# Patient Record
Sex: FEMALE | Race: WHITE | NOT HISPANIC OR LATINO | Employment: PART TIME | URBAN - METROPOLITAN AREA
[De-identification: names, ages, dates, MRNs, and addresses within clinical notes are randomized per-mention and may not be internally consistent; named-entity substitution may affect disease eponyms.]

---

## 2023-03-14 ENCOUNTER — TELEPHONE (OUTPATIENT)
Dept: PRIMARY CARE | Facility: CLINIC | Age: 73
End: 2023-03-14

## 2023-03-14 NOTE — TELEPHONE ENCOUNTER
"Pt called stating she has radiation scheduled for next week ( oncology) and feels very anxious about it; she is hoping to get something called in \"to take the edge off\". Her oncologist told her to call her PCP. Please advise.   "

## 2023-03-15 DIAGNOSIS — F41.9 ANXIETY: Primary | ICD-10-CM

## 2023-03-16 RX ORDER — ALPRAZOLAM 0.25 MG/1
0.25 TABLET ORAL EVERY 8 HOURS PRN
Qty: 21 TABLET | Refills: 0 | Status: SHIPPED | OUTPATIENT
Start: 2023-03-16 | End: 2024-02-02 | Stop reason: ALTCHOICE

## 2023-07-25 LAB — THYROTROPIN (MIU/L) IN SER/PLAS BY DETECTION LIMIT <= 0.05 MIU/L: 3.06 MIU/L (ref 0.44–3.98)

## 2023-08-08 LAB
ERYTHROCYTE DISTRIBUTION WIDTH (RATIO) BY AUTOMATED COUNT: 14.3 % (ref 11.5–14.5)
ERYTHROCYTE MEAN CORPUSCULAR HEMOGLOBIN CONCENTRATION (G/DL) BY AUTOMATED: 30.6 G/DL (ref 32–36)
ERYTHROCYTE MEAN CORPUSCULAR VOLUME (FL) BY AUTOMATED COUNT: 96 FL (ref 80–100)
ERYTHROCYTES (10*6/UL) IN BLOOD BY AUTOMATED COUNT: 4.23 X10E12/L (ref 4–5.2)
HEMATOCRIT (%) IN BLOOD BY AUTOMATED COUNT: 40.8 % (ref 36–46)
HEMOGLOBIN (G/DL) IN BLOOD: 12.5 G/DL (ref 12–16)
LEUKOCYTES (10*3/UL) IN BLOOD BY AUTOMATED COUNT: 6.2 X10E9/L (ref 4.4–11.3)
PLATELETS (10*3/UL) IN BLOOD AUTOMATED COUNT: 312 X10E9/L (ref 150–450)

## 2023-08-09 LAB
ALANINE AMINOTRANSFERASE (SGPT) (U/L) IN SER/PLAS: 22 U/L (ref 7–45)
ALBUMIN (G/DL) IN SER/PLAS: 4.2 G/DL (ref 3.4–5)
ALKALINE PHOSPHATASE (U/L) IN SER/PLAS: 127 U/L (ref 33–136)
ANION GAP IN SER/PLAS: 12 MMOL/L (ref 10–20)
ASPARTATE AMINOTRANSFERASE (SGOT) (U/L) IN SER/PLAS: 26 U/L (ref 9–39)
BILIRUBIN TOTAL (MG/DL) IN SER/PLAS: 0.3 MG/DL (ref 0–1.2)
CALCIUM (MG/DL) IN SER/PLAS: 10 MG/DL (ref 8.6–10.3)
CARBON DIOXIDE, TOTAL (MMOL/L) IN SER/PLAS: 30 MMOL/L (ref 21–32)
CHLORIDE (MMOL/L) IN SER/PLAS: 100 MMOL/L (ref 98–107)
CREATININE (MG/DL) IN SER/PLAS: 0.71 MG/DL (ref 0.5–1.05)
GFR FEMALE: >90 ML/MIN/1.73M2
GLUCOSE (MG/DL) IN SER/PLAS: 83 MG/DL (ref 74–99)
POTASSIUM (MMOL/L) IN SER/PLAS: 4.2 MMOL/L (ref 3.5–5.3)
PROTEIN TOTAL: 6.9 G/DL (ref 6.4–8.2)
SODIUM (MMOL/L) IN SER/PLAS: 138 MMOL/L (ref 136–145)
UREA NITROGEN (MG/DL) IN SER/PLAS: 28 MG/DL (ref 6–23)

## 2023-08-21 ENCOUNTER — HOSPITAL ENCOUNTER (OUTPATIENT)
Dept: DATA CONVERSION | Facility: HOSPITAL | Age: 73
End: 2023-08-21
Attending: OTOLARYNGOLOGY | Admitting: OTOLARYNGOLOGY
Payer: COMMERCIAL

## 2023-08-21 DIAGNOSIS — M79.89 OTHER SPECIFIED SOFT TISSUE DISORDERS: ICD-10-CM

## 2023-08-21 DIAGNOSIS — K08.89 OTHER SPECIFIED DISORDERS OF TEETH AND SUPPORTING STRUCTURES: ICD-10-CM

## 2023-08-21 DIAGNOSIS — L90.5 SCAR CONDITIONS AND FIBROSIS OF SKIN: ICD-10-CM

## 2023-08-21 DIAGNOSIS — C06.9 MALIGNANT NEOPLASM OF MOUTH, UNSPECIFIED (MULTI): ICD-10-CM

## 2023-08-22 LAB
AFB CULTURE: NORMAL
AFB STAIN: NORMAL

## 2023-09-29 VITALS — HEIGHT: 66 IN | BODY MASS INDEX: 19.7 KG/M2 | WEIGHT: 122.58 LBS

## 2023-10-01 PROBLEM — J10.1 INFLUENZA B: Status: ACTIVE | Noted: 2023-10-01

## 2023-10-01 PROBLEM — M79.672 LEFT FOOT PAIN: Status: ACTIVE | Noted: 2023-10-01

## 2023-10-01 PROBLEM — K13.70 MOUTH LESION: Status: ACTIVE | Noted: 2023-10-01

## 2023-10-01 PROBLEM — R29.898 ANKLE WEAKNESS: Status: ACTIVE | Noted: 2023-10-01

## 2023-10-01 PROBLEM — M17.12 PRIMARY OSTEOARTHRITIS OF LEFT KNEE: Status: ACTIVE | Noted: 2023-10-01

## 2023-10-01 PROBLEM — L03.90 CELLULITIS: Status: ACTIVE | Noted: 2023-10-01

## 2023-10-01 PROBLEM — K12.1 OTHER FORMS OF STOMATITIS: Status: ACTIVE | Noted: 2023-10-01

## 2023-10-01 PROBLEM — E03.9 ACQUIRED HYPOTHYROIDISM: Status: ACTIVE | Noted: 2023-10-01

## 2023-10-01 PROBLEM — T66.XXXA ADVERSE EFFECT OF RADIATION: Status: ACTIVE | Noted: 2023-10-01

## 2023-10-01 PROBLEM — L08.9 WOUND INFECTION: Status: ACTIVE | Noted: 2023-10-01

## 2023-10-01 PROBLEM — E04.1 THYROID NODULE: Status: ACTIVE | Noted: 2023-10-01

## 2023-10-01 PROBLEM — R11.2 NAUSEA AND/OR VOMITING: Status: ACTIVE | Noted: 2023-05-05

## 2023-10-01 PROBLEM — R25.2 TRISMUS: Status: ACTIVE | Noted: 2023-10-01

## 2023-10-01 PROBLEM — C02.3 CANCER OF ANTERIOR TWO-THIRDS OF TONGUE (MULTI): Status: ACTIVE | Noted: 2023-10-01

## 2023-10-01 PROBLEM — K92.2 GI BLEED: Status: ACTIVE | Noted: 2023-10-01

## 2023-10-01 PROBLEM — F41.9 ANXIETY: Status: ACTIVE | Noted: 2023-10-01

## 2023-10-01 PROBLEM — R13.10 DYSPHAGIA: Status: ACTIVE | Noted: 2023-10-01

## 2023-10-01 PROBLEM — D64.9 ANEMIA: Status: ACTIVE | Noted: 2023-10-01

## 2023-10-01 PROBLEM — J98.8 AIRWAY OBSTRUCTION: Status: ACTIVE | Noted: 2023-10-01

## 2023-10-01 PROBLEM — K21.9 ESOPHAGEAL REFLUX: Status: ACTIVE | Noted: 2023-10-01

## 2023-10-01 PROBLEM — J06.9 VIRAL UPPER RESPIRATORY TRACT INFECTION: Status: ACTIVE | Noted: 2023-10-01

## 2023-10-01 PROBLEM — M27.2 OSTEORADIONECROSIS OF JAW: Status: ACTIVE | Noted: 2023-10-01

## 2023-10-01 PROBLEM — R06.02 SHORTNESS OF BREATH: Status: ACTIVE | Noted: 2023-10-01
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
## 2023-10-01 PROBLEM — K14.8 TONGUE LESION: Status: ACTIVE | Noted: 2023-10-01

## 2023-10-01 PROBLEM — K12.1 ULCERATION, ORAL MUCOSA: Status: ACTIVE | Noted: 2023-10-01

## 2023-10-01 PROBLEM — K06.8 GUM LESION: Status: ACTIVE | Noted: 2023-10-01

## 2023-10-01 PROBLEM — R09.81 SINUS CONGESTION: Status: ACTIVE | Noted: 2023-10-01

## 2023-10-01 PROBLEM — E78.5 HLD (HYPERLIPIDEMIA): Status: ACTIVE | Noted: 2023-10-01

## 2023-10-01 PROBLEM — M25.672 STIFFNESS OF LEFT ANKLE JOINT: Status: ACTIVE | Noted: 2023-10-01

## 2023-10-01 PROBLEM — T14.8XXA WOUND INFECTION: Status: ACTIVE | Noted: 2023-10-01

## 2023-10-01 PROBLEM — C06.9 MALIGNANT NEOPLASM OF ORAL CAVITY (MULTI): Status: ACTIVE | Noted: 2023-10-01

## 2023-10-01 PROBLEM — C03.1: Status: ACTIVE | Noted: 2023-10-01

## 2023-10-01 PROBLEM — R92.8 ABNORMAL MAMMOGRAM: Status: ACTIVE | Noted: 2023-10-01

## 2023-10-01 PROBLEM — G47.00 INSOMNIA: Status: ACTIVE | Noted: 2023-10-01

## 2023-10-01 PROBLEM — L02.01 ABSCESS OF FACE: Status: ACTIVE | Noted: 2023-10-01

## 2023-10-01 PROBLEM — R07.89 CHEST TIGHTNESS: Status: ACTIVE | Noted: 2023-02-17

## 2023-10-01 PROBLEM — C02.9 MALIGNANT NEOPLASM OF TONGUE (MULTI): Status: ACTIVE | Noted: 2023-10-01

## 2023-10-01 RX ORDER — ASPIRIN 325 MG
TABLET ORAL
COMMUNITY
End: 2023-10-24 | Stop reason: ALTCHOICE

## 2023-10-01 RX ORDER — SODIUM CHLORIDE 0.9 G/100ML
IRRIGANT IRRIGATION
COMMUNITY
Start: 2023-02-17 | End: 2023-10-24 | Stop reason: ALTCHOICE

## 2023-10-01 RX ORDER — CHLORHEXIDINE GLUCONATE 40 MG/ML
SOLUTION TOPICAL
COMMUNITY
Start: 2023-01-30 | End: 2023-10-24 | Stop reason: ALTCHOICE

## 2023-10-01 RX ORDER — HYDROXYZINE HYDROCHLORIDE 10 MG/1
TABLET, FILM COATED ORAL
COMMUNITY
Start: 2023-02-23 | End: 2023-10-24 | Stop reason: ALTCHOICE

## 2023-10-01 RX ORDER — ONDANSETRON 8 MG/1
TABLET, ORALLY DISINTEGRATING ORAL
COMMUNITY
Start: 2023-03-28 | End: 2023-10-24 | Stop reason: ALTCHOICE

## 2023-10-01 RX ORDER — DOXYCYCLINE 100 MG/1
CAPSULE ORAL
COMMUNITY
Start: 2023-03-16 | End: 2023-10-24 | Stop reason: ALTCHOICE

## 2023-10-01 RX ORDER — OXYCODONE HCL 5 MG/5 ML
SOLUTION, ORAL ORAL
COMMUNITY
Start: 2023-02-13 | End: 2023-10-24 | Stop reason: ALTCHOICE

## 2023-10-01 RX ORDER — HYDROCODONE BITARTRATE AND ACETAMINOPHEN 7.5; 325 MG/1; MG/1
TABLET ORAL
COMMUNITY
Start: 2023-02-21 | End: 2023-10-24 | Stop reason: ALTCHOICE

## 2023-10-01 RX ORDER — ONDANSETRON 4 MG/1
TABLET, FILM COATED ORAL
COMMUNITY
Start: 2023-02-14 | End: 2023-10-24 | Stop reason: ALTCHOICE

## 2023-10-01 RX ORDER — MAGNESIUM HYDROXIDE 2400 MG/10ML
SUSPENSION ORAL
COMMUNITY
End: 2023-10-24 | Stop reason: ALTCHOICE

## 2023-10-01 RX ORDER — PETROLATUM 1 G/G
OINTMENT TOPICAL
COMMUNITY
End: 2023-10-24 | Stop reason: ALTCHOICE

## 2023-10-01 RX ORDER — TRAZODONE HYDROCHLORIDE 100 MG/1
TABLET ORAL
COMMUNITY
Start: 2023-01-20 | End: 2023-10-24 | Stop reason: ALTCHOICE

## 2023-10-01 RX ORDER — ESOMEPRAZOLE MAGNESIUM 20 MG/1
GRANULE, DELAYED RELEASE ORAL
COMMUNITY
Start: 2023-02-27 | End: 2023-10-24 | Stop reason: ALTCHOICE

## 2023-10-01 RX ORDER — GENTAMICIN SULFATE 1 MG/G
CREAM TOPICAL
COMMUNITY
Start: 2023-03-16 | End: 2023-10-24 | Stop reason: ALTCHOICE

## 2023-10-01 RX ORDER — CIPROFLOXACIN 500 MG/1
TABLET ORAL
COMMUNITY
Start: 2023-03-30 | End: 2023-10-24 | Stop reason: ALTCHOICE

## 2023-10-01 RX ORDER — HYDROCODONE BITARTRATE AND ACETAMINOPHEN 5; 325 MG/1; MG/1
TABLET ORAL
COMMUNITY
Start: 2023-01-25 | End: 2023-10-24 | Stop reason: ALTCHOICE

## 2023-10-01 RX ORDER — CEPHALEXIN 250 MG/5ML
POWDER, FOR SUSPENSION ORAL
COMMUNITY
Start: 2023-03-08 | End: 2023-10-24 | Stop reason: ALTCHOICE

## 2023-10-01 RX ORDER — CHLORHEXIDINE GLUCONATE ORAL RINSE 1.2 MG/ML
SOLUTION DENTAL
COMMUNITY
Start: 2023-03-16 | End: 2023-10-24 | Stop reason: ALTCHOICE

## 2023-10-01 RX ORDER — OXYCODONE HYDROCHLORIDE 5 MG/1
TABLET ORAL
COMMUNITY
Start: 2023-04-24 | End: 2023-10-24 | Stop reason: ALTCHOICE

## 2023-10-01 RX ORDER — SODIUM CHLORIDE FOR INHALATION 0.9 %
VIAL, NEBULIZER (ML) INHALATION
COMMUNITY
Start: 2023-02-13 | End: 2023-10-24 | Stop reason: ALTCHOICE

## 2023-10-01 NOTE — OP NOTE
PROCEDURE DETAILS    Preoperative Diagnosis:  1. Tissue thinning and fibrosis around mandibular plate  2. Loose left mandibular incisor   Postoperative Diagnosis:  1. Tissue thinning and fibrosis around mandibular plate  2. Loose left mandibular incisor   Surgeon: Dr. Fermin MD  Resident/Fellow/Other Assistant: Kamran Merino DO    Procedure:  1. Mandibular debridement   2. Right lower chin wound revision with local tissue rearrangement   3. Dental extraction   Anesthesia: General  Estimated Blood Loss: 2 cc  Findings: See operative report        Operative Report:   Indications:   Ms. 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 In order to address  her devitalized tooth, impending plate exposure and scar, I recommend taking her to the OR for a scar revision  and tooth extraction. Discussed the inherent risks, benefits and alternatives with the patient including but not limited to risk of bleeding,  infection, scarring, damage to surrounding structures, wound breakdown and need for further procedure. She verbalized understanding and agrees to proceed.     Description of procedure: Patient was brought back to the operating room and placed supine on the table with SCDs on and functioning. A huddle was performed with Dr. Zee present. She was turned over to anesthesia for proper induction via general anesthesia  and orotracheal intubation via fiberoptic scope. Once induced. She was turned 90 degrees from anesthesia. The incision was marked out and infiltrated with approximately 2 cc of 1% lidocaine with 1:100,000 epinephrine. She was then prepped and draped in  the usual sterile fashion.     Using a #15 blade full thickness skin incision was made on the previously marked area down through skin and subcutaneous tissue. There was fat necrosis present and thinning of the dermis around the mandibular  plate. The skin and underlying fat necrosis  were excised completely. The using hook retractors and #15 blade we undermined circumferentially to allow laxity of the surrounding tissues for maximal tension free closure. Deep dermal layer was closed using 3-0 chromic suture. The skin was then closed  using 5-0 prolene suture in a simple interrupted fashion. This was then covered with bacitracin.     Attention was then turned to the mouth. Dental extraction forceps were used to grasp the devitalized left mandibular incisor and gentle retraction was provided in a figure of 8 fashion until the entire tooth root was removed from the alveolar socket.  Rongeur was used to remove any remaining bone within the socket which was then irrigated with normal saline.     Patient was then turned over to anesthesia for proper awakening and taken to PACU in good condition without complication    Dr. Christensen was present and scrubbed for the entirety of the case                         Attestation:   Note Completion:  Attending Attestation I was present for the entire procedure          Electronic Signatures:  John Merino (DO (Resident))  (Signed 21-Aug-2023 13:48)   Authored: Post-Operative Note, Chart Review  Brandy Christensen)  (Signed 31-Aug-2023 08:58)   Authored: Note Completion   Co-Signer: Post-Operative Note, Chart Review      Last Updated: 31-Aug-2023 08:58 by Brandy Christensen)

## 2023-10-04 ENCOUNTER — APPOINTMENT (OUTPATIENT)
Dept: PHYSICAL THERAPY | Facility: CLINIC | Age: 73
End: 2023-10-04
Payer: COMMERCIAL

## 2023-10-10 ENCOUNTER — TREATMENT (OUTPATIENT)
Dept: PHYSICAL THERAPY | Facility: CLINIC | Age: 73
End: 2023-10-10
Payer: COMMERCIAL

## 2023-10-10 DIAGNOSIS — R29.898 OTHER SYMPTOMS AND SIGNS INVOLVING THE MUSCULOSKELETAL SYSTEM: ICD-10-CM

## 2023-10-10 DIAGNOSIS — M25.672 STIFFNESS OF LEFT ANKLE, NOT ELSEWHERE CLASSIFIED: ICD-10-CM

## 2023-10-10 DIAGNOSIS — M25.672 STIFFNESS OF LEFT ANKLE JOINT: ICD-10-CM

## 2023-10-10 DIAGNOSIS — R29.898 ANKLE WEAKNESS: Primary | ICD-10-CM

## 2023-10-10 DIAGNOSIS — M79.672 LEFT FOOT PAIN: ICD-10-CM

## 2023-10-10 DIAGNOSIS — M79.672 PAIN IN LEFT FOOT: ICD-10-CM

## 2023-10-10 PROCEDURE — 97140 MANUAL THERAPY 1/> REGIONS: CPT | Mod: GP

## 2023-10-10 PROCEDURE — 97110 THERAPEUTIC EXERCISES: CPT | Mod: GP

## 2023-10-10 ASSESSMENT — ENCOUNTER SYMPTOMS
OCCASIONAL FEELINGS OF UNSTEADINESS: 0
LOSS OF SENSATION IN FEET: 0
DEPRESSION: 0

## 2023-10-10 NOTE — PROGRESS NOTES
Physical Therapy    Physical Therapy Treatment    Patient Name: Brigette Romo  MRN: [id number]  Today's Date: 10/11/2023  Time Calculation  Start Time: 1430  Stop Time: 1515  Time Calculation (min): 45 min  Visit number: 8 of 10   [employer] Medicare   Medicare Certification Period: Beginnin2023 Ending: 10/ 19/ 2023      Assessment:   Pt demod limited response to DN this date. Pt will benefit from continued AAROM/AROM at home this week to help the scar tissue continue to move  to end range and reduce restriction.     Plan:   Continue per plan of care as tolerated. Patient to continue with HEP each day independently.      Current Problem  1. Ankle weakness        2. Left foot pain        3. Stiffness of left ankle joint        4. Stiffness of left ankle, not elsewhere classified  Follow Up In Physical Therapy      5. Pain in left foot  Follow Up In Physical Therapy      6. Other symptoms and signs involving the musculoskeletal system  Follow Up In Physical Therapy          Subjective   Pt reports no new complaints. Pt has been walking/shopping recently.    Precautions  Precautions  Precautions Comment: no falls in last 6 mo  Pain  Pain Assessment: 0-10  Pain Score: 0 - No pain    Objective   AROM L Ankle  DF 9  PF 40    AROM L Forefoot  Inv 34 --> 35  Ev 18    Redness at needle site, no pain or unusual skin reaction    Treatments:  Therapeutic exercise (26111):   Heel /toe raises off foam x10 each  Foam stance decreased YARITZA EC 30s, tandem 30s ea   Airex SLS 30s    Not this visit:  towel scrunch x 20  AAROM BTB ev/inv  YTB eversion loop on foot x 20   Side stepping with YTB - HEP  Ankle CW/CCW AAROM on [place] board x 20 ea  prop board ant/post /balance x20 1 minute   SIde stepping on foam with/without hurdles 2 minutes each   Step down L LE 4 inch 2 x10   Mini squats x10   Leg Press B LE 50 # x10 / 65 1x10.     Manual Therapy (08312):   STM lateral L lower leg scar tissue   Hindfoot and midfoot inv/ev AROM   TC  posterior glides gr 2-3  MTP ext   metatarsal glides anterior/posterior glides  digit distraction NV.    (5) .25 x 30mm DN to anterior border of scar   Patient educated in dry needling precautions, indications, and contraindications. Patient is aware of the risks and benefits of procedure and wishes to have it performed. Verbal and written consent provided. No adverse reaction to the dry needling noted.

## 2023-10-11 ASSESSMENT — PAIN SCALES - GENERAL: PAINLEVEL_OUTOF10: 0 - NO PAIN

## 2023-10-11 ASSESSMENT — PAIN - FUNCTIONAL ASSESSMENT: PAIN_FUNCTIONAL_ASSESSMENT: 0-10

## 2023-10-17 ENCOUNTER — TREATMENT (OUTPATIENT)
Dept: PHYSICAL THERAPY | Facility: CLINIC | Age: 73
End: 2023-10-17
Payer: COMMERCIAL

## 2023-10-17 DIAGNOSIS — R29.898 OTHER SYMPTOMS AND SIGNS INVOLVING THE MUSCULOSKELETAL SYSTEM: ICD-10-CM

## 2023-10-17 DIAGNOSIS — M79.672 PAIN IN LEFT FOOT: ICD-10-CM

## 2023-10-17 DIAGNOSIS — M25.672 STIFFNESS OF LEFT ANKLE, NOT ELSEWHERE CLASSIFIED: ICD-10-CM

## 2023-10-17 PROCEDURE — 97110 THERAPEUTIC EXERCISES: CPT | Mod: GP,CQ

## 2023-10-17 PROCEDURE — 97140 MANUAL THERAPY 1/> REGIONS: CPT | Mod: GP,CQ

## 2023-10-17 ASSESSMENT — PAIN SCALES - GENERAL: PAINLEVEL_OUTOF10: 0 - NO PAIN

## 2023-10-17 ASSESSMENT — PAIN - FUNCTIONAL ASSESSMENT: PAIN_FUNCTIONAL_ASSESSMENT: 0-10

## 2023-10-17 NOTE — PROGRESS NOTES
Physical Therapy    Physical Therapy Treatment    Patient Name: Brigette Romo  MRN: [id number]  Today's Date: 10/17/2023  Time Calculation  Start Time: 020  Stop Time: 245  Time Calculation (min): 40 min    Visit number: 8 of 10   [employer] Medicare   Medicare Certification Period: Beginnin2023 Ending: 10/ 19/ 2023           Plan:  Cont with progression     Current Problem  1. Stiffness of left ankle, not elsewhere classified  Follow Up In Physical Therapy      2. Pain in left foot  Follow Up In Physical Therapy      3. Other symptoms and signs involving the musculoskeletal system  Follow Up In Physical Therapy          Subjective   States that she continues to have no pain in L ankle however would like to improve mobility and strength.    Precautions  Precautions  Precautions Comment: No falls recently      Pain  Pain Assessment: 0-10  Pain Score: 0 - No pain    Objective   Gait improved toni decreased antalgia       Treatments:    Therapeutic exercise (84850):  30 minutes 2 units   Heel /toe raises off foam x10 each  Foam stance decreased YARITZA EC 30s, tandem 30s ea   Airex SLS 30s    Ankle CW/CCW AAROM on [place] board x 20 ea  prop board ant/post /balance x20 1 minute   Step down L LE 4 inch 2 x10   Mini squats x10   Leg Press B LE 80 # 2x10  Leg press Gastroc B LE 80 X20     Manual Therapy (22181): 10 minutes 1 unit   STM lateral L lower leg scar tissue   Hindfoot and midfoot inv/ev AROM   TC posterior glides gr 2-3  MTP ext

## 2023-10-24 ENCOUNTER — OFFICE VISIT (OUTPATIENT)
Dept: OTOLARYNGOLOGY | Facility: CLINIC | Age: 73
End: 2023-10-24
Payer: COMMERCIAL

## 2023-10-24 ENCOUNTER — TREATMENT (OUTPATIENT)
Dept: PHYSICAL THERAPY | Facility: CLINIC | Age: 73
End: 2023-10-24
Payer: COMMERCIAL

## 2023-10-24 VITALS — HEIGHT: 66 IN | WEIGHT: 125 LBS | BODY MASS INDEX: 20.09 KG/M2

## 2023-10-24 DIAGNOSIS — C02.9 MALIGNANT NEOPLASM OF TONGUE (MULTI): Primary | ICD-10-CM

## 2023-10-24 DIAGNOSIS — C03.1 CANCER OF LOWER GUM (MULTI): ICD-10-CM

## 2023-10-24 DIAGNOSIS — M79.672 PAIN IN LEFT FOOT: ICD-10-CM

## 2023-10-24 DIAGNOSIS — M25.672 STIFFNESS OF LEFT ANKLE, NOT ELSEWHERE CLASSIFIED: ICD-10-CM

## 2023-10-24 DIAGNOSIS — R29.898 OTHER SYMPTOMS AND SIGNS INVOLVING THE MUSCULOSKELETAL SYSTEM: ICD-10-CM

## 2023-10-24 DIAGNOSIS — R13.12 OROPHARYNGEAL DYSPHAGIA: ICD-10-CM

## 2023-10-24 PROCEDURE — 99213 OFFICE O/P EST LOW 20 MIN: CPT | Performed by: OTOLARYNGOLOGY

## 2023-10-24 PROCEDURE — 31575 DIAGNOSTIC LARYNGOSCOPY: CPT | Performed by: OTOLARYNGOLOGY

## 2023-10-24 PROCEDURE — 3008F BODY MASS INDEX DOCD: CPT | Performed by: OTOLARYNGOLOGY

## 2023-10-24 PROCEDURE — 1159F MED LIST DOCD IN RCRD: CPT | Performed by: OTOLARYNGOLOGY

## 2023-10-24 PROCEDURE — 1160F RVW MEDS BY RX/DR IN RCRD: CPT | Performed by: OTOLARYNGOLOGY

## 2023-10-24 PROCEDURE — 1036F TOBACCO NON-USER: CPT | Performed by: OTOLARYNGOLOGY

## 2023-10-24 PROCEDURE — 97110 THERAPEUTIC EXERCISES: CPT | Mod: GP,CQ

## 2023-10-24 PROCEDURE — 1126F AMNT PAIN NOTED NONE PRSNT: CPT | Performed by: OTOLARYNGOLOGY

## 2023-10-24 ASSESSMENT — PAIN SCALES - GENERAL: PAINLEVEL_OUTOF10: 0 - NO PAIN

## 2023-10-24 ASSESSMENT — PAIN - FUNCTIONAL ASSESSMENT: PAIN_FUNCTIONAL_ASSESSMENT: 0-10

## 2023-10-24 NOTE — PROGRESS NOTES
Patient Name: Brigette Romo  MRN: [id number]  Today's Date: 10/24/2023  Time Calculation  Start Time: 0200  Stop Time: 0243  Time Calculation (min): 43 min    Subjective:  States that she may possible have an infection on scar on chin. Going to surgeon after today's session.    Objective:  Gait decreased YARITZA decreased heel strike    Assessment:   Pts tissue mobility is improving . Gait quality improved with cues on proper mechanics.    Plan:   Continue to increase ankle stability.    Treatment :     Therapeutic exercise (75093): 40 minutes 2 units   Heel /toe raises off foam x10 each  Foam stance decreased YARITZA EC 30s, tandem 30s ea   Airex SLS 30s  prop board ant/post /balance x20 1 minute   Step down L LE 4 inch 2 x10   Mini squats x10   Leg Press B LE 80 # 2x10  Leg press SL 30# 2 x10  Cybex hamstring curl 30# x20   Cable column walk out all directions 7.5# x3 each    MANUAL TECHNIQUES  52803 3 minute no charge   MFR across incision  Current Problem:  1. Stiffness of left ankle, not elsewhere classified  Follow Up In Physical Therapy      2. Pain in left foot  Follow Up In Physical Therapy      3. Other symptoms and signs involving the musculoskeletal system  Follow Up In Physical Therapy              Pain:  No pain        Precautions:   Precautions  Precautions Comment: No falls

## 2023-10-24 NOTE — PROGRESS NOTES
Provider Impressions     Status post treatment of multiple different oral cavity cancers.  I do not see any evidence of any tumor recurrence.    Right mandibular plate exposure with some redness of the overlying soft tissue and skin.  She is put on Augmentin liquid for 2 months.    Dysphagia which is unlikely to get better.  She remains PEG dependent.    Thyroid nodule that was picked up on the PET scan. She may need to get a thyroid ultrasound at some point.     I will see her in 4 months      Chief Complaint     Follow-up status post treatment of tongue cancer      History of Present Illness    This lady was treated back in December 2014 for a T1 N1 lesion of her right tongue. She had surgical excision and a neck dissection followed by radiation therapy. She was found to have tumor recurrence and on September 22, 2016 she underwent reexcision. The margins were felt to be clear. There was no evidence of pedro luis metastasis. She had a TSH level in July 2023 which was normal. She had a chest CT in April 2020 which was negative. She was having some issues with discomfort on the lateral tongue on the left. This led to a biopsy that showed cancer. On April 13, 2020 she underwent a partial glossectomy. The margins were clear. She was presented at our tumor board and it was not felt that anything further needed to be done. She admits that her swallowing is somewhat difficult. She had a chest CT in May 2023 that showed a possible small nodule. I saw her in January 2023 because she had some concern about a lesion in her mouth. 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 She had a CT of the neck done on January 20, 2023 that showed a lesion involving the mandible on the right side. On February 7, 2023 she underwent surgery. There was bone involvement. There was a margin that was described as being close. She completed radiation therapy in April 2023. She had a procedure to try to cover her  reconstruction plate which helped but does not seem to have solved the issue completely.  She has had some recent drainage from around the plate exposure site.  She also has some redness of her right chin.  She also had a tooth pulled and the area has not healed.  She had a PET scan done in August 2023 that really does not show anything worrisome.  She is also PEG dependent.    Physical Exam      Examination of the oral cavity and oropharynx shows all the changes from the prior surgeries. The site of the recent dental extraction shows some exposed bone. Examination of the neck and palpation of the parotid, neck, and thyroid field is negative except for this is small area in the right mental area where the plate is now eroding through.  She does have some small amount of purulence coming from there.  Her right shin is also reddish in appearance.  A flexible laryngoscopy was carried out. Under topical Xylocaine and Leonard-Synephrine the scope was introduced through the nostril. The nasopharynx, base of tongue, hypopharynx, and larynx are visualized. The vocal cords are normally mobile. There is no pooling of secretions in the piriform sinuses. There is no evidence of any mucosal lesions. The airway is adequate.

## 2023-10-25 ENCOUNTER — TELEPHONE (OUTPATIENT)
Dept: OTOLARYNGOLOGY | Facility: CLINIC | Age: 73
End: 2023-10-25
Payer: COMMERCIAL

## 2023-10-25 DIAGNOSIS — M27.2 OSTEORADIONECROSIS OF JAW: ICD-10-CM
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
## 2023-10-25 DIAGNOSIS — L02.01 ABSCESS OF FACE: ICD-10-CM

## 2023-10-25 RX ORDER — AMOXICILLIN AND CLAVULANATE POTASSIUM 400; 57 MG/5ML; MG/5ML
POWDER, FOR SUSPENSION ORAL
Qty: 900 ML | Refills: 0 | Status: SHIPPED | OUTPATIENT
Start: 2023-10-25 | End: 2024-02-02 | Stop reason: ALTCHOICE

## 2023-10-25 NOTE — TELEPHONE ENCOUNTER
Brigette was seen yesterday by Dr. Magana who wanted her started on Augmentin liquid twice a day for one month and reduce to once a day for one month.      Sharon called and left me a message as her pharmacy didn't receive anything.  Chart reviewed and it was noted that the prescription didn't get sent in.    Prescription entered and sent in.  I notified Brigette and apologized for the delay.

## 2023-10-31 ENCOUNTER — APPOINTMENT (OUTPATIENT)
Dept: PHYSICAL THERAPY | Facility: CLINIC | Age: 73
End: 2023-10-31
Payer: COMMERCIAL

## 2023-11-07 ENCOUNTER — TELEPHONE (OUTPATIENT)
Dept: RADIATION ONCOLOGY | Facility: HOSPITAL | Age: 73
End: 2023-11-07
Payer: COMMERCIAL

## 2023-11-13 ENCOUNTER — HOSPITAL ENCOUNTER (OUTPATIENT)
Dept: RADIATION ONCOLOGY | Facility: HOSPITAL | Age: 73
Setting detail: RADIATION/ONCOLOGY SERIES
Discharge: HOME | End: 2023-11-13
Payer: COMMERCIAL

## 2023-11-13 VITALS
SYSTOLIC BLOOD PRESSURE: 97 MMHG | HEIGHT: 66 IN | BODY MASS INDEX: 20.27 KG/M2 | TEMPERATURE: 97.2 F | OXYGEN SATURATION: 98 % | HEART RATE: 73 BPM | RESPIRATION RATE: 18 BRPM | WEIGHT: 126.1 LBS | DIASTOLIC BLOOD PRESSURE: 52 MMHG

## 2023-11-13 DIAGNOSIS — I79.8 OTHER DISORDERS OF ARTERIES, ARTERIOLES AND CAPILLARIES IN DISEASES CLASSIFIED ELSEWHERE (CMS-HCC): ICD-10-CM

## 2023-11-13 DIAGNOSIS — C06.9 MALIGNANT NEOPLASM OF ORAL CAVITY (MULTI): ICD-10-CM

## 2023-11-13 DIAGNOSIS — C02.9 MALIGNANT NEOPLASM OF TONGUE (MULTI): ICD-10-CM

## 2023-11-13 DIAGNOSIS — K14.8 TONGUE LESION: Primary | ICD-10-CM

## 2023-11-13 DIAGNOSIS — C03.1 CANCER OF LOWER GUM (MULTI): ICD-10-CM

## 2023-11-13 DIAGNOSIS — Z92.3 HISTORY OF RADIATION THERAPY: ICD-10-CM

## 2023-11-13 PROCEDURE — 99214 OFFICE O/P EST MOD 30 MIN: CPT

## 2023-11-13 ASSESSMENT — ENCOUNTER SYMPTOMS
VOMITING: 0
CHILLS: 0
HEMOPTYSIS: 0
ABDOMINAL DISTENTION: 0
APPETITE CHANGE: 0
PALPITATIONS: 0
COUGH: 0
LOSS OF SENSATION IN FEET: 0
ARTHRALGIAS: 0
HEMATOLOGIC/LYMPHATIC NEGATIVE: 1
SEIZURES: 0
FATIGUE: 0
BLOOD IN STOOL: 0
HEMATURIA: 0
DIZZINESS: 0
TROUBLE SWALLOWING: 1
DIFFICULTY URINATING: 0
LIGHT-HEADEDNESS: 0
SORE THROAT: 0
OCCASIONAL FEELINGS OF UNSTEADINESS: 0
FEVER: 0
SPEECH DIFFICULTY: 0
EYE PROBLEMS: 1
HEADACHES: 0
NAUSEA: 0
BACK PAIN: 0
DIAPHORESIS: 0
ABDOMINAL PAIN: 0
NERVOUS/ANXIOUS: 0
DEPRESSION: 0
CONFUSION: 0
WHEEZING: 0
CONSTIPATION: 0
DIARRHEA: 0
CHEST TIGHTNESS: 0
NUMBNESS: 0

## 2023-11-13 ASSESSMENT — COLUMBIA-SUICIDE SEVERITY RATING SCALE - C-SSRS
1. IN THE PAST MONTH, HAVE YOU WISHED YOU WERE DEAD OR WISHED YOU COULD GO TO SLEEP AND NOT WAKE UP?: NO
2. HAVE YOU ACTUALLY HAD ANY THOUGHTS OF KILLING YOURSELF?: NO
6. HAVE YOU EVER DONE ANYTHING, STARTED TO DO ANYTHING, OR PREPARED TO DO ANYTHING TO END YOUR LIFE?: NO

## 2023-11-13 ASSESSMENT — PAIN SCALES - GENERAL: PAINLEVEL: 0-NO PAIN

## 2023-11-13 ASSESSMENT — PATIENT HEALTH QUESTIONNAIRE - PHQ9
SUM OF ALL RESPONSES TO PHQ9 QUESTIONS 1 AND 2: 0
2. FEELING DOWN, DEPRESSED OR HOPELESS: NOT AT ALL
1. LITTLE INTEREST OR PLEASURE IN DOING THINGS: NOT AT ALL

## 2023-11-13 NOTE — PROGRESS NOTES
Radiation Oncology Follow-Up    Patient Name:  Brigette Romo  MRN:  [id number]  :  1950    Referring Provider: No ref. provider found  Primary Care Provider: Eri Read DO  Care Team: Patient Care Team:  Eri Read DO as PCP - General  Eri Read DO as PCP - [employer] PCP  Brandy Christensen MD as Surgeon (Otolaryngology)  Sierra Gagnon MD as Radiation Oncologist (Radiation Oncology)  Maximino Aguilera DMD as Dentist (Prosthodontics)    Date of Service: 2023     SUBJECTIVE  History of Present Illness:   Mrs. Romo is a pleasant 72 y/o who is in clinic today for her first Radiation Oncology survival visit s/p diagnosis with a new primary T4N0M0 SCC of the  alveolar ridge.  She underwent a direct laryngoscopy, bronchoscopy, and Esophagoscopy with segmental mandibulectomy/ composite resection, right mandibular reconstruction with plating and right sided neck exploration of vessels on 23.  She then underwent  photon radiation (60Gy in 30 fractions) to the oral cavity ending on 23.  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  Today the patient states that she is doing well and has good energy.   Continues to have a fistula on the right jaw which is scabbed over on the outside.  Pt denies liquids draining from the site.  Is currently on Augmentin, per Dr. Magana.  Patient continues to follow the recommendations from her 23 MBS.  She eats soft pureed foods and does thin liquid and medications through her PEG.  Denies coughing or choking when eating.  Denies mucositis or any new lumps/bumps in the mouth or around the neck.  She endorses being able to maintain her weight with her current diet.  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  She manages with extra hydration and Biotene spray  throughout the day. She endorses significant trismus which began after her most recent treatments.  She continues to follow with SLP.  She does have some hearing loss which has been stable.  She does have some visual changes which she attributes to b/l cataracts.  She is scheduled to have eye surgery in Jan 2024.  Denies chills, fatigue, fever, dyspnea or chest pain.  Denies seizures,  headaches, dizziness and focal/sensory neurological changes.  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  She has a prism in the left lens of her glasses which helps.  Denies abdominal pain, nausea, vomiting, diarrhea or constipation.      Treatment Rendered:   Radiation Treatments       No radiation treatments to show. (Treatments may have been administered in another system.)            Review of Systems:   Review of Systems   Constitutional:  Negative for appetite change, chills, diaphoresis, fatigue and fever.   HENT:   Positive for hearing loss and trouble swallowing. Negative for lump/mass, mouth sores, nosebleeds, sore throat and tinnitus.         Is following a soft/puree diet for swallowing issues.    Eyes:  Positive for eye problems.   Respiratory:  Negative for chest tightness, cough, hemoptysis and wheezing.    Cardiovascular:  Negative for chest pain and palpitations.   Gastrointestinal:  Negative for abdominal distention, abdominal pain, blood in stool, constipation, diarrhea, nausea and vomiting.   Genitourinary:  Negative for difficulty urinating and hematuria.    Musculoskeletal:  Negative for arthralgias, back pain and gait problem.   Neurological:  Negative for dizziness, gait problem, headaches, light-headedness, numbness, seizures and speech difficulty.   Hematological: Negative.    Psychiatric/Behavioral:  Negative for confusion. The patient is not nervous/anxious.      The patient's current pain level was assessed.  They report currently having a pain of 0 out of 10.  They feel  their pain is under control without the use of pain medications.    Performance Status:   The Karnofsky performance scale today is 100, Fully active, able to carry on all pre-disease performed without restriction (ECOG equivalent 0).        OBJECTIVE  Vital Signs:  There were no vitals taken for this visit.   Physical Exam:  Physical Exam  Vitals and nursing note reviewed.   Constitutional:       General: She is not in acute distress.     Appearance: Normal appearance. She is normal weight. She is not ill-appearing.   HENT:      Head: Normocephalic and atraumatic. No masses.      Jaw: Trismus present. No tenderness, swelling or pain on movement.      Salivary Glands: Right salivary gland is not diffusely enlarged. Left salivary gland is not diffusely enlarged.        Comments: Scabbed over fistula.      Nose: Nose normal. No congestion or rhinorrhea.      Mouth/Throat:      Mouth: Mucous membranes are dry. No injury.      Dentition: Abnormal dentition. Does not have dentures. No dental tenderness, gingival swelling, dental caries or gum lesions.      Tongue: No lesions. Tongue does not deviate from midline.      Palate: No mass and lesions.      Pharynx: Pharyngeal swelling present. No oropharyngeal exudate or posterior oropharyngeal erythema.      Tonsils: No tonsillar exudate or tonsillar abscesses.   Eyes:      General: No scleral icterus.     Extraocular Movements: Extraocular movements intact.      Pupils: Pupils are equal, round, and reactive to light.   Cardiovascular:      Rate and Rhythm: Normal rate and regular rhythm.      Pulses: Normal pulses.      Heart sounds: Normal heart sounds. No murmur heard.  Pulmonary:      Effort: Pulmonary effort is normal. No respiratory distress.      Breath sounds: Normal breath sounds. No stridor. No wheezing or rhonchi.   Chest:      Chest wall: No tenderness.   Abdominal:      General: Abdomen is flat. Bowel sounds are normal.      Palpations: Abdomen is soft. There is  no mass.      Tenderness: There is no abdominal tenderness. There is no guarding or rebound.   Musculoskeletal:         General: No swelling or tenderness. Normal range of motion.      Cervical back: Normal range of motion and neck supple. No tenderness.   Skin:     General: Skin is warm and dry.      Coloration: Skin is not jaundiced.      Findings: No erythema or lesion.   Neurological:      General: No focal deficit present.      Mental Status: She is alert and oriented to person, place, and time.      Sensory: No sensory deficit.      Motor: No weakness.      Gait: Gait normal.      Deep Tendon Reflexes: Reflexes normal.   Psychiatric:         Mood and Affect: Mood normal.         Behavior: Behavior normal.         Thought Content: Thought content normal.         Judgment: Judgment normal.             ASSESSMENT:  Mrs. Romo is a pleasant 74 y/o who is in clinic today for a routine Radiation Oncology survival visit s/p diagnosis with a new primary T4N0M0 SCC of the  alveolar ridge.  She underwent a direct laryngoscopy, bronchoscopy, and Esophagoscopy with segmental mandibulectomy/ composite resection, right mandibular reconstruction with plating and right sided neck exploration of vessels on 2/27/23.  She then underwent  photon radiation (60Gy in 30 fractions) to the oral cavity ending on 4/28/23.  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  She continues to do very well in recovery.     PLAN:      #1 Cancer Status:   --FU with Radiation Oncology in 3 months.  --Continue to follow with Geo Magana and Fermin for ENT management.   --CT neck in 4/2024.  Schedule at next visit.   --Carotid ultrasound in 4/4024.  Schedule at next visit.      #2 Nutrition :  --Continue to follow with RD for nutritional needs.    --VS stable today.  --Continue with Soft/puree diet per SLP recommendations.      #2 Pain  Management:   --None     #3 Distress:   --Patient is in no distress.   --Instructed PT to call me if she has any concerns.      #4 Alcohol/Smoking Cessation:   --Never smoker  --Doesn't currently drink.   --No illicit drugs.     #5 Thyroid Surveillance:  --TSH 3.06 on 7/26/23.  --Dr. Magana managing surveillance.     #6 Dental:   --Will self-schedule.   --Unable to start high concentration fluoride because oral paresthesia when eating abrasive food items.   --Xerostomia--Continue hydrating with water and Biotene spray PRN.      #7 Swallowing:   --Significant trismus  --Continue to follow with SLP.      #8 Neck edema or shoulder weakness:  --Patient reports some soreness of her right shoulder.   --Continue to monitor.  --Patient to manage with Tylenol.      #9 Skin Care:   --Patient does report some tightening of her skin on the left neck.   --Continue to apply lotion on a daily basis.       #10 PCP:   --Patient to self schedule with her PCP at least twice a year.   --Patient to give PCP a copy of the survival plan.      #11 Eye & hearing:   --Follow with eye doctor for cateracts.   --Cataract surgery in Jan 2024.      Please reach out to us with any questions or concerns.   NCCN Guidelines were applicable to guide this patients treatment plan.  Evaristo Winston, APRN-CNP

## 2023-11-14 ENCOUNTER — TREATMENT (OUTPATIENT)
Dept: PHYSICAL THERAPY | Facility: CLINIC | Age: 73
End: 2023-11-14
Payer: COMMERCIAL

## 2023-11-14 DIAGNOSIS — M25.672 STIFFNESS OF LEFT ANKLE, NOT ELSEWHERE CLASSIFIED: Primary | ICD-10-CM

## 2023-11-14 DIAGNOSIS — R29.898 ANKLE WEAKNESS: ICD-10-CM

## 2023-11-14 DIAGNOSIS — M79.672 PAIN IN LEFT FOOT: ICD-10-CM

## 2023-11-14 DIAGNOSIS — R29.898 OTHER SYMPTOMS AND SIGNS INVOLVING THE MUSCULOSKELETAL SYSTEM: ICD-10-CM

## 2023-11-14 DIAGNOSIS — M25.672 STIFFNESS OF LEFT ANKLE JOINT: ICD-10-CM

## 2023-11-14 DIAGNOSIS — M79.672 LEFT FOOT PAIN: ICD-10-CM

## 2023-11-14 PROCEDURE — 97110 THERAPEUTIC EXERCISES: CPT | Mod: GP,CQ

## 2023-11-14 PROCEDURE — 97140 MANUAL THERAPY 1/> REGIONS: CPT | Mod: GP,CQ

## 2023-11-14 ASSESSMENT — PAIN - FUNCTIONAL ASSESSMENT: PAIN_FUNCTIONAL_ASSESSMENT: 0-10

## 2023-11-14 ASSESSMENT — PAIN SCALES - GENERAL: PAINLEVEL_OUTOF10: 0 - NO PAIN

## 2023-11-14 NOTE — PROGRESS NOTES
Patient Name: Brigette Romo  MRN: [id number]  Today's Date: 2023  Time Calculation  Start Time: 1500  Stop Time: 1545  Time Calculation (min): 45 min  Visit number: 10 of 10   [facility] Medicare   Medicare Certification Period: Beginnin2023 Ending: 10/ 19/ 2023       Subjective:  States that was out of town and that is why she has not had any follow up PT sessions . However she was able to hike recently without increased L LE pain . States that she still feels that her balance is slightly off however doing well.   Objective:  No gait deviations     Assessment:   Pt is progressing well with increased ankle mobility and stabilization. Dynamic stability still challenging however and would benefit from further balance progression.     Plan:   REEVAL NV   Treatment :   THERAPEUTIC EXERCISE 64067 35 minutes 2 units   Heel /toe raises off foam x10 each  Foam stance decreased YARITZA EC 30s, tandem 30s ea   Airex SLS 30s  Airex with Loaded LLE /RLE leg swings x10   Foam lateral/fwd/retro stepping over hurdles 2 minutes   Foam heel /toe raises   prop board ant/post /balance x20 1 minute   Step down  Fwd/lat L LE 4 inch 2 x10   Mini squats x10 NV  Leg Press B LE 80 # 2x10 NV   Leg press SL 30# 2 x10 NV  Cybex hamstring curl 30# x20 NV    Cable column walk out all directions 7.5# x3 each NV   MANUAL TECHNIQUES  38540 10 minutes 1 unit   STM lateral L lower leg scar tissue   Hindfoot and midfoot inv/ev AROM   TC posterior glides gr 2-3  MTP ext      Current Problem:  1. Stiffness of left ankle, not elsewhere classified        2. Pain in left foot        3. Other symptoms and signs involving the musculoskeletal system        4. Ankle weakness        5. Left foot pain        6. Stiffness of left ankle joint                Pain:  No pain   Precautions:   No recent falls

## 2023-11-20 ENCOUNTER — TREATMENT (OUTPATIENT)
Dept: PHYSICAL THERAPY | Facility: CLINIC | Age: 73
End: 2023-11-20
Payer: COMMERCIAL

## 2023-11-20 DIAGNOSIS — R29.898 OTHER SYMPTOMS AND SIGNS INVOLVING THE MUSCULOSKELETAL SYSTEM: ICD-10-CM

## 2023-11-20 DIAGNOSIS — M25.672 STIFFNESS OF LEFT ANKLE, NOT ELSEWHERE CLASSIFIED: ICD-10-CM

## 2023-11-20 DIAGNOSIS — M79.672 PAIN IN LEFT FOOT: ICD-10-CM

## 2023-11-20 PROCEDURE — 97110 THERAPEUTIC EXERCISES: CPT | Mod: GP

## 2023-11-20 ASSESSMENT — PAIN - FUNCTIONAL ASSESSMENT: PAIN_FUNCTIONAL_ASSESSMENT: 0-10

## 2023-11-20 ASSESSMENT — PAIN SCALES - GENERAL: PAINLEVEL_OUTOF10: 0 - NO PAIN

## 2023-11-20 NOTE — PROGRESS NOTES
Physical Therapy    Physical Therapy Treatment    Patient Name: Brigette Romo  MRN: [id number]  Today's Date: 11/20/2023  Time Calculation  Start Time: 1400  Stop Time: 1440  Time Calculation (min): 40 min  Visit number: 11  [employer]     Assessment:   Pt has improved in ankle ROM, slightly improved OM scores, improved ankle strength for inversion, eversion and DF. Pt no longer requires skilled therapy to continue her program.     Plan:   Discharge to [facility]. Met most important goals.     Current Problem  1. Stiffness of left ankle, not elsewhere classified  Follow Up In Physical Therapy      2. Pain in left foot  Follow Up In Physical Therapy      3. Other symptoms and signs involving the musculoskeletal system  Follow Up In Physical Therapy            Subjective   Pt reports some ankle stiffness, no pain recently. Overall she reports doing well.     Precautions  Precautions  Precautions Comment: No falls  Pain  Pain Assessment: 0-10  Pain Score: 0 - No pain    Objective     Ankle AROM (degrees):   Dorsiflexion R/L: 10/10 PROM 19/15  Plantarflexion R/L: 45/45  Inversion R/L: 18/ 10 PROM 25/ 21  Eversion R/L: 20/10 PROM 20/ 12    Strength:  Dorsiflexion R/L: 5/5  Inversion R/L: 5/4+  Eversion R/L: 5/4    Hip flexion, IR, ER ROM and HS length all WNL    Observation:  Tenderness: lateral R foot at 5th metatarsal resolved  Skin condition:graft is closed without current signs of infection, limited vertical> lateral skin mobility  Joint play: good movement at TC jt, improved movement at forefoot and intermetatarsal joints.     Treatments:  Re-assessment  Access Code: [email or web address]  URL: [email or web address]  Date: 11/20/2023  Prepared by: Nicole Calderon    Exercises  - Single Leg Stance  - 1 x daily - 7 x weekly - 3 sets - 10 reps  - Side Stepping with Resistance at Feet  - 1 x daily - 7 x weekly - 3 sets - 10 reps  - Eccentric Heel Lowering on Step  - 1 x daily - 7 x weekly - 3 sets - 10 reps  -  Forward Step Down  - 1 x daily - 7 x weekly - 3 sets - 10 reps  - Seated Ankle Inversion Eversion PROM  - 1 x daily - 7 x weekly - 3 sets - 10 reps  - Standing Gastroc Stretch on Step  - 1 x daily - 7 x weekly - 2 sets - 30s hold  - Standing Soleus Stretch on Step  - 1 x daily - 7 x weekly - 2 sets - 30s hold    Sheet with exercise descriptions and images issued. Skilled intervention utilized in the appropriate selection & application of above exercises. Verbal and tactile cues provided for proper form and technique. Pt demonstrated appropriate form & verbalized understanding of optimal technique for above exercises.    Goals:    By discharge DWIGHT PÉREZ will achieve the following goals:     Patient will verbalize pain (0-10) 0 /10 at rest and 2/10 or less with activity. MET    L AROM (degrees) will improve to greater or equal to: Dorsiflexion 10, Plantarflexion 35 , Inversion 21, and Eversion 18 , to improve joint mechanics during ADL’s. PARTIALLY MET    Strength will improve to greater than or equal to: Dorsiflexion 5/5, Inversion 5/5, and Eversion 5/5, to improve joint stability during ADL’s. PARTIALLY MRT     LEFS score will be = 70 /80 or better to demonstrate overall improved functional abilities. NOT MET    Patient will improve flexibility of the calf to 10 degrees or better to improve gait quality and ADL’s. MET    Patient will correctly perform home exercise program to progress current functional status. MET    Pt will report using stairs at home reciprocally without instability or LOB without use of HR to improve her functional level with household tasks. MET    Pt will demo improved scar tissue mobility to improve inversion at ankle.  MET    Pt will walk recreationally > 1 hour to increase her overall activity level, systemic health, and improve QoL.  MET

## 2023-11-27 ENCOUNTER — OFFICE VISIT (OUTPATIENT)
Dept: OTOLARYNGOLOGY | Facility: HOSPITAL | Age: 73
End: 2023-11-27
Payer: COMMERCIAL

## 2023-11-27 VITALS — BODY MASS INDEX: 20.33 KG/M2 | HEIGHT: 66 IN | TEMPERATURE: 97.5 F | WEIGHT: 126.5 LBS

## 2023-11-27 DIAGNOSIS — M27.2 OSTEORADIONECROSIS OF JAW: ICD-10-CM
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
## 2023-11-27 PROCEDURE — 99213 OFFICE O/P EST LOW 20 MIN: CPT | Performed by: OTOLARYNGOLOGY

## 2023-11-27 PROCEDURE — 1160F RVW MEDS BY RX/DR IN RCRD: CPT | Performed by: OTOLARYNGOLOGY

## 2023-11-27 PROCEDURE — 1159F MED LIST DOCD IN RCRD: CPT | Performed by: OTOLARYNGOLOGY

## 2023-11-27 PROCEDURE — 1036F TOBACCO NON-USER: CPT | Performed by: OTOLARYNGOLOGY

## 2023-11-27 PROCEDURE — 1126F AMNT PAIN NOTED NONE PRSNT: CPT | Performed by: OTOLARYNGOLOGY

## 2023-11-27 PROCEDURE — 3008F BODY MASS INDEX DOCD: CPT | Performed by: OTOLARYNGOLOGY

## 2023-11-27 RX ORDER — AMPICILLIN 500 MG/1
CAPSULE ORAL
COMMUNITY
Start: 2023-09-01 | End: 2023-12-19 | Stop reason: ALTCHOICE

## 2023-11-27 ASSESSMENT — PATIENT HEALTH QUESTIONNAIRE - PHQ9
1. LITTLE INTEREST OR PLEASURE IN DOING THINGS: NOT AT ALL
2. FEELING DOWN, DEPRESSED OR HOPELESS: NOT AT ALL
SUM OF ALL RESPONSES TO PHQ9 QUESTIONS 1 & 2: 0

## 2023-11-27 NOTE — LETTER
November 29, 2023     Eri Read DO  [address]  [address]  [facility] [address]    Patient: Brigette Romo   YOB: 1950   Date of Visit: 11/27/2023       Dear Dr. Eri Read DO:    Thank you for referring Brigette Romo to me for evaluation. Below are my notes for this consultation.  If you have questions, please do not hesitate to call me. I look forward to following your patient along with you.       Sincerely,     Brandy Christensen MD      CC: MD Maximino Wilson, DMD  ______________________________________________________________________________________    Subjective  Patient ID: Brigette Romo is a 73 y.o. female who presents for Follow-up.  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  She has done well since her surgery but most recently developed exposed hardware in the right parasymphyseal region.  She was taken back to the operating room where a closure was performed.  Since that time her hardware has once again exposed in the right parasymphyseal region and she has a near exposure in the left parasymphyseal region.  Recent CT imaging was performed and there is no evidence of a recurrence.  However there is concern for bony nonunion at the anterior osteotomy site.  She has not had a Panorex to date.      Objective  Physical Exam  On physical examination, he is a healthy-appearing woman in no distress.  Overlying the right parasymphyseal incision line she has a 1.5 x 0.7 cm dehiscence with exposed plate.  There is no purulence noted at this site.  In addition she has a 3 x 5 mm punctate area overlying her hardware in the left parasymphyseal submental region which has not yet exposed but exposure is imminent.  The remainder of her head neck exam is free of any  mucosal abnormalities.  Assessment/Plan  Brigette would very much like to have this exposed hardware situation dealt with definitively.  She is asked that we perform a procedure following the new year and certainly this is a reasonable request as this is a nonurgent situation.  I will need to evaluate a Panorex x-ray to determine if she has a bony nonunion.  The plan after the new year is 2 remove her hardware and if necessary create a union at the anterior osteotomy site.  I will also perform a bone scan to ensure that all segments of her fibula are viable.  She will return to see me in 2 weeks after the studies have been performed.

## 2023-11-28 ENCOUNTER — APPOINTMENT (OUTPATIENT)
Dept: PHYSICAL THERAPY | Facility: CLINIC | Age: 73
End: 2023-11-28
Payer: COMMERCIAL

## 2023-11-28 ENCOUNTER — APPOINTMENT (OUTPATIENT)
Dept: OTOLARYNGOLOGY | Facility: CLINIC | Age: 73
End: 2023-11-28
Payer: COMMERCIAL

## 2023-11-29 NOTE — PROGRESS NOTES
Subjective   Patient ID: Brigette Romo is a 73 y.o. female who presents for Follow-up.  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  She has done well since her surgery but most recently developed exposed hardware in the right parasymphyseal region.  She was taken back to the operating room where a closure was performed.  Since that time her hardware has once again exposed in the right parasymphyseal region and she has a near exposure in the left parasymphyseal region.  Recent CT imaging was performed and there is no evidence of a recurrence.  However there is concern for bony nonunion at the anterior osteotomy site.  She has not had a Panorex to date.      Objective   Physical Exam  On physical examination, he is a healthy-appearing woman in no distress.  Overlying the right parasymphyseal incision line she has a 1.5 x 0.7 cm dehiscence with exposed plate.  There is no purulence noted at this site.  In addition she has a 3 x 5 mm punctate area overlying her hardware in the left parasymphyseal submental region which has not yet exposed but exposure is imminent.  The remainder of her head neck exam is free of any mucosal abnormalities.  Assessment/Plan   Brigette would very much like to have this exposed hardware situation dealt with definitively.  She is asked that we perform a procedure following the new year and certainly this is a reasonable request as this is a nonurgent situation.  I will need to evaluate a Panorex x-ray to determine if she has a bony nonunion.  The plan after the new year is 2 remove her hardware and if necessary create a union at the anterior osteotomy site.  I will also perform a bone scan to ensure that all segments of her fibula are viable.  She will return to see me in 2 weeks after the studies have  been performed.

## 2023-12-04 ENCOUNTER — APPOINTMENT (OUTPATIENT)
Dept: PHYSICAL THERAPY | Facility: CLINIC | Age: 73
End: 2023-12-04
Payer: COMMERCIAL

## 2023-12-11 ENCOUNTER — APPOINTMENT (OUTPATIENT)
Dept: PHYSICAL THERAPY | Facility: CLINIC | Age: 73
End: 2023-12-11
Payer: COMMERCIAL

## 2023-12-18 ENCOUNTER — APPOINTMENT (OUTPATIENT)
Dept: PHYSICAL THERAPY | Facility: CLINIC | Age: 73
End: 2023-12-18
Payer: COMMERCIAL

## 2023-12-19 ENCOUNTER — OFFICE VISIT (OUTPATIENT)
Dept: OTOLARYNGOLOGY | Facility: HOSPITAL | Age: 73
End: 2023-12-19
Payer: COMMERCIAL

## 2023-12-19 ENCOUNTER — HOSPITAL ENCOUNTER (OUTPATIENT)
Dept: RADIOLOGY | Facility: HOSPITAL | Age: 73
Discharge: HOME | End: 2023-12-19
Payer: COMMERCIAL

## 2023-12-19 VITALS — BODY MASS INDEX: 20.88 KG/M2 | TEMPERATURE: 96.8 F | WEIGHT: 129.9 LBS | HEIGHT: 66 IN

## 2023-12-19 DIAGNOSIS — M27.2 OSTEORADIONECROSIS OF JAW: ICD-10-CM

## 2023-12-19 DIAGNOSIS — R23.9 UNSPECIFIED SKIN CHANGES: ICD-10-CM
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
## 2023-12-19 DIAGNOSIS — T84.498A EXPOSED ORTHOPAEDIC HARDWARE (CMS-HCC): Primary | ICD-10-CM

## 2023-12-19 PROCEDURE — 1159F MED LIST DOCD IN RCRD: CPT | Performed by: OTOLARYNGOLOGY

## 2023-12-19 PROCEDURE — 99213 OFFICE O/P EST LOW 20 MIN: CPT | Performed by: OTOLARYNGOLOGY

## 2023-12-19 PROCEDURE — 1126F AMNT PAIN NOTED NONE PRSNT: CPT | Performed by: OTOLARYNGOLOGY

## 2023-12-19 PROCEDURE — 70355 PANORAMIC X-RAY OF JAWS: CPT

## 2023-12-19 PROCEDURE — 1036F TOBACCO NON-USER: CPT | Performed by: OTOLARYNGOLOGY

## 2023-12-19 PROCEDURE — 70355 PANORAMIC X-RAY OF JAWS: CPT | Performed by: RADIOLOGY

## 2023-12-19 PROCEDURE — 1160F RVW MEDS BY RX/DR IN RCRD: CPT | Performed by: OTOLARYNGOLOGY

## 2023-12-19 PROCEDURE — 3008F BODY MASS INDEX DOCD: CPT | Performed by: OTOLARYNGOLOGY

## 2023-12-19 ASSESSMENT — PATIENT HEALTH QUESTIONNAIRE - PHQ9
2. FEELING DOWN, DEPRESSED OR HOPELESS: NOT AT ALL
SUM OF ALL RESPONSES TO PHQ9 QUESTIONS 1 & 2: 0
1. LITTLE INTEREST OR PLEASURE IN DOING THINGS: NOT AT ALL

## 2023-12-19 NOTE — LETTER
December 19, 2023     Eri Read DO  [address] [facility]  [address]  [facility] [address]    Patient: Brigette Romo   YOB: 1950   Date of Visit: 12/19/2023       Dear Dr. Eri Read DO:    Thank you for referring Brigette Romo to me for evaluation. Below are my notes for this consultation.  If you have questions, please do not hesitate to call me. I look forward to following your patient along with you.       Sincerely,     Brandy Christensen MD      CC: No Recipients  ______________________________________________________________________________________    Ms. 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  She has undergone an extensive workup for this which reveals she has a viable fibular flap with what appears to be mandibular continuity both on CT scanning as well as Panorex.  The patient has been on chronic antibiotics to keep this region from draining and currently it is dry.    On physical examination she has an area of exposed bone which is deep to the mental incisional line used to access her tumor.  Inspection of her oral cavity reveals a well-healed flap with no evidence of exposed bone intraorally.  The remainder of her head neck exam is free of any mucosal abnormalities.    Overall Ms. Romo would like to proceed with hardware removal.  She has cataract surgery scheduled in January and would like to have that prior to proceeding with this hardware removal.  I plan to remove the exposed hardware, assess the mandible for union and potentially use tibial bone graft should a nonunion be noted.  The patient understands the risks and benefits and has agreed to proceed with this surgical intervention.

## 2023-12-19 NOTE — PROGRESS NOTES
Ms. 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  She has undergone an extensive workup for this which reveals she has a viable fibular flap with what appears to be mandibular continuity both on CT scanning as well as Panorex.  The patient has been on chronic antibiotics to keep this region from draining and currently it is dry.    On physical examination she has an area of exposed bone which is deep to the mental incisional line used to access her tumor.  Inspection of her oral cavity reveals a well-healed flap with no evidence of exposed bone intraorally.  The remainder of her head neck exam is free of any mucosal abnormalities.    Overall Ms. Romo would like to proceed with hardware removal.  She has cataract surgery scheduled in January and would like to have that prior to proceeding with this hardware removal.  I plan to remove the exposed hardware, assess the mandible for union and potentially use tibial bone graft should a nonunion be noted.  The patient understands the risks and benefits and has agreed to proceed with this surgical intervention.

## 2024-01-22 NOTE — PROGRESS NOTES
Provider Impressions     Status post treatment of multiple different oral cavity cancers.  I do not see any evidence of any tumor recurrence.    Right mandibular plate exposure with some redness of the overlying soft tissue and skin.  She will undergo surgery in the near future to have this addressed.    Dysphagia which is unlikely to get better.  She remains PEG dependent.    Thyroid nodule that was picked up on the PET scan.  An ultrasound is ordered.     I will see her in 3 months      Chief Complaint     Follow-up status post treatment of tongue cancer      History of Present Illness    This lady was treated back in December 2014 for a T1 N1 lesion of her right tongue. She had surgical excision and a neck dissection followed by radiation therapy. She was found to have tumor recurrence and on September 22, 2016 she underwent reexcision. The margins were felt to be clear. There was no evidence of pedro luis metastasis. She had a TSH level in July 2023 which was normal. She had a chest CT in April 2020 which was negative. She was having some issues with discomfort on the lateral tongue on the left. This led to a biopsy that showed cancer. On April 13, 2020 she underwent a partial glossectomy. The margins were clear. She was presented at our tumor board and it was not felt that anything further needed to be done. She admits that her swallowing is somewhat difficult. She had a chest CT in May 2023 that showed a possible small nodule. I saw her in January 2023 because she had some concern about a lesion in her mouth. 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 She had a CT of the neck done on January 20, 2023 that showed a lesion involving the mandible on the right side. On February 7, 2023 she underwent surgery. There was bone involvement. There was a margin that was described as being close. She completed radiation therapy in April 2023. She had a procedure to try to cover her  reconstruction plate which helped but does not seem to have solved the issue completely.   She also had a tooth pulled and the area has not healed.  She had a PET scan done in August 2023 that really does not show anything worrisome.  At the same time she has some uptake in the right thyroid.  She is also PEG dependent.    Physical Exam      Examination of the oral cavity and oropharynx shows all the changes from the prior surgeries. The site of the dental extraction shows some exposed bone. Examination of the neck and palpation of the parotid, neck, and thyroid field is negative except for this is small area in the right mental area where the plate is now eroding through.      A flexible laryngoscopy was carried out. Under topical Xylocaine and Leonard-Synephrine the scope was introduced through the nostril. The nasopharynx, base of tongue, hypopharynx, and larynx are visualized. The vocal cords are normally mobile. There is no pooling of secretions in the piriform sinuses. There is no evidence of any mucosal lesions. The airway is adequate.

## 2024-01-23 ENCOUNTER — OFFICE VISIT (OUTPATIENT)
Dept: OTOLARYNGOLOGY | Facility: CLINIC | Age: 74
End: 2024-01-23
Payer: MEDICARE

## 2024-01-23 VITALS — HEIGHT: 66 IN | WEIGHT: 132 LBS | BODY MASS INDEX: 21.21 KG/M2

## 2024-01-23 DIAGNOSIS — M27.2 OSTEORADIONECROSIS OF JAW: ICD-10-CM

## 2024-01-23 DIAGNOSIS — C03.1 CANCER OF LOWER GUM (MULTI): Primary | ICD-10-CM

## 2024-01-23 DIAGNOSIS — R13.12 OROPHARYNGEAL DYSPHAGIA: ICD-10-CM
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
## 2024-01-23 DIAGNOSIS — C02.9 MALIGNANT NEOPLASM OF TONGUE (MULTI): ICD-10-CM

## 2024-01-23 DIAGNOSIS — E04.1 THYROID NODULE: ICD-10-CM

## 2024-01-23 PROCEDURE — 31575 DIAGNOSTIC LARYNGOSCOPY: CPT | Performed by: OTOLARYNGOLOGY

## 2024-01-23 PROCEDURE — 1036F TOBACCO NON-USER: CPT | Performed by: OTOLARYNGOLOGY

## 2024-01-23 PROCEDURE — 3008F BODY MASS INDEX DOCD: CPT | Performed by: OTOLARYNGOLOGY

## 2024-01-23 PROCEDURE — 1159F MED LIST DOCD IN RCRD: CPT | Performed by: OTOLARYNGOLOGY

## 2024-01-23 PROCEDURE — 99213 OFFICE O/P EST LOW 20 MIN: CPT | Performed by: OTOLARYNGOLOGY

## 2024-01-23 PROCEDURE — 1160F RVW MEDS BY RX/DR IN RCRD: CPT | Performed by: OTOLARYNGOLOGY

## 2024-01-23 PROCEDURE — 1126F AMNT PAIN NOTED NONE PRSNT: CPT | Performed by: OTOLARYNGOLOGY

## 2024-02-02 ENCOUNTER — OFFICE VISIT (OUTPATIENT)
Dept: PRIMARY CARE | Facility: CLINIC | Age: 74
End: 2024-02-02
Payer: MEDICARE

## 2024-02-02 ENCOUNTER — LAB (OUTPATIENT)
Dept: LAB | Facility: LAB | Age: 74
End: 2024-02-02
Payer: MEDICARE

## 2024-02-02 VITALS
BODY MASS INDEX: 21.05 KG/M2 | HEIGHT: 66 IN | DIASTOLIC BLOOD PRESSURE: 62 MMHG | TEMPERATURE: 97.8 F | SYSTOLIC BLOOD PRESSURE: 108 MMHG | WEIGHT: 131 LBS | OXYGEN SATURATION: 97 % | HEART RATE: 83 BPM | RESPIRATION RATE: 20 BRPM

## 2024-02-02 DIAGNOSIS — Z00.00 HEALTHCARE MAINTENANCE: ICD-10-CM

## 2024-02-02 DIAGNOSIS — Z93.1 GASTROSTOMY STATUS (MULTI): ICD-10-CM

## 2024-02-02 DIAGNOSIS — Z00.00 ROUTINE GENERAL MEDICAL EXAMINATION AT HEALTH CARE FACILITY: Primary | ICD-10-CM

## 2024-02-02 DIAGNOSIS — E78.2 MIXED HYPERLIPIDEMIA: ICD-10-CM

## 2024-02-02 DIAGNOSIS — R23.9 UNSPECIFIED SKIN CHANGES: ICD-10-CM

## 2024-02-02 DIAGNOSIS — I79.8 OTHER DISORDERS OF ARTERIES, ARTERIOLES AND CAPILLARIES IN DISEASES CLASSIFIED ELSEWHERE (CMS-HCC): ICD-10-CM

## 2024-02-02 DIAGNOSIS — Z12.31 ENCOUNTER FOR SCREENING MAMMOGRAM FOR BREAST CANCER: ICD-10-CM

## 2024-02-02 DIAGNOSIS — E43 UNSPECIFIED SEVERE PROTEIN-CALORIE MALNUTRITION (MULTI): ICD-10-CM

## 2024-02-02 DIAGNOSIS — C41.1 MALIGNANT NEOPLASM OF MANDIBLE (MULTI): ICD-10-CM

## 2024-02-02 DIAGNOSIS — T84.498A EXPOSED ORTHOPAEDIC HARDWARE (CMS-HCC): ICD-10-CM

## 2024-02-02 LAB
ALBUMIN SERPL BCP-MCNC: 4.6 G/DL (ref 3.4–5)
ALP SERPL-CCNC: 80 U/L (ref 33–136)
ALT SERPL W P-5'-P-CCNC: 20 U/L (ref 7–45)
ANION GAP SERPL CALC-SCNC: 15 MMOL/L (ref 10–20)
AST SERPL W P-5'-P-CCNC: 20 U/L (ref 9–39)
BILIRUB SERPL-MCNC: 0.7 MG/DL (ref 0–1.2)
BUN SERPL-MCNC: 18 MG/DL (ref 6–23)
CALCIUM SERPL-MCNC: 10.6 MG/DL (ref 8.6–10.3)
CHLORIDE SERPL-SCNC: 103 MMOL/L (ref 98–107)
CHOLEST SERPL-MCNC: 257 MG/DL (ref 0–199)
CHOLESTEROL/HDL RATIO: 4.4
CO2 SERPL-SCNC: 29 MMOL/L (ref 21–32)
CREAT SERPL-MCNC: 0.75 MG/DL (ref 0.5–1.05)
EGFRCR SERPLBLD CKD-EPI 2021: 85 ML/MIN/1.73M*2
ERYTHROCYTE [DISTWIDTH] IN BLOOD BY AUTOMATED COUNT: 12.3 % (ref 11.5–14.5)
GLUCOSE SERPL-MCNC: 86 MG/DL (ref 74–99)
HCT VFR BLD AUTO: 45.6 % (ref 36–46)
HDLC SERPL-MCNC: 58.2 MG/DL
HGB BLD-MCNC: 15 G/DL (ref 12–16)
LDLC SERPL CALC-MCNC: 166 MG/DL
MCH RBC QN AUTO: 31.3 PG (ref 26–34)
MCHC RBC AUTO-ENTMCNC: 32.9 G/DL (ref 32–36)
MCV RBC AUTO: 95 FL (ref 80–100)
NON HDL CHOLESTEROL: 199 MG/DL (ref 0–149)
NRBC BLD-RTO: 0 /100 WBCS (ref 0–0)
PLATELET # BLD AUTO: 274 X10*3/UL (ref 150–450)
POTASSIUM SERPL-SCNC: 4.9 MMOL/L (ref 3.5–5.3)
PREALB SERPL-MCNC: 25.5 MG/DL (ref 18–40)
PROT SERPL-MCNC: 6.8 G/DL (ref 6.4–8.2)
RBC # BLD AUTO: 4.8 X10*6/UL (ref 4–5.2)
SODIUM SERPL-SCNC: 142 MMOL/L (ref 136–145)
TRIGL SERPL-MCNC: 166 MG/DL (ref 0–149)
TSH SERPL-ACNC: 4.27 MIU/L (ref 0.44–3.98)
VLDL: 33 MG/DL (ref 0–40)
WBC # BLD AUTO: 5.6 X10*3/UL (ref 4.4–11.3)

## 2024-02-02 PROCEDURE — 1159F MED LIST DOCD IN RCRD: CPT | Performed by: FAMILY MEDICINE

## 2024-02-02 PROCEDURE — 1160F RVW MEDS BY RX/DR IN RCRD: CPT | Performed by: FAMILY MEDICINE

## 2024-02-02 PROCEDURE — 80061 LIPID PANEL: CPT

## 2024-02-02 PROCEDURE — 3008F BODY MASS INDEX DOCD: CPT | Performed by: FAMILY MEDICINE

## 2024-02-02 PROCEDURE — G0439 PPPS, SUBSEQ VISIT: HCPCS | Performed by: FAMILY MEDICINE

## 2024-02-02 PROCEDURE — 84134 ASSAY OF PREALBUMIN: CPT

## 2024-02-02 PROCEDURE — 1126F AMNT PAIN NOTED NONE PRSNT: CPT | Performed by: FAMILY MEDICINE

## 2024-02-02 PROCEDURE — 1036F TOBACCO NON-USER: CPT | Performed by: FAMILY MEDICINE

## 2024-02-02 PROCEDURE — 80053 COMPREHEN METABOLIC PANEL: CPT

## 2024-02-02 PROCEDURE — 1170F FXNL STATUS ASSESSED: CPT | Performed by: FAMILY MEDICINE

## 2024-02-02 PROCEDURE — 99397 PER PM REEVAL EST PAT 65+ YR: CPT | Performed by: FAMILY MEDICINE

## 2024-02-02 PROCEDURE — 85027 COMPLETE CBC AUTOMATED: CPT

## 2024-02-02 PROCEDURE — 84443 ASSAY THYROID STIM HORMONE: CPT

## 2024-02-02 PROCEDURE — 36415 COLL VENOUS BLD VENIPUNCTURE: CPT

## 2024-02-02 ASSESSMENT — ENCOUNTER SYMPTOMS
RESPIRATORY NEGATIVE: 1
CARDIOVASCULAR NEGATIVE: 1
CONSTITUTIONAL NEGATIVE: 1
EYES NEGATIVE: 1
GASTROINTESTINAL NEGATIVE: 1
DEPRESSION: 0
NEUROLOGICAL NEGATIVE: 1
OCCASIONAL FEELINGS OF UNSTEADINESS: 0
PSYCHIATRIC NEGATIVE: 1
ENDOCRINE NEGATIVE: 1
LOSS OF SENSATION IN FEET: 0

## 2024-02-02 ASSESSMENT — PATIENT HEALTH QUESTIONNAIRE - PHQ9
2. FEELING DOWN, DEPRESSED OR HOPELESS: NOT AT ALL
1. LITTLE INTEREST OR PLEASURE IN DOING THINGS: NOT AT ALL
SUM OF ALL RESPONSES TO PHQ9 QUESTIONS 1 AND 2: 0

## 2024-02-02 ASSESSMENT — ACTIVITIES OF DAILY LIVING (ADL)
GROCERY_SHOPPING: INDEPENDENT
BATHING: INDEPENDENT
DOING_HOUSEWORK: INDEPENDENT
DRESSING: INDEPENDENT
MANAGING_FINANCES: INDEPENDENT
TAKING_MEDICATION: INDEPENDENT

## 2024-02-02 NOTE — PROGRESS NOTES
"Subjective   Reason for Visit: Brigette Romo is an 73 y.o. female here for a Medicare Wellness visit.     Past Medical, Surgical, and Family History reviewed and updated in chart.    Reviewed all medications by prescribing practitioner or clinical pharmacist (such as prescriptions, OTCs, herbal therapies and supplements) and documented in the medical record.    HPI Has another surgery coming up 3/4/24.     Patient Care Team:  Eri Read DO as PCP - General  Eri Read DO as PCP - [employer] PCP  Brandy Christensen MD as Surgeon (Otolaryngology)  Sierra Gagnon MD as Radiation Oncologist (Radiation Oncology)  Maximino Aguilera DMD as Dentist (Prosthodontics)     Review of Systems   Constitutional: Negative.    HENT: Negative.     Eyes: Negative.    Respiratory: Negative.     Cardiovascular: Negative.    Gastrointestinal: Negative.    Endocrine: Negative.    Genitourinary: Negative.    Skin: Negative.    Neurological: Negative.    Psychiatric/Behavioral: Negative.         Objective   Vitals:  /62 (BP Location: Right arm, Patient Position: Sitting)   Pulse 83   Temp 36.6 °C (97.8 °F) (Temporal)   Resp 20   Ht 1.676 m (5' 6\")   Wt 59.4 kg (131 lb)   SpO2 97%   BMI 21.14 kg/m²       Physical Exam  Constitutional:       General: She is not in acute distress.     Appearance: Normal appearance.   HENT:      Head: Normocephalic and atraumatic.      Right Ear: Tympanic membrane normal.      Left Ear: Tympanic membrane normal.   Eyes:      Conjunctiva/sclera: Conjunctivae normal.      Pupils: Pupils are equal, round, and reactive to light.   Neck:      Vascular: No carotid bruit.   Cardiovascular:      Rate and Rhythm: Normal rate and regular rhythm.      Heart sounds: Normal heart sounds.   Pulmonary:      Effort: Pulmonary effort is normal.      Breath sounds: Normal breath sounds.   Abdominal:      General: Bowel sounds are normal.      Palpations: Abdomen is soft. "   Musculoskeletal:      Cervical back: Neck supple. No tenderness.   Skin:     General: Skin is warm and dry.   Neurological:      General: No focal deficit present.      Mental Status: She is alert.   Psychiatric:         Mood and Affect: Mood normal.         Behavior: Behavior normal.         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

## 2024-02-06 ENCOUNTER — TELEPHONE (OUTPATIENT)
Dept: OTOLARYNGOLOGY | Facility: HOSPITAL | Age: 74
End: 2024-02-06
Payer: MEDICARE

## 2024-02-06 DIAGNOSIS — E03.9 HYPOTHYROIDISM (ACQUIRED): Primary | ICD-10-CM

## 2024-02-06 DIAGNOSIS — E03.2 HYPOTHYROIDISM DUE TO NON-MEDICATION EXOGENOUS SUBSTANCES: ICD-10-CM

## 2024-02-06 RX ORDER — LEVOTHYROXINE SODIUM 25 UG/1
25 TABLET ORAL ONCE
Status: SHIPPED | OUTPATIENT
Start: 2024-02-06

## 2024-02-06 NOTE — TELEPHONE ENCOUNTER
"I called and spoke with Brigette about her TSH results.  \"I knew this call was coming.  [facility]    I explained that hypothyroidism can impair the healing process and with  her surgery coming up, we don't need that.  She completely agreed.    I reviewed with her how to take the medication:  on an empty stomach with water only and then wait at least 1 hour before having food, medication or beverages other than plain water.    We will plan on repeating the TSH level in approx. 6-8 weeks.    Brigette verbalized an understanding of the treatment plan and appreciation of the call.  "

## 2024-02-06 NOTE — TELEPHONE ENCOUNTER
----- Message from Brandy Christensen MD sent at 2/6/2024  8:56 AM EST -----  Cori:  Ms. Romo's TSH is mildly elevated. In light of her upcoming surgery, we should start her on a small dose (25mcg) to get her into the normal range prior to her surgery.  I've ordered it but please inform her of the rationale.  Thanks  marco antonio

## 2024-02-07 ENCOUNTER — HOSPITAL ENCOUNTER (OUTPATIENT)
Dept: RADIOLOGY | Facility: CLINIC | Age: 74
Discharge: HOME | End: 2024-02-07
Payer: MEDICARE

## 2024-02-07 ENCOUNTER — TELEPHONE (OUTPATIENT)
Dept: OTOLARYNGOLOGY | Facility: CLINIC | Age: 74
End: 2024-02-07
Payer: MEDICARE

## 2024-02-07 DIAGNOSIS — E04.1 THYROID NODULE: ICD-10-CM

## 2024-02-07 DIAGNOSIS — E03.9 HYPOTHYROIDISM (ACQUIRED): ICD-10-CM

## 2024-02-07 PROCEDURE — 76536 US EXAM OF HEAD AND NECK: CPT

## 2024-02-07 PROCEDURE — 76536 US EXAM OF HEAD AND NECK: CPT | Performed by: RADIOLOGY

## 2024-02-07 RX ORDER — LEVOTHYROXINE SODIUM 25 UG/1
25 TABLET ORAL
Qty: 90 TABLET | Refills: 0 | Status: SHIPPED | OUTPATIENT
Start: 2024-02-07 | End: 2024-04-26 | Stop reason: SDUPTHER

## 2024-02-07 NOTE — TELEPHONE ENCOUNTER
Sharon called and left me a message at 8:04 a.m. asking that I call her as she has several questions.     She reported that her pharmacy didn't receive the prescription Dr. Christensen sent in yesterday.  I reviewed the chart and it doesn't appear it went to a pharmacy.  It just was added to her medication list.  I entered the order and will notify Dr. Christensen to sign off on it ASAP.  No reorder notification came up, so that also indicated to me that it was never entered as an order to go to a pharmacy.  She asked if the pills were crushable, which they are.  She wanted to clarify if she was hyper or hypothyroid--HYPOTHYROID    I called Brigette and informed her of the above.  She was appreciative of the call back information and very understanding about the prescription issue.

## 2024-02-08 ENCOUNTER — TELEPHONE (OUTPATIENT)
Dept: OTOLARYNGOLOGY | Facility: HOSPITAL | Age: 74
End: 2024-02-08
Payer: MEDICARE
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
## 2024-02-08 DIAGNOSIS — L03.211 CELLULITIS OF FACE: ICD-10-CM

## 2024-02-08 DIAGNOSIS — M27.2 OSTEORADIONECROSIS OF JAW: ICD-10-CM

## 2024-02-08 DIAGNOSIS — L02.01 ABSCESS OF FACE: ICD-10-CM

## 2024-02-08 DIAGNOSIS — T84.498A EXPOSED ORTHOPAEDIC HARDWARE (CMS-HCC): ICD-10-CM

## 2024-02-08 RX ORDER — AMOXICILLIN AND CLAVULANATE POTASSIUM 400; 57 MG/5ML; MG/5ML
875 POWDER, FOR SUSPENSION ORAL EVERY 12 HOURS SCHEDULED
Qty: 450 ML | Refills: 0 | Status: SHIPPED | OUTPATIENT
Start: 2024-02-08 | End: 2024-03-04 | Stop reason: HOSPADM

## 2024-02-08 NOTE — TELEPHONE ENCOUNTER
"Sharon called and left me a message at 8:02 a.m. stating the \"right side of my face is swollen and itchy.\"  She also adds that there is \"crusting around the hardware on her chin.\"    I messaged Dr. Christensen about initiating ATB therapy and if he wanted her to remain on it until surgery, which is 3/4/24.  He did want ATB therapy started at full dosing for 2 weeks and then reduce to 1/2 dosing until DOS.    I called and informed Brigette of this.  \"I was expecting this.\"  We resumed the prescription for Augmentin which she was on back in October.    Prescription pended for Dr. Christensen's authorization.  "

## 2024-02-09 ENCOUNTER — TELEPHONE (OUTPATIENT)
Dept: OTOLARYNGOLOGY | Facility: HOSPITAL | Age: 74
End: 2024-02-09
Payer: MEDICARE

## 2024-02-09 DIAGNOSIS — E04.1 THYROID NODULE: ICD-10-CM

## 2024-02-13 ENCOUNTER — TELEPHONE (OUTPATIENT)
Dept: RADIATION ONCOLOGY | Facility: HOSPITAL | Age: 74
End: 2024-02-13
Payer: MEDICARE

## 2024-02-14 ENCOUNTER — HOSPITAL ENCOUNTER (OUTPATIENT)
Dept: RADIATION ONCOLOGY | Facility: HOSPITAL | Age: 74
Setting detail: RADIATION/ONCOLOGY SERIES
Discharge: HOME | End: 2024-02-14
Payer: MEDICARE

## 2024-02-14 ENCOUNTER — HOSPITAL ENCOUNTER (OUTPATIENT)
Dept: VASCULAR MEDICINE | Facility: HOSPITAL | Age: 74
Discharge: HOME | End: 2024-02-14
Payer: MEDICARE

## 2024-02-14 VITALS
HEART RATE: 84 BPM | HEIGHT: 66 IN | OXYGEN SATURATION: 98 % | DIASTOLIC BLOOD PRESSURE: 67 MMHG | TEMPERATURE: 96.8 F | BODY MASS INDEX: 21.15 KG/M2 | WEIGHT: 131.61 LBS | RESPIRATION RATE: 18 BRPM | SYSTOLIC BLOOD PRESSURE: 109 MMHG

## 2024-02-14 DIAGNOSIS — I79.8 OTHER DISORDERS OF ARTERIES, ARTERIOLES AND CAPILLARIES IN DISEASES CLASSIFIED ELSEWHERE (CMS-HCC): ICD-10-CM

## 2024-02-14 DIAGNOSIS — C02.9 MALIGNANT NEOPLASM OF TONGUE (MULTI): ICD-10-CM

## 2024-02-14 DIAGNOSIS — C06.9 MALIGNANT NEOPLASM OF ORAL CAVITY (MULTI): ICD-10-CM

## 2024-02-14 DIAGNOSIS — C03.1 CANCER OF LOWER GUM (MULTI): ICD-10-CM

## 2024-02-14 DIAGNOSIS — Z92.3 HISTORY OF RADIATION THERAPY: ICD-10-CM

## 2024-02-14 DIAGNOSIS — I79.1: ICD-10-CM

## 2024-02-14 DIAGNOSIS — K14.8 TONGUE LESION: ICD-10-CM

## 2024-02-14 PROCEDURE — 99214 OFFICE O/P EST MOD 30 MIN: CPT

## 2024-02-14 PROCEDURE — 99214 OFFICE O/P EST MOD 30 MIN: CPT | Mod: 25,27

## 2024-02-14 PROCEDURE — 93880 EXTRACRANIAL BILAT STUDY: CPT | Performed by: SURGERY

## 2024-02-14 PROCEDURE — 93880 EXTRACRANIAL BILAT STUDY: CPT

## 2024-02-14 ASSESSMENT — ENCOUNTER SYMPTOMS
DIZZINESS: 0
LIGHT-HEADEDNESS: 0
NUMBNESS: 0
FEVER: 0
ABDOMINAL PAIN: 0
DIAPHORESIS: 0
SPEECH DIFFICULTY: 0
COUGH: 0
HEADACHES: 0
CHILLS: 0
BLOOD IN STOOL: 0
EYE PROBLEMS: 1
DIFFICULTY URINATING: 0
WHEEZING: 0
CONSTIPATION: 0
PALPITATIONS: 0
APPETITE CHANGE: 0
NERVOUS/ANXIOUS: 0
HEMOPTYSIS: 0
HEMATURIA: 0
BACK PAIN: 0
TROUBLE SWALLOWING: 1
FATIGUE: 0
OCCASIONAL FEELINGS OF UNSTEADINESS: 0
CHEST TIGHTNESS: 0
NAUSEA: 0
ARTHRALGIAS: 0
DIARRHEA: 0
ABDOMINAL DISTENTION: 0
CONFUSION: 0
ADENOPATHY: 0
SORE THROAT: 0
LOSS OF SENSATION IN FEET: 0
SEIZURES: 0
DEPRESSION: 0
VOMITING: 0

## 2024-02-14 ASSESSMENT — PATIENT HEALTH QUESTIONNAIRE - PHQ9
2. FEELING DOWN, DEPRESSED OR HOPELESS: NOT AT ALL
SUM OF ALL RESPONSES TO PHQ9 QUESTIONS 1 AND 2: 0
1. LITTLE INTEREST OR PLEASURE IN DOING THINGS: NOT AT ALL

## 2024-02-14 ASSESSMENT — PAIN SCALES - GENERAL: PAINLEVEL: 0-NO PAIN

## 2024-02-14 ASSESSMENT — COLUMBIA-SUICIDE SEVERITY RATING SCALE - C-SSRS
2. HAVE YOU ACTUALLY HAD ANY THOUGHTS OF KILLING YOURSELF?: NO
1. IN THE PAST MONTH, HAVE YOU WISHED YOU WERE DEAD OR WISHED YOU COULD GO TO SLEEP AND NOT WAKE UP?: NO
6. HAVE YOU EVER DONE ANYTHING, STARTED TO DO ANYTHING, OR PREPARED TO DO ANYTHING TO END YOUR LIFE?: NO

## 2024-02-14 NOTE — PROGRESS NOTES
Radiation Oncology Follow-Up    Patient Name:  Brigette Romo  MRN:  [id number]  :  1950    Referring Provider: Evaristo Winston, *  Primary Care Provider: Eri Read DO  Care Team: Patient Care Team:  Eri Read DO as PCP - General  Eri Read DO as PCP - [employer] PCP  Brandy Christensen MD as Surgeon (Otolaryngology)  Sierra Gagnon MD as Radiation Oncologist (Radiation Oncology)  Maximino Aguilera DMD as Dentist (Prosthodontics)    Date of Service: 2024     SUBJECTIVE  History of Present Illness:   Mrs. Romo is a pleasant 74 y/o who is in clinic today for her first Radiation Oncology survival visit s/p diagnosis with a new primary T4N0M0 SCC of the  alveolar ridge.  She underwent a direct laryngoscopy, bronchoscopy, and Esophagoscopy with segmental mandibulectomy/ composite resection, right mandibular reconstruction with plating and right sided neck exploration of vessels on 23.  She then underwent  photon radiation (60Gy in 30 fractions) to the oral cavity ending on 23.  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  Today the patient states that she is doing well and has good energy.   Continues to have a fistula on the right jaw which is scabbed over on the outside.  Pt denies liquids draining from the site.  Is currently on Augmentin, per Dr. Magana.  Patient continues to follow the recommendations from her 23 MBS.  She eats soft pureed foods and does thin liquid and medications through her PEG.  Denies coughing or choking when eating.  Denies mucositis or any new lumps/bumps in the mouth or around the neck.  She endorses being able to maintain her weight with her current diet.  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  She manages with extra hydration and Biotene spray  "throughout the day. She endorses significant trismus which began after her most recent treatments.  She continues to follow with SLP.  She does have some hearing loss which has been stable.  She does have some visual changes which she attributes to b/l cataracts.  She is scheduled to have eye surgery in Jan 2024.  Denies chills, fatigue, fever, dyspnea or chest pain.  Denies seizures,  headaches, dizziness and focal/sensory neurological changes.  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  She has a prism in the left lens of her glasses which helps.  Denies abdominal pain, nausea, vomiting, diarrhea or constipation.      2/14/24 Interim Follow-up visit:   Today the patient states that she's \"okay\".  She continues to have issues with a with a fistula (with exposed hardware) in her right anterior jaw.  She states that it's not painful but bothersome with drainage.  She is currently back on ABX and is scheduled for a revision on 3/4/24.  Additionally, she is anxious about a biopsy of her thyroid that is taking place on 2/22/24.  Continue to have issues with PO nutrition.  She states that the immobility of her tongue along with xerostomia poses a problem with the transit of food to the back of her throat.  She does endorse being able to eat creamy soups and mashed potatoes with gravy.  The rest of her nutrition is via PEG.  She states that she's doing 4 boxes of TF/day.  Endorses being able to sustain her weight with her current diet.  Denies chills, fatigue, fever, dyspnea or chest pain.  Denies seizures,  headaches, dizziness and focal/sensory neurological changes.  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  She has a prism in the left lens of her glasses which helps.  Patient underwent cataract surgery in Jan 2024 and stats that her vision is much better.  Denies abdominal pain, nausea, vomiting, diarrhea or constipation.    Treatment Rendered: "   Radiation Treatments       No radiation treatments to show. (Treatments may have been administered in another system.)            Review of Systems:   Review of Systems   Constitutional:  Negative for appetite change, chills, diaphoresis, fatigue and fever.   HENT:   Positive for hearing loss and trouble swallowing. Negative for lump/mass, mouth sores, nosebleeds, sore throat and tinnitus.         Is following a soft/puree diet for swallowing issues.    Eyes:  Positive for eye problems.   Respiratory:  Negative for chest tightness, cough, hemoptysis and wheezing.    Cardiovascular:  Negative for chest pain and palpitations.   Gastrointestinal:  Negative for abdominal distention, abdominal pain, blood in stool, constipation, diarrhea, nausea and vomiting.   Genitourinary:  Negative for difficulty urinating and hematuria.    Musculoskeletal:  Negative for arthralgias, back pain and gait problem.   Neurological:  Negative for dizziness, gait problem, headaches, light-headedness, numbness, seizures and speech difficulty.   Hematological:  Negative for adenopathy.   Psychiatric/Behavioral:  Negative for confusion and depression. The patient is not nervous/anxious.      The patient's current pain level was assessed.  They report currently having a pain of 0 out of 10.  They feel their pain is under control without the use of pain medications.    Performance Status:   The Karnofsky performance scale today is 100, Fully active, able to carry on all pre-disease performed without restriction (ECOG equivalent 0).        OBJECTIVE  Vital Signs:  There were no vitals taken for this visit.   Physical Exam:  Physical Exam  Vitals and nursing note reviewed.   Constitutional:       General: She is not in acute distress.     Appearance: Normal appearance. She is normal weight. She is not ill-appearing.   HENT:      Head: Normocephalic and atraumatic. No masses.      Jaw: Trismus present. No tenderness, swelling or pain on movement.       Salivary Glands: Right salivary gland is not diffusely enlarged. Left salivary gland is not diffusely enlarged.        Comments: Scabbed over fistula with exposed hardware.      Nose: Nose normal. No congestion or rhinorrhea.      Mouth/Throat:      Mouth: Mucous membranes are dry. No injury.      Dentition: Abnormal dentition. Does not have dentures. Dental caries present. No dental tenderness, gingival swelling or gum lesions.      Tongue: No lesions. Tongue does not deviate from midline.      Palate: No mass and lesions.      Pharynx: No pharyngeal swelling, oropharyngeal exudate or posterior oropharyngeal erythema.      Tonsils: No tonsillar exudate or tonsillar abscesses.   Eyes:      General: No scleral icterus.     Extraocular Movements: Extraocular movements intact.      Pupils: Pupils are equal, round, and reactive to light.   Cardiovascular:      Rate and Rhythm: Normal rate and regular rhythm.      Pulses: Normal pulses.      Heart sounds: Normal heart sounds. No murmur heard.  Pulmonary:      Effort: Pulmonary effort is normal. No respiratory distress.      Breath sounds: Normal breath sounds. No stridor. No wheezing or rhonchi.   Chest:      Chest wall: No tenderness.   Abdominal:      General: Abdomen is flat. Bowel sounds are normal.      Palpations: Abdomen is soft. There is no mass.      Tenderness: There is no abdominal tenderness. There is no guarding or rebound.   Musculoskeletal:         General: No swelling or tenderness. Normal range of motion.      Cervical back: Normal range of motion and neck supple. No tenderness.   Skin:     General: Skin is warm and dry.      Coloration: Skin is not jaundiced.      Findings: No erythema or lesion.   Neurological:      General: No focal deficit present.      Mental Status: She is alert and oriented to person, place, and time.      Motor: No weakness.      Coordination: Coordination normal.      Gait: Gait normal.      Deep Tendon Reflexes: Reflexes  normal.   Psychiatric:         Mood and Affect: Mood normal.         Behavior: Behavior normal.         Thought Content: Thought content normal.         Judgment: Judgment normal.             ASSESSMENT:  Mrs. Romo is a pleasant 72 y/o who is in clinic today for a routine Radiation Oncology survival visit s/p diagnosis with a new primary T4N0M0 SCC of the  alveolar ridge.  She underwent a direct laryngoscopy, bronchoscopy, and Esophagoscopy with segmental mandibulectomy/ composite resection, right mandibular reconstruction with plating and right sided neck exploration of vessels on 2/27/23.  She then underwent  photon radiation (60Gy in 30 fractions) to the oral cavity ending on 4/28/23.  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  She continues to do very well in recovery.     PLAN:      #1 Cancer Status:   --FU with Radiation Oncology in 3-4 months.  --Continue to follow with Geo Magana and Fermin for ENT management.   --CT neck in 4/2024.  Schedule at next visit.   --2/14/24 Ultrasound shows <50% stenosis.   --Revision of a right anterior jaw fistula on 3/4/24.     #2 Nutrition :  --Continue to follow with RD for nutritional needs.    --VS stable today.  --Continue with Soft/puree diet per SLP recommendations.   --Supplements with 4 boxes of TF/day.      #2 Pain Management:   --None     #3 Distress:   --Patient is in no distress.   --Instructed PT to call me if she has any concerns.      #4 Alcohol/Smoking Cessation:   --Never smoker  --Doesn't currently drink.   --No illicit drugs.     #5 Thyroid Surveillance:  --TSH 4.27 on 2/2/24.  --Dr. Magana managing surveillance.  --Thyroid Bx on 2/22/24.     #6 Dental:   --Will self-schedule.   --Unable to start high concentration fluoride because oral paresthesia when eating abrasive food items.   --Xerostomia--Continue hydrating with water and Biotene spray  PRN.      #7 Swallowing:   --Significant trismus  --Continue to follow with SLP.      #8 Neck edema or shoulder weakness:  --Patient reports some soreness of her right shoulder.   --Continue to monitor.  --Patient to manage with Tylenol.      #9 Skin Care:   --Patient does report some tightening of her skin on the left neck.   --Continue to apply lotion on a daily basis.       #10 PCP:   --Patient to self schedule with her PCP at least twice a year.   --Patient to give PCP a copy of the survival plan.      #11 Eye & hearing:   --Follow with eye doctor for cateracts.   --Completed cataract surgery in Jan 2024.      Please reach out to us with any questions or concerns.   NCCN Guidelines were applicable to guide this patients treatment plan.  Evaristo Winston, ROSALINA-CNP

## 2024-02-14 NOTE — H&P (VIEW-ONLY)
Radiation Oncology Follow-Up    Patient Name:  Brigette Romo  MRN:  [id number]  :  1950    Referring Provider: Evaristo Winston, *  Primary Care Provider: Eri Read DO  Care Team: Patient Care Team:  Eri Read DO as PCP - General  Eri Read DO as PCP - [employer] PCP  Brandy Christensen MD as Surgeon (Otolaryngology)  Sierra Gagnon MD as Radiation Oncologist (Radiation Oncology)  Maximino Aguilera DMD as Dentist (Prosthodontics)    Date of Service: 2024     SUBJECTIVE  History of Present Illness:   Mrs. Romo is a pleasant 74 y/o who is in clinic today for her first Radiation Oncology survival visit s/p diagnosis with a new primary T4N0M0 SCC of the  alveolar ridge.  She underwent a direct laryngoscopy, bronchoscopy, and Esophagoscopy with segmental mandibulectomy/ composite resection, right mandibular reconstruction with plating and right sided neck exploration of vessels on 23.  She then underwent  photon radiation (60Gy in 30 fractions) to the oral cavity ending on 23.  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  Today the patient states that she is doing well and has good energy.   Continues to have a fistula on the right jaw which is scabbed over on the outside.  Pt denies liquids draining from the site.  Is currently on Augmentin, per Dr. Magana.  Patient continues to follow the recommendations from her 23 MBS.  She eats soft pureed foods and does thin liquid and medications through her PEG.  Denies coughing or choking when eating.  Denies mucositis or any new lumps/bumps in the mouth or around the neck.  She endorses being able to maintain her weight with her current diet.  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  She manages with extra hydration and Biotene spray  "throughout the day. She endorses significant trismus which began after her most recent treatments.  She continues to follow with SLP.  She does have some hearing loss which has been stable.  She does have some visual changes which she attributes to b/l cataracts.  She is scheduled to have eye surgery in Jan 2024.  Denies chills, fatigue, fever, dyspnea or chest pain.  Denies seizures,  headaches, dizziness and focal/sensory neurological changes.  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  She has a prism in the left lens of her glasses which helps.  Denies abdominal pain, nausea, vomiting, diarrhea or constipation.      2/14/24 Interim Follow-up visit:   Today the patient states that she's \"okay\".  She continues to have issues with a with a fistula (with exposed hardware) in her right anterior jaw.  She states that it's not painful but bothersome with drainage.  She is currently back on ABX and is scheduled for a revision on 3/4/24.  Additionally, she is anxious about a biopsy of her thyroid that is taking place on 2/22/24.  Continue to have issues with PO nutrition.  She states that the immobility of her tongue along with xerostomia poses a problem with the transit of food to the back of her throat.  She does endorse being able to eat creamy soups and mashed potatoes with gravy.  The rest of her nutrition is via PEG.  She states that she's doing 4 boxes of TF/day.  Endorses being able to sustain her weight with her current diet.  Denies chills, fatigue, fever, dyspnea or chest pain.  Denies seizures,  headaches, dizziness and focal/sensory neurological changes.  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  She has a prism in the left lens of her glasses which helps.  Patient underwent cataract surgery in Jan 2024 and stats that her vision is much better.  Denies abdominal pain, nausea, vomiting, diarrhea or constipation.    Treatment Rendered: "   Radiation Treatments       No radiation treatments to show. (Treatments may have been administered in another system.)            Review of Systems:   Review of Systems   Constitutional:  Negative for appetite change, chills, diaphoresis, fatigue and fever.   HENT:   Positive for hearing loss and trouble swallowing. Negative for lump/mass, mouth sores, nosebleeds, sore throat and tinnitus.         Is following a soft/puree diet for swallowing issues.    Eyes:  Positive for eye problems.   Respiratory:  Negative for chest tightness, cough, hemoptysis and wheezing.    Cardiovascular:  Negative for chest pain and palpitations.   Gastrointestinal:  Negative for abdominal distention, abdominal pain, blood in stool, constipation, diarrhea, nausea and vomiting.   Genitourinary:  Negative for difficulty urinating and hematuria.    Musculoskeletal:  Negative for arthralgias, back pain and gait problem.   Neurological:  Negative for dizziness, gait problem, headaches, light-headedness, numbness, seizures and speech difficulty.   Hematological:  Negative for adenopathy.   Psychiatric/Behavioral:  Negative for confusion and depression. The patient is not nervous/anxious.      The patient's current pain level was assessed.  They report currently having a pain of 0 out of 10.  They feel their pain is under control without the use of pain medications.    Performance Status:   The Karnofsky performance scale today is 100, Fully active, able to carry on all pre-disease performed without restriction (ECOG equivalent 0).        OBJECTIVE  Vital Signs:  There were no vitals taken for this visit.   Physical Exam:  Physical Exam  Vitals and nursing note reviewed.   Constitutional:       General: She is not in acute distress.     Appearance: Normal appearance. She is normal weight. She is not ill-appearing.   HENT:      Head: Normocephalic and atraumatic. No masses.      Jaw: Trismus present. No tenderness, swelling or pain on movement.       Salivary Glands: Right salivary gland is not diffusely enlarged. Left salivary gland is not diffusely enlarged.        Comments: Scabbed over fistula with exposed hardware.      Nose: Nose normal. No congestion or rhinorrhea.      Mouth/Throat:      Mouth: Mucous membranes are dry. No injury.      Dentition: Abnormal dentition. Does not have dentures. Dental caries present. No dental tenderness, gingival swelling or gum lesions.      Tongue: No lesions. Tongue does not deviate from midline.      Palate: No mass and lesions.      Pharynx: No pharyngeal swelling, oropharyngeal exudate or posterior oropharyngeal erythema.      Tonsils: No tonsillar exudate or tonsillar abscesses.   Eyes:      General: No scleral icterus.     Extraocular Movements: Extraocular movements intact.      Pupils: Pupils are equal, round, and reactive to light.   Cardiovascular:      Rate and Rhythm: Normal rate and regular rhythm.      Pulses: Normal pulses.      Heart sounds: Normal heart sounds. No murmur heard.  Pulmonary:      Effort: Pulmonary effort is normal. No respiratory distress.      Breath sounds: Normal breath sounds. No stridor. No wheezing or rhonchi.   Chest:      Chest wall: No tenderness.   Abdominal:      General: Abdomen is flat. Bowel sounds are normal.      Palpations: Abdomen is soft. There is no mass.      Tenderness: There is no abdominal tenderness. There is no guarding or rebound.   Musculoskeletal:         General: No swelling or tenderness. Normal range of motion.      Cervical back: Normal range of motion and neck supple. No tenderness.   Skin:     General: Skin is warm and dry.      Coloration: Skin is not jaundiced.      Findings: No erythema or lesion.   Neurological:      General: No focal deficit present.      Mental Status: She is alert and oriented to person, place, and time.      Motor: No weakness.      Coordination: Coordination normal.      Gait: Gait normal.      Deep Tendon Reflexes: Reflexes  normal.   Psychiatric:         Mood and Affect: Mood normal.         Behavior: Behavior normal.         Thought Content: Thought content normal.         Judgment: Judgment normal.             ASSESSMENT:  Mrs. Romo is a pleasant 72 y/o who is in clinic today for a routine Radiation Oncology survival visit s/p diagnosis with a new primary T4N0M0 SCC of the  alveolar ridge.  She underwent a direct laryngoscopy, bronchoscopy, and Esophagoscopy with segmental mandibulectomy/ composite resection, right mandibular reconstruction with plating and right sided neck exploration of vessels on 2/27/23.  She then underwent  photon radiation (60Gy in 30 fractions) to the oral cavity ending on 4/28/23.  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  She continues to do very well in recovery.     PLAN:      #1 Cancer Status:   --FU with Radiation Oncology in 3-4 months.  --Continue to follow with Geo Magana and Fermin for ENT management.   --CT neck in 4/2024.  Schedule at next visit.   --2/14/24 Ultrasound shows <50% stenosis.   --Revision of a right anterior jaw fistula on 3/4/24.     #2 Nutrition :  --Continue to follow with RD for nutritional needs.    --VS stable today.  --Continue with Soft/puree diet per SLP recommendations.   --Supplements with 4 boxes of TF/day.      #2 Pain Management:   --None     #3 Distress:   --Patient is in no distress.   --Instructed PT to call me if she has any concerns.      #4 Alcohol/Smoking Cessation:   --Never smoker  --Doesn't currently drink.   --No illicit drugs.     #5 Thyroid Surveillance:  --TSH 4.27 on 2/2/24.  --Dr. Magana managing surveillance.  --Thyroid Bx on 2/22/24.     #6 Dental:   --Will self-schedule.   --Unable to start high concentration fluoride because oral paresthesia when eating abrasive food items.   --Xerostomia--Continue hydrating with water and Biotene spray  PRN.      #7 Swallowing:   --Significant trismus  --Continue to follow with SLP.      #8 Neck edema or shoulder weakness:  --Patient reports some soreness of her right shoulder.   --Continue to monitor.  --Patient to manage with Tylenol.      #9 Skin Care:   --Patient does report some tightening of her skin on the left neck.   --Continue to apply lotion on a daily basis.       #10 PCP:   --Patient to self schedule with her PCP at least twice a year.   --Patient to give PCP a copy of the survival plan.      #11 Eye & hearing:   --Follow with eye doctor for cateracts.   --Completed cataract surgery in Jan 2024.      Please reach out to us with any questions or concerns.   NCCN Guidelines were applicable to guide this patients treatment plan.  Evaristo Winston, ROSALINA-CNP

## 2024-02-15 ENCOUNTER — HOSPITAL ENCOUNTER (OUTPATIENT)
Dept: RADIOLOGY | Facility: HOSPITAL | Age: 74
Discharge: HOME | End: 2024-02-15
Payer: MEDICARE

## 2024-02-15 DIAGNOSIS — Z12.31 ENCOUNTER FOR SCREENING MAMMOGRAM FOR BREAST CANCER: ICD-10-CM

## 2024-02-15 PROCEDURE — 77067 SCR MAMMO BI INCL CAD: CPT | Performed by: RADIOLOGY

## 2024-02-15 PROCEDURE — 77067 SCR MAMMO BI INCL CAD: CPT

## 2024-02-15 PROCEDURE — 77063 BREAST TOMOSYNTHESIS BI: CPT | Performed by: RADIOLOGY

## 2024-02-16 ENCOUNTER — TELEPHONE (OUTPATIENT)
Dept: RADIATION ONCOLOGY | Facility: HOSPITAL | Age: 74
End: 2024-02-16
Payer: MEDICARE

## 2024-02-16 NOTE — TELEPHONE ENCOUNTER
Called the patient with her carotid ultrasound results.  Per the read, there is <50% stenosis in the b/l carotid arteries. All questions/concerns were addressed to the satisfaction of the patient.

## 2024-02-22 ENCOUNTER — HOSPITAL ENCOUNTER (OUTPATIENT)
Dept: RADIOLOGY | Facility: HOSPITAL | Age: 74
Discharge: HOME | End: 2024-02-22
Payer: MEDICARE

## 2024-02-22 VITALS
RESPIRATION RATE: 14 BRPM | HEART RATE: 65 BPM | DIASTOLIC BLOOD PRESSURE: 59 MMHG | OXYGEN SATURATION: 98 % | SYSTOLIC BLOOD PRESSURE: 114 MMHG

## 2024-02-22 DIAGNOSIS — E04.1 THYROID NODULE: ICD-10-CM

## 2024-02-22 PROCEDURE — 76942 ECHO GUIDE FOR BIOPSY: CPT

## 2024-02-22 PROCEDURE — 88112 CYTOPATH CELL ENHANCE TECH: CPT | Mod: TC,59 | Performed by: OTOLARYNGOLOGY

## 2024-02-22 PROCEDURE — 10005 FNA BX W/US GDN 1ST LES: CPT

## 2024-02-22 PROCEDURE — 10005 FNA BX W/US GDN 1ST LES: CPT | Performed by: NURSE PRACTITIONER

## 2024-02-22 PROCEDURE — 2500000005 HC RX 250 GENERAL PHARMACY W/O HCPCS: Performed by: NURSE PRACTITIONER

## 2024-02-22 PROCEDURE — 88173 CYTOPATH EVAL FNA REPORT: CPT | Performed by: PATHOLOGY

## 2024-02-22 RX ORDER — LIDOCAINE HYDROCHLORIDE 10 MG/ML
INJECTION, SOLUTION EPIDURAL; INFILTRATION; INTRACAUDAL; PERINEURAL
Status: COMPLETED | OUTPATIENT
Start: 2024-02-22 | End: 2024-02-22

## 2024-02-22 RX ADMIN — LIDOCAINE HYDROCHLORIDE 1 ML: 10 INJECTION, SOLUTION EPIDURAL; INFILTRATION; INTRACAUDAL; PERINEURAL at 14:22

## 2024-02-22 ASSESSMENT — PAIN SCALES - GENERAL
PAINLEVEL_OUTOF10: 0 - NO PAIN

## 2024-02-22 NOTE — Clinical Note
Right inferior thyroid nodule biopsy complete x 3 passes, specimens collected and taken to lab as ordered. Pt tolerated procedure well. Site dressed with adhesive bandage, cold pack applied to promote comfort. Pt remained with this RN x 20 min for recovery. Pt ambulatory without assistance to waiting room post procedure. Discharge instructions provided.

## 2024-02-22 NOTE — POST-PROCEDURE NOTE
Interventional Radiology Brief Postprocedure Note    Procedure: Right thyroid nodule biopsy    Provider: ROSALINA Beaulieu-CNP    Assistant: None    Diagnosis: Right thyroid nodule    Description of procedure: Using ultrasound guidance a total of 3 passes were made with 25 gauge spinal needle to the right thyroid nodule. Scanning after each pass demonstrated no evidence of significant postprocedure bleeding. Diagnostic specimen sent to lab for analysis. Please refer to the full radiology report for further details.         Medications (Filter: Administrations occurring from 1407 to 1439 on 02/22/24) As of 02/22/24 1439      lidocaine PF (Xylocaine) 10 mg/mL (1 %) injection (mL) Total volume:  1 mL      Date/Time Rate/Dose/Volume Action       02/22/24  1422 1 mL Given                     Complications: None    Estimated Blood Loss: none        See detailed result report with images in PACS.    The patient tolerated the procedure well without incident or complication and is in stable condition.

## 2024-02-23 ENCOUNTER — DOCUMENTATION (OUTPATIENT)
Dept: SPEECH THERAPY | Facility: CLINIC | Age: 74
End: 2024-02-23
Payer: MEDICARE

## 2024-02-23 LAB
LABORATORY COMMENT REPORT: NORMAL
LABORATORY COMMENT REPORT: NORMAL
PATH REPORT.FINAL DX SPEC: NORMAL
PATH REPORT.GROSS SPEC: NORMAL
PATH REPORT.TOTAL CANCER: NORMAL

## 2024-02-23 NOTE — PROGRESS NOTES
Speech-Language Pathology    Discharge Summary    Name: Brigette Romo  MRN: [id number]  : 1950  Date: 24    Discharge Summary: SLP    Discharge Information: Date of evaluation 3/15/23    Therapy Summary: Patient was seen for evaluation and treatment on 3/15/23. No further treatment sessions were scheduled by patient.    Rehab Discharge Reason: Other did not return to clinic.

## 2024-02-29 ENCOUNTER — TELEPHONE (OUTPATIENT)
Dept: OTOLARYNGOLOGY | Facility: HOSPITAL | Age: 74
End: 2024-02-29
Payer: MEDICARE

## 2024-02-29 NOTE — TELEPHONE ENCOUNTER
Sharon called and left me a message at 8:09 a.m. asking that I call her as she has a question about her upcoming surgery.  I called and spoke with her.    She reports her family is concerned that she should have people come and stay with her after the surgery.  Dr. Christensen is likely to keep her for 1-3 days after the surgery.  She won't have a trach and wound care will be minimal.      I informed her that certainly she will need someone if she goes home the same day as the surgery.  However, if she is in the hospital for a couple days, I would not anticipate her needing someone with her around the clock.  People should be reachable and check in on her, but I anticipate her being able to care for herself.  That was her anticipation as well.    She appreciated the call back and will update her family.

## 2024-03-03 ENCOUNTER — ANESTHESIA EVENT (OUTPATIENT)
Dept: OPERATING ROOM | Facility: HOSPITAL | Age: 74
End: 2024-03-03
Payer: MEDICARE

## 2024-03-04 ENCOUNTER — HOSPITAL ENCOUNTER (OUTPATIENT)
Facility: HOSPITAL | Age: 74
Setting detail: SURGERY ADMIT
Discharge: HOME | End: 2024-03-04
Attending: OTOLARYNGOLOGY | Admitting: OTOLARYNGOLOGY
Payer: MEDICARE

## 2024-03-04 ENCOUNTER — ANESTHESIA (OUTPATIENT)
Dept: OPERATING ROOM | Facility: HOSPITAL | Age: 74
End: 2024-03-04
Payer: MEDICARE

## 2024-03-04 VITALS
SYSTOLIC BLOOD PRESSURE: 101 MMHG | RESPIRATION RATE: 16 BRPM | BODY MASS INDEX: 20.59 KG/M2 | DIASTOLIC BLOOD PRESSURE: 55 MMHG | TEMPERATURE: 96.8 F | WEIGHT: 128.09 LBS | HEART RATE: 59 BPM | OXYGEN SATURATION: 100 % | HEIGHT: 66 IN

## 2024-03-04 DIAGNOSIS — T84.498A EXPOSED ORTHOPAEDIC HARDWARE (CMS-HCC): ICD-10-CM
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
## 2024-03-04 DIAGNOSIS — M27.2 OSTEORADIONECROSIS OF JAW: ICD-10-CM

## 2024-03-04 DIAGNOSIS — L03.211 CELLULITIS OF FACE: ICD-10-CM

## 2024-03-04 DIAGNOSIS — L02.01 ABSCESS OF FACE: ICD-10-CM

## 2024-03-04 PROCEDURE — 20680 REMOVAL OF IMPLANT DEEP: CPT | Performed by: OTOLARYNGOLOGY

## 2024-03-04 PROCEDURE — 2500000004 HC RX 250 GENERAL PHARMACY W/ HCPCS (ALT 636 FOR OP/ED): Performed by: OTOLARYNGOLOGY

## 2024-03-04 PROCEDURE — 3700000001 HC GENERAL ANESTHESIA TIME - INITIAL BASE CHARGE: Performed by: OTOLARYNGOLOGY

## 2024-03-04 PROCEDURE — 2780000003 HC OR 278 NO HCPCS: Performed by: OTOLARYNGOLOGY

## 2024-03-04 PROCEDURE — 88305 TISSUE EXAM BY PATHOLOGIST: CPT | Performed by: DENTIST

## 2024-03-04 PROCEDURE — 14060 TIS TRNFR E/N/E/L 10 SQ CM/<: CPT | Performed by: OTOLARYNGOLOGY

## 2024-03-04 PROCEDURE — A4217 STERILE WATER/SALINE, 500 ML: HCPCS | Performed by: OTOLARYNGOLOGY

## 2024-03-04 PROCEDURE — 7100000010 HC PHASE TWO TIME - EACH INCREMENTAL 1 MINUTE: Performed by: OTOLARYNGOLOGY

## 2024-03-04 PROCEDURE — 3700000002 HC GENERAL ANESTHESIA TIME - EACH INCREMENTAL 1 MINUTE: Performed by: OTOLARYNGOLOGY

## 2024-03-04 PROCEDURE — A20680 PR REMOVAL DEEP IMPLANT: Performed by: ANESTHESIOLOGY

## 2024-03-04 PROCEDURE — 2720000007 HC OR 272 NO HCPCS: Performed by: OTOLARYNGOLOGY

## 2024-03-04 PROCEDURE — 7100000002 HC RECOVERY ROOM TIME - EACH INCREMENTAL 1 MINUTE: Performed by: OTOLARYNGOLOGY

## 2024-03-04 PROCEDURE — 3600000009 HC OR TIME - EACH INCREMENTAL 1 MINUTE - PROCEDURE LEVEL FOUR: Performed by: OTOLARYNGOLOGY

## 2024-03-04 PROCEDURE — 88300 SURGICAL PATH GROSS: CPT | Performed by: DENTIST

## 2024-03-04 PROCEDURE — 2500000004 HC RX 250 GENERAL PHARMACY W/ HCPCS (ALT 636 FOR OP/ED): Performed by: ANESTHESIOLOGY

## 2024-03-04 PROCEDURE — 2500000001 HC RX 250 WO HCPCS SELF ADMINISTERED DRUGS (ALT 637 FOR MEDICARE OP): Performed by: ANESTHESIOLOGY

## 2024-03-04 PROCEDURE — 7100000009 HC PHASE TWO TIME - INITIAL BASE CHARGE: Performed by: OTOLARYNGOLOGY

## 2024-03-04 PROCEDURE — 3600000004 HC OR TIME - INITIAL BASE CHARGE - PROCEDURE LEVEL FOUR: Performed by: OTOLARYNGOLOGY

## 2024-03-04 PROCEDURE — 88300 SURGICAL PATH GROSS: CPT | Mod: TC,SUR,WESLAB | Performed by: OTOLARYNGOLOGY

## 2024-03-04 PROCEDURE — P9045 ALBUMIN (HUMAN), 5%, 250 ML: HCPCS | Mod: JZ,JG | Performed by: NURSE ANESTHETIST, CERTIFIED REGISTERED

## 2024-03-04 PROCEDURE — 21040 EXCISE MANDIBLE LESION: CPT | Performed by: OTOLARYNGOLOGY

## 2024-03-04 PROCEDURE — 2500000005 HC RX 250 GENERAL PHARMACY W/O HCPCS: Performed by: NURSE ANESTHETIST, CERTIFIED REGISTERED

## 2024-03-04 PROCEDURE — 7100000001 HC RECOVERY ROOM TIME - INITIAL BASE CHARGE: Performed by: OTOLARYNGOLOGY

## 2024-03-04 PROCEDURE — 2500000004 HC RX 250 GENERAL PHARMACY W/ HCPCS (ALT 636 FOR OP/ED): Mod: JZ,JG | Performed by: NURSE ANESTHETIST, CERTIFIED REGISTERED

## 2024-03-04 PROCEDURE — 99100 ANES PT EXTEME AGE<1 YR&>70: CPT | Performed by: ANESTHESIOLOGY

## 2024-03-04 PROCEDURE — A20680 PR REMOVAL DEEP IMPLANT: Performed by: NURSE ANESTHETIST, CERTIFIED REGISTERED

## 2024-03-04 RX ORDER — ACETAMINOPHEN 325 MG/1
650 TABLET ORAL EVERY 4 HOURS PRN
Status: DISCONTINUED | OUTPATIENT
Start: 2024-03-04 | End: 2024-03-04

## 2024-03-04 RX ORDER — GLYCOPYRROLATE 0.2 MG/ML
INJECTION INTRAMUSCULAR; INTRAVENOUS AS NEEDED
Status: DISCONTINUED | OUTPATIENT
Start: 2024-03-04 | End: 2024-03-04

## 2024-03-04 RX ORDER — MIDAZOLAM HYDROCHLORIDE 1 MG/ML
INJECTION INTRAMUSCULAR; INTRAVENOUS AS NEEDED
Status: DISCONTINUED | OUTPATIENT
Start: 2024-03-04 | End: 2024-03-04

## 2024-03-04 RX ORDER — LIDOCAINE HYDROCHLORIDE 40 MG/ML
INJECTION, SOLUTION RETROBULBAR AS NEEDED
Status: DISCONTINUED | OUTPATIENT
Start: 2024-03-04 | End: 2024-03-04

## 2024-03-04 RX ORDER — CEFAZOLIN 1 G/1
INJECTION, POWDER, FOR SOLUTION INTRAVENOUS AS NEEDED
Status: DISCONTINUED | OUTPATIENT
Start: 2024-03-04 | End: 2024-03-04

## 2024-03-04 RX ORDER — ACETAMINOPHEN 160 MG/5ML
650 SOLUTION ORAL EVERY 4 HOURS PRN
Status: DISCONTINUED | OUTPATIENT
Start: 2024-03-04 | End: 2024-03-04 | Stop reason: HOSPADM

## 2024-03-04 RX ORDER — CHLORHEXIDINE GLUCONATE ORAL RINSE 1.2 MG/ML
15 SOLUTION DENTAL AS NEEDED
Qty: 120 ML | Refills: 0 | Status: SHIPPED | OUTPATIENT
Start: 2024-03-04 | End: 2024-03-06

## 2024-03-04 RX ORDER — ALBUTEROL SULFATE 0.83 MG/ML
2.5 SOLUTION RESPIRATORY (INHALATION) ONCE AS NEEDED
Status: DISCONTINUED | OUTPATIENT
Start: 2024-03-04 | End: 2024-03-04 | Stop reason: HOSPADM

## 2024-03-04 RX ORDER — SODIUM CHLORIDE, SODIUM LACTATE, POTASSIUM CHLORIDE, CALCIUM CHLORIDE 600; 310; 30; 20 MG/100ML; MG/100ML; MG/100ML; MG/100ML
INJECTION, SOLUTION INTRAVENOUS CONTINUOUS PRN
Status: DISCONTINUED | OUTPATIENT
Start: 2024-03-04 | End: 2024-03-04

## 2024-03-04 RX ORDER — FENTANYL CITRATE 50 UG/ML
INJECTION, SOLUTION INTRAMUSCULAR; INTRAVENOUS AS NEEDED
Status: DISCONTINUED | OUTPATIENT
Start: 2024-03-04 | End: 2024-03-04

## 2024-03-04 RX ORDER — PHENYLEPHRINE HCL IN 0.9% NACL 1 MG/10 ML
SYRINGE (ML) INTRAVENOUS AS NEEDED
Status: DISCONTINUED | OUTPATIENT
Start: 2024-03-04 | End: 2024-03-04

## 2024-03-04 RX ORDER — ALBUMIN HUMAN 50 G/1000ML
SOLUTION INTRAVENOUS AS NEEDED
Status: DISCONTINUED | OUTPATIENT
Start: 2024-03-04 | End: 2024-03-04

## 2024-03-04 RX ORDER — SODIUM CHLORIDE, SODIUM LACTATE, POTASSIUM CHLORIDE, CALCIUM CHLORIDE 600; 310; 30; 20 MG/100ML; MG/100ML; MG/100ML; MG/100ML
100 INJECTION, SOLUTION INTRAVENOUS CONTINUOUS
Status: DISCONTINUED | OUTPATIENT
Start: 2024-03-04 | End: 2024-03-04 | Stop reason: HOSPADM

## 2024-03-04 RX ORDER — SODIUM CHLORIDE 0.9 G/100ML
IRRIGANT IRRIGATION AS NEEDED
Status: DISCONTINUED | OUTPATIENT
Start: 2024-03-04 | End: 2024-03-04 | Stop reason: HOSPADM

## 2024-03-04 RX ORDER — HYDROMORPHONE HYDROCHLORIDE 1 MG/ML
0.4 INJECTION, SOLUTION INTRAMUSCULAR; INTRAVENOUS; SUBCUTANEOUS EVERY 5 MIN PRN
Status: DISCONTINUED | OUTPATIENT
Start: 2024-03-04 | End: 2024-03-04 | Stop reason: HOSPADM

## 2024-03-04 RX ORDER — ROCURONIUM BROMIDE 10 MG/ML
INJECTION, SOLUTION INTRAVENOUS AS NEEDED
Status: DISCONTINUED | OUTPATIENT
Start: 2024-03-04 | End: 2024-03-04

## 2024-03-04 RX ORDER — AMOXICILLIN AND CLAVULANATE POTASSIUM 400; 57 MG/5ML; MG/5ML
POWDER, FOR SUSPENSION ORAL
Qty: 500 ML | OUTPATIENT
Start: 2024-03-04

## 2024-03-04 RX ORDER — AMOXICILLIN AND CLAVULANATE POTASSIUM 400; 57 MG/5ML; MG/5ML
875 POWDER, FOR SUSPENSION ORAL 2 TIMES DAILY
Qty: 218 ML | Refills: 0 | Status: SHIPPED | OUTPATIENT
Start: 2024-03-04 | End: 2024-03-14

## 2024-03-04 RX ORDER — DEXAMETHASONE SODIUM PHOSPHATE 100 MG/10ML
INJECTION INTRAMUSCULAR; INTRAVENOUS AS NEEDED
Status: DISCONTINUED | OUTPATIENT
Start: 2024-03-04 | End: 2024-03-04

## 2024-03-04 RX ORDER — ONDANSETRON HYDROCHLORIDE 2 MG/ML
4 INJECTION, SOLUTION INTRAVENOUS ONCE AS NEEDED
Status: DISCONTINUED | OUTPATIENT
Start: 2024-03-04 | End: 2024-03-04 | Stop reason: HOSPADM

## 2024-03-04 RX ORDER — HYDROMORPHONE HYDROCHLORIDE 1 MG/ML
0.2 INJECTION, SOLUTION INTRAMUSCULAR; INTRAVENOUS; SUBCUTANEOUS EVERY 5 MIN PRN
Status: DISCONTINUED | OUTPATIENT
Start: 2024-03-04 | End: 2024-03-04 | Stop reason: HOSPADM

## 2024-03-04 RX ORDER — PROPOFOL 10 MG/ML
INJECTION, EMULSION INTRAVENOUS CONTINUOUS PRN
Status: DISCONTINUED | OUTPATIENT
Start: 2024-03-04 | End: 2024-03-04

## 2024-03-04 RX ORDER — PHENYLEPHRINE 10 MG/250 ML(40 MCG/ML)IN 0.9 % SOD.CHLORIDE INTRAVENOUS
CONTINUOUS PRN
Status: DISCONTINUED | OUTPATIENT
Start: 2024-03-04 | End: 2024-03-04

## 2024-03-04 RX ORDER — OXYCODONE HCL 5 MG/5 ML
5 SOLUTION, ORAL ORAL EVERY 6 HOURS PRN
Qty: 90 ML | Refills: 0 | Status: SHIPPED | OUTPATIENT
Start: 2024-03-04 | End: 2024-03-07

## 2024-03-04 RX ORDER — OXYCODONE HYDROCHLORIDE 5 MG/1
5 TABLET ORAL EVERY 4 HOURS PRN
Status: DISCONTINUED | OUTPATIENT
Start: 2024-03-04 | End: 2024-03-04 | Stop reason: HOSPADM

## 2024-03-04 RX ORDER — NORETHINDRONE AND ETHINYL ESTRADIOL 0.5-0.035
KIT ORAL AS NEEDED
Status: DISCONTINUED | OUTPATIENT
Start: 2024-03-04 | End: 2024-03-04

## 2024-03-04 RX ORDER — SENNOSIDES 8.8 MG/5ML
5 LIQUID ORAL NIGHTLY
Qty: 240 ML | Refills: 0 | Status: SHIPPED | OUTPATIENT
Start: 2024-03-04 | End: 2024-03-19 | Stop reason: WASHOUT

## 2024-03-04 RX ORDER — ONDANSETRON HYDROCHLORIDE 2 MG/ML
INJECTION, SOLUTION INTRAVENOUS AS NEEDED
Status: DISCONTINUED | OUTPATIENT
Start: 2024-03-04 | End: 2024-03-04

## 2024-03-04 RX ORDER — PROPOFOL 10 MG/ML
INJECTION, EMULSION INTRAVENOUS AS NEEDED
Status: DISCONTINUED | OUTPATIENT
Start: 2024-03-04 | End: 2024-03-04

## 2024-03-04 RX ADMIN — ONDANSETRON 4 MG: 2 INJECTION INTRAMUSCULAR; INTRAVENOUS at 09:07

## 2024-03-04 RX ADMIN — DEXAMETHASONE SODIUM PHOSPHATE 8 MG: 10 INJECTION INTRAMUSCULAR; INTRAVENOUS at 07:53

## 2024-03-04 RX ADMIN — SUGAMMADEX 200 MG: 100 INJECTION, SOLUTION INTRAVENOUS at 09:10

## 2024-03-04 RX ADMIN — ACETAMINOPHEN 650 MG: 650 SOLUTION ORAL at 10:41

## 2024-03-04 RX ADMIN — Medication 120 MCG: at 07:50

## 2024-03-04 RX ADMIN — Medication 80 MCG: at 07:46

## 2024-03-04 RX ADMIN — PROPOFOL 150 MG: 10 INJECTION, EMULSION INTRAVENOUS at 07:28

## 2024-03-04 RX ADMIN — FENTANYL CITRATE 50 MCG: 50 INJECTION, SOLUTION INTRAMUSCULAR; INTRAVENOUS at 07:28

## 2024-03-04 RX ADMIN — EPHEDRINE SULFATE 10 MG: 50 INJECTION, SOLUTION INTRAVENOUS at 08:27

## 2024-03-04 RX ADMIN — Medication 0.3 MCG/KG/MIN: at 08:25

## 2024-03-04 RX ADMIN — Medication 120 MCG: at 08:03

## 2024-03-04 RX ADMIN — GLYCOPYRROLATE 0.2 MG: 0.2 INJECTION INTRAMUSCULAR; INTRAVENOUS at 07:19

## 2024-03-04 RX ADMIN — HYDROMORPHONE HYDROCHLORIDE 0.4 MG: 1 INJECTION, SOLUTION INTRAMUSCULAR; INTRAVENOUS; SUBCUTANEOUS at 09:33

## 2024-03-04 RX ADMIN — Medication 80 MCG: at 08:18

## 2024-03-04 RX ADMIN — SODIUM CHLORIDE, POTASSIUM CHLORIDE, SODIUM LACTATE AND CALCIUM CHLORIDE: 600; 310; 30; 20 INJECTION, SOLUTION INTRAVENOUS at 07:19

## 2024-03-04 RX ADMIN — LIDOCAINE HYDROCHLORIDE 5 ML: 40 INJECTION, SOLUTION RETROBULBAR; TOPICAL at 07:28

## 2024-03-04 RX ADMIN — MIDAZOLAM HYDROCHLORIDE 2 MG: 1 INJECTION, SOLUTION INTRAMUSCULAR; INTRAVENOUS at 07:19

## 2024-03-04 RX ADMIN — ROCURONIUM BROMIDE 40 MG: 10 INJECTION, SOLUTION INTRAVENOUS at 07:28

## 2024-03-04 RX ADMIN — ALBUMIN (HUMAN) 250 ML: 12.5 SOLUTION INTRAVENOUS at 08:03

## 2024-03-04 RX ADMIN — SODIUM CHLORIDE, SODIUM LACTATE, POTASSIUM CHLORIDE, CALCIUM CHLORIDE: 600; 310; 30; 20 INJECTION, SOLUTION INTRAVENOUS at 07:33

## 2024-03-04 RX ADMIN — CEFAZOLIN 2 G: 1 INJECTION, POWDER, FOR SOLUTION INTRAMUSCULAR; INTRAVENOUS at 07:46

## 2024-03-04 ASSESSMENT — PAIN - FUNCTIONAL ASSESSMENT
PAIN_FUNCTIONAL_ASSESSMENT: 0-10

## 2024-03-04 ASSESSMENT — COLUMBIA-SUICIDE SEVERITY RATING SCALE - C-SSRS
2. HAVE YOU ACTUALLY HAD ANY THOUGHTS OF KILLING YOURSELF?: NO
6. HAVE YOU EVER DONE ANYTHING, STARTED TO DO ANYTHING, OR PREPARED TO DO ANYTHING TO END YOUR LIFE?: NO
1. IN THE PAST MONTH, HAVE YOU WISHED YOU WERE DEAD OR WISHED YOU COULD GO TO SLEEP AND NOT WAKE UP?: NO

## 2024-03-04 ASSESSMENT — PAIN SCALES - GENERAL
PAINLEVEL_OUTOF10: 0 - NO PAIN
PAINLEVEL_OUTOF10: 7
PAINLEVEL_OUTOF10: 7
PAINLEVEL_OUTOF10: 6
PAINLEVEL_OUTOF10: 6

## 2024-03-04 ASSESSMENT — PAIN SCALES - PAIN ASSESSMENT IN ADVANCED DEMENTIA (PAINAD): BREATHING: NORMAL

## 2024-03-04 NOTE — PROGRESS NOTES
Pharmacy Medication History Review    Brigette Romo is a 74 y.o. female admitted for Exposed orthopaedic hardware (CMS/[facility]). Pharmacy reviewed the patient's [id number] medications and allergies for accuracy.    The list below reflects the updated PTA list. Comments regarding how patient may be taking medications differently can be found in the Admit Orders Activity  Prior to Admission Medications   Prescriptions Last Dose Informant Patient Reported?   amoxicillin-pot clavulanate (Augmentin) 400-57 mg/5 mL suspension 3/3/2024 Self No   Sig: 10.9 mL (875 mg) by g-tube route every 12 hours. Take twice a day for 14 days, then reduce to once daily dosing until day of surgery.   levothyroxine (Synthroid, Levoxyl) 25 mcg tablet 3/3/2024 Self No   Sig: Take 1 tablet (25 mcg) by mouth once daily in the morning. Take before meals. Take on an empty stomach with water only.  Wait 1 hour before having food, medications or beverages other than water.  OK to crush pill.      Facility-Administered Medications Last Administration Doses Remaining   levothyroxine (Synthroid, Levoxyl) tablet 25 mcg None recorded 1           The list below reflects the updated allergy list. Please review each documented allergy for additional clarification and justification.  Allergies  Reviewed by Matilde Mendez RN on 3/4/2024        Severity Reactions Comments    Sulfa (sulfonamide Antibiotics) Low Rash             Patient declines M2B at discharge.    Sources used to complete the med history include out patient fill history, OARRS, and patient interview along with 2/25/24 primary care note Dr. Eri Read.       Below are additional concerns with the patient's PTA list.      Elton Grijalva [facility]  Transitions of Care Clinical Pharmacist  Please reach out via [employer] for questions, if no response call  [email or web address] or [employer]  [facility]

## 2024-03-04 NOTE — ANESTHESIA PREPROCEDURE EVALUATION
Patient: Brigette Romo    Procedure Information       Anesthesia Start Date/Time: 03/04/24 0719    Procedures:       Removal Hardware Face      Secondary reconstruction of mandible with tibial bone graft - secondary reconstruction of mandible with tibial bone graft      Reconstruction Face    Location: [facility] OR 04 / [facility] OR    Surgeons: Brandy Christensen MD            Relevant Problems   Anesthesia (within normal limits)      Cardiovascular   (+) HLD (hyperlipidemia)      Endocrine   (+) Acquired hypothyroidism      GI   (+) Esophageal reflux   (+) GI bleed      Neuro/Psych   (+) Anxiety      GI/Hepatic   (+) Cancer of anterior two-thirds of tongue (CMS/HCC)   (+) Cancer of lower gum (CMS/HCC)   (+) Malignant neoplasm of oral cavity (CMS/HCC)   (+) Malignant neoplasm of tongue (CMS/HCC)      Hematology   (+) Anemia      Musculoskeletal   (+) Primary osteoarthritis of left knee      Eyes, Ears, Nose, and Throat  Tongue cancer      Infectious Disease   (+) Influenza B   (+) Viral upper respiratory tract infection   (+) Wound infection       Clinical information reviewed:   Tobacco  Allergies  Meds   Med Hx  Surg Hx   Fam Hx  Soc Hx        NPO Detail:  NPO/Void Status  Date of Last Liquid: 03/04/24  Time of Last Liquid: 0000  Date of Last Solid: 03/04/24  Time of Last Solid: 0000         Physical Exam    Airway  Mallampati: unable to assess  TM distance: >3 FB  Neck ROM: full     Cardiovascular    Dental    Pulmonary    Abdominal      Other findings: Limited mouth opening and limited neck extension      Anesthesia Plan    History of general anesthesia?: yes  History of complications of general anesthesia?: no    ASA 3     general   (GA ETT glidescope)  intravenous induction   Anesthetic plan and risks discussed with patient.  Use of blood products discussed with patient who consented to blood products.

## 2024-03-04 NOTE — ANESTHESIA POSTPROCEDURE EVALUATION
Patient: Brigette Romo    Procedure Summary       Date: 03/04/24 Room / Location: [facility] OR 04 / [facility] OR    Anesthesia Start: 0719 Anesthesia Stop: 0924    Procedures:       Removal Hardware Face      Reconstruction Face. Local Flap Rearrangement. Intraoral Mandible Curreting. Diagnosis:       Exposed orthopaedic hardware (CMS/HCC)      (Exposed orthopaedic hardware (CMS/HCC) [T84.498A])    Surgeons: Brandy Christensen MD Responsible Provider: Sonya Martin MD    Anesthesia Type: general ASA Status: 3            Anesthesia Type: general    Vitals Value Taken Time   /55 03/04/24 1000   Temp 36 °C (96.8 °F) 03/04/24 0920   Pulse 63 03/04/24 1002   Resp 9 03/04/24 1002   SpO2 100 % 03/04/24 1002   Vitals shown include unvalidated device data.    Anesthesia Post Evaluation    Patient location during evaluation: PACU  Patient participation: complete - patient participated  Level of consciousness: awake and alert  Pain management: satisfactory to patient  Airway patency: patent  Cardiovascular status: acceptable  Respiratory status: acceptable  Hydration status: acceptable  Postoperative Nausea and Vomiting: none    No notable events documented.

## 2024-03-04 NOTE — OP NOTE
Removal Hardware Face, Reconstruction Face. Local Flap Rearrangement. Intraoral Mandible Curreting. Operative Note     Date: 3/4/2024  OR Location: [facility] OR    Name: Brigette Romo, : 1950, Age: 74 y.o., MRN: [id number], Sex: female    Diagnosis  Pre-op Diagnosis     * Exposed orthopaedic hardware (CMS/HCC) [T84.498A] Post-op Diagnosis     * Exposed orthopaedic hardware (CMS/HCC) [T84.498A]     Procedures  Removal Hardware Face   - MS REMOVAL IMPLANT DEEP    Reconstruction Face. Local Flap Rearrangement. Intraoral Mandible Curreting.   - MS RCNSTJ MNDBLR RAMI&/BDY SGTL SPLT W/INT RGD FI      Surgeons      * Brandy Christensen - Primary    Resident/Fellow/Other Assistant:  Surgeon(s) and Role:     * Lourdes Santiago MD - Assisting     * Shanell Mccray DMD - Assisting    Procedure Summary  Anesthesia: General  ASA: ASA status not filed in the log.  Anesthesia Staff: Anesthesiologist: Sonya Martin MD  CRNA: ROSALINA Burks-CRNA  RENÉ: Antonella Broderick  Estimated Blood Loss: 5mL  Intra-op Medications:   Administrations occurring from 0715 to 1145 on 24:   Medication Name Total Dose   sodium chloride 0.9 % irrigation solution 1,000 mL              Anesthesia Record               Intraprocedure I/O Totals          Intake    Phenylephrine Drip 0.00 mL    The total shown is the total volume documented since Anesthesia Start was filed.    LR infusion 200.00 mL    lactated Ringer's 400.00 mL    Total Intake 600 mL       Output    Est. Blood Loss 5 mL    Total Output 5 mL       Net    Net Volume 595 mL          Specimen:   ID Type Source Tests Collected by Time   1 : Right Mandible Hardware for Gross Only Tissue HARDWARE SURGICAL PATHOLOGY EXAM Brandy Christensen MD 3/4/2024 0819   2 : Right Mandible Bone Debridment Tissue BONE CURETTINGS (DECAL) SURGICAL PATHOLOGY EXAM Brandy Christensen MD 3/4/2024 0907        Staff:   Circulator: Sierra Sprague RN  Scrub Person: Jam  Bartlett    Drains and/or Catheters: * None in log *      Implants: None    Findings: Removal of mandibular hardware (6 holes, 3 screws), local tissue rearrangement for primary closure, intraoral mandibular curettage (exposed bone intraorally with bleeding soft bone)    Indications: Brigette Romo is an 74 y.o. female who is having surgery for Exposed orthopaedic hardware (CMS/[facility]) [T84.376G].     The patient was seen in the preoperative area. The risks, benefits, complications, treatment options, non-operative alternatives, expected recovery and outcomes were discussed with the patient. The possibilities of reaction to medication, pulmonary aspiration, injury to surrounding structures, bleeding, recurrent infection, the need for additional procedures, failure to diagnose a condition, and creating a complication requiring transfusion or operation were discussed with the patient. The patient concurred with the proposed plan, giving informed consent.  The site of surgery was properly noted/marked if necessary per policy. The patient has been actively warmed in preoperative area. Preoperative antibiotics have been ordered and given within 1 hours of incision. Venous thrombosis prophylaxis have been ordered including bilateral sequential compression devices    Procedure in Detail:   Patient was seen and evaluated in the pre-operative area. Informed consent was obtained after discussing the risks, benefits and indications for the procedure. The patient was taken back to the operating room by the anesthesia team. General endotracheal anesthesia was provided. Appropriate timeout was performed.    The neck and the right leg area were prepped and draped in the usual sterile fashion. A 15 blade was used to create an incision through the dermis to the subcutaneous fat until we were able to elevate the soft tissue off the exposed bone. Once we were able to visualize the segment of bone/plate of interest, we removed the  screws and then cut the segment of the plate with a side cutting pineapple derrell. The bone appeared healthy and without any concern for malunion, so we decided not to proceed with a tibial bone graft. We then closed the skin over it with a combination of simple interrupted and simple vertical mattress sutures with a combination of 4-0 and 5-0 prolene sutures. Local tissue flap rearrangement and advancement was performed to reconstruct the skin defect. Steri-strips placed. Hemostasis was achieved with a bipolar.     Next, we debrided the exposed bone inside of the mouth with a combination of a rongeur and a curette. Specimens were sent for pathology testing.     This concluded the procedure and the patient was turned over to anesthesia for wake up.    Dr. Christensen  was present for the critical portions of the procedure.         Complications:  None; patient tolerated the procedure well.    Disposition: PACU - hemodynamically stable.  Condition: stable

## 2024-03-04 NOTE — ANESTHESIA PROCEDURE NOTES
Airway  Date/Time: 3/4/2024 7:31 AM  Urgency: elective    Airway not difficult    Staffing  Performed: RENÉ   Authorized by: Sonya Martin MD    Performed by: HARVEY Burks  Patient location during procedure: OR    Indications and Patient Condition  Indications for airway management: anesthesia  Spontaneous ventilation: present  Sedation level: deep  Preoxygenated: yes  Patient position: sniffing  Mask difficulty assessment: 1 - vent by mask    Final Airway Details  Final airway type: endotracheal airway      Successful airway: ETT  Cuffed: yes   Successful intubation technique: video laryngoscopy  Facilitating devices/methods: intubating stylet and cricoid pressure  Blade: Yury  Blade size: #3  ETT size (mm): 6.0  Cormack-Lehane Classification: grade IIa - partial view of glottis  Placement verified by: chest auscultation and capnometry   Measured from: teeth  ETT to teeth (cm): 21  Number of attempts at approach: 1

## 2024-03-05 ENCOUNTER — TELEPHONE (OUTPATIENT)
Dept: OTOLARYNGOLOGY | Facility: HOSPITAL | Age: 74
End: 2024-03-05
Payer: MEDICARE

## 2024-03-05 ASSESSMENT — PAIN SCALES - GENERAL: PAINLEVEL_OUTOF10: 5 - MODERATE PAIN

## 2024-03-05 NOTE — TELEPHONE ENCOUNTER
"Sharon called and left me a message at 7:34 a.m. stating she thought her \"stitches popped\" as there was \"dried blood under the bandage.\"  She also added a new symptom of \"popping sound in her ear that's not constant.\"    I message Dr. Christensen about the suture and my suspicion that the ear issue is likely eustachian tube dysfunction due to some postop swelling.    Dr. Christensen messaged back that for the amount of suturing they did, it was unlikely that she popped a suture and that it's just dried blood from the surgery.  The popping in her ear is likely temporary eustachian tube dysfunction.  We will follow up on all this at her appointment on 3/19/24.    I called Brigette to let her know.  She was appreciative of the call back and information.  We will see her as scheduled apt on 3/19/24.  "
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
## 2024-03-06 DIAGNOSIS — M27.2 OSTEORADIONECROSIS OF JAW: Primary | ICD-10-CM

## 2024-03-06 RX ORDER — CHLORHEXIDINE GLUCONATE ORAL RINSE 1.2 MG/ML
15 SOLUTION DENTAL 4 TIMES DAILY
Qty: 1800 ML | Refills: 0 | Status: SHIPPED | OUTPATIENT
Start: 2024-03-06 | End: 2024-03-19 | Stop reason: WASHOUT

## 2024-03-06 NOTE — PROGRESS NOTES
"Sharon called and left me a message at 11:32 a.m. stating the pharmacy is having issues filling the Peridex that was prescribed postop for her.  \"It's now 2 days without it.\"    I reviewed the script that was sent in as well as the postop discharge instructions and understand why the pharmacy has been unable to fill the prescription.    Incorrect prescription cancelled and new prescription sent into the pharmacy.    Brigette was notified.  "

## 2024-03-08 LAB
LABORATORY COMMENT REPORT: NORMAL
PATH REPORT.FINAL DX SPEC: NORMAL
PATH REPORT.GROSS SPEC: NORMAL
PATH REPORT.RELEVANT HX SPEC: NORMAL
PATH REPORT.TOTAL CANCER: NORMAL

## 2024-03-19 ENCOUNTER — OFFICE VISIT (OUTPATIENT)
Dept: OTOLARYNGOLOGY | Facility: HOSPITAL | Age: 74
End: 2024-03-19
Payer: MEDICARE

## 2024-03-19 VITALS — WEIGHT: 129.2 LBS | HEIGHT: 66 IN | BODY MASS INDEX: 20.76 KG/M2 | TEMPERATURE: 97 F

## 2024-03-19 DIAGNOSIS — T84.498A EXPOSED ORTHOPAEDIC HARDWARE (CMS-HCC): Primary | ICD-10-CM

## 2024-03-19 PROCEDURE — 1036F TOBACCO NON-USER: CPT | Performed by: OTOLARYNGOLOGY

## 2024-03-19 PROCEDURE — 1159F MED LIST DOCD IN RCRD: CPT | Performed by: OTOLARYNGOLOGY

## 2024-03-19 PROCEDURE — 99024 POSTOP FOLLOW-UP VISIT: CPT | Performed by: OTOLARYNGOLOGY

## 2024-03-19 PROCEDURE — 1160F RVW MEDS BY RX/DR IN RCRD: CPT | Performed by: OTOLARYNGOLOGY

## 2024-03-19 NOTE — PROGRESS NOTES
Ms. Romo returns today for her first postoperative visit following removal of exposed hardware and local tissue rearrangement for secondary wound closure.  Patient tolerated this procedure well and was discharged from the hospital on the same day.  She has had no subsequent drainage and returns today for suture removal.    On physical examination her incision is well-healed and it is clean dry and intact.  The sutures were removed without difficulty.  The remainder of her head neck exams within normal limits.    Overall Ms. Romo had successful removal of hardware with equally successful closure of a skin defect.  She will return to see me for routine follow-up in 2 months time and she will continue to see Dr. Magana for oncologic follow-up.

## 2024-03-25 ENCOUNTER — APPOINTMENT (OUTPATIENT)
Dept: OTOLARYNGOLOGY | Facility: HOSPITAL | Age: 74
End: 2024-03-25
Payer: MEDICARE

## 2024-03-26 ENCOUNTER — OFFICE VISIT (OUTPATIENT)
Dept: OTOLARYNGOLOGY | Facility: CLINIC | Age: 74
End: 2024-03-26
Payer: MEDICARE

## 2024-03-26 VITALS — HEIGHT: 66 IN | BODY MASS INDEX: 21.05 KG/M2 | WEIGHT: 131 LBS
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
## 2024-03-26 DIAGNOSIS — M27.2 OSTEORADIONECROSIS OF JAW: ICD-10-CM

## 2024-03-26 DIAGNOSIS — C02.9 MALIGNANT NEOPLASM OF TONGUE (MULTI): ICD-10-CM

## 2024-03-26 DIAGNOSIS — C03.1 CANCER OF LOWER GUM (MULTI): Primary | ICD-10-CM

## 2024-03-26 PROCEDURE — 1036F TOBACCO NON-USER: CPT | Performed by: OTOLARYNGOLOGY

## 2024-03-26 PROCEDURE — 1160F RVW MEDS BY RX/DR IN RCRD: CPT | Performed by: OTOLARYNGOLOGY

## 2024-03-26 PROCEDURE — 99024 POSTOP FOLLOW-UP VISIT: CPT | Performed by: OTOLARYNGOLOGY

## 2024-03-26 PROCEDURE — 1159F MED LIST DOCD IN RCRD: CPT | Performed by: OTOLARYNGOLOGY

## 2024-03-26 RX ORDER — DOXYCYCLINE 100 MG/1
CAPSULE ORAL
Qty: 42 CAPSULE | Refills: 0 | Status: SHIPPED | OUTPATIENT
Start: 2024-03-26 | End: 2024-04-12 | Stop reason: SDUPTHER

## 2024-03-26 NOTE — PROGRESS NOTES
Provider Impressions     Status post treatment of multiple different oral cavity cancers.  I do not see any evidence of any tumor recurrence.    Dysphagia which is unlikely to get better.  She remains PEG dependent.    Thyroid nodule that was picked up on the PET scan.  An ultrasound is ordered.    Recent posterior mouth discomfort on the right side where there is a small area that looks a little bit swollen with a very small overlying ulcer which does not look suspicious.  I did put her on doxycycline.     I will see her at her regular appointment.     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 She had surgical excision and a neck dissection followed by radiation therapy. She was found to have tumor recurrence and on September 22, 2016 she underwent reexcision. The margins were felt to be clear. There was no evidence of pedro luis metastasis. She had a TSH level in July 2023 which was normal. She had a chest CT in April 2020 which was negative. She was having some issues with discomfort on the lateral tongue on the left. This led to a biopsy that showed cancer. On April 13, 2020 she underwent a partial glossectomy. The margins were clear. She was presented at our tumor board and it was not felt that anything further needed to be done. She admits that her swallowing is somewhat difficult. She had a chest CT in May 2023 that showed a possible small nodule. I saw her in January 2023 because she had some concern about a lesion in her mouth. 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 She had a CT of the neck done on January 20, 2023 that showed a lesion involving the mandible on the right side. On February 7, 2023 she underwent surgery. There was bone involvement. There was a margin that was described as being close. She completed radiation  therapy in April 2023. She had a PET scan done in August 2023 that really does not show anything worrisome.  At the same time she has some uptake in the right thyroid.  She is also PEG dependent.  He is here today ahead of her appointment because she has had some discomfort on the posterior aspect of her mouth on the right.    Physical Exam      Examination of the oral cavity and oropharynx shows all the changes from the prior surgeries.  On the posterior aspect of the right mandibular gum there is a little nodularity that looks tiny but they are reddish.  There is also a small ulceration anterior to that nodularity.  I cannot express any purulence.  It is slightly tender on palpation.

## 2024-04-10 ENCOUNTER — TELEPHONE (OUTPATIENT)
Dept: OTOLARYNGOLOGY | Facility: CLINIC | Age: 74
End: 2024-04-10
Payer: MEDICARE

## 2024-04-10 NOTE — TELEPHONE ENCOUNTER
"Sharon called and left me a message at 7:33 a.m. stating that she has a \"black stitch\" sticking out, but more importantly, under her chin an area has opened up and is draining \"a little puss.\"  I reviewed her chart and she is on ATB at this time.    I called and spoke with Brigette and she confirmed that she still is on the ATB.  I explained that I'm concerned and feel we should see her back in the office as we might want to culture the drainage.  Brigette was agreeable and we arranged an apt for Friday.    In the meantime, she will continue to wash the area with soap and water and stay on the ATB.  "

## 2024-04-11 NOTE — PROGRESS NOTES
Provider Impressions     Status post treatment of multiple different oral cavity cancers.  I do not see any evidence of any tumor recurrence.    Dysphagia which is unlikely to get better.  She remains PEG dependent.    Thyroid nodule that was picked up on the PET scan.  This turned out to be benign on a biopsy that was eventually done after the ultrasound.    Ongoing issues with some bone exposure as well as recent a drainage in her chin.  I will keep her on doxycycline at this point.     I will see her at her in 6 weeks.     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 She had surgical excision and a neck dissection followed by radiation therapy. She was found to have tumor recurrence and on September 22, 2016 she underwent reexcision. The margins were felt to be clear. There was no evidence of pedro luis metastasis. She had a TSH level in July 2023 which was normal. She had a chest CT in April 2020 which was negative. She was having some issues with discomfort on the lateral tongue on the left. This led to a biopsy that showed cancer. On April 13, 2020 she underwent a partial glossectomy. The margins were clear. She was presented at our tumor board and it was not felt that anything further needed to be done. She admits that her swallowing is somewhat difficult. She had a chest CT in May 2023 that showed a possible small nodule. I saw her in January 2023 because she had some concern about a lesion in her mouth. 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 She had a CT of the neck done on January 20, 2023 that showed a lesion involving the mandible on the right side. On February 7, 2023 she underwent surgery. There was bone involvement. There was a margin that was described as being close. She completed radiation therapy in April 2023. She had a  PET scan done in August 2023 that really does not show anything worrisome.  At the same time she has some uptake in the right thyroid.  This was followed by an ultrasound that showed a suspicious nodule which was eventually biopsied and turned out to be benign.  I will follow this clinically.  The patient also has ongoing issues with drainage in the left submental area.  She was on doxycycline for the past 3 weeks or so.  This is the first day today that she has not had any drainage.    Physical Exam      Examination of the oral cavity and oropharynx shows all the changes from the prior surgeries.  On the posterior aspect of the right mandibular gum there is a little nodularity that looks tiny but they are reddish.  This has not changed.  There is also a small ulceration anterior to that nodularity.  She has lost a tooth on the left anterior gum and there is some bone exposure around that area.  The adjacent tooth is also mobile.  The submental area shows a little bit of crusting but no evidence of any purulence.  The rest of the neck exam is negative.  It does show significant induration.    A flexible laryngoscopy was carried out. Under topical Xylocaine and Leonard-Synephrine the scope was introduced through the nostril. The nasopharynx, base of tongue, hypopharynx, and larynx are visualized.  The vocal cords are normally mobile. There is no pooling of secretions in the piriform sinuses. There is no evidence of any mucosal lesions.

## 2024-04-12 ENCOUNTER — PATIENT MESSAGE (OUTPATIENT)
Dept: OTOLARYNGOLOGY | Facility: HOSPITAL | Age: 74
End: 2024-04-12

## 2024-04-12 ENCOUNTER — OFFICE VISIT (OUTPATIENT)
Dept: OTOLARYNGOLOGY | Facility: HOSPITAL | Age: 74
End: 2024-04-12
Payer: MEDICARE

## 2024-04-12 VITALS — WEIGHT: 131 LBS | TEMPERATURE: 97.3 F | BODY MASS INDEX: 21.05 KG/M2 | HEIGHT: 66 IN

## 2024-04-12 DIAGNOSIS — R13.12 OROPHARYNGEAL DYSPHAGIA: ICD-10-CM

## 2024-04-12 DIAGNOSIS — E03.9 HYPOTHYROIDISM (ACQUIRED): ICD-10-CM

## 2024-04-12 DIAGNOSIS — E03.9 ACQUIRED HYPOTHYROIDISM: ICD-10-CM

## 2024-04-12 DIAGNOSIS — M27.2 OSTEORADIONECROSIS OF JAW: ICD-10-CM
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
## 2024-04-12 DIAGNOSIS — C02.9 MALIGNANT NEOPLASM OF TONGUE (MULTI): ICD-10-CM

## 2024-04-12 DIAGNOSIS — C03.1 CANCER OF LOWER GUM (MULTI): Primary | ICD-10-CM

## 2024-04-12 PROCEDURE — 1159F MED LIST DOCD IN RCRD: CPT | Performed by: OTOLARYNGOLOGY

## 2024-04-12 PROCEDURE — 1160F RVW MEDS BY RX/DR IN RCRD: CPT | Performed by: OTOLARYNGOLOGY

## 2024-04-12 PROCEDURE — 31575 DIAGNOSTIC LARYNGOSCOPY: CPT | Performed by: OTOLARYNGOLOGY

## 2024-04-12 PROCEDURE — 99213 OFFICE O/P EST LOW 20 MIN: CPT | Performed by: OTOLARYNGOLOGY

## 2024-04-12 PROCEDURE — 1036F TOBACCO NON-USER: CPT | Performed by: OTOLARYNGOLOGY

## 2024-04-12 RX ORDER — DOXYCYCLINE 100 MG/1
100 CAPSULE ORAL DAILY
Qty: 42 CAPSULE | Refills: 0 | Status: SHIPPED | OUTPATIENT
Start: 2024-04-23 | End: 2024-05-28 | Stop reason: SDUPTHER

## 2024-04-12 ASSESSMENT — PATIENT HEALTH QUESTIONNAIRE - PHQ9
SUM OF ALL RESPONSES TO PHQ9 QUESTIONS 1 & 2: 0
2. FEELING DOWN, DEPRESSED OR HOPELESS: NOT AT ALL
1. LITTLE INTEREST OR PLEASURE IN DOING THINGS: NOT AT ALL

## 2024-04-23 ENCOUNTER — APPOINTMENT (OUTPATIENT)
Dept: OTOLARYNGOLOGY | Facility: CLINIC | Age: 74
End: 2024-04-23
Payer: MEDICARE

## 2024-04-25 ENCOUNTER — LAB (OUTPATIENT)
Dept: LAB | Facility: LAB | Age: 74
End: 2024-04-25
Payer: MEDICARE

## 2024-04-25 DIAGNOSIS — E03.9 ACQUIRED HYPOTHYROIDISM: ICD-10-CM

## 2024-04-25 LAB — TSH SERPL-ACNC: 3.29 MIU/L (ref 0.44–3.98)

## 2024-04-25 PROCEDURE — 84443 ASSAY THYROID STIM HORMONE: CPT

## 2024-04-25 PROCEDURE — 36415 COLL VENOUS BLD VENIPUNCTURE: CPT

## 2024-04-26 RX ORDER — LEVOTHYROXINE SODIUM 25 UG/1
25 TABLET ORAL
Qty: 90 TABLET | Refills: 3 | Status: SHIPPED | OUTPATIENT
Start: 2024-04-26

## 2024-05-27 NOTE — PROGRESS NOTES
Provider Impressions     Status post treatment of multiple different oral cavity cancers.  I do not see any evidence of any tumor recurrence.    Dysphagia which is unlikely to get better.  She remains PEG dependent.    Thyroid nodule that was picked up on the PET scan.  This turned out to be benign on a biopsy that was eventually done after the ultrasound.    Ongoing issues with some bone exposure as well as drainage around her chin.  She was asked to see by reconstruction colleague.  I will keep her on doxycycline at this point.     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 She had surgical excision and a neck dissection followed by radiation therapy. She was found to have tumor recurrence and on September 22, 2016 she underwent reexcision. The margins were felt to be clear. There was no evidence of pedro luis metastasis. She had a TSH level in July 2023 which was normal. She had a chest CT in April 2020 which was negative. She was having some issues with discomfort on the lateral tongue on the left. This led to a biopsy that showed cancer. On April 13, 2020 she underwent a partial glossectomy. The margins were clear. She was presented at our tumor board and it was not felt that anything further needed to be done. She admits that her swallowing is somewhat difficult. She had a chest CT in May 2023 that showed a possible small nodule. I saw her in January 2023 because she had some concern about a lesion in her mouth. 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 She had a CT of the neck done on January 20, 2023 that showed a lesion involving the mandible on the right side. On February 7, 2023 she underwent surgery. There was bone involvement. There was a margin that was described as being close. She completed radiation therapy in April 2023.  She had a PET scan done in August 2023 that really does not show anything worrisome.  At the same time she has some uptake in the right thyroid.  This was followed by an ultrasound that showed a suspicious nodule which was eventually biopsied and turned out to be benign.  I will follow this clinically.  The patient also has ongoing issues with drainage in the left submental area.  She has been on doxycycline.  She continues to have some drainage.  She is draining from 2 separate areas.  She describes it as being purulent.    Physical Exam      Examination of the oral cavity and oropharynx shows all the changes from the prior surgeries.  On the posterior aspect of the right mandibular gum there is a little nodularity that looks tiny but they are reddish.  This has not changed.   She has lost a tooth on the left anterior gum and there is some bone exposure around that area.  The adjacent tooth is also mobile.  She has 2 separate openings in the chin 1 on the right and 1 on the left.  She definitely has some drainage from these areas.  I could not see any purulence at this point.  The rest of the neck exam is negative.  It does show significant induration.

## 2024-05-28 ENCOUNTER — OFFICE VISIT (OUTPATIENT)
Dept: OTOLARYNGOLOGY | Facility: CLINIC | Age: 74
End: 2024-05-28
Payer: MEDICARE

## 2024-05-28 VITALS — BODY MASS INDEX: 20.89 KG/M2 | HEIGHT: 66 IN | WEIGHT: 130 LBS

## 2024-05-28 DIAGNOSIS — R13.12 OROPHARYNGEAL DYSPHAGIA: ICD-10-CM

## 2024-05-28 DIAGNOSIS — M27.2 OSTEORADIONECROSIS OF JAW: Primary | ICD-10-CM

## 2024-05-28 DIAGNOSIS — C03.1 CANCER OF LOWER GUM (MULTI): ICD-10-CM
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
## 2024-05-28 DIAGNOSIS — E03.9 ACQUIRED HYPOTHYROIDISM: ICD-10-CM

## 2024-05-28 PROCEDURE — 99024 POSTOP FOLLOW-UP VISIT: CPT | Performed by: OTOLARYNGOLOGY

## 2024-05-28 PROCEDURE — 1160F RVW MEDS BY RX/DR IN RCRD: CPT | Performed by: OTOLARYNGOLOGY

## 2024-05-28 PROCEDURE — 1036F TOBACCO NON-USER: CPT | Performed by: OTOLARYNGOLOGY

## 2024-05-28 PROCEDURE — 1159F MED LIST DOCD IN RCRD: CPT | Performed by: OTOLARYNGOLOGY

## 2024-05-28 RX ORDER — DOXYCYCLINE 100 MG/1
100 CAPSULE ORAL DAILY
Qty: 42 CAPSULE | Refills: 2 | Status: SHIPPED | OUTPATIENT
Start: 2024-05-28 | End: 2024-06-05

## 2024-05-30 ENCOUNTER — OFFICE VISIT (OUTPATIENT)
Dept: OTOLARYNGOLOGY | Facility: HOSPITAL | Age: 74
End: 2024-05-30
Payer: MEDICARE

## 2024-05-30 ENCOUNTER — HOSPITAL ENCOUNTER (OUTPATIENT)
Dept: RADIOLOGY | Facility: HOSPITAL | Age: 74
Discharge: HOME | End: 2024-05-30
Payer: MEDICARE

## 2024-05-30 ENCOUNTER — LAB (OUTPATIENT)
Dept: LAB | Facility: LAB | Age: 74
End: 2024-05-30
Payer: MEDICARE

## 2024-05-30 VITALS — HEIGHT: 66 IN | WEIGHT: 127.9 LBS | TEMPERATURE: 97.3 F | BODY MASS INDEX: 20.55 KG/M2
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
## 2024-05-30 DIAGNOSIS — M27.2 OSTEORADIONECROSIS OF JAW: ICD-10-CM

## 2024-05-30 LAB
CREAT SERPL-MCNC: 0.73 MG/DL (ref 0.5–1.05)
EGFRCR SERPLBLD CKD-EPI 2021: 88 ML/MIN/1.73M*2

## 2024-05-30 PROCEDURE — 99214 OFFICE O/P EST MOD 30 MIN: CPT | Performed by: OTOLARYNGOLOGY

## 2024-05-30 PROCEDURE — 87070 CULTURE OTHR SPECIMN AEROBIC: CPT | Performed by: OTOLARYNGOLOGY

## 2024-05-30 PROCEDURE — 4260F WOUND SRFC CULTURETECH USED: CPT | Performed by: OTOLARYNGOLOGY

## 2024-05-30 PROCEDURE — 82565 ASSAY OF CREATININE: CPT

## 2024-05-30 PROCEDURE — 70355 PANORAMIC X-RAY OF JAWS: CPT | Performed by: RADIOLOGY

## 2024-05-30 PROCEDURE — 70355 PANORAMIC X-RAY OF JAWS: CPT

## 2024-05-30 PROCEDURE — 1159F MED LIST DOCD IN RCRD: CPT | Performed by: OTOLARYNGOLOGY

## 2024-05-30 PROCEDURE — 1160F RVW MEDS BY RX/DR IN RCRD: CPT | Performed by: OTOLARYNGOLOGY

## 2024-05-30 NOTE — LETTER
May 30, 2024     Eri Read DO  [address] [facility]  [address]  [facility] [address]    Patient: Brigette Romo   YOB: 1950   Date of Visit: 5/30/2024       Dear Dr. Eri Read DO:    Thank you for referring Brigette Romo to me for evaluation. Below are my notes for this consultation.  If you have questions, please do not hesitate to call me. I look forward to following your patient along with you.       Sincerely,     Brandy Christensen MD      CC: No Recipients  ______________________________________________________________________________________    Mrs. Romo returns to see me today in follow-up following removal of exposed hardware in March of this year.  She states that she initially did well following the procedure with no evidence of infection.  However approximately 1 month following successful hardware extraction, she developed drainage from the left parasymphyseal region.  Her exposed hardware had been in the right parasymphyseal region.  She was initially placed on Augmentin with minimal response then switched to long-term oral doxycycline.  She states that while the drainage has improved it has not completely been eradicated.  She also states that the region is very itchy and can occasionally be painful.  She denies any fevers or abscess formation.    On physical examination the site of the right parasymphyseal hardware extraction remains intact with a small area of serous drainage.  The left parasymphyseal region does reveal dried crusting which upon unroofing has a very small amount of purulent drainage.  There is no exposed hardware at either site.  Intraorally, there is exposed bone which is in the gingivobuccal sulcus at the anterior site of a previous dental extraction.    Procedure: A sample was taken from the wound for aerobic anaerobic and fungal cultures.    I had a long discussion with the patient today regarding her current physical examination.  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  We will tailor her antibiotics based on the cultures that were obtained today.  She will continue her doxycycline until we modify those based on the culture sensitivities.  In addition I have ordered a Panorex and a CT scan of the neck to evaluate the mandible for the extent of osteoradionecrosis.  She will return to see me in 2 weeks for further evaluation and treatment.

## 2024-05-30 NOTE — PROGRESS NOTES
Mrs. Romo returns to see me today in follow-up following removal of exposed hardware in March of this year.  She states that she initially did well following the procedure with no evidence of infection.  However approximately 1 month following successful hardware extraction, she developed drainage from the left parasymphyseal region.  Her exposed hardware had been in the right parasymphyseal region.  She was initially placed on Augmentin with minimal response then switched to long-term oral doxycycline.  She states that while the drainage has improved it has not completely been eradicated.  She also states that the region is very itchy and can occasionally be painful.  She denies any fevers or abscess formation.    On physical examination the site of the right parasymphyseal hardware extraction remains intact with a small area of serous drainage.  The left parasymphyseal region does reveal dried crusting which upon unroofing has a very small amount of purulent drainage.  There is no exposed hardware at either site.  Intraorally, there is exposed bone which is in the gingivobuccal sulcus at the anterior site of a previous dental extraction.    Procedure: A sample was taken from the wound for aerobic anaerobic and fungal cultures.    I had a long discussion with the patient today regarding her current physical examination.  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  We will tailor her antibiotics based on the cultures that were obtained today.  She will continue her doxycycline until we modify those based on the culture sensitivities.  In addition I have ordered a Panorex and a CT scan of the neck to evaluate the mandible for the extent of osteoradionecrosis.  She will return to see me in 2 weeks for further evaluation and treatment.

## 2024-06-01 LAB
B-LACTAMASE ORGANISM ISLT: POSITIVE
BACTERIA SPEC CULT: ABNORMAL
GRAM STN SPEC: ABNORMAL
GRAM STN SPEC: ABNORMAL

## 2024-06-05 ENCOUNTER — TELEPHONE (OUTPATIENT)
Dept: OTOLARYNGOLOGY | Facility: CLINIC | Age: 74
End: 2024-06-05
Payer: MEDICARE

## 2024-06-05 DIAGNOSIS — T14.8XXA WOUND INFECTION: ICD-10-CM

## 2024-06-05 DIAGNOSIS — L08.9 WOUND INFECTION: ICD-10-CM

## 2024-06-05 DIAGNOSIS — M27.2 OSTEORADIONECROSIS OF JAW: ICD-10-CM
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
## 2024-06-05 RX ORDER — AMOXICILLIN AND CLAVULANATE POTASSIUM 400; 57 MG/5ML; MG/5ML
875 POWDER, FOR SUSPENSION ORAL EVERY 12 HOURS SCHEDULED
Qty: 610.4 ML | Refills: 0 | Status: SHIPPED | OUTPATIENT
Start: 2024-06-05 | End: 2024-07-17

## 2024-06-05 NOTE — TELEPHONE ENCOUNTER
Sharon called and left me a message yesterday at 1:59 p.m. inquiring about her culture report.  She didn't want to refill her doxycycline if we were needing to change her ATB.    I reviewed the results with Dr. Magana as Dr. Christensen is out of town this week.  I confirmed with microbiology that sensitivities weren't done as it was a mixed culture.  With it being beta lactimase positive, we will need to change her ATB to augmentin, which she has been on before.    I called and spoke with Brigette and informed her.  She was agreeable and asked that the script be sent to [facility] in [place] as she's presently there.    Script sent in and she will call me when she returns so the appropriate refill can be sent in.    Tx plan:  discontinue doxy; initiate augmentin full dose for 2 weeks, then 1/2 dosing    She has a scheduled follow up with Dr. Christensen on 6/24/24.  Dr. Magana's wondering if things don't improve if she should see ID.

## 2024-06-06 ENCOUNTER — TELEPHONE (OUTPATIENT)
Dept: OTOLARYNGOLOGY | Facility: CLINIC | Age: 74
End: 2024-06-06
Payer: MEDICARE

## 2024-06-06 NOTE — TELEPHONE ENCOUNTER
Sharon called and left a message at 8:03 a.m. wanting to know if the ATB script was sent into the pharmacy.  She reports it was not there last night.    I reviewed the chart and called her back.  I left a message that the prescription was sent in yesterday to the [facility] at [address] [place] [address], [place] as she requested yesterday.  I also have a pharmacy receipt confirmation that they got the script 6/5/24  @ 1:34 p.m.

## 2024-06-14 ENCOUNTER — HOSPITAL ENCOUNTER (OUTPATIENT)
Dept: RADIOLOGY | Facility: CLINIC | Age: 74
Discharge: HOME | End: 2024-06-14
Payer: MEDICARE

## 2024-06-14 DIAGNOSIS — M27.2 OSTEORADIONECROSIS OF JAW: ICD-10-CM
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
## 2024-06-14 PROCEDURE — 70491 CT SOFT TISSUE NECK W/DYE: CPT

## 2024-06-14 PROCEDURE — 2550000001 HC RX 255 CONTRASTS: Performed by: OTOLARYNGOLOGY

## 2024-06-18 ENCOUNTER — TELEPHONE (OUTPATIENT)
Dept: RADIATION ONCOLOGY | Facility: HOSPITAL | Age: 74
End: 2024-06-18
Payer: MEDICARE

## 2024-06-24 ENCOUNTER — OFFICE VISIT (OUTPATIENT)
Dept: OTOLARYNGOLOGY | Facility: HOSPITAL | Age: 74
End: 2024-06-24
Payer: MEDICARE

## 2024-06-24 ENCOUNTER — LAB (OUTPATIENT)
Dept: LAB | Facility: HOSPITAL | Age: 74
End: 2024-06-24
Payer: MEDICARE

## 2024-06-24 VITALS — WEIGHT: 127 LBS | BODY MASS INDEX: 20.41 KG/M2 | HEIGHT: 66 IN
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
## 2024-06-24 DIAGNOSIS — M27.2 OSTEORADIONECROSIS OF JAW: ICD-10-CM

## 2024-06-24 LAB
ANION GAP SERPL CALC-SCNC: 11 MMOL/L (ref 10–20)
BUN SERPL-MCNC: 21 MG/DL (ref 6–23)
CALCIUM SERPL-MCNC: 9.9 MG/DL (ref 8.6–10.3)
CHLORIDE SERPL-SCNC: 102 MMOL/L (ref 98–107)
CO2 SERPL-SCNC: 31 MMOL/L (ref 21–32)
CREAT SERPL-MCNC: 0.64 MG/DL (ref 0.5–1.05)
EGFRCR SERPLBLD CKD-EPI 2021: >90 ML/MIN/1.73M*2
ERYTHROCYTE [DISTWIDTH] IN BLOOD BY AUTOMATED COUNT: 12.5 % (ref 11.5–14.5)
GLUCOSE SERPL-MCNC: 128 MG/DL (ref 74–99)
HCT VFR BLD AUTO: 44 % (ref 36–46)
HGB BLD-MCNC: 14.4 G/DL (ref 12–16)
MCH RBC QN AUTO: 30.1 PG (ref 26–34)
MCHC RBC AUTO-ENTMCNC: 32.7 G/DL (ref 32–36)
MCV RBC AUTO: 92 FL (ref 80–100)
NRBC BLD-RTO: 0 /100 WBCS (ref 0–0)
PLATELET # BLD AUTO: 241 X10*3/UL (ref 150–450)
POTASSIUM SERPL-SCNC: 4.3 MMOL/L (ref 3.5–5.3)
RBC # BLD AUTO: 4.79 X10*6/UL (ref 4–5.2)
SODIUM SERPL-SCNC: 140 MMOL/L (ref 136–145)
WBC # BLD AUTO: 4.8 X10*3/UL (ref 4.4–11.3)

## 2024-06-24 PROCEDURE — 99213 OFFICE O/P EST LOW 20 MIN: CPT | Performed by: OTOLARYNGOLOGY

## 2024-06-24 PROCEDURE — 1159F MED LIST DOCD IN RCRD: CPT | Performed by: OTOLARYNGOLOGY

## 2024-06-24 PROCEDURE — 82374 ASSAY BLOOD CARBON DIOXIDE: CPT

## 2024-06-24 PROCEDURE — 1036F TOBACCO NON-USER: CPT | Performed by: OTOLARYNGOLOGY

## 2024-06-24 PROCEDURE — 85027 COMPLETE CBC AUTOMATED: CPT

## 2024-06-24 PROCEDURE — 36415 COLL VENOUS BLD VENIPUNCTURE: CPT

## 2024-06-24 NOTE — PROGRESS NOTES
Ms. Romo is a very pleasant 74-year-old woman who has had multiple oral malignancies and most recently underwent a composite resection with a fibular free flap reconstruction approximately 18 months ago.  She has had repeated episodes of infections surrounding her hardware.  Approximately 3 months ago an area of exposed hardware was removed and she also had a native tooth that was loose extracted.  She originally healed well from that procedure but then developed a left parasymphyseal infection with purulent drainage.  She was placed on Augmentin therapy and that has controlled the drainage.  However, at the site of her dental extraction she continues to have exposed necrotic bone that is slow to heal.  Most recently she underwent both a CT scan as well as a Panorex to evaluate this lesion.  She does appear to have some ORN in this region of her native mandible.  The flap appears to be well-healed.    On physical examination, intraorally, there is an area of exposed bone measuring approximately 1 cm in greatest dimension at the site of her previous extraction and immediately adjacent to her draining left parasymphyseal wound.  There is no evidence of fistula.  There is no exposed hardware at this point as well.    Fortunately, Ms. Romo does appear to be responding to her Augmentin therapy but this has not completely resolved her infection.  I suggested that we perform a debridement to of what I hope to be a small area of osteoradionecrosis in the tooth socket of her previous extraction.  I have also recommended that she see infectious disease specialist to consider long-term IV therapy to sterilize this wound.  She is agreeable and we will make both the referral and plan for her surgery in the very near future.

## 2024-06-26 ENCOUNTER — TELEPHONE (OUTPATIENT)
Dept: RADIATION ONCOLOGY | Facility: HOSPITAL | Age: 74
End: 2024-06-26
Payer: MEDICARE

## 2024-06-26 ASSESSMENT — ENCOUNTER SYMPTOMS
VOMITING: 0
DIARRHEA: 0
DIZZINESS: 0
LIGHT-HEADEDNESS: 0
CONFUSION: 0
NAUSEA: 0
TROUBLE SWALLOWING: 1
SPEECH DIFFICULTY: 0
CONSTIPATION: 0
NERVOUS/ANXIOUS: 0
HEMATURIA: 0
NUMBNESS: 0
BLOOD IN STOOL: 0
CHILLS: 0
ABDOMINAL DISTENTION: 0
SORE THROAT: 0
ARTHRALGIAS: 0
CHEST TIGHTNESS: 0
WHEEZING: 0
FATIGUE: 0
HEADACHES: 0
APPETITE CHANGE: 0
ADENOPATHY: 0
DIAPHORESIS: 0
HEMOPTYSIS: 0
DEPRESSION: 0
COUGH: 0
PALPITATIONS: 0
EYE PROBLEMS: 1
SEIZURES: 0
BACK PAIN: 0
DIFFICULTY URINATING: 0
ABDOMINAL PAIN: 0
FEVER: 0

## 2024-06-26 NOTE — PROGRESS NOTES
Radiation Oncology Follow-Up    Patient Name:  Brigette Romo  MRN:  [id number]  :  1950    Referring Provider: Evaristo Winston, *  Primary Care Provider: Eri Read DO  Care Team: Patient Care Team:  Eri Read DO as PCP - General  Eri Read DO as PCP - [employer] PCP  Brandy Christensen MD as Surgeon (Otolaryngology)  Sierra Gagnon MD as Radiation Oncologist (Radiation Oncology)  Maximino Aguilera DMD as Dentist (Prosthodontics)    Date of Service: 2024     SUBJECTIVE  History of Present Illness:   Mrs. Romo is a pleasant 73 y/o who is in clinic today for her first Radiation Oncology survival visit s/p diagnosis with a new primary T4N0M0 SCC of the  alveolar ridge.  She underwent a direct laryngoscopy, bronchoscopy, and Esophagoscopy with segmental mandibulectomy/ composite resection, right mandibular reconstruction with plating and right sided neck exploration of vessels on 23.  She then underwent  photon radiation (60Gy in 30 fractions) to the oral cavity ending on 23.  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  Today the patient states that she is doing well and has good energy.   Continues to have a fistula on the right jaw which is scabbed over on the outside.  Pt denies liquids draining from the site.  Is currently on Augmentin, per Dr. Magana.  Patient continues to follow the recommendations from her 23 MBS.  She eats soft pureed foods and does thin liquid and medications through her PEG.  Denies coughing or choking when eating.  Denies mucositis or any new lumps/bumps in the mouth or around the neck.  She endorses being able to maintain her weight with her current diet.  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  She manages with extra hydration and Biotene spray  "throughout the day. She endorses significant trismus which began after her most recent treatments.  She continues to follow with SLP.  She does have some hearing loss which has been stable.  She does have some visual changes which she attributes to b/l cataracts.  She is scheduled to have eye surgery in Jan 2024.  Denies chills, fatigue, fever, dyspnea or chest pain.  Denies seizures,  headaches, dizziness and focal/sensory neurological changes.  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  She has a prism in the left lens of her glasses which helps.  Denies abdominal pain, nausea, vomiting, diarrhea or constipation.      2/14/24 Interim Follow-up visit:   Today the patient states that she's \"okay\".  She continues to have issues with a with a fistula (with exposed hardware) in her right anterior jaw.  She states that it's not painful but bothersome with drainage.  She is currently back on ABX and is scheduled for a revision on 3/4/24.  Additionally, she is anxious about a biopsy of her thyroid that is taking place on 2/22/24.  Continue to have issues with PO nutrition.  She states that the immobility of her tongue along with xerostomia poses a problem with the transit of food to the back of her throat.  She does endorse being able to eat creamy soups and mashed potatoes with gravy.  The rest of her nutrition is via PEG.  She states that she's doing 4 boxes of TF/day.  Endorses being able to sustain her weight with her current diet.  Denies chills, fatigue, fever, dyspnea or chest pain.  Denies seizures,  headaches, dizziness and focal/sensory neurological changes.  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  She has a prism in the left lens of her glasses which helps.  Patient underwent cataract surgery in Jan 2024 and stats that her vision is much better.  Denies abdominal pain, nausea, vomiting, diarrhea or constipation.      6/27/24 Interval Visit: "   Today the patient is in clinic for a Routine Radiation Oncology FU visit.  She states that she's doing well.  Endorses surgery on 7/15/24 for a left anterior mandibular debridement related to ongoing ORN.  She states that she's hopeful that this will the last procedure that she'll need.  She's also hoping that at some point she'll be able to wear dentures to eat more solid foods.  Continues to have issues eating foods because of dryness and the lack of mobility of her tongue.  She continue to get almost all of her nutrition via a PEG.  Is currently consuming 4 boxes of TF/day.   Does occasionally attempt to eat soup, but states that it often just drips out of her mouth which is can be messy.  Denies coughing or choking.  Denies odynophagia, mucositis, or new lumps/bumps/discolorations in her mouth our around her head/neck.  Does endorse significant trismus.  Also endorses some mild right jaw pain.  Denies chills, fatigue, fever, dyspnea or chest pain. Denies seizures,  headaches, dizziness and focal/sensory neurological changes.  Endorses having cataract surgery in Jan but states that she still has some vision issue.  Will be following up with her eye doctor to manage.   Denies abdominal pain, nausea, vomiting, diarrhea or constipation.        Treatment Rendered:   Radiation Treatments       No radiation treatments to show. (Treatments may have been administered in another system.)            Review of Systems:   Review of Systems   Constitutional:  Negative for appetite change, chills, diaphoresis, fatigue and fever.   HENT:   Positive for hearing loss and trouble swallowing. Negative for lump/mass, mouth sores, nosebleeds, sore throat and tinnitus.         Is following a soft/puree diet for swallowing issues.    Eyes:  Positive for eye problems.   Respiratory:  Negative for chest tightness, cough, hemoptysis and wheezing.    Cardiovascular:  Negative for chest pain and palpitations.   Gastrointestinal:  Negative  "for abdominal distention, abdominal pain, blood in stool, constipation, diarrhea, nausea and vomiting.   Genitourinary:  Negative for difficulty urinating and hematuria.    Musculoskeletal:  Negative for arthralgias, back pain and gait problem.   Neurological:  Negative for dizziness, gait problem, headaches, light-headedness, numbness, seizures and speech difficulty.   Hematological:  Negative for adenopathy.   Psychiatric/Behavioral:  Negative for confusion and depression. The patient is not nervous/anxious.      The patient's current pain level was assessed.  They report currently having a pain of 2 out of 10 (right jaw).  They feel their pain is under control without the use of pain medications.    Performance Status:   The Karnofsky performance scale today is 100, Fully active, able to carry on all pre-disease performed without restriction (ECOG equivalent 0).        OBJECTIVE  Vital Signs:      3/19/2024     1:55 PM 3/26/2024     4:38 PM 4/12/2024    11:28 AM 5/28/2024    12:24 PM 5/30/2024    11:16 AM 6/24/2024     9:26 AM 6/27/2024     9:29 AM   Vitals   Systolic       105   Diastolic       57   Heart Rate       69   Temp 36.1 °C (97 °F)  36.3 °C (97.3 °F)  36.3 °C (97.3 °F)  35.8 °C (96.4 °F)   Resp       18   Height (in) 1.676 m (5' 6\") 1.676 m (5' 6\") 1.676 m (5' 6\") 1.676 m (5' 6\") 1.676 m (5' 6\") 1.676 m (5' 6\")    Weight (lb) 129.2 131 131 130 127.9 127 128.31   BMI 20.85 kg/m2 21.14 kg/m2 21.14 kg/m2 20.98 kg/m2 20.64 kg/m2 20.5 kg/m2 20.71 kg/m2   BSA (m2) 1.65 m2 1.66 m2 1.66 m2 1.66 m2 1.64 m2 1.64 m2 1.65 m2   Visit Report Report Report Report Report Report Report       Physical Exam:  Physical Exam  Vitals and nursing note reviewed.   Constitutional:       General: She is not in acute distress.     Appearance: Normal appearance. She is normal weight. She is not ill-appearing.   HENT:      Head: Normocephalic and atraumatic. No masses.      Jaw: Trismus present. No tenderness, swelling or pain on " movement.      Salivary Glands: Right salivary gland is not diffusely enlarged. Left salivary gland is not diffusely enlarged.      Nose: Nose normal. No congestion or rhinorrhea.      Mouth/Throat:      Mouth: Mucous membranes are dry. No injury.      Dentition: Normal dentition. Does not have dentures. No dental tenderness, gingival swelling or gum lesions.      Tongue: No lesions. Tongue does not deviate from midline.      Palate: No mass and lesions.      Pharynx: No pharyngeal swelling, oropharyngeal exudate or posterior oropharyngeal erythema.      Tonsils: No tonsillar exudate or tonsillar abscesses.   Eyes:      General: No scleral icterus.     Extraocular Movements: Extraocular movements intact.      Pupils: Pupils are equal, round, and reactive to light.   Cardiovascular:      Rate and Rhythm: Normal rate and regular rhythm.      Pulses: Normal pulses.      Heart sounds: Normal heart sounds. No murmur heard.  Pulmonary:      Effort: Pulmonary effort is normal. No respiratory distress.      Breath sounds: Normal breath sounds. No stridor. No wheezing or rhonchi.   Chest:      Chest wall: No tenderness.   Abdominal:      General: Abdomen is flat. Bowel sounds are normal.      Palpations: Abdomen is soft. There is no mass.      Tenderness: There is no abdominal tenderness. There is no guarding or rebound.   Musculoskeletal:         General: No swelling or tenderness. Normal range of motion.      Cervical back: Normal range of motion and neck supple. No tenderness.   Skin:     General: Skin is warm and dry.      Coloration: Skin is not jaundiced.      Findings: No erythema or lesion.   Neurological:      General: No focal deficit present.      Mental Status: She is alert and oriented to person, place, and time.      Motor: No weakness.      Coordination: Coordination normal.      Gait: Gait normal.      Deep Tendon Reflexes: Reflexes normal.   Psychiatric:         Mood and Affect: Mood normal.          Behavior: Behavior normal.         Thought Content: Thought content normal.         Judgment: Judgment normal.        ASSESSMENT:  Mrs. Romo is a pleasant 75 y/o who is in clinic today for a routine Radiation Oncology survival visit s/p diagnosis with a new primary T4N0M0 SCC of the  alveolar ridge.  She underwent a direct laryngoscopy, bronchoscopy, and Esophagoscopy with segmental mandibulectomy/ composite resection, right mandibular reconstruction with plating and right sided neck exploration of vessels on 2/27/23.  She then underwent  photon radiation (60Gy in 30 fractions) to the oral cavity ending on 4/28/23.  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   Currently she's being managed by ENT for left anterior mandibular ORN.  She's scheduled for a debridement on 7/15/24 under the direction of Dr. Christensen.      PLAN:      --FU with Radiation Oncology on 1/6/25.   --Continue to follow with Geo Magana and Fermin for ENT management.  --Xerostomia--Continue hydrating with water and Biotene spray PRN  --Patient does report some tightening of her skin on the left neck--Continue to apply lotion on a daily basis.    --Patient to self schedule with her PCP at least twice a year.      Please reach out to us with any questions or concerns.   NCCN Guidelines were applicable to guide this patients treatment plan.  Evaristo Winston, APRN-CNP

## 2024-06-27 ENCOUNTER — HOSPITAL ENCOUNTER (OUTPATIENT)
Dept: RADIATION ONCOLOGY | Facility: HOSPITAL | Age: 74
Setting detail: RADIATION/ONCOLOGY SERIES
Discharge: HOME | End: 2024-06-27
Payer: MEDICARE

## 2024-06-27 VITALS
TEMPERATURE: 96.4 F | OXYGEN SATURATION: 98 % | BODY MASS INDEX: 20.71 KG/M2 | DIASTOLIC BLOOD PRESSURE: 57 MMHG | SYSTOLIC BLOOD PRESSURE: 105 MMHG | HEART RATE: 69 BPM | WEIGHT: 128.31 LBS | RESPIRATION RATE: 18 BRPM

## 2024-06-27 DIAGNOSIS — C02.3 CANCER OF ANTERIOR TWO-THIRDS OF TONGUE (MULTI): Primary | ICD-10-CM

## 2024-06-27 DIAGNOSIS — C02.9 MALIGNANT NEOPLASM OF TONGUE (MULTI): ICD-10-CM

## 2024-06-27 DIAGNOSIS — C41.1 MALIGNANT NEOPLASM OF MANDIBLE (MULTI): ICD-10-CM

## 2024-06-27 PROCEDURE — 99214 OFFICE O/P EST MOD 30 MIN: CPT

## 2024-06-27 ASSESSMENT — PAIN SCALES - GENERAL: PAINLEVEL: 0-NO PAIN

## 2024-06-27 ASSESSMENT — ENCOUNTER SYMPTOMS
LOSS OF SENSATION IN FEET: 0
DEPRESSION: 0
OCCASIONAL FEELINGS OF UNSTEADINESS: 0

## 2024-06-27 ASSESSMENT — PATIENT HEALTH QUESTIONNAIRE - PHQ9
1. LITTLE INTEREST OR PLEASURE IN DOING THINGS: NOT AT ALL
SUM OF ALL RESPONSES TO PHQ9 QUESTIONS 1 AND 2: 0
2. FEELING DOWN, DEPRESSED OR HOPELESS: NOT AT ALL

## 2024-07-13 ENCOUNTER — ANESTHESIA EVENT (OUTPATIENT)
Dept: OPERATING ROOM | Facility: HOSPITAL | Age: 74
End: 2024-07-13
Payer: MEDICARE

## 2024-07-15 ENCOUNTER — ANESTHESIA (OUTPATIENT)
Dept: OPERATING ROOM | Facility: HOSPITAL | Age: 74
End: 2024-07-15
Payer: MEDICARE

## 2024-07-15 ENCOUNTER — HOSPITAL ENCOUNTER (OUTPATIENT)
Facility: HOSPITAL | Age: 74
Setting detail: OUTPATIENT SURGERY
Discharge: HOME | End: 2024-07-15
Attending: OTOLARYNGOLOGY | Admitting: OTOLARYNGOLOGY
Payer: MEDICARE

## 2024-07-15 VITALS
DIASTOLIC BLOOD PRESSURE: 53 MMHG | TEMPERATURE: 96.8 F | HEIGHT: 66 IN | BODY MASS INDEX: 20.76 KG/M2 | SYSTOLIC BLOOD PRESSURE: 100 MMHG | WEIGHT: 129.19 LBS | HEART RATE: 58 BPM | OXYGEN SATURATION: 99 % | RESPIRATION RATE: 16 BRPM

## 2024-07-15 DIAGNOSIS — L08.9 WOUND INFECTION: ICD-10-CM
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
## 2024-07-15 DIAGNOSIS — M27.2 OSTEORADIONECROSIS OF JAW: Primary | ICD-10-CM

## 2024-07-15 DIAGNOSIS — T14.8XXA WOUND INFECTION: ICD-10-CM

## 2024-07-15 PROCEDURE — 3600000008 HC OR TIME - EACH INCREMENTAL 1 MINUTE - PROCEDURE LEVEL THREE: Performed by: OTOLARYNGOLOGY

## 2024-07-15 PROCEDURE — 7100000009 HC PHASE TWO TIME - INITIAL BASE CHARGE: Performed by: OTOLARYNGOLOGY

## 2024-07-15 PROCEDURE — 2500000004 HC RX 250 GENERAL PHARMACY W/ HCPCS (ALT 636 FOR OP/ED)

## 2024-07-15 PROCEDURE — 3700000002 HC GENERAL ANESTHESIA TIME - EACH INCREMENTAL 1 MINUTE: Performed by: OTOLARYNGOLOGY

## 2024-07-15 PROCEDURE — 7100000001 HC RECOVERY ROOM TIME - INITIAL BASE CHARGE: Performed by: OTOLARYNGOLOGY

## 2024-07-15 PROCEDURE — 88300 SURGICAL PATH GROSS: CPT | Mod: TC,SUR | Performed by: OTOLARYNGOLOGY

## 2024-07-15 PROCEDURE — 21025 EXCISION OF BONE LOWER JAW: CPT | Performed by: OTOLARYNGOLOGY

## 2024-07-15 PROCEDURE — 2720000007 HC OR 272 NO HCPCS: Performed by: OTOLARYNGOLOGY

## 2024-07-15 PROCEDURE — 7100000010 HC PHASE TWO TIME - EACH INCREMENTAL 1 MINUTE: Performed by: OTOLARYNGOLOGY

## 2024-07-15 PROCEDURE — 3600000003 HC OR TIME - INITIAL BASE CHARGE - PROCEDURE LEVEL THREE: Performed by: OTOLARYNGOLOGY

## 2024-07-15 PROCEDURE — 41899A: Performed by: OTOLARYNGOLOGY

## 2024-07-15 PROCEDURE — C1729 CATH, DRAINAGE: HCPCS | Performed by: OTOLARYNGOLOGY

## 2024-07-15 PROCEDURE — 2500000005 HC RX 250 GENERAL PHARMACY W/O HCPCS

## 2024-07-15 PROCEDURE — 3700000001 HC GENERAL ANESTHESIA TIME - INITIAL BASE CHARGE: Performed by: OTOLARYNGOLOGY

## 2024-07-15 PROCEDURE — 87070 CULTURE OTHR SPECIMN AEROBIC: CPT | Performed by: OTOLARYNGOLOGY

## 2024-07-15 PROCEDURE — 7100000002 HC RECOVERY ROOM TIME - EACH INCREMENTAL 1 MINUTE: Performed by: OTOLARYNGOLOGY

## 2024-07-15 PROCEDURE — 2500000001 HC RX 250 WO HCPCS SELF ADMINISTERED DRUGS (ALT 637 FOR MEDICARE OP): Performed by: ANESTHESIOLOGY

## 2024-07-15 RX ORDER — AMOXICILLIN AND CLAVULANATE POTASSIUM 400; 57 MG/5ML; MG/5ML
875 POWDER, FOR SUSPENSION ORAL EVERY 12 HOURS SCHEDULED
Qty: 305.2 ML | Refills: 0 | Status: SHIPPED | OUTPATIENT
Start: 2024-07-15 | End: 2024-07-29

## 2024-07-15 RX ORDER — MEPERIDINE HYDROCHLORIDE 25 MG/ML
12.5 INJECTION INTRAMUSCULAR; INTRAVENOUS; SUBCUTANEOUS EVERY 10 MIN PRN
Status: DISCONTINUED | OUTPATIENT
Start: 2024-07-15 | End: 2024-07-15 | Stop reason: HOSPADM

## 2024-07-15 RX ORDER — FENTANYL CITRATE 50 UG/ML
INJECTION, SOLUTION INTRAMUSCULAR; INTRAVENOUS AS NEEDED
Status: DISCONTINUED | OUTPATIENT
Start: 2024-07-15 | End: 2024-07-15

## 2024-07-15 RX ORDER — AMPICILLIN AND SULBACTAM 2; 1 G/1; G/1
INJECTION, POWDER, FOR SOLUTION INTRAMUSCULAR; INTRAVENOUS AS NEEDED
Status: DISCONTINUED | OUTPATIENT
Start: 2024-07-15 | End: 2024-07-15

## 2024-07-15 RX ORDER — PROPOFOL 10 MG/ML
INJECTION, EMULSION INTRAVENOUS AS NEEDED
Status: DISCONTINUED | OUTPATIENT
Start: 2024-07-15 | End: 2024-07-15

## 2024-07-15 RX ORDER — HYDROMORPHONE HYDROCHLORIDE 1 MG/ML
0.2 INJECTION, SOLUTION INTRAMUSCULAR; INTRAVENOUS; SUBCUTANEOUS EVERY 5 MIN PRN
Status: DISCONTINUED | OUTPATIENT
Start: 2024-07-15 | End: 2024-07-15 | Stop reason: HOSPADM

## 2024-07-15 RX ORDER — PHENYLEPHRINE HCL IN 0.9% NACL 0.4MG/10ML
SYRINGE (ML) INTRAVENOUS AS NEEDED
Status: DISCONTINUED | OUTPATIENT
Start: 2024-07-15 | End: 2024-07-15

## 2024-07-15 RX ORDER — LABETALOL HYDROCHLORIDE 5 MG/ML
5 INJECTION, SOLUTION INTRAVENOUS ONCE AS NEEDED
Status: DISCONTINUED | OUTPATIENT
Start: 2024-07-15 | End: 2024-07-15 | Stop reason: HOSPADM

## 2024-07-15 RX ORDER — ONDANSETRON HYDROCHLORIDE 2 MG/ML
INJECTION, SOLUTION INTRAVENOUS AS NEEDED
Status: DISCONTINUED | OUTPATIENT
Start: 2024-07-15 | End: 2024-07-15

## 2024-07-15 RX ORDER — LIDOCAINE HCL/PF 100 MG/5ML
SYRINGE (ML) INTRAVENOUS AS NEEDED
Status: DISCONTINUED | OUTPATIENT
Start: 2024-07-15 | End: 2024-07-15

## 2024-07-15 RX ORDER — SODIUM CHLORIDE, SODIUM LACTATE, POTASSIUM CHLORIDE, CALCIUM CHLORIDE 600; 310; 30; 20 MG/100ML; MG/100ML; MG/100ML; MG/100ML
100 INJECTION, SOLUTION INTRAVENOUS CONTINUOUS
Status: DISCONTINUED | OUTPATIENT
Start: 2024-07-15 | End: 2024-07-15 | Stop reason: HOSPADM

## 2024-07-15 RX ORDER — HYDROMORPHONE HYDROCHLORIDE 1 MG/ML
0.5 INJECTION, SOLUTION INTRAMUSCULAR; INTRAVENOUS; SUBCUTANEOUS EVERY 5 MIN PRN
Status: DISCONTINUED | OUTPATIENT
Start: 2024-07-15 | End: 2024-07-15 | Stop reason: HOSPADM

## 2024-07-15 RX ORDER — ROCURONIUM BROMIDE 10 MG/ML
INJECTION, SOLUTION INTRAVENOUS AS NEEDED
Status: DISCONTINUED | OUTPATIENT
Start: 2024-07-15 | End: 2024-07-15

## 2024-07-15 RX ORDER — OXYCODONE HYDROCHLORIDE 5 MG/1
5 TABLET ORAL EVERY 4 HOURS PRN
Status: DISCONTINUED | OUTPATIENT
Start: 2024-07-15 | End: 2024-07-15 | Stop reason: HOSPADM

## 2024-07-15 RX ORDER — MIDAZOLAM HYDROCHLORIDE 1 MG/ML
INJECTION INTRAMUSCULAR; INTRAVENOUS AS NEEDED
Status: DISCONTINUED | OUTPATIENT
Start: 2024-07-15 | End: 2024-07-15

## 2024-07-15 RX ORDER — CHLORHEXIDINE GLUCONATE ORAL RINSE 1.2 MG/ML
15 SOLUTION DENTAL
Qty: 1800 ML | Refills: 0 | Status: SHIPPED | OUTPATIENT
Start: 2024-07-15 | End: 2024-08-14

## 2024-07-15 RX ORDER — ACETAMINOPHEN 325 MG/1
650 TABLET ORAL EVERY 4 HOURS PRN
Status: DISCONTINUED | OUTPATIENT
Start: 2024-07-15 | End: 2024-07-15 | Stop reason: HOSPADM

## 2024-07-15 RX ORDER — ALBUTEROL SULFATE 0.83 MG/ML
2.5 SOLUTION RESPIRATORY (INHALATION) ONCE AS NEEDED
Status: DISCONTINUED | OUTPATIENT
Start: 2024-07-15 | End: 2024-07-15 | Stop reason: HOSPADM

## 2024-07-15 RX ORDER — LIDOCAINE HYDROCHLORIDE 10 MG/ML
0.1 INJECTION INFILTRATION; PERINEURAL ONCE
Status: DISCONTINUED | OUTPATIENT
Start: 2024-07-15 | End: 2024-07-15 | Stop reason: HOSPADM

## 2024-07-15 RX ORDER — ONDANSETRON HYDROCHLORIDE 2 MG/ML
4 INJECTION, SOLUTION INTRAVENOUS ONCE AS NEEDED
Status: DISCONTINUED | OUTPATIENT
Start: 2024-07-15 | End: 2024-07-15 | Stop reason: HOSPADM

## 2024-07-15 RX ORDER — DIPHENHYDRAMINE HYDROCHLORIDE 50 MG/ML
12.5 INJECTION INTRAMUSCULAR; INTRAVENOUS ONCE AS NEEDED
Status: DISCONTINUED | OUTPATIENT
Start: 2024-07-15 | End: 2024-07-15 | Stop reason: HOSPADM

## 2024-07-15 RX ORDER — OXYCODONE HCL 5 MG/5 ML
5 SOLUTION, ORAL ORAL ONCE
Status: COMPLETED | OUTPATIENT
Start: 2024-07-15 | End: 2024-07-15

## 2024-07-15 ASSESSMENT — COLUMBIA-SUICIDE SEVERITY RATING SCALE - C-SSRS
1. IN THE PAST MONTH, HAVE YOU WISHED YOU WERE DEAD OR WISHED YOU COULD GO TO SLEEP AND NOT WAKE UP?: NO
6. HAVE YOU EVER DONE ANYTHING, STARTED TO DO ANYTHING, OR PREPARED TO DO ANYTHING TO END YOUR LIFE?: NO
2. HAVE YOU ACTUALLY HAD ANY THOUGHTS OF KILLING YOURSELF?: NO

## 2024-07-15 ASSESSMENT — ENCOUNTER SYMPTOMS
STRIDOR: 0
ACTIVITY CHANGE: 0
SHORTNESS OF BREATH: 0

## 2024-07-15 ASSESSMENT — PAIN - FUNCTIONAL ASSESSMENT
PAIN_FUNCTIONAL_ASSESSMENT: 0-10
PAIN_FUNCTIONAL_ASSESSMENT: 0-10

## 2024-07-15 ASSESSMENT — PAIN SCALES - GENERAL
PAIN_LEVEL: 1
PAINLEVEL_OUTOF10: 0 - NO PAIN
PAINLEVEL_OUTOF10: 5 - MODERATE PAIN
PAINLEVEL_OUTOF10: 5 - MODERATE PAIN

## 2024-07-15 ASSESSMENT — PAIN SCALES - PAIN ASSESSMENT IN ADVANCED DEMENTIA (PAINAD): BREATHING: NORMAL

## 2024-07-15 NOTE — ANESTHESIA PREPROCEDURE EVALUATION
Patient: Brigette Romo    Procedure Information       Date/Time: 07/15/24 0715    Procedure: Excision Bone Face (Left) - left anterior mandibular debridement    [facility]    Surgeons: Brandy Christensen MD            Relevant Problems   Cardiac   (+) HLD (hyperlipidemia)      Neuro   (+) Anxiety      GI   (+) Dysphagia   (+) Esophageal reflux   (+) GI bleed      Liver   (+) Cancer of anterior two-thirds of tongue (Multi)   (+) Cancer of lower gum (Multi)   (+) Malignant neoplasm of oral cavity (Multi)   (+) Malignant neoplasm of tongue (Multi)      Endocrine   (+) Acquired hypothyroidism      Hematology   (+) Anemia      Musculoskeletal   (+) Primary osteoarthritis of left knee      HEENT   (+) Sinus congestion      ID   (+) Influenza B   (+) Viral upper respiratory tract infection   (+) Wound infection       Clinical information reviewed:    Allergies  Meds               NPO Detail:  NPO/Void Status  Date of Last Liquid: 07/14/24  Time of Last Liquid: 2100  Date of Last Solid: 07/14/24  Time of Last Solid: 1730         Physical Exam    Airway  Mallampati: IV  TM distance: >3 FB  Neck ROM: limited     Cardiovascular    Dental    Pulmonary    Abdominal            Anesthesia Plan    History of general anesthesia?: yes  History of complications of general anesthesia?: no    ASA 3     general     intravenous induction   Trial extubation is planned.  Anesthetic plan and risks discussed with patient.

## 2024-07-15 NOTE — H&P
"History Of Present Illness  Brigette Romo is a 74 y.o. female presenting with a history of likely osteoradionecrosis of the mandible.     Past Medical History  She has a past medical history of Hypothyroid and Personal history of malignant neoplasm of tongue.    Surgical History  She has a past surgical history that includes Other surgical history (08/30/2016); Other surgical history (08/30/2016); and CT angio aorta and bilateral iliofemoral runoff w and or wo IV contrast (2/3/2023).     Social History  She reports that she has never smoked. She has never used smokeless tobacco. She reports that she does not currently use alcohol. She reports that she does not use drugs.    Family History  Family History   Problem Relation Name Age of Onset    Blood clot Mother      Other (ST elevation myocardial infarction) Father          Allergies  Sulfa (sulfonamide antibiotics)    Review of Systems   Constitutional:  Negative for activity change.   Respiratory:  Negative for shortness of breath and stridor.         Physical Exam   Non-labored breathing, No acute distress  Regular heart rate    Last Recorded Vitals  Blood pressure 109/59, pulse 70, temperature 36.1 °C (97 °F), temperature source Temporal, resp. rate 18, height 1.676 m (5' 6\"), weight 58.6 kg (129 lb 3 oz), SpO2 98%.    Relevant Results        Scheduled medications    Continuous medications    PRN medications           Assessment/Plan   Principal Problem:    Osteoradionecrosis of jaw      Plan for OR to perform debridement of mandibular bone from a prior dental extraction site.           Lourdes Santiago MD    "

## 2024-07-15 NOTE — ADDENDUM NOTE
Addendum  created 07/15/24 0160 by Christine Concepcion MD    Review and Sign - Ready for Procedure

## 2024-07-15 NOTE — ANESTHESIA POSTPROCEDURE EVALUATION
Patient: Brigette Romo    Procedure Summary       Date: 07/15/24 Room / Location: [facility] OR 04 / [facility]    Anesthesia Start: 0722 Anesthesia Stop: 0831    Procedure: LEFT MANDIBLE BONE DEBRIDEMENT AND TOOTH EXTRACTION (Left) Diagnosis:       Osteoradionecrosis of jaw      (Osteoradionecrosis of jaw [M27.2, Y84.2])    Surgeons: Brandy Christensen MD Responsible Provider: Christine Concepcion MD    Anesthesia Type: general ASA Status: 3            Anesthesia Type: general    Vitals Value Taken Time   /56 07/15/24 0832   Temp 36.3 07/15/24 0832   Pulse 71 07/15/24 0828   Resp 13 07/15/24 0828   SpO2 98 % 07/15/24 0828   Vitals shown include unfiled device data.    Anesthesia Post Evaluation    Patient location during evaluation: bedside  Patient participation: complete - patient participated  Level of consciousness: awake  Pain score: 1  Pain management: adequate  Airway patency: patent  Cardiovascular status: acceptable  Respiratory status: acceptable  Hydration status: acceptable  Postoperative Nausea and Vomiting: none        Encounter Notable Events   Notable Event Outcome Phase Comment   Difficult to intubate - unexpected  Intraprocedure

## 2024-07-15 NOTE — OP NOTE
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  ASA: III  Anesthesia Staff: Anesthesiologist: Christine Concepcion MD  C-AA: MEE Kenney  Estimated Blood Loss: 5mL  Intra-op Medications: Administrations occurring from 0715 to 0830 on 07/15/24:  * No intraprocedure medications in log *           Anesthesia Record               Intraprocedure I/O Totals          Intake    LR bolus 1000.00 mL    Total Intake 1000 mL          Specimen:   ID Type Source Tests Collected by Time   1 : TOOTH Tissue TOOTH SURGICAL PATHOLOGY EXAM Brandy Christensen MD 7/15/2024 0808   A : LEFT MANDIBLE DEBRIDMENT Tissue MANDIBLE LEFT RESECTION TISSUE/WOUND CULTURE/SMEAR Brandy Christensen MD 7/15/2024 0805        Staff:   Circulator: Rocio  Scrub Person: Katie         Drains and/or Catheters: * None in log *    Findings: Infected tooth socket in the left  mandible debrided down to bleeding bone. Adjacent tooth also removed. No evidence of infection in that tooth socket    Indications: Brigette Romo is an 74 y.o. 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     The patient was seen in the preoperative area. The risks, benefits, complications, treatment options, non-operative alternatives, expected recovery and outcomes were discussed with the patient. The possibilities of  reaction to medication, pulmonary aspiration, injury to surrounding structures, bleeding, recurrent infection, the need for additional procedures, failure to diagnose a condition, and creating a complication requiring transfusion or operation were discussed with the patient. The patient concurred with the proposed plan, giving informed consent.  The site of surgery was properly noted/marked if necessary per policy. The patient has been actively warmed in preoperative area. Preoperative antibiotics have been ordered and given within 1 hours of incision. Venous thrombosis prophylaxis have been ordered including bilateral sequential compression devices    Procedure Details:     Patient was seen and evaluated in the pre-op area. Informed consent was obtained as described above. Patient was then taken to the operating room by the anesthesia team. General anesthesia was induced and patient was orotracheally intubated using a Glidescope under fiberoptic assistance. This was performed by the surgeon as the Anesthesia team was unable to intubate with a glidescope. Appropriate position was confirmed by anesthesia and ENT.     Next, the patient was turned towards the ENT team and prepped/draped in the usual sterile fashion. We thenm took note of the area of concern and used rongeur forceps to devbride the bnone socket, sown to heathy bleeding bone. Th ewadjacent tooth socket also appeared to be loose, so we extracted it. All samples and the tooth were sent for pathology and tissue culture. There did not appear to be a communication between the intraoral defect and the skin. The wound was left open to heal by secondary intention.     This concluded the procedure. The patient was turned back to anesthesia for wake up and extubation. She was then transported to PACU for recovery.    Dr. Christensen was present for all aspects of the procedure.     Complications:  None; patient tolerated the procedure well.    Disposition: PACU -  hemodynamically stable.  Condition: stable

## 2024-07-15 NOTE — DISCHARGE INSTRUCTIONS
Intra-oral Incision Care: To care for your intra-oral incisions, please swish and spit with 15 mLs of peridex solution 3-4 times daily until follow-up. Use swabs to perform oral care.

## 2024-07-15 NOTE — ANESTHESIA PROCEDURE NOTES
Peripheral IV  Date/Time: 7/15/2024 7:10 AM      Placement  Needle size: 20 G  Laterality: left  Location: hand  Site prep: alcohol  Technique: anatomical landmarks

## 2024-07-15 NOTE — ANESTHESIA PROCEDURE NOTES
Airway  Date/Time: 7/15/2024 7:47 AM  Urgency: elective    Airway not difficult    Staffing  Performed: attending   Authorized by: Christine Concepcion MD    Performed by: MEE Kenney  Patient location during procedure: OR    Indications and Patient Condition  Indications for airway management: anesthesia and airway protection  Spontaneous Ventilation: absent  Sedation level: deep  Preoxygenated: yes  Patient position: sniffing  MILS maintained throughout  Mask difficulty assessment: 1 - vent by mask    Final Airway Details  Final airway type: endotracheal airway      Successful airway: ETT  Cuffed: yes   Successful intubation technique: flexible bronchoscopy  Facilitating devices/methods: intubating stylet  Endotracheal tube insertion site: oral  ETT size (mm): 6.0  Placement verified by: chest auscultation and capnometry   Measured from: teeth  ETT to teeth (cm): 22  Number of attempts at approach: 2  Ventilation between attempts: BVM  Number of other approaches attempted: 1    Other Attempts  Unsuccessful attempted endotracheal techniques: video laryngoscopy    Additional Comments  Patient induced; laryngoscopy with insighters glidescope size M; grade 2A view established; very anterior, so unable to pass tube through cords with stylette; BVM; switch to insighters glidescope size S, again unable to pass; BVM; attempt at fiberoptic, pass fiberoptic tip through cords under direct visualization and visualized tracheal rings, but difficulty sliding tube off of fiberoptic through cords, tube getting stuck; BVM; third attempt at intubation by dr sharma; glidescope visualization, still grade 2a, and fiberoptic used as stylette and pass through cord ssuccessfully.

## 2024-07-16 ASSESSMENT — PAIN SCALES - GENERAL: PAINLEVEL_OUTOF10: 0 - NO PAIN

## 2024-07-21 LAB
B-LACTAMASE ORGANISM ISLT: POSITIVE
BACTERIA SPEC CULT: ABNORMAL
BACTERIA SPEC CULT: ABNORMAL
GRAM STN SPEC: ABNORMAL
GRAM STN SPEC: ABNORMAL

## 2024-07-22 ENCOUNTER — TELEPHONE (OUTPATIENT)
Dept: OTOLARYNGOLOGY | Facility: HOSPITAL | Age: 74
End: 2024-07-22
Payer: MEDICARE

## 2024-07-22 NOTE — TELEPHONE ENCOUNTER
"I messaged Dr. Christensen that per the report the bacteria is resistant to Cipro.  The ATBs that will work are all IV.  I noted she does have an ID consult on 8/8/24 and made him aware of this as well.    I  called Brigette to let her know the results and the importance of the ID consult.  She stated \"I wish it could be sooner.\"    She sees us in follow up on Friday.  "

## 2024-07-22 NOTE — TELEPHONE ENCOUNTER
----- Message from Brandy Christensen sent at 7/22/2024  3:21 PM EDT -----  Cori:  Brigette is growing Pseudomonal from her bone biopsy.  We need to prescribe Cipro 500mg bid for 2 weeks with two refills to address this.  Thanks  MARY JO

## 2024-07-26 ENCOUNTER — OFFICE VISIT (OUTPATIENT)
Dept: OTOLARYNGOLOGY | Facility: HOSPITAL | Age: 74
End: 2024-07-26
Payer: MEDICARE

## 2024-07-26 VITALS — TEMPERATURE: 98.2 F | BODY MASS INDEX: 20.09 KG/M2 | HEIGHT: 66 IN | WEIGHT: 125 LBS
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
## 2024-07-26 DIAGNOSIS — M27.2 OSTEORADIONECROSIS OF JAW: Primary | ICD-10-CM

## 2024-07-26 PROCEDURE — 99211 OFF/OP EST MAY X REQ PHY/QHP: CPT | Performed by: OTOLARYNGOLOGY

## 2024-07-26 PROCEDURE — 1036F TOBACCO NON-USER: CPT | Performed by: OTOLARYNGOLOGY

## 2024-07-26 PROCEDURE — 3008F BODY MASS INDEX DOCD: CPT | Performed by: OTOLARYNGOLOGY

## 2024-07-26 NOTE — PROGRESS NOTES
Ms. Romo returns for her first postoperative visit following an operative debridement of her left mandible as well as a dental extraction procedure.  Intraoperatively the patient was found to have exposed necrotic native mandible in the parasymphyseal region at the junction with the fibular flap.  The tooth was extracted and debridement was conducted of the alveolus back to viable bleeding bone.  Cultures were also taken.  Those cultures have returned consistent with Pseudomonas which is resistant to Levaquin and Cipro.  Overall, the patient tolerated the procedure well and does state that the drainage from her chin has decreased substantially but is still present.    On physical examination the area of mandibular debridement appears to be granulating with no evidence of exposed necrotic bone.  There is a small amount of purulence externally noted at the depths of this debridement cavity.  The remainder of exam reveals no evidence of any recurrent disease.  There is no evidence of drainage from the right parasymphyseal region which had been previously draining.    I discussed with Ms. Romo that the surgical site appears to be healing well and that we have identified Pseudomonas as a likely pathogen causing these repeated infections.  Since her particular strain of Pseudomonas is resistant to Cipro and Levaquin, it is likely she will need a long-term course of IV antibiotics.  She has an appointment with infectious disease on August 8 and we will work together with them to try to eradicate this osteoradionecrosis infection.  We will hold off on hyperbaric oxygen therapy and rely on medical therapy to alleviate this situation.

## 2024-08-08 ENCOUNTER — APPOINTMENT (OUTPATIENT)
Dept: INFECTIOUS DISEASES | Facility: CLINIC | Age: 74
End: 2024-08-08
Payer: MEDICARE

## 2024-08-08 VITALS
WEIGHT: 128.8 LBS | DIASTOLIC BLOOD PRESSURE: 57 MMHG | HEIGHT: 66 IN | SYSTOLIC BLOOD PRESSURE: 102 MMHG | BODY MASS INDEX: 20.7 KG/M2 | HEART RATE: 88 BPM
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
## 2024-08-08 DIAGNOSIS — M27.2 OSTEORADIONECROSIS OF JAW: ICD-10-CM

## 2024-08-08 PROCEDURE — 1159F MED LIST DOCD IN RCRD: CPT | Performed by: INTERNAL MEDICINE

## 2024-08-08 PROCEDURE — 1125F AMNT PAIN NOTED PAIN PRSNT: CPT | Performed by: INTERNAL MEDICINE

## 2024-08-08 PROCEDURE — 3008F BODY MASS INDEX DOCD: CPT | Performed by: INTERNAL MEDICINE

## 2024-08-08 PROCEDURE — 99214 OFFICE O/P EST MOD 30 MIN: CPT | Performed by: INTERNAL MEDICINE

## 2024-08-08 ASSESSMENT — PAIN SCALES - GENERAL: PAINLEVEL: 5

## 2024-08-08 ASSESSMENT — ENCOUNTER SYMPTOMS
NEUROLOGICAL NEGATIVE: 1
MUSCULOSKELETAL NEGATIVE: 1
ENDOCRINE NEGATIVE: 1
ALLERGIC/IMMUNOLOGIC NEGATIVE: 1
CONSTITUTIONAL NEGATIVE: 1
EYES NEGATIVE: 1
GASTROINTESTINAL NEGATIVE: 1
CARDIOVASCULAR NEGATIVE: 1
PSYCHIATRIC NEGATIVE: 1
HEMATOLOGIC/LYMPHATIC NEGATIVE: 1
RESPIRATORY NEGATIVE: 1

## 2024-08-08 NOTE — LETTER
08/08/24    Eri Read DO  [address]  [address]  [place] [facility] [address]      Dear Dr. Eri Read DO,    I am writing to confirm that your patient, Brigette Romo, received care in my office on 08/08/24. I have enclosed a summary of the care provided to Brigette for your reference.    Please contact me with any questions you may have regarding the visit.    Sincerely,         Bacilio Lockwood MD MPH  [address]  [place] [address]    CC: No Recipients

## 2024-08-08 NOTE — PROGRESS NOTES
Infectious Diseases Clinic Follow-up:    Reason for Visit:   Chief Complaint   Patient presents with    Abscess     New patient visit for facial abscess.       History of Current Issue  Brigette Pérez is a 74 y.o. year old female     Here for follow up. Seen in house by my ID colleague Dr Hansen for osteoradionecrosis.    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 This was c/b  infection of the split thickness  skin graft of the fibula which was treated with multiple courses of antibiotics (keflex -> doxy -> cipro per patient) and local wound care.      She finished her adjuvant radiation therapy on 4/28/23. 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 She presented after provider noting purulent drainage from neck. The tract bubbled per documentation  with talking and cough.      She presented to [facility] and was transferred to [facility]. Imaging shows a fistula from the right retromolar trigone to the right neck. Her WBC is 12 and she was started on vanc/zosyn. She uses a PEG for nutrition and has recently had diarrhea. 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  Fistula draining and packed.  Cultures negative and no plan for surgical intervention.  At this point, we can stop her vanco/zosyn and start her on augmentin 875mg BID via PEG tube.  Will need 6 weeks of this with anticipated stop date of June 19.     D/w primary team.     ID signing off.  PLease page team B ([id number]) with questions.     Sierra Hansen, ID Team B  "Attending  Pager [phone]    PAST MEDICAL HISTORY:  Past Medical History:   Diagnosis Date    Hypothyroid     Personal history of malignant neoplasm of tongue     History of tongue cancer       PAST SURGICAL HISTORY:  Past Surgical History:   Procedure Laterality Date    CT AORTA AND BILATERAL ILIOFEMORAL RUNOFF ANGIOGRAM W AND/OR WO IV CONTRAST  2/3/2023    CT AORTA AND BILATERAL ILIOFEMORAL RUNOFF ANGIOGRAM W AND/OR WO IV CONTRAST 2/3/2023 DOCTOR OFFICE LEGACY    OTHER SURGICAL HISTORY  08/30/2016    Glossectomy    OTHER SURGICAL HISTORY  08/30/2016    Radical Neck Dissection       ALLERGIES:    Allergies   Allergen Reactions    Sulfa (Sulfonamide Antibiotics) Rash       MEDICATIONS:      Current Outpatient Medications:     chlorhexidine (Peridex) 0.12 % solution, Use 15 mL in the mouth or throat 2 times a day., Disp: 1800 mL, Rfl: 0    levothyroxine (Synthroid, Levoxyl) 25 mcg tablet, Take 1 tablet (25 mcg) by mouth once daily in the morning. Take before meals. Take on an empty stomach with water only.  Wait 1 hour before having food, medications or beverages other than water.  OK to crush pill., Disp: 90 tablet, Rfl: 3    REVIEW OF SYSTEMS:    Review of Systems   Constitutional: Negative.    HENT: Negative.     Eyes: Negative.    Respiratory: Negative.     Cardiovascular: Negative.    Gastrointestinal: Negative.    Endocrine: Negative.    Genitourinary: Negative.    Musculoskeletal: Negative.    Skin: Negative.    Allergic/Immunologic: Negative.    Neurological: Negative.    Hematological: Negative.    Psychiatric/Behavioral: Negative.         PHYSICAL EXAMINATION:       Visit Vitals  /57 (BP Location: Right arm, Patient Position: Sitting, BP Cuff Size: Adult)   Pulse 88   Ht 1.676 m (5' 6\")   Wt 58.4 kg (128 lb 12.8 oz)   LMP  (LMP Unknown)   BMI 20.79 kg/m²   OB Status Postmenopausal   Smoking Status Never   BSA 1.65 m²        EXAM:   Physical Exam  Vitals reviewed.   Constitutional:       Appearance: " Normal appearance.   HENT:      Head: Normocephalic and atraumatic.      Comments: BL jaw with healed surgical scar. There is an open area under the L mandible with mild drainage     Mouth/Throat:      Mouth: Mucous membranes are moist.      Pharynx: Oropharynx is clear.   Cardiovascular:      Rate and Rhythm: Normal rate and regular rhythm.   Pulmonary:      Effort: Pulmonary effort is normal.   Abdominal:      General: Abdomen is flat. Bowel sounds are normal.      Palpations: Abdomen is soft.   Musculoskeletal:         General: Normal range of motion.      Cervical back: Normal range of motion.   Skin:     General: Skin is warm.   Neurological:      General: No focal deficit present.      Mental Status: She is alert and oriented to person, place, and time.   Psychiatric:         Mood and Affect: Mood normal.         Behavior: Behavior normal.         Thought Content: Thought content normal.         Judgment: Judgment normal.          DATA:     Microbiology:    Susceptibility data from last 90 days.  Collected Specimen Info Organism Aztreonam Cefepime Ceftazidime Ciprofloxacin Levofloxacin Piperacillin/Tazobactam Tobramycin   07/15/24 Tissue from MANDIBLE LEFT RESECTION Pseudomonas aeruginosa  S S S  R  R  S  S     Mixed Aerobic and Anaerobic Bacteria           05/30/24 Tissue/Biopsy from Wound/Tissue Mixed Aerobic and Anaerobic Bacteria                ASSESSMENT / RECOMMENDATIONS:  Brigette Romo, a 74-year-old female with a history of recurrent right oral tongue SCCa and recent right composite mandibulectomy with reconstruction, presents with an orocutaneous fistula and jaw osteomyelitis in the setting of osteoradionecrosis. She was transferred to [facility] after purulent neck drainage was noted. Imaging confirmed a fistula from the right retromolar trigone to the neck. Initial treatment included vancomycin and zosyn, but cultures were negative, and she will be switched to Augmentin 875 mg BID via PEG tube for six  weeks.    Recently, she had a recurrence of infection at the surgical site. Cultures collected on 7/19 grew Pseudomonas aeruginosa (resistant to ciprofloxacin and levofloxacin). She will require IV antibiotics for six weeks, possibly longer.    IMPRESSIONS:  # Osteoradionecrosis    PLAN:  --Place a PICC.  --Will start cefepime 2 gm IV q8 once PICC is placed.  --Will require at least 6 weeks of IV ABXs.  --Weekly CBC with diff, CMP, CRP and ESR and fax reports to [phone] attn Dr Lockwood.  --Will follow up in 6-8 weeks.    Pt is in agreement with plan, states that feels satisfied that her questions were adequately answered.      I spent 30 minutes in the professional and overall care of this patient.      Bacilio Lockwood MD MPH

## 2024-08-08 NOTE — LETTER
08/08/24    Brandy Christensen MD  [address]  [facility] [address]      Dear Dr. Brandy Christensen MD,    Thank you for referring your patient, Brigette Romo, to receive care in my office. I have enclosed a summary of the care provided to Brigette on 08/08/24.    Please contact me with any questions you may have regarding the visit.    Sincerely,         Bacilio Lockwood MD [facility]  [address]  [facility] [address]  [facility] [place] [address]    CC: No Recipients

## 2024-08-12 ENCOUNTER — HOSPITAL ENCOUNTER (OUTPATIENT)
Dept: RADIOLOGY | Facility: HOSPITAL | Age: 74
Discharge: HOME | End: 2024-08-12
Payer: MEDICARE
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
## 2024-08-12 DIAGNOSIS — M27.2 OSTEORADIONECROSIS OF JAW: ICD-10-CM

## 2024-08-12 PROCEDURE — 36573 INSJ PICC RS&I 5 YR+: CPT

## 2024-08-12 PROCEDURE — C1751 CATH, INF, PER/CENT/MIDLINE: HCPCS

## 2024-08-12 PROCEDURE — 2780000003 HC OR 278 NO HCPCS

## 2024-08-14 ENCOUNTER — TELEPHONE (OUTPATIENT)
Dept: INFECTIOUS DISEASES | Facility: HOSPITAL | Age: 74
End: 2024-08-14
Payer: MEDICARE

## 2024-08-19 ENCOUNTER — HOSPITAL ENCOUNTER (EMERGENCY)
Facility: HOSPITAL | Age: 74
Discharge: HOME | End: 2024-08-19
Attending: STUDENT IN AN ORGANIZED HEALTH CARE EDUCATION/TRAINING PROGRAM
Payer: MEDICARE

## 2024-08-19 VITALS
OXYGEN SATURATION: 98 % | RESPIRATION RATE: 16 BRPM | BODY MASS INDEX: 20.73 KG/M2 | DIASTOLIC BLOOD PRESSURE: 80 MMHG | HEIGHT: 66 IN | SYSTOLIC BLOOD PRESSURE: 122 MMHG | TEMPERATURE: 97.7 F | HEART RATE: 74 BPM | WEIGHT: 129 LBS

## 2024-08-19 DIAGNOSIS — Z45.2 PIC LINE (PERIPHERALLY INSERTED CENTRAL CATHETER) FLUSH: Primary | ICD-10-CM

## 2024-08-19 PROCEDURE — 99282 EMERGENCY DEPT VISIT SF MDM: CPT | Performed by: STUDENT IN AN ORGANIZED HEALTH CARE EDUCATION/TRAINING PROGRAM

## 2024-08-19 PROCEDURE — 2500000004 HC RX 250 GENERAL PHARMACY W/ HCPCS (ALT 636 FOR OP/ED)

## 2024-08-19 PROCEDURE — 99283 EMERGENCY DEPT VISIT LOW MDM: CPT | Performed by: STUDENT IN AN ORGANIZED HEALTH CARE EDUCATION/TRAINING PROGRAM

## 2024-08-19 RX ORDER — HEPARIN 100 UNIT/ML
5 SYRINGE INTRAVENOUS ONCE
Status: COMPLETED | OUTPATIENT
Start: 2024-08-19 | End: 2024-08-19

## 2024-08-19 ASSESSMENT — LIFESTYLE VARIABLES
HAVE YOU EVER FELT YOU SHOULD CUT DOWN ON YOUR DRINKING: NO
EVER FELT BAD OR GUILTY ABOUT YOUR DRINKING: NO
EVER HAD A DRINK FIRST THING IN THE MORNING TO STEADY YOUR NERVES TO GET RID OF A HANGOVER: NO
TOTAL SCORE: 0
HAVE PEOPLE ANNOYED YOU BY CRITICIZING YOUR DRINKING: NO

## 2024-08-19 ASSESSMENT — PAIN - FUNCTIONAL ASSESSMENT: PAIN_FUNCTIONAL_ASSESSMENT: 0-10

## 2024-08-19 ASSESSMENT — PAIN SCALES - GENERAL: PAINLEVEL_OUTOF10: 0 - NO PAIN

## 2024-08-19 NOTE — ED PROVIDER NOTES
EMERGENCY DEPARTMENT ENCOUNTER      Pt Name: Brigette Romo  MRN: [id number]  Birthdate 1950  Date of evaluation: 8/19/2024  Provider: Daniel Newell MD    CHIEF COMPLAINT       Chief Complaint   Patient presents with    Vascular Access Problem     HISTORY OF PRESENT ILLNESS    HPI  74-year-old female presents emergency department with chief complaint of vascular access problem.  She claims that she did not have the complete pieces to her power PICC that was inserted on Monday and her right arm for antibiotics due to antibiotic resistance.  Review of systems otherwise negative the patient feels well.  Nursing Notes were reviewed.    PAST MEDICAL HISTORY     Past Medical History:   Diagnosis Date    Hypothyroid     Personal history of malignant neoplasm of tongue     History of tongue cancer         SURGICAL HISTORY       Past Surgical History:   Procedure Laterality Date    CT AORTA AND BILATERAL ILIOFEMORAL RUNOFF ANGIOGRAM W AND/OR WO IV CONTRAST  2/3/2023    CT AORTA AND BILATERAL ILIOFEMORAL RUNOFF ANGIOGRAM W AND/OR WO IV CONTRAST 2/3/2023 DOCTOR OFFICE LEGACY    OTHER SURGICAL HISTORY  08/30/2016    Glossectomy    OTHER SURGICAL HISTORY  08/30/2016    Radical Neck Dissection         CURRENT MEDICATIONS       Previous Medications    LEVOTHYROXINE (SYNTHROID, LEVOXYL) 25 MCG TABLET    Take 1 tablet (25 mcg) by mouth once daily in the morning. Take before meals. Take on an empty stomach with water only.  Wait 1 hour before having food, medications or beverages other than water.  OK to crush pill.       ALLERGIES     Sulfa (sulfonamide antibiotics)    FAMILY HISTORY       Family History   Problem Relation Name Age of Onset    Blood clot Mother      Other (ST elevation myocardial infarction) Father            SOCIAL HISTORY       Social History     Socioeconomic History    Marital status: Single   Tobacco Use    Smoking status: Never    Smokeless tobacco: Never   Vaping Use    Vaping status: Never Used    Substance and Sexual Activity    Alcohol use: Not Currently    Drug use: Never    Sexual activity: Defer       SCREENINGS                        PHYSICAL EXAM    (up to 7 for level 4, 8 or more for level 5)     ED Triage Vitals [08/19/24 1858]   Temperature Heart Rate Respirations BP   36.5 °C (97.7 °F) 86 18 123/62      Pulse Ox Temp Source Heart Rate Source Patient Position   99 % Temporal Monitor Sitting      BP Location FiO2 (%)     Right arm --       Physical Exam  Constitutional:       Appearance: She is well-developed.   HENT:      Head: Normocephalic and atraumatic.      Right Ear: Tympanic membrane normal.      Left Ear: Tympanic membrane normal.      Nose: Nose normal.      Mouth/Throat:      Mouth: Mucous membranes are moist.      Pharynx: Oropharynx is clear.   Eyes:      Extraocular Movements: Extraocular movements intact.      Pupils: Pupils are equal, round, and reactive to light.   Cardiovascular:      Rate and Rhythm: Normal rate and regular rhythm.      Pulses: Normal pulses.      Heart sounds: Normal heart sounds.   Pulmonary:      Effort: Pulmonary effort is normal.      Breath sounds: Normal breath sounds.   Abdominal:      General: Abdomen is flat.      Palpations: Abdomen is soft.   Musculoskeletal:         General: Normal range of motion.      Cervical back: Normal range of motion and neck supple.   Skin:     General: Skin is warm.   Neurological:      General: No focal deficit present.      Mental Status: She is alert and oriented to person, place, and time.   Psychiatric:         Mood and Affect: Mood normal.         Behavior: Behavior normal.          DIAGNOSTIC RESULTS     LABS:  Labs Reviewed - No data to display    All other labs were within normal range or not returned as of this dictation.    Imaging  No orders to display        Procedures  Procedures     EMERGENCY DEPARTMENT COURSE/MDM:   Medical Decision Making  74-year-old female presents emergency department with chief complaint  of vascular access problem.  Medical management treatment emergency department will be consisting of applying appropriate dressing to the PICC line and flushing with heparin.  Patient will be discharged home with strict return precautions.    Diagnoses as of 08/19/24 1942   PIC line (peripherally inserted central catheter) flush        Patient and or family in agreement and understanding of treatment plan.  All questions answered.      I reviewed the case with the attending ED physician. The attending ED physician agrees with the plan. Patient and/or patient´s representative was counseled regarding labs, imaging, likely diagnosis, and plan. All questions were answered.    ED Medications administered this visit:    Medications   heparin flush 100 unit/mL syringe 500 Units (has no administration in time range)       New Prescriptions from this visit:    New Prescriptions    No medications on file       Follow-up:  No follow-up provider specified.      Final Impression: No diagnosis found.      (Please note that portions of this note were completed with a voice recognition program.  Efforts were made to edit the dictations but occasionally words are mis-transcribed.)     Daniel Newell MD  Resident  08/19/24 1943

## 2024-08-19 NOTE — DISCHARGE INSTRUCTIONS
You are seen the emergency department today for care of your PICC line.  If you develop any redness at the wound site nausea vomiting fever please return back to emergency department soon as possible.

## 2024-08-20 ENCOUNTER — DOCUMENTATION (OUTPATIENT)
Dept: INFECTIOUS DISEASES | Facility: HOSPITAL | Age: 74
End: 2024-08-20
Payer: MEDICARE

## 2024-08-20 ENCOUNTER — HOME HEALTH ADMISSION (OUTPATIENT)
Dept: HOME HEALTH SERVICES | Facility: HOME HEALTH | Age: 74
End: 2024-08-20
Payer: MEDICARE
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
## 2024-08-20 DIAGNOSIS — M27.2 OSTEORADIONECROSIS OF JAW: Primary | ICD-10-CM

## 2024-08-20 NOTE — PROGRESS NOTES
The patient was contacted via phone to discuss recent developments in their care. A PICC line was successfully placed on 8/12 and is now ready for use. She did go to the ED yesterday due to the line not flushing. However, per the ED notes, they were able to get the line to flush well with heparin and applied a dressing. The patient confirms that the line is now working well.    I coordinated with the Home Care Team and submitted a referral (contact: [phone]) to ensure the patient's home care needs are met. I called the patient back with these details. The patient is in agreement with the current plan of care.    Please see below for full details.    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 She was transferred to [facility] after purulent neck drainage was noted. Imaging confirmed a fistula from the right retromolar trigone to the neck. Initial treatment included vancomycin and zosyn, but cultures were negative, and she will be switched to Augmentin 875 mg BID via PEG tube for six weeks.     Recently, she had a recurrence of infection at the surgical site. Cultures collected on 7/19 grew Pseudomonas aeruginosa (resistant to ciprofloxacin and levofloxacin). She will require IV antibiotics for six weeks, possibly longer.     IMPRESSIONS:  # Osteoradionecrosis     PLAN:  --Will start cefepime 2 gm IV q8 once PICC is placed.  --Will require at least 6 weeks of IV ABXs.  --Weekly CBC with diff, CMP, CRP and ESR and fax reports to [phone] attn Dr Lockwood.  --Will follow up in 6-8 weeks.  --Call or go to ED with any PICC line issues

## 2024-08-21 ENCOUNTER — TELEPHONE (OUTPATIENT)
Dept: INFUSION THERAPY | Age: 74
End: 2024-08-21
Payer: MEDICARE

## 2024-08-21 ENCOUNTER — DOCUMENTATION (OUTPATIENT)
Dept: HOME HEALTH SERVICES | Facility: HOME HEALTH | Age: 74
End: 2024-08-21
Payer: MEDICARE

## 2024-08-21 ENCOUNTER — HOME INFUSION (OUTPATIENT)
Dept: INFUSION THERAPY | Age: 74
End: 2024-08-21
Payer: MEDICARE

## 2024-08-21 ENCOUNTER — TELEPHONE (OUTPATIENT)
Dept: HOME HEALTH SERVICES | Facility: HOME HEALTH | Age: 74
End: 2024-08-21
Payer: MEDICARE

## 2024-08-21 DIAGNOSIS — T84.498A EXPOSED ORTHOPAEDIC HARDWARE (CMS-HCC): ICD-10-CM

## 2024-08-21 DIAGNOSIS — K12.1 ULCERATION, ORAL MUCOSA: Primary | ICD-10-CM

## 2024-08-21 NOTE — PROGRESS NOTES
REVIEWED PT INFO AS CORRECT.   DX: Pseudomonas Infection at surgical site  REVIEWED ALLERGIES: Sulfa Antibiotics  PMH: Oral CA, post mandibulectomy with reconstruction   NO MEDICATION INTERACTIONS.  REVIEWED RELEVANT BASELINE VALUES  LABS: Weekly labs to Dr Lockwood - CBC/Diff, CMP, ESR and CRP  PT HAS SL PICC  FLUSH PER [facility] PROTOCOL.    Cefepime 2g IV push q8h for 6 weeks at home     CARE PLAN DONE TODAY    Patient is a 74 year old female admitted to homecare from outpt referral for course of Cefepime 2g IV push q8h for pseudomonas infection at surgical site     ABT appropriate for culture results  First dose in the home - eulogio kit sent    Dispense x21 doses of Cefepime for cmpd and delivery on 8/21   Infusions 8/22 - 8/28    NF 8/27 OVN - check labs, prgress and inventory

## 2024-08-21 NOTE — HH CARE COORDINATION
Home Care received a Referral for Infusion and Nursing. We have processed the referral for a Start of Care on 8/22/24.     If you have any questions or concerns, please feel free to contact us at [phone]. Follow the prompts, enter your five digit zip code, and you will be directed to your care team on WEST 2.

## 2024-08-21 NOTE — TELEPHONE ENCOUNTER
Patient is  74 year old admitted to  Homecare Services to complete course of  Cefepime 2g of q8h via IV push. Tel call with patient. They agree to delivery by 9 pm this evening and confirmed they will refrigerate med as appropriate. Verified correct address and phone #.  to call on the way.  Patient is provided with contact info for the pharmacy. RN to call patient with time of appt. Sending 7-day supplies of meds, flushes, and dressing. Asked patient if they have any questions to pharmacist. No questions.

## 2024-08-22 ENCOUNTER — HOME CARE VISIT (OUTPATIENT)
Dept: HOME HEALTH SERVICES | Facility: HOME HEALTH | Age: 74
End: 2024-08-22
Payer: MEDICARE

## 2024-08-22 VITALS
HEART RATE: 71 BPM | TEMPERATURE: 97.8 F | DIASTOLIC BLOOD PRESSURE: 66 MMHG | OXYGEN SATURATION: 98 % | SYSTOLIC BLOOD PRESSURE: 110 MMHG | RESPIRATION RATE: 20 BRPM

## 2024-08-22 PROCEDURE — G0299 HHS/HOSPICE OF RN EA 15 MIN: HCPCS

## 2024-08-22 ASSESSMENT — ENCOUNTER SYMPTOMS
PAIN LOCATION: JAW
PERSON REPORTING PAIN: PATIENT
PAIN LOCATION - PAIN SEVERITY: 5/10
PAIN: 1

## 2024-08-23 ASSESSMENT — ENCOUNTER SYMPTOMS: SKIN LESIONS: 1

## 2024-08-23 ASSESSMENT — ACTIVITIES OF DAILY LIVING (ADL)
ENTERING_EXITING_HOME: NEEDS ASSISTANCE
OASIS_M1830: 03

## 2024-08-26 ENCOUNTER — LAB REQUISITION (OUTPATIENT)
Dept: LAB | Facility: LAB | Age: 74
End: 2024-08-26
Payer: MEDICARE

## 2024-08-26 ENCOUNTER — HOME CARE VISIT (OUTPATIENT)
Dept: HOME HEALTH SERVICES | Facility: HOME HEALTH | Age: 74
End: 2024-08-26
Payer: MEDICARE

## 2024-08-26 VITALS
RESPIRATION RATE: 18 BRPM | DIASTOLIC BLOOD PRESSURE: 58 MMHG | OXYGEN SATURATION: 98 % | SYSTOLIC BLOOD PRESSURE: 108 MMHG | HEART RATE: 76 BPM | TEMPERATURE: 97.6 F

## 2024-08-26 DIAGNOSIS — M27.2 INFLAMMATORY CONDITIONS OF JAWS: ICD-10-CM

## 2024-08-26 LAB
ALBUMIN SERPL BCP-MCNC: 3.8 G/DL (ref 3.4–5)
ALP SERPL-CCNC: 75 U/L (ref 33–136)
ALT SERPL W P-5'-P-CCNC: 13 U/L (ref 7–45)
ANION GAP SERPL CALC-SCNC: 11 MMOL/L (ref 10–20)
AST SERPL W P-5'-P-CCNC: 16 U/L (ref 9–39)
BASOPHILS # BLD AUTO: 0.03 X10*3/UL (ref 0–0.1)
BASOPHILS NFR BLD AUTO: 0.6 %
BILIRUB SERPL-MCNC: 0.6 MG/DL (ref 0–1.2)
BUN SERPL-MCNC: 21 MG/DL (ref 6–23)
CALCIUM SERPL-MCNC: 9.6 MG/DL (ref 8.6–10.3)
CHLORIDE SERPL-SCNC: 102 MMOL/L (ref 98–107)
CO2 SERPL-SCNC: 29 MMOL/L (ref 21–32)
CREAT SERPL-MCNC: 0.74 MG/DL (ref 0.5–1.05)
CRP SERPL-MCNC: 0.37 MG/DL
EGFRCR SERPLBLD CKD-EPI 2021: 86 ML/MIN/1.73M*2
EOSINOPHIL # BLD AUTO: 0.06 X10*3/UL (ref 0–0.4)
EOSINOPHIL NFR BLD AUTO: 1.1 %
ERYTHROCYTE [DISTWIDTH] IN BLOOD BY AUTOMATED COUNT: 13.2 % (ref 11.5–14.5)
ERYTHROCYTE [SEDIMENTATION RATE] IN BLOOD BY WESTERGREN METHOD: 33 MM/H (ref 0–30)
GLUCOSE SERPL-MCNC: 170 MG/DL (ref 74–99)
HCT VFR BLD AUTO: 40 % (ref 36–46)
HGB BLD-MCNC: 13.1 G/DL (ref 12–16)
IMM GRANULOCYTES # BLD AUTO: 0.01 X10*3/UL (ref 0–0.5)
IMM GRANULOCYTES NFR BLD AUTO: 0.2 % (ref 0–0.9)
LYMPHOCYTES # BLD AUTO: 0.75 X10*3/UL (ref 0.8–3)
LYMPHOCYTES NFR BLD AUTO: 14 %
MCH RBC QN AUTO: 30.8 PG (ref 26–34)
MCHC RBC AUTO-ENTMCNC: 32.8 G/DL (ref 32–36)
MCV RBC AUTO: 94 FL (ref 80–100)
MONOCYTES # BLD AUTO: 0.46 X10*3/UL (ref 0.05–0.8)
MONOCYTES NFR BLD AUTO: 8.6 %
NEUTROPHILS # BLD AUTO: 4.03 X10*3/UL (ref 1.6–5.5)
NEUTROPHILS NFR BLD AUTO: 75.5 %
NRBC BLD-RTO: 0 /100 WBCS (ref 0–0)
PLATELET # BLD AUTO: 259 X10*3/UL (ref 150–450)
POTASSIUM SERPL-SCNC: 4.5 MMOL/L (ref 3.5–5.3)
PROT SERPL-MCNC: 6.4 G/DL (ref 6.4–8.2)
RBC # BLD AUTO: 4.26 X10*6/UL (ref 4–5.2)
SODIUM SERPL-SCNC: 137 MMOL/L (ref 136–145)
WBC # BLD AUTO: 5.3 X10*3/UL (ref 4.4–11.3)

## 2024-08-26 PROCEDURE — 85025 COMPLETE CBC W/AUTO DIFF WBC: CPT

## 2024-08-26 PROCEDURE — G0299 HHS/HOSPICE OF RN EA 15 MIN: HCPCS

## 2024-08-26 PROCEDURE — 85652 RBC SED RATE AUTOMATED: CPT

## 2024-08-26 PROCEDURE — 86140 C-REACTIVE PROTEIN: CPT

## 2024-08-26 PROCEDURE — 80053 COMPREHEN METABOLIC PANEL: CPT

## 2024-08-26 ASSESSMENT — ENCOUNTER SYMPTOMS
PERSON REPORTING PAIN: PATIENT
APPETITE LEVEL: FAIR
FACIAL ASYMMETRY: 1
SKIN LESIONS: 1
CHANGE IN APPETITE: UNCHANGED
DENIES PAIN: 1

## 2024-08-27 ENCOUNTER — HOME INFUSION (OUTPATIENT)
Dept: INFUSION THERAPY | Age: 74
End: 2024-08-27
Payer: MEDICARE

## 2024-08-27 ENCOUNTER — TELEPHONE (OUTPATIENT)
Dept: OTOLARYNGOLOGY | Facility: CLINIC | Age: 74
End: 2024-08-27
Payer: MEDICARE

## 2024-08-27 NOTE — PROGRESS NOTES
Reviewed chart and Cefepime 2g IV push q8h for 6 weeks at home. Start of care was 08/22/24.   Patient is a 74 year old female admitted to homecare from outpt referral for course of Cefepime 2g IV push q8h for pseudomonas infection at surgical site     RN notes reviewed and unremarkable.  Labs reviewed and notable for slightly elevated ESR    Rph called and left  with patient checking on progress and stating that delivery will be made on Wednesday. No callback at this time.     Dispense x24 doses of Cefepime for 08/27/27 cmpd and OVN delivery on 8/21   Infusions 8/29 - 9/5     NF 9/04 OVN - check labs, progress and inventory

## 2024-08-27 NOTE — TELEPHONE ENCOUNTER
I called Brigette and since she is seeing Dr. Christensen on 9/9/24, and she wanted to see Dr. Magana at [facility], we arranged an apt for 10/8/24 at noon.

## 2024-08-27 NOTE — TELEPHONE ENCOUNTER
----- Message from Jerrod Magana sent at 8/26/2024  3:44 PM EDT -----  Regarding: RE: f/u apt  I should see her in 2 or 3 months.  ----- Message -----  From: ROSALINA Acharya-CNS  Sent: 8/26/2024   3:33 PM EDT  To: Jerrod Magana MD  Subject: f/u apt                                          Brigette wants to know when you need to see her back.    You last saw her in May.  She's been seeing Dr. Christensen and was recently started on HIVAT for ORN that was debrided by Dr. Christensen on 7/15/24.

## 2024-09-02 ENCOUNTER — LAB REQUISITION (OUTPATIENT)
Dept: LAB | Facility: HOSPITAL | Age: 74
End: 2024-09-02
Payer: MEDICARE

## 2024-09-02 ENCOUNTER — HOME CARE VISIT (OUTPATIENT)
Dept: HOME HEALTH SERVICES | Facility: HOME HEALTH | Age: 74
End: 2024-09-02
Payer: MEDICARE

## 2024-09-02 VITALS
TEMPERATURE: 97.3 F | HEART RATE: 78 BPM | DIASTOLIC BLOOD PRESSURE: 58 MMHG | OXYGEN SATURATION: 97 % | RESPIRATION RATE: 18 BRPM | SYSTOLIC BLOOD PRESSURE: 100 MMHG

## 2024-09-02 DIAGNOSIS — M27.2 INFLAMMATORY CONDITIONS OF JAWS: ICD-10-CM

## 2024-09-02 LAB
ALBUMIN SERPL BCP-MCNC: 4.2 G/DL (ref 3.4–5)
ALP SERPL-CCNC: 76 U/L (ref 33–136)
ALT SERPL W P-5'-P-CCNC: 16 U/L (ref 7–45)
ANION GAP SERPL CALC-SCNC: 11 MMOL/L (ref 10–20)
AST SERPL W P-5'-P-CCNC: 18 U/L (ref 9–39)
BASOPHILS # BLD AUTO: 0.06 X10*3/UL (ref 0–0.1)
BASOPHILS NFR BLD AUTO: 1.1 %
BILIRUB SERPL-MCNC: 0.6 MG/DL (ref 0–1.2)
BUN SERPL-MCNC: 22 MG/DL (ref 6–23)
CALCIUM SERPL-MCNC: 10 MG/DL (ref 8.6–10.3)
CHLORIDE SERPL-SCNC: 103 MMOL/L (ref 98–107)
CO2 SERPL-SCNC: 28 MMOL/L (ref 21–32)
CREAT SERPL-MCNC: 0.67 MG/DL (ref 0.5–1.05)
CRP SERPL-MCNC: 0.35 MG/DL
EGFRCR SERPLBLD CKD-EPI 2021: >90 ML/MIN/1.73M*2
EOSINOPHIL # BLD AUTO: 0.13 X10*3/UL (ref 0–0.4)
EOSINOPHIL NFR BLD AUTO: 2.5 %
ERYTHROCYTE [DISTWIDTH] IN BLOOD BY AUTOMATED COUNT: 13.3 % (ref 11.5–14.5)
ERYTHROCYTE [SEDIMENTATION RATE] IN BLOOD BY WESTERGREN METHOD: 21 MM/H (ref 0–30)
GLUCOSE SERPL-MCNC: 83 MG/DL (ref 74–99)
HCT VFR BLD AUTO: 40.7 % (ref 36–46)
HGB BLD-MCNC: 13.2 G/DL (ref 12–16)
IMM GRANULOCYTES # BLD AUTO: 0.01 X10*3/UL (ref 0–0.5)
IMM GRANULOCYTES NFR BLD AUTO: 0.2 % (ref 0–0.9)
LYMPHOCYTES # BLD AUTO: 0.68 X10*3/UL (ref 0.8–3)
LYMPHOCYTES NFR BLD AUTO: 13 %
MCH RBC QN AUTO: 30.2 PG (ref 26–34)
MCHC RBC AUTO-ENTMCNC: 32.4 G/DL (ref 32–36)
MCV RBC AUTO: 93 FL (ref 80–100)
MONOCYTES # BLD AUTO: 0.56 X10*3/UL (ref 0.05–0.8)
MONOCYTES NFR BLD AUTO: 10.7 %
NEUTROPHILS # BLD AUTO: 3.78 X10*3/UL (ref 1.6–5.5)
NEUTROPHILS NFR BLD AUTO: 72.5 %
NRBC BLD-RTO: 0 /100 WBCS (ref 0–0)
PLATELET # BLD AUTO: 213 X10*3/UL (ref 150–450)
POTASSIUM SERPL-SCNC: 4.4 MMOL/L (ref 3.5–5.3)
PROT SERPL-MCNC: 6.6 G/DL (ref 6.4–8.2)
RBC # BLD AUTO: 4.37 X10*6/UL (ref 4–5.2)
SODIUM SERPL-SCNC: 138 MMOL/L (ref 136–145)
WBC # BLD AUTO: 5.2 X10*3/UL (ref 4.4–11.3)

## 2024-09-02 PROCEDURE — 85652 RBC SED RATE AUTOMATED: CPT

## 2024-09-02 PROCEDURE — G0299 HHS/HOSPICE OF RN EA 15 MIN: HCPCS

## 2024-09-02 PROCEDURE — 85025 COMPLETE CBC W/AUTO DIFF WBC: CPT

## 2024-09-02 PROCEDURE — 86140 C-REACTIVE PROTEIN: CPT

## 2024-09-02 PROCEDURE — 80053 COMPREHEN METABOLIC PANEL: CPT

## 2024-09-02 ASSESSMENT — ENCOUNTER SYMPTOMS
PAIN: 1
PAIN LOCATION: JAW
CHANGE IN APPETITE: UNCHANGED
PERSON REPORTING PAIN: PATIENT
PAIN LOCATION - PAIN SEVERITY: 4/10

## 2024-09-04 ENCOUNTER — HOME INFUSION (OUTPATIENT)
Dept: INFUSION THERAPY | Age: 74
End: 2024-09-04
Payer: MEDICARE

## 2024-09-09 ENCOUNTER — HOME CARE VISIT (OUTPATIENT)
Dept: HOME HEALTH SERVICES | Facility: HOME HEALTH | Age: 74
End: 2024-09-09
Payer: MEDICARE

## 2024-09-09 ENCOUNTER — OFFICE VISIT (OUTPATIENT)
Dept: OTOLARYNGOLOGY | Facility: HOSPITAL | Age: 74
End: 2024-09-09
Payer: MEDICARE

## 2024-09-09 VITALS
OXYGEN SATURATION: 98 % | HEART RATE: 82 BPM | DIASTOLIC BLOOD PRESSURE: 69 MMHG | RESPIRATION RATE: 20 BRPM | SYSTOLIC BLOOD PRESSURE: 108 MMHG | TEMPERATURE: 97.8 F

## 2024-09-09 VITALS — BODY MASS INDEX: 20.72 KG/M2 | HEIGHT: 66 IN | TEMPERATURE: 97.9 F | WEIGHT: 128.9 LBS
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
## 2024-09-09 DIAGNOSIS — M27.2 OSTEORADIONECROSIS, JAW: Primary | ICD-10-CM

## 2024-09-09 PROCEDURE — 85652 RBC SED RATE AUTOMATED: CPT

## 2024-09-09 PROCEDURE — 99024 POSTOP FOLLOW-UP VISIT: CPT | Performed by: OTOLARYNGOLOGY

## 2024-09-09 PROCEDURE — 1126F AMNT PAIN NOTED NONE PRSNT: CPT | Performed by: OTOLARYNGOLOGY

## 2024-09-09 PROCEDURE — 1160F RVW MEDS BY RX/DR IN RCRD: CPT | Performed by: OTOLARYNGOLOGY

## 2024-09-09 PROCEDURE — 86140 C-REACTIVE PROTEIN: CPT

## 2024-09-09 PROCEDURE — 1159F MED LIST DOCD IN RCRD: CPT | Performed by: OTOLARYNGOLOGY

## 2024-09-09 PROCEDURE — 80053 COMPREHEN METABOLIC PANEL: CPT

## 2024-09-09 PROCEDURE — 1036F TOBACCO NON-USER: CPT | Performed by: OTOLARYNGOLOGY

## 2024-09-09 PROCEDURE — 85025 COMPLETE CBC W/AUTO DIFF WBC: CPT

## 2024-09-09 PROCEDURE — G0299 HHS/HOSPICE OF RN EA 15 MIN: HCPCS

## 2024-09-09 PROCEDURE — 99213 OFFICE O/P EST LOW 20 MIN: CPT | Performed by: OTOLARYNGOLOGY

## 2024-09-09 PROCEDURE — 3008F BODY MASS INDEX DOCD: CPT | Performed by: OTOLARYNGOLOGY

## 2024-09-09 ASSESSMENT — PATIENT HEALTH QUESTIONNAIRE - PHQ9
1. LITTLE INTEREST OR PLEASURE IN DOING THINGS: SEVERAL DAYS
SUM OF ALL RESPONSES TO PHQ9 QUESTIONS 1 AND 2: 2
2. FEELING DOWN, DEPRESSED OR HOPELESS: SEVERAL DAYS

## 2024-09-09 ASSESSMENT — PAIN SCALES - GENERAL: PAINLEVEL: 0-NO PAIN

## 2024-09-09 ASSESSMENT — ENCOUNTER SYMPTOMS
APPETITE LEVEL: GOOD
PERSON REPORTING PAIN: PATIENT
DENIES PAIN: 1
CHANGE IN APPETITE: UNCHANGED

## 2024-09-09 NOTE — PROGRESS NOTES
Ms. Romo returns for a visit following an operative debridement of her left mandible as well as a dental extraction procedure in July of 2024.  Intraoperatively the patient was found to have exposed necrotic native mandible in the parasymphyseal region at the junction with the fibular flap.  The tooth was extracted and debridement was conducted of the alveolus back to viable bleeding bone.  Cultures were also taken.  Those cultures have returned consistent with Pseudomonas which is resistant to Levaquin and Cipro.  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  The patient was seen by ID and placed on a long term course of IV antibiotics which will continue through the end of September.  Brigette states that the fistula appears to have closed and the extraction cavity has begun to slowly heal.  She has also noticed increased drooling due to her lips inability to close completely following this healing process.       On physical examination the area of mandibular debridement appears to be granulating with no evidence of exposed necrotic bone.  There is no evidence of a persistent fistula, but there is a 3mm area of exposed native mandible in the left parasymphyseal region.  The remainder of exam reveals no evidence of any recurrent disease.  There is no evidence of drainage from the right parasymphyseal region which had been previously draining. She had a bony spicule at the edge of her extraction cavity that was irritating her tongue. This was removed without incident using a hemostat.        I discussed with Ms. Romo that the surgical site appears to be healing well ant that the IV abx have improved the situation greatly.  We will reassess the wound following the completion of her antibiotic course and consider adding HBO therapy if it hasn't completely healed at that point. She will return to see me in 5 weeks.

## 2024-09-11 ENCOUNTER — HOME INFUSION (OUTPATIENT)
Dept: INFUSION THERAPY | Age: 74
End: 2024-09-11
Payer: MEDICARE

## 2024-09-11 NOTE — PROGRESS NOTES
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

## 2024-09-16 ENCOUNTER — LAB REQUISITION (OUTPATIENT)
Dept: LAB | Facility: LAB | Age: 74
End: 2024-09-16
Payer: MEDICARE

## 2024-09-16 ENCOUNTER — HOME CARE VISIT (OUTPATIENT)
Dept: HOME HEALTH SERVICES | Facility: HOME HEALTH | Age: 74
End: 2024-09-16
Payer: MEDICARE

## 2024-09-16 VITALS
HEART RATE: 68 BPM | OXYGEN SATURATION: 99 % | SYSTOLIC BLOOD PRESSURE: 96 MMHG | RESPIRATION RATE: 18 BRPM | DIASTOLIC BLOOD PRESSURE: 56 MMHG | TEMPERATURE: 97.8 F

## 2024-09-16 DIAGNOSIS — M27.2 INFLAMMATORY CONDITIONS OF JAWS: ICD-10-CM

## 2024-09-16 LAB
ALBUMIN SERPL BCP-MCNC: 4.1 G/DL (ref 3.4–5)
ALP SERPL-CCNC: 92 U/L (ref 33–136)
ALT SERPL W P-5'-P-CCNC: 23 U/L (ref 7–45)
ANION GAP SERPL CALC-SCNC: 11 MMOL/L (ref 10–20)
AST SERPL W P-5'-P-CCNC: 23 U/L (ref 9–39)
BASOPHILS # BLD AUTO: 0.04 X10*3/UL (ref 0–0.1)
BASOPHILS NFR BLD AUTO: 0.8 %
BILIRUB SERPL-MCNC: 0.5 MG/DL (ref 0–1.2)
BUN SERPL-MCNC: 25 MG/DL (ref 6–23)
CALCIUM SERPL-MCNC: 9.7 MG/DL (ref 8.6–10.3)
CHLORIDE SERPL-SCNC: 102 MMOL/L (ref 98–107)
CO2 SERPL-SCNC: 30 MMOL/L (ref 21–32)
CREAT SERPL-MCNC: 0.7 MG/DL (ref 0.5–1.05)
CRP SERPL-MCNC: 0.89 MG/DL
EGFRCR SERPLBLD CKD-EPI 2021: >90 ML/MIN/1.73M*2
EOSINOPHIL # BLD AUTO: 0.22 X10*3/UL (ref 0–0.4)
EOSINOPHIL NFR BLD AUTO: 4.7 %
ERYTHROCYTE [DISTWIDTH] IN BLOOD BY AUTOMATED COUNT: 13.4 % (ref 11.5–14.5)
ERYTHROCYTE [SEDIMENTATION RATE] IN BLOOD BY WESTERGREN METHOD: 24 MM/H (ref 0–30)
GLUCOSE SERPL-MCNC: 108 MG/DL (ref 74–99)
HCT VFR BLD AUTO: 39.3 % (ref 36–46)
HGB BLD-MCNC: 13.1 G/DL (ref 12–16)
IMM GRANULOCYTES # BLD AUTO: 0.01 X10*3/UL (ref 0–0.5)
IMM GRANULOCYTES NFR BLD AUTO: 0.2 % (ref 0–0.9)
LYMPHOCYTES # BLD AUTO: 0.4 X10*3/UL (ref 0.8–3)
LYMPHOCYTES NFR BLD AUTO: 8.5 %
MCH RBC QN AUTO: 30.8 PG (ref 26–34)
MCHC RBC AUTO-ENTMCNC: 33.3 G/DL (ref 32–36)
MCV RBC AUTO: 93 FL (ref 80–100)
MONOCYTES # BLD AUTO: 0.64 X10*3/UL (ref 0.05–0.8)
MONOCYTES NFR BLD AUTO: 13.6 %
NEUTROPHILS # BLD AUTO: 3.41 X10*3/UL (ref 1.6–5.5)
NEUTROPHILS NFR BLD AUTO: 72.2 %
NRBC BLD-RTO: 0.4 /100 WBCS (ref 0–0)
PLATELET # BLD AUTO: 214 X10*3/UL (ref 150–450)
POTASSIUM SERPL-SCNC: 4.5 MMOL/L (ref 3.5–5.3)
PROT SERPL-MCNC: 6.6 G/DL (ref 6.4–8.2)
RBC # BLD AUTO: 4.25 X10*6/UL (ref 4–5.2)
SODIUM SERPL-SCNC: 138 MMOL/L (ref 136–145)
WBC # BLD AUTO: 4.7 X10*3/UL (ref 4.4–11.3)

## 2024-09-16 PROCEDURE — G0299 HHS/HOSPICE OF RN EA 15 MIN: HCPCS

## 2024-09-16 PROCEDURE — 85652 RBC SED RATE AUTOMATED: CPT

## 2024-09-16 PROCEDURE — 85025 COMPLETE CBC W/AUTO DIFF WBC: CPT

## 2024-09-16 PROCEDURE — 86140 C-REACTIVE PROTEIN: CPT

## 2024-09-16 PROCEDURE — 80053 COMPREHEN METABOLIC PANEL: CPT

## 2024-09-16 ASSESSMENT — ENCOUNTER SYMPTOMS
SKIN LESIONS: 1
DENIES PAIN: 1
COUGH CHARACTERISTICS: MOIST
APPETITE LEVEL: GOOD
COUGH: 1
PERSON REPORTING PAIN: PATIENT
CHANGE IN APPETITE: UNCHANGED

## 2024-09-17 ENCOUNTER — HOME INFUSION (OUTPATIENT)
Dept: INFUSION THERAPY | Age: 74
End: 2024-09-17
Payer: MEDICARE

## 2024-09-17 NOTE — PROGRESS NOTES
PATIENT CONTINUES IV CEFEPIME THORUGH 10/2 FOR JAW OM.   LABS REVIEWED FROM YESTERDAY AND THEY ARE STABLE.  ESR AND CRP BOTH WNL NOW.  SPOKE WITH JAMARI AND SHE IS DOING WELL, NO SIDE EFFECTS OR INFUSION RELATED PROBLEMS.  SHE AGREED TO DELIVERY ON THURSDAY 5-7 WITH STANDARD SUPPLIES.   TO CALL ON WAY.  PROCESSED FILL FOR 21 CEFEPIME FOR MIX WED AND DEL. THURS.  DOS 9/20 THRU 9/26.  NF FOR 9/25 FOR OVN DEL. DSL

## 2024-09-19 ENCOUNTER — HOME CARE VISIT (OUTPATIENT)
Dept: HOME HEALTH SERVICES | Facility: HOME HEALTH | Age: 74
End: 2024-09-19
Payer: MEDICARE

## 2024-09-19 ENCOUNTER — HOME INFUSION (OUTPATIENT)
Dept: INFUSION THERAPY | Age: 74
End: 2024-09-19
Payer: MEDICARE

## 2024-09-19 NOTE — PROGRESS NOTES
9/18 pm patient called RN manager on - call reporting  pricking feeling on her tongue and itching with Cefepime infusions - last dose 8p on 9/18 -     Telephone call 9/19 am  - still some itching - pricking feeling has subsided.  Dr Lockwood notified Cefepime on hold - delivery for 9/19 held.  Patient aware of above    Dr Lockwood to follow up after clinic on 9/19

## 2024-09-20 ENCOUNTER — DOCUMENTATION (OUTPATIENT)
Dept: INFECTIOUS DISEASES | Facility: HOSPITAL | Age: 74
End: 2024-09-20
Payer: MEDICARE

## 2024-09-20 ENCOUNTER — HOME INFUSION (OUTPATIENT)
Dept: INFUSION THERAPY | Age: 74
End: 2024-09-20
Payer: MEDICARE

## 2024-09-20 DIAGNOSIS — K12.1 ULCERATION, ORAL MUCOSA: Primary | ICD-10-CM

## 2024-09-20 RX ORDER — MEROPENEM 1 G/1
2 INJECTION, POWDER, FOR SOLUTION INTRAVENOUS EVERY 8 HOURS
Qty: 168 G | Refills: 0 | Status: SHIPPED
Start: 2024-09-20 | End: 2024-10-18

## 2024-09-20 NOTE — PROGRESS NOTES
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 She was transferred to [facility] after purulent neck drainage was noted. Imaging confirmed a fistula from the right retromolar trigone to the neck. Initial treatment included vancomycin and zosyn, but cultures were negative, and she will be switched to Augmentin 875 mg BID via PEG tube for six weeks.     Recently, she had a recurrence of infection at the surgical site. Cultures collected on 7/19 grew Pseudomonas aeruginosa (resistant to ciprofloxacin and levofloxacin). She will require IV antibiotics for six weeks, possibly longer.     IMPRESSIONS:  # Osteoradionecrosis    UPDATE 9/20/2024  I spoke with the patient regarding her reported reaction to cefepime, which only emerged in the past week. After receiving the infusion, she experienced extreme itchiness that was relieved with Benadryl. She has felt better since cefepime was stopped two days ago. Overall, she reports progress with healing from the surgical site infection but believes she may need another four weeks of antibiotics.    Her last Pseudomonas aeruginosa isolate was resistant to fluoroquinolones, so we discussed alternative therapies. I recommended switching to meropenem. We reviewed the relationship between penicillins, cephalosporins, and carbapenems as antibiotic classes, noting that the cross-reactivity between penicillins and cephalosporins is around 1%, and drops to less than 1% with carbapenems. Nonetheless, she was advised to watch for any repeat signs and symptoms of an allergic reaction (such as rash, hives, swelling, shortness of breath, or anaphylaxis).    If she cannot tolerate meropenem, aztreonam is a potential alternative. The patient is in agreement with this plan.      PLAN:    --STOP cefepime     --START meropenem 2 gm IV q8    --Will require at least 4 weeks of IV ABXs, EOT 10/18/2024     --Weekly CBC with diff, CMP, CRP and ESR and fax reports to [phone] attn Dr Lockwood.    --Will follow up in 4-6 weeks    --Call or go to ED with any PICC line issues

## 2024-09-20 NOTE — PROGRESS NOTES
See previous note re: cefepime adverse reactions. Med held since 9/18 am. Dr. Lockwood spoke to patient, changing med to meropenem 2g q8h through 10/18/24. Order entered into [facility], white copy to intake. Labs are weekly CBC/d, CMP, CRP, ESR faxed to Dr. Lockwood.    Checked with RN team, Dominique able to see patient tomorrow morning.    Tel call to patient, understands change to medication. Still has anakit from first delivery untouched, will keep handy for RN visit tomorrow. Agreeable to twice weekly deliveries, one this evening,  to call on the way.    Pharmacy to dispense 9/20 with supplies to match for straight delivery:  12x meropenem 2g HP  DOS 9/21-9/24    Followup 9/23 check progress, OVN (3/4)

## 2024-09-21 ENCOUNTER — HOME CARE VISIT (OUTPATIENT)
Dept: HOME HEALTH SERVICES | Facility: HOME HEALTH | Age: 74
End: 2024-09-21
Payer: MEDICARE

## 2024-09-21 VITALS
OXYGEN SATURATION: 98 % | HEART RATE: 84 BPM | DIASTOLIC BLOOD PRESSURE: 51 MMHG | RESPIRATION RATE: 18 BRPM | TEMPERATURE: 97.8 F | SYSTOLIC BLOOD PRESSURE: 117 MMHG

## 2024-09-21 PROCEDURE — G0299 HHS/HOSPICE OF RN EA 15 MIN: HCPCS

## 2024-09-21 ASSESSMENT — ENCOUNTER SYMPTOMS
CHANGE IN APPETITE: UNCHANGED
SKIN LESIONS: 1
APPETITE LEVEL: GOOD
MUSCLE WEAKNESS: 1
DENIES PAIN: 1
PERSON REPORTING PAIN: PATIENT

## 2024-09-23 ENCOUNTER — HOME INFUSION (OUTPATIENT)
Dept: INFUSION THERAPY | Age: 74
End: 2024-09-23
Payer: MEDICARE

## 2024-09-23 ENCOUNTER — LAB REQUISITION (OUTPATIENT)
Dept: LAB | Facility: LAB | Age: 74
End: 2024-09-23
Payer: MEDICARE

## 2024-09-23 ENCOUNTER — HOME CARE VISIT (OUTPATIENT)
Dept: HOME HEALTH SERVICES | Facility: HOME HEALTH | Age: 74
End: 2024-09-23
Payer: MEDICARE

## 2024-09-23 DIAGNOSIS — E03.9 HYPOTHYROIDISM, UNSPECIFIED: ICD-10-CM

## 2024-09-23 DIAGNOSIS — M27.2 INFLAMMATORY CONDITIONS OF JAWS: ICD-10-CM

## 2024-09-23 DIAGNOSIS — L02.01 CUTANEOUS ABSCESS OF FACE: ICD-10-CM

## 2024-09-23 LAB
ALBUMIN SERPL BCP-MCNC: 4.2 G/DL (ref 3.4–5)
ALP SERPL-CCNC: 82 U/L (ref 33–136)
ALT SERPL W P-5'-P-CCNC: 22 U/L (ref 7–45)
ANION GAP SERPL CALC-SCNC: 10 MMOL/L (ref 10–20)
AST SERPL W P-5'-P-CCNC: 21 U/L (ref 9–39)
BASOPHILS # BLD AUTO: 0.04 X10*3/UL (ref 0–0.1)
BASOPHILS NFR BLD AUTO: 0.8 %
BILIRUB SERPL-MCNC: 0.4 MG/DL (ref 0–1.2)
BUN SERPL-MCNC: 22 MG/DL (ref 6–23)
CALCIUM SERPL-MCNC: 10 MG/DL (ref 8.6–10.3)
CHLORIDE SERPL-SCNC: 102 MMOL/L (ref 98–107)
CO2 SERPL-SCNC: 31 MMOL/L (ref 21–32)
CREAT SERPL-MCNC: 0.68 MG/DL (ref 0.5–1.05)
CRP SERPL-MCNC: 0.25 MG/DL
EGFRCR SERPLBLD CKD-EPI 2021: >90 ML/MIN/1.73M*2
EOSINOPHIL # BLD AUTO: 0.17 X10*3/UL (ref 0–0.4)
EOSINOPHIL NFR BLD AUTO: 3.2 %
ERYTHROCYTE [DISTWIDTH] IN BLOOD BY AUTOMATED COUNT: 13.3 % (ref 11.5–14.5)
ERYTHROCYTE [SEDIMENTATION RATE] IN BLOOD BY WESTERGREN METHOD: 14 MM/H (ref 0–30)
GLUCOSE SERPL-MCNC: 91 MG/DL (ref 74–99)
HCT VFR BLD AUTO: 40.3 % (ref 36–46)
HGB BLD-MCNC: 13.1 G/DL (ref 12–16)
IMM GRANULOCYTES # BLD AUTO: 0.01 X10*3/UL (ref 0–0.5)
IMM GRANULOCYTES NFR BLD AUTO: 0.2 % (ref 0–0.9)
LYMPHOCYTES # BLD AUTO: 1.1 X10*3/UL (ref 0.8–3)
LYMPHOCYTES NFR BLD AUTO: 21 %
MCH RBC QN AUTO: 30.7 PG (ref 26–34)
MCHC RBC AUTO-ENTMCNC: 32.5 G/DL (ref 32–36)
MCV RBC AUTO: 94 FL (ref 80–100)
MONOCYTES # BLD AUTO: 0.63 X10*3/UL (ref 0.05–0.8)
MONOCYTES NFR BLD AUTO: 12 %
NEUTROPHILS # BLD AUTO: 3.29 X10*3/UL (ref 1.6–5.5)
NEUTROPHILS NFR BLD AUTO: 62.8 %
NRBC BLD-RTO: 0 /100 WBCS (ref 0–0)
PLATELET # BLD AUTO: 276 X10*3/UL (ref 150–450)
POTASSIUM SERPL-SCNC: 4.4 MMOL/L (ref 3.5–5.3)
PROT SERPL-MCNC: 6.4 G/DL (ref 6.4–8.2)
RBC # BLD AUTO: 4.27 X10*6/UL (ref 4–5.2)
SODIUM SERPL-SCNC: 139 MMOL/L (ref 136–145)
WBC # BLD AUTO: 5.2 X10*3/UL (ref 4.4–11.3)

## 2024-09-23 PROCEDURE — 86140 C-REACTIVE PROTEIN: CPT

## 2024-09-23 PROCEDURE — 85652 RBC SED RATE AUTOMATED: CPT

## 2024-09-23 PROCEDURE — 85025 COMPLETE CBC W/AUTO DIFF WBC: CPT

## 2024-09-23 PROCEDURE — 80053 COMPREHEN METABOLIC PANEL: CPT

## 2024-09-23 PROCEDURE — G0299 HHS/HOSPICE OF RN EA 15 MIN: HCPCS

## 2024-09-23 NOTE — PROGRESS NOTES
Brigette Romo is receiving meropenem 2g q8h for jaw OM through 10/18. Followed by Dr. Lockwood  Labs ordered include CBC/d, CMP, CRP, ESR    No new labs to review    RX contacted patient, new infusions going well. No issues with med or line. Thinks she has doses through tomorrow (no wasted or failed doses). Agreeable to delivery tomorrow 9/24 any time and Friday 9/27 any time.  to call on the way.    Dispensing 9/23 with supplies and flushes to match for OVN delivery:  9x meropenem 2g HP  DOS 9/24-9/27    Dispensing 9/27 with supplies and flushes to match for straight delivery:  12x meropenem 2g HP  DOS 9/28-10/1    Follow up 9/30 check labs, progress, OVN (3/4)

## 2024-09-24 VITALS
DIASTOLIC BLOOD PRESSURE: 60 MMHG | HEART RATE: 77 BPM | SYSTOLIC BLOOD PRESSURE: 94 MMHG | RESPIRATION RATE: 18 BRPM | TEMPERATURE: 97.9 F | OXYGEN SATURATION: 97 %

## 2024-09-24 ASSESSMENT — ENCOUNTER SYMPTOMS
DENIES PAIN: 1
OCCASIONAL FEELINGS OF UNSTEADINESS: 0
CHANGE IN APPETITE: UNCHANGED
APPETITE LEVEL: GOOD
PERSON REPORTING PAIN: PATIENT

## 2024-09-27 ENCOUNTER — HOME INFUSION (OUTPATIENT)
Dept: INFUSION THERAPY | Age: 74
End: 2024-09-27
Payer: MEDICARE

## 2024-09-27 NOTE — PROGRESS NOTES
Chart Review: Patient ordered [id number] q8h through 10/18 OM of jaw post radiation    RN visit notes and Labs from 9/23 unremarkable - WBC, ESR and CRP WNL    Per telephone note from patient to Dr Lockwood she is having constant nausea 2/2 Meropenem - Follow up appt on 10/3    Dispense x9 doses of Meropenem in HP for cmpd on 9/30 and delivery on 10/2  Infusions 10/2 - 10/4    Check with patient on 9/30 on status before giving mix report to clean room

## 2024-09-30 ENCOUNTER — HOME INFUSION (OUTPATIENT)
Dept: INFUSION THERAPY | Age: 74
End: 2024-09-30
Payer: MEDICARE

## 2024-09-30 ENCOUNTER — HOME CARE VISIT (OUTPATIENT)
Dept: HOME HEALTH SERVICES | Facility: HOME HEALTH | Age: 74
End: 2024-09-30
Payer: MEDICARE

## 2024-09-30 DIAGNOSIS — R11.0 NAUSEA: Primary | ICD-10-CM

## 2024-09-30 PROCEDURE — 80053 COMPREHEN METABOLIC PANEL: CPT

## 2024-09-30 PROCEDURE — 86140 C-REACTIVE PROTEIN: CPT

## 2024-09-30 PROCEDURE — 85652 RBC SED RATE AUTOMATED: CPT

## 2024-09-30 PROCEDURE — G0299 HHS/HOSPICE OF RN EA 15 MIN: HCPCS

## 2024-09-30 PROCEDURE — 85025 COMPLETE CBC W/AUTO DIFF WBC: CPT

## 2024-09-30 RX ORDER — ONDANSETRON 4 MG/1
4 TABLET, FILM COATED ORAL EVERY 8 HOURS PRN
Qty: 20 TABLET | Refills: 0 | Status: SHIPPED | OUTPATIENT
Start: 2024-09-30 | End: 2024-10-10

## 2024-09-30 NOTE — PROGRESS NOTES
Tel call with patient. Nausea has improved. Patient agreeable to delivery of meropenem tomorrow with supplies to match. She reports one faulty homepump with last delivery. Needs replacement dose for 11pm dose 10/1/24.    Processed fill for 1 x replacement meropenem HP (no charge) for mix 9/30 and delivery 10/1/24 for dose 10/1/24 pm (leave off ice). Replacement dose to be sent with routine fill of 9 doses for Tues 10/1 delivery.

## 2024-10-03 ENCOUNTER — HOME INFUSION (OUTPATIENT)
Dept: INFUSION THERAPY | Age: 74
End: 2024-10-03

## 2024-10-03 ENCOUNTER — APPOINTMENT (OUTPATIENT)
Dept: INFECTIOUS DISEASES | Facility: CLINIC | Age: 74
End: 2024-10-03
Payer: MEDICARE

## 2024-10-03 VITALS
HEIGHT: 66 IN | BODY MASS INDEX: 20.57 KG/M2 | SYSTOLIC BLOOD PRESSURE: 106 MMHG | HEART RATE: 72 BPM | DIASTOLIC BLOOD PRESSURE: 54 MMHG | WEIGHT: 128 LBS

## 2024-10-03 DIAGNOSIS — M27.2 OSTEORADIONECROSIS OF JAW: Primary | ICD-10-CM

## 2024-10-03 DIAGNOSIS — K12.1 ULCERATION, ORAL MUCOSA: ICD-10-CM
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
## 2024-10-03 PROCEDURE — 1126F AMNT PAIN NOTED NONE PRSNT: CPT | Performed by: INTERNAL MEDICINE

## 2024-10-03 PROCEDURE — 1036F TOBACCO NON-USER: CPT | Performed by: INTERNAL MEDICINE

## 2024-10-03 PROCEDURE — 1159F MED LIST DOCD IN RCRD: CPT | Performed by: INTERNAL MEDICINE

## 2024-10-03 PROCEDURE — 3008F BODY MASS INDEX DOCD: CPT | Performed by: INTERNAL MEDICINE

## 2024-10-03 PROCEDURE — 99214 OFFICE O/P EST MOD 30 MIN: CPT | Performed by: INTERNAL MEDICINE

## 2024-10-03 RX ORDER — MEROPENEM 1 G/1
2 INJECTION, POWDER, FOR SOLUTION INTRAVENOUS EVERY 8 HOURS
Qty: 168 G | Refills: 0 | Status: SHIPPED
Start: 2024-10-03 | End: 2024-10-31

## 2024-10-03 ASSESSMENT — ENCOUNTER SYMPTOMS
PERSON REPORTING PAIN: PATIENT
HEMATOLOGIC/LYMPHATIC NEGATIVE: 1
GASTROINTESTINAL NEGATIVE: 1
RESPIRATORY NEGATIVE: 1
DENIES PAIN: 1
NEUROLOGICAL NEGATIVE: 1
MUSCULOSKELETAL NEGATIVE: 1
PSYCHIATRIC NEGATIVE: 1
SKIN LESIONS: 1
CARDIOVASCULAR NEGATIVE: 1
ALLERGIC/IMMUNOLOGIC NEGATIVE: 1
EYES NEGATIVE: 1
ENDOCRINE NEGATIVE: 1
CONSTITUTIONAL NEGATIVE: 1

## 2024-10-03 ASSESSMENT — PAIN SCALES - GENERAL: PAINLEVEL: 0-NO PAIN

## 2024-10-03 NOTE — PROGRESS NOTES
Infectious Diseases Clinic Follow-up:    Reason for Visit:   Chief Complaint   Patient presents with    Follow-up       History of Current Issue  Brigette Romo is a 74 y.o. 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 She was transferred to [facility] after purulent neck drainage was noted. Imaging confirmed a fistula from the right retromolar trigone to the neck. Initial treatment included vancomycin and zosyn, but cultures were negative, and she will be switched to Augmentin 875 mg BID via PEG tube for six weeks.     Recently, she had a recurrence of infection at the surgical site. Cultures collected on 7/19 grew Pseudomonas aeruginosa (resistant to ciprofloxacin and levofloxacin). She will require IV antibiotics for six weeks, possibly longer.     IMPRESSIONS:  # Osteoradionecrosis     UPDATE 9/20/2024  I spoke with the patient regarding her reported reaction to cefepime, which only emerged in the past week. After receiving the infusion, she experienced extreme itchiness that was relieved with Benadryl. She has felt better since cefepime was stopped two days ago. Overall, she reports progress with healing from the surgical site infection but believes she may need another four weeks of antibiotics.    Her last Pseudomonas aeruginosa isolate was resistant to fluoroquinolones, so we discussed alternative therapies. I recommended switching to meropenem. We reviewed the relationship between penicillins, cephalosporins, and carbapenems as antibiotic classes, noting that the cross-reactivity between penicillins and cephalosporins is around 1%, and drops to less than 1% with carbapenems. Nonetheless, she was advised to watch for any repeat signs and symptoms of an allergic  reaction (such as rash, hives, swelling, shortness of breath, or anaphylaxis).    If she cannot tolerate meropenem, aztreonam is a potential alternative. The patient is in agreement with this plan.     PLAN:     --STOP cefepime      --START meropenem 2 gm IV q8     --Will require at least 4 weeks of IV ABXs, EOT 10/18/2024      --Weekly CBC with diff, CMP, CRP and ESR and fax reports to [phone] attn Dr Lockwood.     --Will follow up in 4-6 weeks     --Call or go to ED with any PICC line issues    PAST MEDICAL HISTORY:  Past Medical History:   Diagnosis Date    Hypothyroid     Personal history of malignant neoplasm of tongue     History of tongue cancer       PAST SURGICAL HISTORY:  Past Surgical History:   Procedure Laterality Date    CT AORTA AND BILATERAL ILIOFEMORAL RUNOFF ANGIOGRAM W AND/OR WO IV CONTRAST  2/3/2023    CT AORTA AND BILATERAL ILIOFEMORAL RUNOFF ANGIOGRAM W AND/OR WO IV CONTRAST 2/3/2023 DOCTOR OFFICE LEGACY    OTHER SURGICAL HISTORY  08/30/2016    Glossectomy    OTHER SURGICAL HISTORY  08/30/2016    Radical Neck Dissection       ALLERGIES:    Allergies   Allergen Reactions    Sulfa (Sulfonamide Antibiotics) Rash       MEDICATIONS:      Current Outpatient Medications:     HEPARIN 10 UNITS/ML NS-SIMPLE, Infuse 5 mL into a venous catheter 3 times a day. [facility] method  Indications: flushing, Disp: , Rfl:     levothyroxine (Synthroid, Levoxyl) 25 mcg tablet, Take 1 tablet (25 mcg) by mouth once daily in the morning. Take before meals. Take on an empty stomach with water only.  Wait 1 hour before having food, medications or beverages other than water.  OK to crush pill., Disp: 90 tablet, Rfl: 3    meropenem (Merrem) 1 gram injection, Infuse 2 g into a venous catheter every 8 hours for 28 days., Disp: 168 g, Rfl: 0    nutritional supplements (Osmolite 1 Sanjiv) 0.04 gram-1.06 kcal/mL liquid, 1 Can by peg tube route 3 times a day. Indications: nutrition, Disp: , Rfl:     ondansetron (Zofran) 4 mg tablet,  "Take 1 tablet (4 mg) by mouth every 8 hours if needed for nausea or vomiting for up to 10 days., Disp: 20 tablet, Rfl: 0    sodium chloride 0.9% (BD PosiFlush Normal Saline 0.9) flush, Infuse 10 mL into a venous catheter 3 times a day. [facility] method, Disp: , Rfl:     REVIEW OF SYSTEMS:    Review of Systems   Constitutional: Negative.    HENT: Negative.     Eyes: Negative.    Respiratory: Negative.     Cardiovascular: Negative.    Gastrointestinal: Negative.    Endocrine: Negative.    Genitourinary: Negative.    Musculoskeletal: Negative.    Skin: Negative.    Allergic/Immunologic: Negative.    Neurological: Negative.    Hematological: Negative.    Psychiatric/Behavioral: Negative.         PHYSICAL EXAMINATION:       Visit Vitals  /54   Pulse 72   Ht 1.676 m (5' 6\")   Wt 58.1 kg (128 lb)   LMP  (LMP Unknown)   BMI 20.66 kg/m²   OB Status Postmenopausal   Smoking Status Never   BSA 1.64 m²        EXAM:   Physical Exam  Vitals reviewed.   Constitutional:       Appearance: Normal appearance.   HENT:      Head: Normocephalic and atraumatic.      Comments: BL jaw with healed surgical scar. There is an open area under the L mandible with mild drainage     Mouth/Throat:      Mouth: Mucous membranes are moist.      Pharynx: Oropharynx is clear.   Cardiovascular:      Rate and Rhythm: Normal rate and regular rhythm.   Pulmonary:      Effort: Pulmonary effort is normal.   Abdominal:      General: Abdomen is flat. Bowel sounds are normal.      Palpations: Abdomen is soft.   Musculoskeletal:         General: Normal range of motion.      Cervical back: Normal range of motion.   Skin:     General: Skin is warm.   Neurological:      General: No focal deficit present.      Mental Status: She is alert and oriented to person, place, and time.   Psychiatric:         Mood and Affect: Mood normal.         Behavior: Behavior normal.         Thought Content: Thought content normal.         Judgment: Judgment normal.  "     DATA:    Microbiology:   Susceptibility data from last 90 days.  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 She was transferred to [facility] after purulent neck drainage was noted. Imaging confirmed a fistula from the right retromolar trigone to the neck. Initial treatment included vancomycin and zosyn, but cultures were negative, and she will be switched to Augmentin 875 mg BID via PEG tube for six weeks.     Recently, she had a recurrence of infection at the surgical site. Cultures collected on 7/19 grew Pseudomonas aeruginosa (resistant to ciprofloxacin and levofloxacin). She will require IV antibiotics for six weeks, possibly longer.     IMPRESSIONS:  # Osteoradionecrosis     UPDATE 9/20/2024  I spoke with the patient regarding her reported reaction to cefepime, which only emerged in the past week. After receiving the infusion, she experienced extreme itchiness that was relieved with Benadryl. She has felt better since cefepime was stopped two days ago. Overall, she reports progress with healing from the surgical site infection but believes she may need another four weeks of antibiotics.    Her last Pseudomonas aeruginosa isolate was resistant to fluoroquinolones, so we discussed alternative therapies. I recommended switching to meropenem. We reviewed the relationship between penicillins, cephalosporins, and carbapenems as antibiotic classes, noting that the cross-reactivity between  penicillins and cephalosporins is around 1%, and drops to less than 1% with carbapenems. Nonetheless, she was advised to watch for any repeat signs and symptoms of an allergic reaction (such as rash, hives, swelling, shortness of breath, or anaphylaxis).    If she cannot tolerate meropenem, aztreonam is a potential alternative. The patient is in agreement with this plan.    UPDATE 10/03/2024  The patient has been doing well overall. Two weeks ago, meropenem was initiated due to intense itching, which was attributed to a reaction to cefepime. The itching subsided after the switch. However, last week, the patient reported experiencing intense nausea for a few days, which significantly improved shortly after. Symptoms have now improved further with the use of Zofran as needed, which I prescribed last week. The nausea is now minimal.    Regarding the wound in the left lower submandibular space, it appears much better. The hardware is still exposed but no longer draining, and the area is less tender to touch.     I anticipate treating for another 3-4 weeks to achieve full resolution. The isolated organism is resistant to fluoroquinolones, so suppressive antibiotics are not an option once IV therapy is discontinued. Therefore, extending treatment to maximize benefit and reduce the chance of recurrence is reasonable.     The patient will check in with me in 2-3 weeks via phone appointment, and we will make a decision based on clinical progress at that time.       PLAN:     1. Continue meropenem 2 gm IV q8 + Zofran 4 mg q8 PRN     2. Will require at least another 4 weeks of IV ABXs, EOT 10/31/2024      3. Weekly CBC with diff, CMP, CRP and ESR and fax reports to [phone] attn Dr Lockwood.     4. Will follow up in 2-3 weeks        The patient agrees with this plan.    I spent 30 minutes in the professional and overall care of this patient.      Bacilio Lockwood MD MPH

## 2024-10-03 NOTE — LETTER
10/03/24    Eri Read DO  [address]  [address]  [place] [address]      Dear Dr. Eri Read DO,    I am writing to confirm that your patient, Brigette Romo, received care in my office on 10/03/24. I have enclosed a summary of the care provided to Brigette for your reference.    Please contact me with any questions you may have regarding the visit.    Sincerely,         Bacilio Lockwood MD MPH  [address]  [facility] [address]  [place] [id number]    CC: No Recipients

## 2024-10-03 NOTE — PROGRESS NOTES
Chart Review: Patient ordered [id number] q8h OM of jaw post radiation    Pt saw Dr. Lockwood today, patient showing improvement. Pt's nausea adequately managed with use of Zofran. Dr. Lockwood extending treatment through 10/31/24 due to inability to use oral suppressive meds due to resistance. Order updated in [facility], gold copy to intake.     RN visit notes and Labs from 9/30 unremarkable - WBC, ESR and CRP WNL    Spoke to patient, infusions going well. No issues with line or med. Has doses through 10/4/24. Agreeable to delivery tomorrow 10/4 any time for 4 days of meds, then delivery Tuesday 10/8 of 3 days. Supplies to match,  to call on the way     Dispense x12 doses of Meropenem in HP for cmpd on 10/4 and straight delivery  Infusions 10/5 - 10/8    Dispense x9 doses of Meropenem in HP for cmpd on 10/8 and straight delivery  Infusions 10/9 - 10/11     FU 10/11 check labs, progress, straight (4/3)

## 2024-10-07 ENCOUNTER — HOME CARE VISIT (OUTPATIENT)
Dept: HOME HEALTH SERVICES | Facility: HOME HEALTH | Age: 74
End: 2024-10-07
Payer: MEDICARE

## 2024-10-07 ENCOUNTER — LAB REQUISITION (OUTPATIENT)
Dept: LAB | Facility: LAB | Age: 74
End: 2024-10-07
Payer: MEDICARE

## 2024-10-07 DIAGNOSIS — M27.2 INFLAMMATORY CONDITIONS OF JAWS: ICD-10-CM

## 2024-10-07 LAB
ALBUMIN SERPL BCP-MCNC: 4.1 G/DL (ref 3.4–5)
ALP SERPL-CCNC: 106 U/L (ref 33–136)
ALT SERPL W P-5'-P-CCNC: 24 U/L (ref 7–45)
ANION GAP SERPL CALC-SCNC: 11 MMOL/L (ref 10–20)
AST SERPL W P-5'-P-CCNC: 27 U/L (ref 9–39)
BASOPHILS # BLD AUTO: 0.09 X10*3/UL (ref 0–0.1)
BASOPHILS NFR BLD AUTO: 1.8 %
BILIRUB SERPL-MCNC: 0.6 MG/DL (ref 0–1.2)
BUN SERPL-MCNC: 19 MG/DL (ref 6–23)
CALCIUM SERPL-MCNC: 9.8 MG/DL (ref 8.6–10.3)
CHLORIDE SERPL-SCNC: 105 MMOL/L (ref 98–107)
CO2 SERPL-SCNC: 28 MMOL/L (ref 21–32)
CREAT SERPL-MCNC: 0.81 MG/DL (ref 0.5–1.05)
CRP SERPL-MCNC: 0.11 MG/DL
EGFRCR SERPLBLD CKD-EPI 2021: 77 ML/MIN/1.73M*2
EOSINOPHIL # BLD AUTO: 0.85 X10*3/UL (ref 0–0.4)
EOSINOPHIL NFR BLD AUTO: 17.3 %
ERYTHROCYTE [DISTWIDTH] IN BLOOD BY AUTOMATED COUNT: 13.1 % (ref 11.5–14.5)
ERYTHROCYTE [SEDIMENTATION RATE] IN BLOOD BY WESTERGREN METHOD: 15 MM/H (ref 0–30)
GLUCOSE SERPL-MCNC: 116 MG/DL (ref 74–99)
HCT VFR BLD AUTO: 40.1 % (ref 36–46)
HGB BLD-MCNC: 13.3 G/DL (ref 12–16)
IMM GRANULOCYTES # BLD AUTO: 0.01 X10*3/UL (ref 0–0.5)
IMM GRANULOCYTES NFR BLD AUTO: 0.2 % (ref 0–0.9)
LYMPHOCYTES # BLD AUTO: 0.86 X10*3/UL (ref 0.8–3)
LYMPHOCYTES NFR BLD AUTO: 17.5 %
MCH RBC QN AUTO: 31 PG (ref 26–34)
MCHC RBC AUTO-ENTMCNC: 33.2 G/DL (ref 32–36)
MCV RBC AUTO: 94 FL (ref 80–100)
MONOCYTES # BLD AUTO: 0.46 X10*3/UL (ref 0.05–0.8)
MONOCYTES NFR BLD AUTO: 9.3 %
NEUTROPHILS # BLD AUTO: 2.65 X10*3/UL (ref 1.6–5.5)
NEUTROPHILS NFR BLD AUTO: 53.9 %
NRBC BLD-RTO: 0 /100 WBCS (ref 0–0)
PLATELET # BLD AUTO: 243 X10*3/UL (ref 150–450)
POTASSIUM SERPL-SCNC: 4.3 MMOL/L (ref 3.5–5.3)
PROT SERPL-MCNC: 6.3 G/DL (ref 6.4–8.2)
RBC # BLD AUTO: 4.29 X10*6/UL (ref 4–5.2)
SODIUM SERPL-SCNC: 140 MMOL/L (ref 136–145)
WBC # BLD AUTO: 4.9 X10*3/UL (ref 4.4–11.3)

## 2024-10-07 PROCEDURE — 85025 COMPLETE CBC W/AUTO DIFF WBC: CPT

## 2024-10-07 PROCEDURE — 80053 COMPREHEN METABOLIC PANEL: CPT

## 2024-10-07 PROCEDURE — G0299 HHS/HOSPICE OF RN EA 15 MIN: HCPCS

## 2024-10-07 PROCEDURE — 85652 RBC SED RATE AUTOMATED: CPT

## 2024-10-07 PROCEDURE — 86140 C-REACTIVE PROTEIN: CPT

## 2024-10-07 ASSESSMENT — ENCOUNTER SYMPTOMS
SKIN LESIONS: 1
PERSON REPORTING PAIN: PATIENT
DENIES PAIN: 1

## 2024-10-08 ENCOUNTER — APPOINTMENT (OUTPATIENT)
Dept: OTOLARYNGOLOGY | Facility: CLINIC | Age: 74
End: 2024-10-08
Payer: MEDICARE

## 2024-10-08 VITALS — WEIGHT: 127 LBS | BODY MASS INDEX: 20.41 KG/M2 | HEIGHT: 66 IN

## 2024-10-08 DIAGNOSIS — C03.1 CANCER OF LOWER GUM (MULTI): ICD-10-CM

## 2024-10-08 DIAGNOSIS — E03.9 HYPOTHYROIDISM (ACQUIRED): ICD-10-CM
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
## 2024-10-08 DIAGNOSIS — C02.9 MALIGNANT NEOPLASM OF TONGUE (MULTI): ICD-10-CM

## 2024-10-08 DIAGNOSIS — M27.2 OSTEORADIONECROSIS, JAW: Primary | ICD-10-CM

## 2024-10-08 PROCEDURE — 99213 OFFICE O/P EST LOW 20 MIN: CPT | Performed by: OTOLARYNGOLOGY

## 2024-10-08 PROCEDURE — 1160F RVW MEDS BY RX/DR IN RCRD: CPT | Performed by: OTOLARYNGOLOGY

## 2024-10-08 PROCEDURE — 1159F MED LIST DOCD IN RCRD: CPT | Performed by: OTOLARYNGOLOGY

## 2024-10-08 PROCEDURE — 31575 DIAGNOSTIC LARYNGOSCOPY: CPT | Performed by: OTOLARYNGOLOGY

## 2024-10-08 PROCEDURE — 1036F TOBACCO NON-USER: CPT | Performed by: OTOLARYNGOLOGY

## 2024-10-08 PROCEDURE — 3008F BODY MASS INDEX DOCD: CPT | Performed by: OTOLARYNGOLOGY

## 2024-10-08 NOTE — PROGRESS NOTES
Provider Impressions     Status post treatment of multiple different oral cavity cancers.  I do not see any evidence of any tumor recurrence.    Dysphagia which is unlikely to get better.  She remains PEG dependent.    Thyroid nodule that was picked up on the PET scan.  This turned out to be benign on a biopsy that was eventually done after the ultrasound.    Ongoing issues with some bone exposure as well as drainage around her chin.  She also has been seen in infectious disease and is presently on antibiotics.  She does not have any further drainage.      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 She had surgical excision and a neck dissection followed by radiation therapy. She was found to have tumor recurrence and on September 22, 2016 she underwent reexcision. The margins were felt to be clear. There was no evidence of pedro luis metastasis. She had a TSH level in July 2023 which was normal. She had a chest CT in April 2020 which was negative. She was having some issues with discomfort on the lateral tongue on the left. This led to a biopsy that showed cancer. On April 13, 2020 she underwent a partial glossectomy. The margins were clear. She was presented at our tumor board and it was not felt that anything further needed to be done. She admits that her swallowing is somewhat difficult. She had a chest CT in May 2023 that showed a possible small nodule. I saw her in January 2023 because she had some concern about a lesion in her mouth. 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 She had a CT of the neck done on January 20, 2023 that showed a lesion involving the mandible on the right side. On February 7, 2023 she underwent surgery. There was bone involvement. There was a margin that was described as being close. She completed radiation  therapy in April 2023. She had a PET scan done in August 2023 that really does not show anything worrisome.  At the same time she has some uptake in the right thyroid.  This was followed by an ultrasound that showed a suspicious nodule which was eventually biopsied and turned out to be benign.  I will follow this clinically.  He has had issues with drainage around infected reconstruction plates.  She has been on antibiotics and is being followed in infectious disease.  She has not had any drainage recently.    Physical Exam      Examination of the oral cavity and oropharynx shows all the changes from the prior surgeries.  On the posterior aspect of the right mandibular gum there is a little nodularity that looks tiny but they are reddish.  This has not changed.   There is no more obvious bone exposure.  The reconstruction plate is visible on the left chin area.  The rest of the neck exam is negative.  It does show significant induration.    A flexible laryngoscopy was carried out. Under topical Xylocaine and Leonard-Synephrine the scope was introduced through the nostril. The nasopharynx, base of tongue, hypopharynx, and larynx are visualized.  The vocal cords are normally mobile. There is no pooling of secretions in the piriform sinuses. There is no evidence of any mucosal lesions.

## 2024-10-10 ENCOUNTER — HOME INFUSION (OUTPATIENT)
Dept: INFUSION THERAPY | Age: 74
End: 2024-10-10
Payer: MEDICARE

## 2024-10-10 NOTE — PROGRESS NOTES
Chart Review - Patient ordered Meropenem 2g IV q8h for OM of jaw post radiation through 10/31 - no good oral suppression option     See by ID and ENT within the last week     Labs from 10/7 reviewed - results unremarkable - ESR, CRP, WBC all WNL    Virtual appt with Dr Lockwood 10/197     Dispense x12 doses of Meropenem in HP for cmpd and delivery on 10/11  Infusions 10/12 10/15    Dispense x9 doses of Meropenem in HP for cmpd and delivery on 10/15  Infusions 10/16 - 10/18    NF 10/18 4,3

## 2024-10-14 ENCOUNTER — LAB REQUISITION (OUTPATIENT)
Dept: LAB | Facility: LAB | Age: 74
End: 2024-10-14
Payer: MEDICARE

## 2024-10-14 ENCOUNTER — HOME CARE VISIT (OUTPATIENT)
Dept: HOME HEALTH SERVICES | Facility: HOME HEALTH | Age: 74
End: 2024-10-14
Payer: MEDICARE

## 2024-10-14 DIAGNOSIS — M27.2 INFLAMMATORY CONDITIONS OF JAWS: ICD-10-CM

## 2024-10-14 DIAGNOSIS — R79.82 ELEVATED C-REACTIVE PROTEIN (CRP): ICD-10-CM

## 2024-10-14 LAB
ALBUMIN SERPL BCP-MCNC: 4 G/DL (ref 3.4–5)
ALP SERPL-CCNC: 99 U/L (ref 33–136)
ALT SERPL W P-5'-P-CCNC: 26 U/L (ref 7–45)
ANION GAP SERPL CALC-SCNC: 8 MMOL/L (ref 10–20)
AST SERPL W P-5'-P-CCNC: 29 U/L (ref 9–39)
BASOPHILS # BLD AUTO: 0.07 X10*3/UL (ref 0–0.1)
BASOPHILS NFR BLD AUTO: 1.6 %
BILIRUB SERPL-MCNC: 0.5 MG/DL (ref 0–1.2)
BUN SERPL-MCNC: 23 MG/DL (ref 6–23)
CALCIUM SERPL-MCNC: 9.7 MG/DL (ref 8.6–10.3)
CHLORIDE SERPL-SCNC: 104 MMOL/L (ref 98–107)
CO2 SERPL-SCNC: 31 MMOL/L (ref 21–32)
CREAT SERPL-MCNC: 0.81 MG/DL (ref 0.5–1.05)
EGFRCR SERPLBLD CKD-EPI 2021: 77 ML/MIN/1.73M*2
EOSINOPHIL # BLD AUTO: 0.67 X10*3/UL (ref 0–0.4)
EOSINOPHIL NFR BLD AUTO: 15 %
ERYTHROCYTE [DISTWIDTH] IN BLOOD BY AUTOMATED COUNT: 12.6 % (ref 11.5–14.5)
ERYTHROCYTE [SEDIMENTATION RATE] IN BLOOD BY WESTERGREN METHOD: 9 MM/H (ref 0–30)
GLUCOSE SERPL-MCNC: 99 MG/DL (ref 74–99)
HCT VFR BLD AUTO: 39.7 % (ref 36–46)
HGB BLD-MCNC: 12.9 G/DL (ref 12–16)
IMM GRANULOCYTES # BLD AUTO: 0 X10*3/UL (ref 0–0.5)
IMM GRANULOCYTES NFR BLD AUTO: 0 % (ref 0–0.9)
LYMPHOCYTES # BLD AUTO: 0.84 X10*3/UL (ref 0.8–3)
LYMPHOCYTES NFR BLD AUTO: 18.8 %
MCH RBC QN AUTO: 30.9 PG (ref 26–34)
MCHC RBC AUTO-ENTMCNC: 32.5 G/DL (ref 32–36)
MCV RBC AUTO: 95 FL (ref 80–100)
MONOCYTES # BLD AUTO: 0.65 X10*3/UL (ref 0.05–0.8)
MONOCYTES NFR BLD AUTO: 14.5 %
NEUTROPHILS # BLD AUTO: 2.24 X10*3/UL (ref 1.6–5.5)
NEUTROPHILS NFR BLD AUTO: 50.1 %
NRBC BLD-RTO: 0 /100 WBCS (ref 0–0)
PLATELET # BLD AUTO: 230 X10*3/UL (ref 150–450)
POTASSIUM SERPL-SCNC: 4.3 MMOL/L (ref 3.5–5.3)
PROT SERPL-MCNC: 6.2 G/DL (ref 6.4–8.2)
RBC # BLD AUTO: 4.17 X10*6/UL (ref 4–5.2)
SODIUM SERPL-SCNC: 139 MMOL/L (ref 136–145)
WBC # BLD AUTO: 4.5 X10*3/UL (ref 4.4–11.3)

## 2024-10-14 PROCEDURE — 85025 COMPLETE CBC W/AUTO DIFF WBC: CPT

## 2024-10-14 PROCEDURE — 85652 RBC SED RATE AUTOMATED: CPT

## 2024-10-14 PROCEDURE — G0299 HHS/HOSPICE OF RN EA 15 MIN: HCPCS

## 2024-10-14 PROCEDURE — 80053 COMPREHEN METABOLIC PANEL: CPT

## 2024-10-14 ASSESSMENT — ENCOUNTER SYMPTOMS
DENIES PAIN: 1
TROUBLE SWALLOWING: 1
APPETITE LEVEL: FAIR
PERSON REPORTING PAIN: PATIENT
CHANGE IN APPETITE: UNCHANGED

## 2024-10-17 ENCOUNTER — APPOINTMENT (OUTPATIENT)
Dept: INFECTIOUS DISEASES | Facility: CLINIC | Age: 74
End: 2024-10-17
Payer: MEDICARE
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
## 2024-10-17 DIAGNOSIS — M27.2 OSTEORADIONECROSIS OF JAW: Primary | ICD-10-CM

## 2024-10-17 PROCEDURE — 1159F MED LIST DOCD IN RCRD: CPT | Performed by: INTERNAL MEDICINE

## 2024-10-17 PROCEDURE — 99214 OFFICE O/P EST MOD 30 MIN: CPT | Performed by: INTERNAL MEDICINE

## 2024-10-18 ENCOUNTER — HOME INFUSION (OUTPATIENT)
Dept: INFUSION THERAPY | Age: 74
End: 2024-10-18
Payer: MEDICARE

## 2024-10-18 DIAGNOSIS — K12.1 ULCERATION, ORAL MUCOSA: ICD-10-CM

## 2024-10-18 DIAGNOSIS — T84.498A EXPOSED ORTHOPAEDIC HARDWARE (CMS-HCC): Primary | ICD-10-CM

## 2024-10-18 ASSESSMENT — ENCOUNTER SYMPTOMS
EYES NEGATIVE: 1
CONSTITUTIONAL NEGATIVE: 1
GASTROINTESTINAL NEGATIVE: 1
PSYCHIATRIC NEGATIVE: 1
HEMATOLOGIC/LYMPHATIC NEGATIVE: 1
CARDIOVASCULAR NEGATIVE: 1
ALLERGIC/IMMUNOLOGIC NEGATIVE: 1
ENDOCRINE NEGATIVE: 1
NEUROLOGICAL NEGATIVE: 1
MUSCULOSKELETAL NEGATIVE: 1
RESPIRATORY NEGATIVE: 1

## 2024-10-18 NOTE — PROGRESS NOTES
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 year old female     Here for routine follow up, see A/P for full details    PAST MEDICAL HISTORY:  Past Medical History:   Diagnosis Date    Hypothyroid     Personal history of malignant neoplasm of tongue     History of tongue cancer       PAST SURGICAL HISTORY:  Past Surgical History:   Procedure Laterality Date    CT AORTA AND BILATERAL ILIOFEMORAL RUNOFF ANGIOGRAM W AND/OR WO IV CONTRAST  2/3/2023    CT AORTA AND BILATERAL ILIOFEMORAL RUNOFF ANGIOGRAM W AND/OR WO IV CONTRAST 2/3/2023 [facility]CTOR OFF[facility]    OTHER SURGICAL HISTORY  08/30/2016    Glossectomy    OTHER SURGICAL HISTORY  08/30/2016    Radical Neck Dissection       ALLERGIES:    Allergies   Allergen Reactions    Sulfa (Sulfonamide Antibiotics) Rash       MEDICATIONS:      Current Outpatient Medications:     HEPARIN 10 UNITS/ML NS-SIMPLE, Infuse 5 mL into a venous catheter 3 times a day. sash method  Indications: flushing, Disp: , Rfl:     levothyroxine (Synthroid, Levoxyl) 25 mcg tablet, Take 1 tablet (25 mcg) by mouth once daily in the morning. Take before meals. Take on an empty stomach with water only.  Wait 1 hour before having food, medications or beverages other than water.  OK to crush pill., Disp: 90 tablet, Rfl: 3    meropenem (Merrem) 1 gram injection, Infuse 2 g into a venous catheter every 8 hours for 28 days., Disp: 168 g, Rfl: 0    sodium chloride 0.9% (BD PosiFlush Normal Saline 0.9) flush, Infuse 10 mL into a venous catheter 3 times a day. sash method, Disp: , Rfl:     nutritional supplements (Osmolite 1 Sanjiv) 0.04 gram-1.06 kcal/mL liquid, 1 Can by peg tube route 3 times a day. Indications: nutrition (Patient not taking: Reported on 10/17/2024), Disp: , Rfl:     REVIEW OF SYSTEMS:    Review of Systems    Constitutional: Negative.    HENT: Negative.     Eyes: Negative.    Respiratory: Negative.     Cardiovascular: Negative.    Gastrointestinal: Negative.    Endocrine: Negative.    Genitourinary: Negative.    Musculoskeletal: Negative.    Skin: Negative.    Allergic/Immunologic: Negative.    Neurological: Negative.    Hematological: Negative.    Psychiatric/Behavioral: Negative.         PHYSICAL EXAMINATION:       Visit Vitals  LMP  (LMP Unknown)   OB Status Postmenopausal   Smoking Status Never        EXAM:   N/A        Microbiology:    No results found for the last 90 days.       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 She was transferred to [facility] after purulent neck drainage was noted. Imaging confirmed a fistula from the right retromolar trigone to the neck. Initial treatment included vancomycin and zosyn, but cultures were negative, and she will be switched to Augmentin 875 mg BID via PEG tube for six weeks.     Recently, she had a recurrence of infection at the surgical site. Cultures collected on 7/19 grew Pseudomonas aeruginosa (resistant to ciprofloxacin and levofloxacin). She will require IV antibiotics for six weeks, possibly longer.     IMPRESSIONS:  # Osteoradionecrosis     UPDATE 9/20/2024  I spoke with the patient regarding her reported reaction to cefepime, which only emerged in the past week. After receiving the infusion, she experienced extreme itchiness that was relieved with Benadryl. She has felt better since cefepime was stopped two days ago. Overall, she reports progress with healing from the surgical site infection but believes she may need another four weeks of antibiotics.    Her last Pseudomonas aeruginosa isolate was resistant to fluoroquinolones, so we  discussed alternative therapies. I recommended switching to meropenem. We reviewed the relationship between penicillins, cephalosporins, and carbapenems as antibiotic classes, noting that the cross-reactivity between penicillins and cephalosporins is around 1%, and drops to less than 1% with carbapenems. Nonetheless, she was advised to watch for any repeat signs and symptoms of an allergic reaction (such as rash, hives, swelling, shortness of breath, or anaphylaxis).    If she cannot tolerate meropenem, aztreonam is a potential alternative. The patient is in agreement with this plan.     UPDATE 10/03/2024  The patient has been doing well overall. Two weeks ago, meropenem was initiated due to intense itching, which was attributed to a reaction to cefepime. The itching subsided after the switch. However, last week, the patient reported experiencing intense nausea for a few days, which significantly improved shortly after. Symptoms have now improved further with the use of Zofran as needed, which I prescribed last week. The nausea is now minimal.    Regarding the wound in the left lower submandibular space, it appears much better. The hardware is still exposed but no longer draining, and the area is less tender to touch.      I anticipate treating for another 3-4 weeks to achieve full resolution. The isolated organism is resistant to fluoroquinolones, so suppressive antibiotics are not an option once IV therapy is discontinued. Therefore, extending treatment to maximize benefit and reduce the chance of recurrence is reasonable.      The patient will check in with me in 2-3 weeks via phone appointment, and we will make a decision based on clinical progress at that time.       UPDATE 10/18/2024  --Spoke to the patient, who is doing well overall. There is a small open area under the chin that is healing but not fully closed yet, and it will need a few more days to heal completely. The patient agreed to continue  antibiotics until 10/31/2024.      PLAN:     1. Continue meropenem 2 gm IV q8 + Zofran 4 mg q8 PRN, EOT 10/31/2024     2. Weekly CBC with diff, CMP, CRP and ESR and fax reports to [phone] attn Dr Lockwood.     3. Ok to remove PICC at EOT        The patient agrees with this plan.       I spent 30 minutes in the professional and overall care of this patient.      Bacilio Lockwood MD MPH

## 2024-10-18 NOTE — PROGRESS NOTES
Chart Review - Patient ordered Meropenem 2g IV q8h for OM of jaw post radiation through 10/31 - no good oral suppression option      Virtual appt from Dr. Lockwood 10/17 - pt progressing well, continue meropenem through 10/31/24 as previously ordered, ok to remove PICC at end of therapy. Maintain same weekly labs.     Labs from 10/14 reviewed - results unremarkable - ESR, CRP, WBC all WNL     Spoke with pt, infusions going well. Has doses through today.      Dispense x12 doses of Meropenem in HP for cmpd and delivery on 10/18  Infusions 10/19-10/22     Dispense x9 doses of Meropenem in HP for cmpd and delivery on 10/22  Infusions 10/23 - 10/25     NF 10/25 check labs, progress, remainder (4/2)

## 2024-10-20 ENCOUNTER — HOME CARE VISIT (OUTPATIENT)
Dept: HOME HEALTH SERVICES | Facility: HOME HEALTH | Age: 74
End: 2024-10-20
Payer: MEDICARE

## 2024-10-20 VITALS
SYSTOLIC BLOOD PRESSURE: 95 MMHG | RESPIRATION RATE: 16 BRPM | OXYGEN SATURATION: 98 % | DIASTOLIC BLOOD PRESSURE: 44 MMHG | TEMPERATURE: 98.4 F | HEART RATE: 78 BPM

## 2024-10-20 PROCEDURE — G0299 HHS/HOSPICE OF RN EA 15 MIN: HCPCS

## 2024-10-20 ASSESSMENT — ENCOUNTER SYMPTOMS
PERSON REPORTING PAIN: PATIENT
MUSCLE WEAKNESS: 1

## 2024-10-20 ASSESSMENT — ACTIVITIES OF DAILY LIVING (ADL)
OASIS_M1830: 03
ENTERING_EXITING_HOME: INDEPENDENT

## 2024-10-21 ENCOUNTER — HOME CARE VISIT (OUTPATIENT)
Dept: HOME HEALTH SERVICES | Facility: HOME HEALTH | Age: 74
End: 2024-10-21
Payer: MEDICARE

## 2024-10-21 ENCOUNTER — LAB REQUISITION (OUTPATIENT)
Dept: LAB | Facility: LAB | Age: 74
End: 2024-10-21
Payer: MEDICARE

## 2024-10-21 VITALS
HEART RATE: 76 BPM | SYSTOLIC BLOOD PRESSURE: 93 MMHG | TEMPERATURE: 97.6 F | OXYGEN SATURATION: 95 % | RESPIRATION RATE: 18 BRPM | DIASTOLIC BLOOD PRESSURE: 57 MMHG

## 2024-10-21 DIAGNOSIS — M72.2 PLANTAR FASCIAL FIBROMATOSIS: ICD-10-CM

## 2024-10-21 LAB
ALBUMIN SERPL BCP-MCNC: 3.9 G/DL (ref 3.4–5)
ALP SERPL-CCNC: 110 U/L (ref 33–136)
ALT SERPL W P-5'-P-CCNC: 175 U/L (ref 7–45)
ANION GAP SERPL CALC-SCNC: 8 MMOL/L (ref 10–20)
AST SERPL W P-5'-P-CCNC: 115 U/L (ref 9–39)
BASOPHILS # BLD AUTO: 0.04 X10*3/UL (ref 0–0.1)
BASOPHILS NFR BLD AUTO: 1.2 %
BILIRUB SERPL-MCNC: 0.7 MG/DL (ref 0–1.2)
BUN SERPL-MCNC: 20 MG/DL (ref 6–23)
CALCIUM SERPL-MCNC: 9.6 MG/DL (ref 8.6–10.3)
CHLORIDE SERPL-SCNC: 104 MMOL/L (ref 98–107)
CO2 SERPL-SCNC: 31 MMOL/L (ref 21–32)
CREAT SERPL-MCNC: 0.88 MG/DL (ref 0.5–1.05)
CRP SERPL-MCNC: 0.16 MG/DL
EGFRCR SERPLBLD CKD-EPI 2021: 70 ML/MIN/1.73M*2
EOSINOPHIL # BLD AUTO: 0.33 X10*3/UL (ref 0–0.4)
EOSINOPHIL NFR BLD AUTO: 10 %
ERYTHROCYTE [DISTWIDTH] IN BLOOD BY AUTOMATED COUNT: 12.8 % (ref 11.5–14.5)
ERYTHROCYTE [SEDIMENTATION RATE] IN BLOOD BY WESTERGREN METHOD: 12 MM/H (ref 0–30)
GLUCOSE SERPL-MCNC: 142 MG/DL (ref 74–99)
HCT VFR BLD AUTO: 39.3 % (ref 36–46)
HGB BLD-MCNC: 12.8 G/DL (ref 12–16)
IMM GRANULOCYTES # BLD AUTO: 0 X10*3/UL (ref 0–0.5)
IMM GRANULOCYTES NFR BLD AUTO: 0 % (ref 0–0.9)
LYMPHOCYTES # BLD AUTO: 0.54 X10*3/UL (ref 0.8–3)
LYMPHOCYTES NFR BLD AUTO: 16.4 %
MCH RBC QN AUTO: 31.1 PG (ref 26–34)
MCHC RBC AUTO-ENTMCNC: 32.6 G/DL (ref 32–36)
MCV RBC AUTO: 96 FL (ref 80–100)
MONOCYTES # BLD AUTO: 0.4 X10*3/UL (ref 0.05–0.8)
MONOCYTES NFR BLD AUTO: 12.1 %
NEUTROPHILS # BLD AUTO: 1.99 X10*3/UL (ref 1.6–5.5)
NEUTROPHILS NFR BLD AUTO: 60.3 %
NRBC BLD-RTO: 0 /100 WBCS (ref 0–0)
PLATELET # BLD AUTO: 201 X10*3/UL (ref 150–450)
POTASSIUM SERPL-SCNC: 4.3 MMOL/L (ref 3.5–5.3)
PROT SERPL-MCNC: 5.9 G/DL (ref 6.4–8.2)
RBC # BLD AUTO: 4.11 X10*6/UL (ref 4–5.2)
SODIUM SERPL-SCNC: 139 MMOL/L (ref 136–145)
WBC # BLD AUTO: 3.3 X10*3/UL (ref 4.4–11.3)

## 2024-10-21 PROCEDURE — G0299 HHS/HOSPICE OF RN EA 15 MIN: HCPCS

## 2024-10-21 PROCEDURE — 80053 COMPREHEN METABOLIC PANEL: CPT

## 2024-10-21 PROCEDURE — 86140 C-REACTIVE PROTEIN: CPT

## 2024-10-21 PROCEDURE — 85025 COMPLETE CBC W/AUTO DIFF WBC: CPT

## 2024-10-21 PROCEDURE — 85652 RBC SED RATE AUTOMATED: CPT

## 2024-10-21 ASSESSMENT — ENCOUNTER SYMPTOMS: DENIES PAIN: 1

## 2024-10-24 ENCOUNTER — HOME INFUSION (OUTPATIENT)
Dept: INFUSION THERAPY | Age: 74
End: 2024-10-24
Payer: MEDICARE

## 2024-10-24 NOTE — PROGRESS NOTES
PATIENT CONTINUES IV MERREM THROUGH 10/31 FOR OM OF JAW POST RADIATION.  F/U WITH DR. DEVINE.  LABS THIS WEEK ARE STABLE, CRP AND ESR ARE WNL.  SPOKE WITH PATIENT TODAY AND SHE IS DOING WELL. VERY EXCITED TO BE DONE.  SHE AGREED TO DELIVERY ON FRIDAY WITH STANDARD SUPPLIES 5-7 P AND  TO CALL ON WAY.    PROCESSED FILL FOR THE FOLLOWING:    10/26  12 MERREM FOR DOS 10/26 THRU10/29    10/28  6 MERREM FOR DOS 10/30 AND 10/31.    RPH TO F/U WITH DR DEVINE ON 10/31 TO CONFIRM SHE IS DONE. DSL

## 2024-10-25 ENCOUNTER — TELEPHONE (OUTPATIENT)
Dept: INFECTIOUS DISEASES | Facility: HOSPITAL | Age: 74
End: 2024-10-25
Payer: MEDICARE

## 2024-10-25 ENCOUNTER — HOME CARE VISIT (OUTPATIENT)
Dept: HOME HEALTH SERVICES | Facility: HOME HEALTH | Age: 74
End: 2024-10-25
Payer: MEDICARE

## 2024-10-28 ENCOUNTER — HOME CARE VISIT (OUTPATIENT)
Dept: HOME HEALTH SERVICES | Facility: HOME HEALTH | Age: 74
End: 2024-10-28
Payer: MEDICARE

## 2024-10-28 ENCOUNTER — TELEPHONE (OUTPATIENT)
Dept: OTOLARYNGOLOGY | Facility: HOSPITAL | Age: 74
End: 2024-10-28
Payer: MEDICARE

## 2024-10-28 VITALS
OXYGEN SATURATION: 98 % | SYSTOLIC BLOOD PRESSURE: 112 MMHG | HEART RATE: 71 BPM | TEMPERATURE: 98.1 F | RESPIRATION RATE: 20 BRPM | DIASTOLIC BLOOD PRESSURE: 64 MMHG

## 2024-10-28 PROCEDURE — 85652 RBC SED RATE AUTOMATED: CPT

## 2024-10-28 PROCEDURE — G0299 HHS/HOSPICE OF RN EA 15 MIN: HCPCS

## 2024-10-28 PROCEDURE — 80053 COMPREHEN METABOLIC PANEL: CPT

## 2024-10-28 PROCEDURE — 85025 COMPLETE CBC W/AUTO DIFF WBC: CPT

## 2024-10-28 PROCEDURE — 86140 C-REACTIVE PROTEIN: CPT

## 2024-10-28 ASSESSMENT — ENCOUNTER SYMPTOMS
FACIAL ASYMMETRY: 1
BOWEL PATTERN NORMAL: 1
APPETITE LEVEL: GOOD
OCCASIONAL FEELINGS OF UNSTEADINESS: 0
TROUBLE SWALLOWING: 1
LOSS OF SENSATION IN FEET: 0
STOOL FREQUENCY: DAILY
DEPRESSION: 0
LAST BOWEL MOVEMENT: 67141
DENIES PAIN: 1

## 2024-10-28 ASSESSMENT — PAIN SCALES - PAIN ASSESSMENT IN ADVANCED DEMENTIA (PAINAD)
BODYLANGUAGE: 0 - RELAXED.
NEGVOCALIZATION: 0
NEGVOCALIZATION: 0 - NONE.
CONSOLABILITY: 0 - NO NEED TO CONSOLE.
FACIALEXPRESSION: 0
BODYLANGUAGE: 0
TOTALSCORE: 0
CONSOLABILITY: 0
FACIALEXPRESSION: 0 - SMILING OR INEXPRESSIVE.
BREATHING: 0

## 2024-10-31 ENCOUNTER — HOME INFUSION (OUTPATIENT)
Dept: INFUSION THERAPY | Age: 74
End: 2024-10-31
Payer: MEDICARE

## 2024-10-31 DIAGNOSIS — T84.498A EXPOSED ORTHOPAEDIC HARDWARE (CMS-HCC): Primary | ICD-10-CM

## 2024-11-01 ENCOUNTER — HOME CARE VISIT (OUTPATIENT)
Dept: HOME HEALTH SERVICES | Facility: HOME HEALTH | Age: 74
End: 2024-11-01
Payer: MEDICARE

## 2024-11-01 VITALS
SYSTOLIC BLOOD PRESSURE: 122 MMHG | RESPIRATION RATE: 18 BRPM | HEART RATE: 76 BPM | TEMPERATURE: 97.3 F | DIASTOLIC BLOOD PRESSURE: 64 MMHG | OXYGEN SATURATION: 99 %

## 2024-11-01 PROCEDURE — G0299 HHS/HOSPICE OF RN EA 15 MIN: HCPCS

## 2024-11-01 ASSESSMENT — ACTIVITIES OF DAILY LIVING (ADL)
HOME_HEALTH_OASIS: 00
OASIS_M1830: 00

## 2024-11-01 ASSESSMENT — ENCOUNTER SYMPTOMS: DENIES PAIN: 1

## 2024-11-04 ENCOUNTER — OFFICE VISIT (OUTPATIENT)
Dept: OTOLARYNGOLOGY | Facility: HOSPITAL | Age: 74
End: 2024-11-04
Payer: MEDICARE

## 2024-11-04 VITALS — HEIGHT: 66 IN | BODY MASS INDEX: 19.7 KG/M2 | WEIGHT: 122.6 LBS | TEMPERATURE: 96.1 F

## 2024-11-04 DIAGNOSIS — M27.2 OSTEORADIONECROSIS OF JAW: ICD-10-CM

## 2024-11-04 DIAGNOSIS — E03.9 ACQUIRED HYPOTHYROIDISM: ICD-10-CM

## 2024-11-04 DIAGNOSIS — T84.498A EXPOSED ORTHOPAEDIC HARDWARE (CMS-HCC): ICD-10-CM
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
## 2024-11-04 PROCEDURE — 99214 OFFICE O/P EST MOD 30 MIN: CPT | Performed by: OTOLARYNGOLOGY

## 2024-11-04 PROCEDURE — 1160F RVW MEDS BY RX/DR IN RCRD: CPT | Performed by: OTOLARYNGOLOGY

## 2024-11-04 PROCEDURE — 1159F MED LIST DOCD IN RCRD: CPT | Performed by: OTOLARYNGOLOGY

## 2024-11-04 PROCEDURE — 1126F AMNT PAIN NOTED NONE PRSNT: CPT | Performed by: OTOLARYNGOLOGY

## 2024-11-04 PROCEDURE — 3008F BODY MASS INDEX DOCD: CPT | Performed by: OTOLARYNGOLOGY

## 2024-11-04 PROCEDURE — 1036F TOBACCO NON-USER: CPT | Performed by: OTOLARYNGOLOGY

## 2024-11-04 ASSESSMENT — PAIN SCALES - GENERAL: PAINLEVEL_OUTOF10: 0-NO PAIN

## 2024-11-05 ENCOUNTER — TELEPHONE (OUTPATIENT)
Dept: OTOLARYNGOLOGY | Facility: HOSPITAL | Age: 74
End: 2024-11-05
Payer: MEDICARE

## 2024-11-05 NOTE — TELEPHONE ENCOUNTER
Brigette was seen yesterday by Dr. Christensen and needs to be set up for surgery.  It will be a free flap, and Dr. Christensen would like to do virtual planning for the plate.    The date we scheduled for the surgery yesterday:  12/16/24.  I told her I would arrange the needed imaging and apt with Dr. Magana and call her with that information.    I finalized the arrangements today and called her.  I got her voice mail, so I left a message with the following apt information and encouraged her to call me back with any questions.  This information was mailed to her has well in the surgery information packet.    The following apts will take place at [address]:     She will get a call to scheduled the preadmission testing apt.  Friday, November 22, 2024  Arrive at 10 a.m. for 10:15 a.m. CT neck and CTA aorta with bilateral runnoff; 2nd floor [facility] radiology; clear liquids only 3 hours before the test  After CT scan, report to first floor [facility], Desk A for the apt with Dr. Magana  Surgery on Monday, December 16th with a 5:45 a.m. arrival to the hospital.

## 2024-11-11 ENCOUNTER — APPOINTMENT (OUTPATIENT)
Dept: OTOLARYNGOLOGY | Facility: HOSPITAL | Age: 74
End: 2024-11-11
Payer: MEDICARE

## 2024-11-11 ENCOUNTER — APPOINTMENT (OUTPATIENT)
Dept: PRIMARY CARE | Facility: CLINIC | Age: 74
End: 2024-11-11
Payer: MEDICARE

## 2024-11-11 NOTE — PROGRESS NOTES
Ms. 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  She initially healed well from this procedure but following the initial period of healing she developed an exposed mandibular plate on the right which was excised without incident.  Subsequent to that she developed drainage from the left parasymphyseal region as well as a loose mandibular tooth.  The tooth was extracted and the area was debrided back to healthy bone however, her native mandible appears to be involved with osteoradionecrosis from her 2 previous courses of radiation therapy.  We have attempted conservative management of this area including multiple courses of oral as well as IV antibiotics and this does not appear to be solving the issue.  The patient is very frustrated with her continued need for intermittent antibiotic therapy and would like definitive management of this situation.    On physical examination she is a healthy-appearing woman in no distress.  She has significant cosmetic deformity secondary to her multiple previous surgeries and radiation therapy.  Her right lip does not have complete occlusion secondary to scar contracture.  She now has exposed plate in her left parasymphyseal region overlying her native mandible.  There is no active purulence at this point although the patient states that she does have serous drainage nearly daily.  Inspection of her oral cavity oropharynx hypopharynx and larynx reveals no evidence of recurrent disease.    Overall I had a long discussion with Ms. Romo today stating that the only definitive means to address the situation would be with removal of all hardware and a bony free flap reconstruction.  The options for this would include a scapular free flap to provide us  more soft tissue versus a right fibular free flap.  We will obtain CT angiograms of her lower extremities and plan for her surgical procedure in the near future.

## 2024-11-12 ENCOUNTER — HOSPITAL ENCOUNTER (EMERGENCY)
Facility: HOSPITAL | Age: 74
Discharge: HOME | End: 2024-11-12
Attending: EMERGENCY MEDICINE
Payer: MEDICARE

## 2024-11-12 ENCOUNTER — CLINICAL SUPPORT (OUTPATIENT)
Dept: URGENT CARE | Age: 74
End: 2024-11-12
Payer: MEDICARE

## 2024-11-12 ENCOUNTER — APPOINTMENT (OUTPATIENT)
Dept: RADIOLOGY | Facility: HOSPITAL | Age: 74
End: 2024-11-12
Payer: MEDICARE

## 2024-11-12 VITALS
TEMPERATURE: 96.8 F | HEIGHT: 66 IN | HEART RATE: 80 BPM | DIASTOLIC BLOOD PRESSURE: 61 MMHG | SYSTOLIC BLOOD PRESSURE: 112 MMHG | OXYGEN SATURATION: 97 % | RESPIRATION RATE: 16 BRPM | WEIGHT: 120 LBS | BODY MASS INDEX: 19.29 KG/M2

## 2024-11-12 VITALS
HEIGHT: 66 IN | BODY MASS INDEX: 19.29 KG/M2 | DIASTOLIC BLOOD PRESSURE: 56 MMHG | OXYGEN SATURATION: 99 % | SYSTOLIC BLOOD PRESSURE: 112 MMHG | TEMPERATURE: 97.5 F | WEIGHT: 120 LBS | HEART RATE: 79 BPM | RESPIRATION RATE: 18 BRPM

## 2024-11-12 DIAGNOSIS — R19.7 DIARRHEA, UNSPECIFIED TYPE: Primary | ICD-10-CM

## 2024-11-12 DIAGNOSIS — E86.0 DEHYDRATION: ICD-10-CM

## 2024-11-12 DIAGNOSIS — A09 DIARRHEA OF INFECTIOUS ORIGIN: Primary | ICD-10-CM

## 2024-11-12 LAB
ALBUMIN SERPL BCP-MCNC: 3.8 G/DL (ref 3.4–5)
ALP SERPL-CCNC: 84 U/L (ref 33–136)
ALT SERPL W P-5'-P-CCNC: 18 U/L (ref 7–45)
ANION GAP SERPL CALC-SCNC: 11 MMOL/L (ref 10–20)
AST SERPL W P-5'-P-CCNC: 19 U/L (ref 9–39)
BASOPHILS # BLD AUTO: 0.05 X10*3/UL (ref 0–0.1)
BASOPHILS NFR BLD AUTO: 0.6 %
BILIRUB SERPL-MCNC: 0.3 MG/DL (ref 0–1.2)
BUN SERPL-MCNC: 12 MG/DL (ref 6–23)
CALCIUM SERPL-MCNC: 9.7 MG/DL (ref 8.6–10.3)
CHLORIDE SERPL-SCNC: 102 MMOL/L (ref 98–107)
CO2 SERPL-SCNC: 30 MMOL/L (ref 21–32)
CREAT SERPL-MCNC: 0.75 MG/DL (ref 0.5–1.05)
EGFRCR SERPLBLD CKD-EPI 2021: 85 ML/MIN/1.73M*2
EOSINOPHIL # BLD AUTO: 0.06 X10*3/UL (ref 0–0.4)
EOSINOPHIL NFR BLD AUTO: 0.7 %
ERYTHROCYTE [DISTWIDTH] IN BLOOD BY AUTOMATED COUNT: 12.2 % (ref 11.5–14.5)
GLUCOSE SERPL-MCNC: 90 MG/DL (ref 74–99)
HCT VFR BLD AUTO: 41.6 % (ref 36–46)
HGB BLD-MCNC: 13.8 G/DL (ref 12–16)
IMM GRANULOCYTES # BLD AUTO: 0.04 X10*3/UL (ref 0–0.5)
IMM GRANULOCYTES NFR BLD AUTO: 0.5 % (ref 0–0.9)
LYMPHOCYTES # BLD AUTO: 0.76 X10*3/UL (ref 0.8–3)
LYMPHOCYTES NFR BLD AUTO: 9.1 %
MCH RBC QN AUTO: 31 PG (ref 26–34)
MCHC RBC AUTO-ENTMCNC: 33.2 G/DL (ref 32–36)
MCV RBC AUTO: 94 FL (ref 80–100)
MONOCYTES # BLD AUTO: 1.36 X10*3/UL (ref 0.05–0.8)
MONOCYTES NFR BLD AUTO: 16.3 %
NEUTROPHILS # BLD AUTO: 6.09 X10*3/UL (ref 1.6–5.5)
NEUTROPHILS NFR BLD AUTO: 72.8 %
NRBC BLD-RTO: 0 /100 WBCS (ref 0–0)
PLATELET # BLD AUTO: 268 X10*3/UL (ref 150–450)
POTASSIUM SERPL-SCNC: 4.7 MMOL/L (ref 3.5–5.3)
PROT SERPL-MCNC: 6.3 G/DL (ref 6.4–8.2)
RBC # BLD AUTO: 4.45 X10*6/UL (ref 4–5.2)
SODIUM SERPL-SCNC: 138 MMOL/L (ref 136–145)
WBC # BLD AUTO: 8.4 X10*3/UL (ref 4.4–11.3)

## 2024-11-12 PROCEDURE — 99205 OFFICE O/P NEW HI 60 MIN: CPT | Performed by: NURSE PRACTITIONER

## 2024-11-12 PROCEDURE — 74177 CT ABD & PELVIS W/CONTRAST: CPT

## 2024-11-12 PROCEDURE — 87324 CLOSTRIDIUM AG IA: CPT | Mod: STJLAB

## 2024-11-12 PROCEDURE — 96361 HYDRATE IV INFUSION ADD-ON: CPT

## 2024-11-12 PROCEDURE — 87493 C DIFF AMPLIFIED PROBE: CPT | Mod: STJLAB

## 2024-11-12 PROCEDURE — 3074F SYST BP LT 130 MM HG: CPT | Performed by: NURSE PRACTITIONER

## 2024-11-12 PROCEDURE — 87506 IADNA-DNA/RNA PROBE TQ 6-11: CPT | Performed by: EMERGENCY MEDICINE

## 2024-11-12 PROCEDURE — 2500000004 HC RX 250 GENERAL PHARMACY W/ HCPCS (ALT 636 FOR OP/ED): Performed by: EMERGENCY MEDICINE

## 2024-11-12 PROCEDURE — 85025 COMPLETE CBC W/AUTO DIFF WBC: CPT

## 2024-11-12 PROCEDURE — 3078F DIAST BP <80 MM HG: CPT | Performed by: NURSE PRACTITIONER

## 2024-11-12 PROCEDURE — 1159F MED LIST DOCD IN RCRD: CPT | Performed by: NURSE PRACTITIONER

## 2024-11-12 PROCEDURE — 3008F BODY MASS INDEX DOCD: CPT | Performed by: NURSE PRACTITIONER

## 2024-11-12 PROCEDURE — 84075 ASSAY ALKALINE PHOSPHATASE: CPT

## 2024-11-12 PROCEDURE — 96374 THER/PROPH/DIAG INJ IV PUSH: CPT

## 2024-11-12 PROCEDURE — 99284 EMERGENCY DEPT VISIT MOD MDM: CPT | Mod: 25

## 2024-11-12 PROCEDURE — 2500000004 HC RX 250 GENERAL PHARMACY W/ HCPCS (ALT 636 FOR OP/ED)

## 2024-11-12 PROCEDURE — 74177 CT ABD & PELVIS W/CONTRAST: CPT | Performed by: RADIOLOGY

## 2024-11-12 PROCEDURE — 2550000001 HC RX 255 CONTRASTS: Performed by: EMERGENCY MEDICINE

## 2024-11-12 RX ORDER — ONDANSETRON HYDROCHLORIDE 2 MG/ML
4 INJECTION, SOLUTION INTRAVENOUS ONCE
Status: COMPLETED | OUTPATIENT
Start: 2024-11-12 | End: 2024-11-12

## 2024-11-12 RX ORDER — LOPERAMIDE HCL 1MG/7.5ML
2 LIQUID (ML) ORAL 4 TIMES DAILY PRN
Qty: 150 ML | Refills: 0 | Status: SHIPPED | OUTPATIENT
Start: 2024-11-12

## 2024-11-12 ASSESSMENT — PAIN DESCRIPTION - LOCATION: LOCATION: ABDOMEN

## 2024-11-12 ASSESSMENT — PAIN SCALES - GENERAL
PAINLEVEL_OUTOF10: 0 - NO PAIN
PAINLEVEL_OUTOF10: 1

## 2024-11-12 ASSESSMENT — LIFESTYLE VARIABLES
HAVE YOU EVER FELT YOU SHOULD CUT DOWN ON YOUR DRINKING: NO
HAVE PEOPLE ANNOYED YOU BY CRITICIZING YOUR DRINKING: NO
TOTAL SCORE: 0
EVER HAD A DRINK FIRST THING IN THE MORNING TO STEADY YOUR NERVES TO GET RID OF A HANGOVER: NO
EVER FELT BAD OR GUILTY ABOUT YOUR DRINKING: NO

## 2024-11-12 ASSESSMENT — PAIN DESCRIPTION - DESCRIPTORS: DESCRIPTORS: CRAMPING

## 2024-11-12 ASSESSMENT — PAIN - FUNCTIONAL ASSESSMENT
PAIN_FUNCTIONAL_ASSESSMENT: 0-10
PAIN_FUNCTIONAL_ASSESSMENT: 0-10

## 2024-11-12 NOTE — DISCHARGE INSTRUCTIONS
Please use Imodium Liquid as directed for the diarrhea.  Stool sample has been sent for C.diff and other stool specimens.   Please return to the ER for worsening pain, continued dehydration or any worsening or concerning symptoms.

## 2024-11-12 NOTE — ED PROVIDER NOTES
Emergency Department Provider Note        History of Present Illness     History provided by: Patient  Limitations to History: None  External Records Reviewed with Brief Summary: Urgent care from November 12, 2024.    HPI:  Brigette Romo is a 74 y.o. female with a history of much malignant neoplasm of the mandible presents to the emergency department with 1 week of watery diarrhea.  The patient states that she had an appointment with her primary care provider yesterday to be seen for her diarrhea, but her primary care provider called in sick.  The patient states that she could not wait until next week to see her primary care provider.  The patient went to the urgent care today.  The patient states that the urgent care did not provide her with intravenous antibiotics and sent her to the emergency department to be treated.  The patient states that her watery diarrhea is too numerous to count and that happens every time she consumes food or water.  The diarrhea is preceded with lower abdominal cramping which signals the patient that she is about to have a bowel movement.  The patient consumes food through a PEG tube due to having oral surgery from her malignant neoplasm.  The patient states that her diarrhea started after switching to a new diet called Ensuie.  The patient switched her diet because the company that makes her PEG tube food did not deliver her food on time.  The patient states that she has been on multiple antibiotics and her most recent antibiotic, meropenem, was completed on October 25, 2024.  The patient states that she was taking meropenem for oral infection with Pseudomonas aeruginosa.  The patient endorses nausea.  The patient denies any vomiting, fevers, chills, chest pain, shortness of breath, or urinary problems.    Physical Exam   Triage vitals:  T 36.3 °C (97.3 °F)  HR 81  BP (!) 82/49  RR 18  O2 99 % None (Room air)    General: Awake, alert, in no acute distress  Eyes: Gaze conjugate.   No scleral icterus or injection  HENT: Normo-cephalic, atraumatic. No stridor  CV: Regular rate, regular rhythm. Radial pulses 2+ bilaterally  Resp: Breathing non-labored, speaking in full sentences.  Clear to auscultation bilaterally  GI: Soft, non-distended, mildly tender bilateral upper quadrant abdominal pains.  No rebound or guarding.  MSK/Extremities: No gross bony deformities. Moving all extremities  Skin: Warm. Appropriate color  Neuro: Alert. Oriented. Face symmetric. Speech is fluent.  Gross strength and sensation intact in b/l UE and LEs  Psych: Appropriate mood and affect    Medical Decision Making & ED Course   Medical Decision Makin y.o. female with a history of malignant neoplasm of the mandible presents to the emergency department with watery diarrhea for the past 1 week.  The patient received a bolus of lactated ringer due to her hydrated status as her blood pressure was low.  The patient received Zofran for nausea.  The patient's blood work did not reveal any significant acute abnormalities that may indicate sepsis or acute bleeding.  The patient received a CT scan of the abdomen pelvis with IV contrast due to suspicion that the patient may have an intra-abdominal process related to her diarrhea.  The patient's CT scan of the abdomen pelvis with IV contrast revealed evidence of infectious colitis.  The patient was given water and juices to initiate a bowel movement so that stool can be collected and analyzed for infection.  Once the patient passed a bowel movement, and the stool was collected.  The patient was prescribed Imodium to be taken at home to help treat her diarrhea.  The patient was discharged from the emergency department with return precautions.  The patient was referred to her primary care provider for further follow-up.  ----      Differential diagnoses considered include but are not limited to: Infectious colitis, Crohn's disease, ulcerative colitis, infectious diarrhea.      Social Determinants of Health which Significantly Impact Care: None identified     Independent Result Review and Interpretation: Relevant laboratory and radiographic results were reviewed and independently interpreted by myself.  As necessary, they are commented on in the ED Course.    Chronic conditions affecting the patient's care: As documented above in MDM    The patient was discussed with the following consultants/services: None    Care Considerations: As documented above in [facility]    ED Course:  Diagnoses as of 11/14/24 1147   Dehydration   Diarrhea of infectious origin     Disposition   As a result of the work-up, the patient was discharged home.  she was informed of her diagnosis and instructed to come back with any concerns or worsening of condition.  she and was agreeable to the plan as discussed above.  she was given the opportunity to ask questions.  All of the patient's questions were answered.    Procedures   Procedures    Patient seen and discussed with ED attending physician.    Tacho Steele,   Emergency Medicine     Lizett Perez MD  Resident  11/12/24 9327          The patient was seen by the resident/fellow.  I have personally performed a substantive portion of the encounter.  I have seen and examined the patient; agree with the workup, evaluation, MDM, management and diagnosis.  The care plan has been discussed with the resident; I have reviewed the resident’s note and agree with the documented findings.      The patient feels much stronger now since coming to the emergency room.  She has received fluids, tolerated the fluid through the PEG tube as well.  Her blood pressure has improved, heart rate is improved proved, patient longer feels lightheaded.  She is been having a lot of bowel movements, and was recently on antibiotics and this may be C. difficile.  The white count appears normal, but there is a little bit of colitis noted on the CT scan.  I recommended the patient try some  Imodium, and things that will help bolster the stool until the cultures come back.  It should take about 24 hours.  If she does test positive for C. difficile, she will be started on vancomycin liquid and will be called from a representative from the hospital.  She is otherwise to return to the emergency room for any worsening lightheadedness, near syncopal episodes, abdominal pain, bloody stools, dehydration or any worsening concerning symptoms.  Otherwise is very important that she continue the hydration through the PEG tube, and try to match what is coming out to stay hydrated.  If she cannot do so she is to return to the emergency room for repeat evaluation.                                          Tacho Steele, DO  11/14/24 [id number]

## 2024-11-13 LAB
C DIF TOX TCDA+TCDB STL QL NAA+PROBE: DETECTED
C DIFF TOX A+B STL QL IA: POSITIVE

## 2024-11-14 ENCOUNTER — TELEPHONE (OUTPATIENT)
Dept: PHARMACY | Facility: HOSPITAL | Age: 74
End: 2024-11-14

## 2024-11-14 ENCOUNTER — OFFICE VISIT (OUTPATIENT)
Dept: PRIMARY CARE | Facility: CLINIC | Age: 74
End: 2024-11-14
Payer: MEDICARE

## 2024-11-14 VITALS
SYSTOLIC BLOOD PRESSURE: 126 MMHG | HEART RATE: 92 BPM | TEMPERATURE: 96.6 F | OXYGEN SATURATION: 98 % | RESPIRATION RATE: 18 BRPM | HEIGHT: 66 IN | WEIGHT: 124 LBS | BODY MASS INDEX: 19.93 KG/M2 | DIASTOLIC BLOOD PRESSURE: 84 MMHG

## 2024-11-14 DIAGNOSIS — A04.72 C. DIFFICILE DIARRHEA: Primary | ICD-10-CM

## 2024-11-14 DIAGNOSIS — F41.9 ANXIETY: ICD-10-CM

## 2024-11-14 PROBLEM — L97.825: Status: ACTIVE | Noted: 2024-11-14

## 2024-11-14 PROBLEM — E11.69 TYPE 2 DIABETES MELLITUS WITH OTHER SPECIFIED COMPLICATION, WITHOUT LONG-TERM CURRENT USE OF INSULIN: Status: ACTIVE | Noted: 2024-11-14

## 2024-11-14 PROCEDURE — 1124F ACP DISCUSS-NO DSCNMKR DOCD: CPT | Performed by: FAMILY MEDICINE

## 2024-11-14 PROCEDURE — 99213 OFFICE O/P EST LOW 20 MIN: CPT | Performed by: FAMILY MEDICINE

## 2024-11-14 PROCEDURE — 3079F DIAST BP 80-89 MM HG: CPT | Performed by: FAMILY MEDICINE

## 2024-11-14 PROCEDURE — 3074F SYST BP LT 130 MM HG: CPT | Performed by: FAMILY MEDICINE

## 2024-11-14 PROCEDURE — 3050F LDL-C >= 130 MG/DL: CPT | Performed by: FAMILY MEDICINE

## 2024-11-14 PROCEDURE — 3008F BODY MASS INDEX DOCD: CPT | Performed by: FAMILY MEDICINE

## 2024-11-14 PROCEDURE — 1036F TOBACCO NON-USER: CPT | Performed by: FAMILY MEDICINE

## 2024-11-14 RX ORDER — ALPRAZOLAM 0.25 MG/1
0.25 TABLET ORAL EVERY 8 HOURS PRN
Qty: 21 TABLET | Refills: 0 | Status: SHIPPED | OUTPATIENT
Start: 2024-11-14 | End: 2025-07-12

## 2024-11-14 RX ORDER — VANCOMYCIN HCL 50 MG/ML
125 SOLUTION, RECONSTITUTED, ORAL ORAL 4 TIMES DAILY
Qty: 100 ML | Refills: 0 | Status: SHIPPED | OUTPATIENT
Start: 2024-11-14 | End: 2024-11-24

## 2024-11-14 ASSESSMENT — ENCOUNTER SYMPTOMS
RESPIRATORY NEGATIVE: 1
CARDIOVASCULAR NEGATIVE: 1
PSYCHIATRIC NEGATIVE: 1
CONSTITUTIONAL NEGATIVE: 1
NEUROLOGICAL NEGATIVE: 1
EYES NEGATIVE: 1
ENDOCRINE NEGATIVE: 1
DIARRHEA: 1

## 2024-11-14 NOTE — PROGRESS NOTES
EDPD Note: Initiation     Contacted Brigette Romo regarding a positive stool culture/result that was taken during their recent emergency room visit. I completed education with patient. The patient is not being treated appropriately.    Emergency visit 11/12 patient endorsed nausea and loose stools, was sent home on loperamide solution. The patient denies any vomiting, fevers, chills, chest pain, shortness of breath, or urinary problems. PMH: malignant neoplasm of the mandible, consumes food through PEG.   Patient is still having about 9 loose stools per day and positive for C.diff.    The following prescription was sent to the patient's preferred pharmacy. No further follow up needed from EDPD Team.     Drug: Vancomycin 50mg/mL  Sig: Administer 125mg (2.5mL) by G-tube every 6 hours, flush with 15mL of water. Shake well before each use.   Qty: 100mL  Days Supply: 10    No results found for the last 90 days.    [facility] #[id number] [place] [address] [address]      If there are any other questions for the ED Post-Discharge Culture Follow Up Team, please contact [phone]. Fax: [phone].    Aleida Davenport, PharmD

## 2024-11-14 NOTE — PROGRESS NOTES
"Subjective     Patient ID: Brigette Romo is a 74 y.o. female who presents for Diarrhea.  HPI Has been on antibiotics. Went to the ER due to the diarhhea. Got a call just now that she is positive for Cdiff. They sent in vancomycin.    Review of Systems   Constitutional: Negative.    HENT: Negative.     Eyes: Negative.    Respiratory: Negative.     Cardiovascular: Negative.    Gastrointestinal:  Positive for diarrhea.   Endocrine: Negative.    Genitourinary: Negative.    Skin: Negative.    Neurological: Negative.    Psychiatric/Behavioral: Negative.         Objective     Vitals:    11/14/24 1206   BP: 126/84   BP Location: Right arm   Patient Position: Sitting   Pulse: 92   Resp: 18   Temp: 35.9 °C (96.6 °F)   TempSrc: Temporal   SpO2: 98%   Weight: 56.2 kg (124 lb)   Height: 1.676 m (5' 6\")        Current Outpatient Medications   Medication Instructions    ALPRAZolam (XANAX) 0.25 mg, oral, Every 8 hours PRN    levothyroxine (SYNTHROID, LEVOXYL) 25 mcg, oral, Daily before breakfast, Take on an empty stomach with water only.  Wait 1 hour before having food, medications or beverages other than water.  OK to crush pill.    loperamide (IMODIUM A-D) 2 mg, oral, 4 times daily PRN    nutritional supplements (Osmolite 1 Sanjiv) 0.04 gram-1.06 kcal/mL liquid 1 Can, 3 times daily    vancomycin (FIRVANQ) 125 mg, g-tube, 4 times daily, Shake well before using, flush with 15mL of water        Physical Exam  Constitutional:       Appearance: Normal appearance.   Cardiovascular:      Rate and Rhythm: Normal rate and regular rhythm.   Pulmonary:      Effort: Pulmonary effort is normal.      Breath sounds: Normal breath sounds.   Skin:     General: Skin is warm and dry.   Neurological:      Mental Status: She is alert.         Assessment/Plan   Diagnoses and all orders for this visit:  C. difficile diarrhea  Comments:  Antibiotics sent in by ER. Call if it does not resolve.  Anxiety  Comments:  prn xanax sent in.  Orders:  -     " ALPRAZolam (Xanax) 0.25 mg tablet; Take 1 tablet (0.25 mg) by mouth every 8 hours if needed for anxiety.

## 2024-11-14 NOTE — TELEPHONE ENCOUNTER
EDPD Note: Initiation     Contacted Brigette Romo regarding a positive stool culture/result that was taken during their recent emergency room visit. I completed education with patient. The patient is not being treated appropriately.    Emergency visit 11/12 patient endorsed nausea and loose stools, was sent home on loperamide solution. The patient denies any vomiting, fevers, chills, chest pain, shortness of breath, or urinary problems. PMH: malignant neoplasm of the mandible, consumes food through PEG.   Patient is still having about 9 loose stools per day and positive for C.diff.    The following prescription was sent to the patient's preferred pharmacy. No further follow up needed from EDPD Team.     Drug: Vancomycin 50mg/mL  Sig: Administer 125mg (2.5mL) by G-tube every 6 hours, flush with 15mL of water. Shake well before each use.   Qty: 100mL  Days Supply: 10    No results found for the last 90 days.    [facility] #[id number] [place] [address] [address]      If there are any other questions for the ED Post-Discharge Culture Follow Up Team, please contact [phone]. Fax: [phone].    Aleida Davenport, PharmD

## 2024-11-15 LAB — SCAN RESULT: NORMAL

## 2024-11-21 DIAGNOSIS — M27.2 INFLAMMATORY CONDITIONS OF JAWS: Primary | ICD-10-CM

## 2024-11-21 NOTE — PROGRESS NOTES
Provider Impressions     Status post treatment of multiple different oral cavity cancers.  I do not see any evidence of any tumor recurrence.    Dysphagia which is unlikely to get better.  She remains PEG dependent.    Thyroid nodule that was picked up on the PET scan.  This turned out to be benign on a biopsy that was eventually done after the ultrasound.    Ongoing issues with some bone exposure as well as drainage around her chin.  She is scheduled to undergo surgery in the near future with my partner.    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 She had surgical excision and a neck dissection followed by radiation therapy. She was found to have tumor recurrence and on September 22, 2016 she underwent reexcision. The margins were felt to be clear. There was no evidence of pedro luis metastasis. She had a TSH level in July 2023 which was normal. She had a chest CT in April 2020 which was negative. She was having some issues with discomfort on the lateral tongue on the left. This led to a biopsy that showed cancer. On April 13, 2020 she underwent a partial glossectomy. The margins were clear. She was presented at our tumor board and it was not felt that anything further needed to be done. She admits that her swallowing is somewhat difficult. She had a chest CT in May 2023 that showed a possible small nodule. I saw her in January 2023 because she had some concern about a lesion in her mouth. 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 She had a CT of the neck done on January 20, 2023 that showed a lesion involving the mandible on the right side. On February 7, 2023 she underwent surgery. There was bone involvement. There was a margin that was described as being close. She completed radiation therapy in April 2023. She had a PET scan done in  August 2023 that really does not show anything worrisome.  At the same time she has some uptake in the right thyroid.  This was followed by an ultrasound that showed a suspicious nodule which was eventually biopsied and turned out to be benign.  I will follow this clinically.  She has had issues with drainage around infected reconstruction plates.  She has been on antibiotics and is being followed in infectious disease.  She will undergo surgery with my partner in the near future.      Physical Exam      Examination of the oral cavity and oropharynx shows all the changes from the prior surgeries.   There is no bone exposure.  The reconstruction plate is visible on the left chin area.  The rest of the neck exam is negative.  It does show significant induration.    A flexible laryngoscopy was carried out. Under topical Xylocaine and Leonard-Synephrine the scope was introduced through the nostril. The nasopharynx, base of tongue, hypopharynx, and larynx are visualized.  The vocal cords are normally mobile. There is no pooling of secretions in the piriform sinuses. There is no evidence of any mucosal lesions.

## 2024-11-22 ENCOUNTER — LAB (OUTPATIENT)
Dept: LAB | Facility: HOSPITAL | Age: 74
End: 2024-11-22
Payer: MEDICARE

## 2024-11-22 ENCOUNTER — HOSPITAL ENCOUNTER (OUTPATIENT)
Dept: RADIOLOGY | Facility: HOSPITAL | Age: 74
Discharge: HOME | End: 2024-11-22
Payer: MEDICARE

## 2024-11-22 ENCOUNTER — OFFICE VISIT (OUTPATIENT)
Dept: OTOLARYNGOLOGY | Facility: HOSPITAL | Age: 74
End: 2024-11-22
Payer: MEDICARE

## 2024-11-22 VITALS — HEIGHT: 66 IN | WEIGHT: 120 LBS | BODY MASS INDEX: 19.29 KG/M2

## 2024-11-22 DIAGNOSIS — C02.9 MALIGNANT NEOPLASM OF TONGUE (MULTI): ICD-10-CM
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
## 2024-11-22 DIAGNOSIS — M27.2 OSTEORADIONECROSIS OF JAW: ICD-10-CM

## 2024-11-22 DIAGNOSIS — T84.498A EXPOSED ORTHOPAEDIC HARDWARE (CMS-HCC): ICD-10-CM

## 2024-11-22 DIAGNOSIS — M27.2 OSTEORADIONECROSIS OF JAW: Primary | ICD-10-CM

## 2024-11-22 DIAGNOSIS — C03.1 CANCER OF LOWER GUM (MULTI): ICD-10-CM

## 2024-11-22 DIAGNOSIS — E03.9 ACQUIRED HYPOTHYROIDISM: ICD-10-CM

## 2024-11-22 DIAGNOSIS — M27.2 INFLAMMATORY CONDITIONS OF JAWS: ICD-10-CM

## 2024-11-22 LAB
ABO GROUP (TYPE) IN BLOOD: NORMAL
ALBUMIN SERPL BCP-MCNC: 4.4 G/DL (ref 3.4–5)
ALP SERPL-CCNC: 88 U/L (ref 33–136)
ALT SERPL W P-5'-P-CCNC: 19 U/L (ref 7–45)
ANION GAP SERPL CALC-SCNC: 12 MMOL/L (ref 10–20)
ANTIBODY SCREEN: NORMAL
AST SERPL W P-5'-P-CCNC: 19 U/L (ref 9–39)
BILIRUB SERPL-MCNC: 0.5 MG/DL (ref 0–1.2)
BUN SERPL-MCNC: 20 MG/DL (ref 6–23)
CALCIUM SERPL-MCNC: 10.3 MG/DL (ref 8.6–10.3)
CHLORIDE SERPL-SCNC: 104 MMOL/L (ref 98–107)
CO2 SERPL-SCNC: 29 MMOL/L (ref 21–32)
CREAT SERPL-MCNC: 0.81 MG/DL (ref 0.5–1.05)
EGFRCR SERPLBLD CKD-EPI 2021: 77 ML/MIN/1.73M*2
ERYTHROCYTE [DISTWIDTH] IN BLOOD BY AUTOMATED COUNT: 12.2 % (ref 11.5–14.5)
GLUCOSE SERPL-MCNC: 95 MG/DL (ref 74–99)
HCT VFR BLD AUTO: 42.1 % (ref 36–46)
HGB BLD-MCNC: 14.1 G/DL (ref 12–16)
MCH RBC QN AUTO: 30.8 PG (ref 26–34)
MCHC RBC AUTO-ENTMCNC: 33.5 G/DL (ref 32–36)
MCV RBC AUTO: 92 FL (ref 80–100)
NRBC BLD-RTO: 0 /100 WBCS (ref 0–0)
PLATELET # BLD AUTO: 331 X10*3/UL (ref 150–450)
POTASSIUM SERPL-SCNC: 4.4 MMOL/L (ref 3.5–5.3)
PREALB SERPL-MCNC: 24.4 MG/DL (ref 18–40)
PROT SERPL-MCNC: 6.8 G/DL (ref 6.4–8.2)
RBC # BLD AUTO: 4.58 X10*6/UL (ref 4–5.2)
RH FACTOR (ANTIGEN D): NORMAL
SODIUM SERPL-SCNC: 141 MMOL/L (ref 136–145)
TSH SERPL-ACNC: 3.24 MIU/L (ref 0.44–3.98)
WBC # BLD AUTO: 5.8 X10*3/UL (ref 4.4–11.3)

## 2024-11-22 PROCEDURE — 1159F MED LIST DOCD IN RCRD: CPT | Performed by: OTOLARYNGOLOGY

## 2024-11-22 PROCEDURE — 75635 CT ANGIO ABDOMINAL ARTERIES: CPT

## 2024-11-22 PROCEDURE — 84443 ASSAY THYROID STIM HORMONE: CPT

## 2024-11-22 PROCEDURE — 84134 ASSAY OF PREALBUMIN: CPT

## 2024-11-22 PROCEDURE — 3008F BODY MASS INDEX DOCD: CPT | Performed by: OTOLARYNGOLOGY

## 2024-11-22 PROCEDURE — 82565 ASSAY OF CREATININE: CPT

## 2024-11-22 PROCEDURE — 3050F LDL-C >= 130 MG/DL: CPT | Performed by: OTOLARYNGOLOGY

## 2024-11-22 PROCEDURE — 85027 COMPLETE CBC AUTOMATED: CPT

## 2024-11-22 PROCEDURE — 1036F TOBACCO NON-USER: CPT | Performed by: OTOLARYNGOLOGY

## 2024-11-22 PROCEDURE — 70491 CT SOFT TISSUE NECK W/DYE: CPT

## 2024-11-22 PROCEDURE — 31575 DIAGNOSTIC LARYNGOSCOPY: CPT | Performed by: OTOLARYNGOLOGY

## 2024-11-22 PROCEDURE — 1160F RVW MEDS BY RX/DR IN RCRD: CPT | Performed by: OTOLARYNGOLOGY

## 2024-11-22 PROCEDURE — 2550000001 HC RX 255 CONTRASTS: Performed by: OTOLARYNGOLOGY

## 2024-11-22 PROCEDURE — 86900 BLOOD TYPING SEROLOGIC ABO: CPT

## 2024-11-22 PROCEDURE — 99213 OFFICE O/P EST LOW 20 MIN: CPT | Performed by: OTOLARYNGOLOGY

## 2024-11-26 ENCOUNTER — CLINICAL SUPPORT (OUTPATIENT)
Dept: PREADMISSION TESTING | Facility: HOSPITAL | Age: 74
End: 2024-11-26
Payer: MEDICARE

## 2024-11-26 NOTE — CPM/PAT H&P
CPM/PAT Evaluation       Name: Brigette Romo (Brigette Romo)  /Age: 1950/74 y.o.     {[facility] Visit Type:22583}      Chief Complaint: ***    HPI    Past Medical History:   Diagnosis Date    Cancer (Multi)     Dysphagia 2023    Hypothyroid     Personal history of irradiation 2023    Personal history of malignant neoplasm of tongue     History of tongue cancer       Past Surgical History:   Procedure Laterality Date    CT ANGIO AORTA AND BILATERAL ILIOFEMORAL RUN OFF INCLUDING WITHOUT CONTRAST IF PERFORMED  2023    CT AORTA AND BILATERAL ILIOFEMORAL RUNOFF ANGIOGRAM W AND/OR WO IV CONTRAST 2/3/2023 DOCTOR OFFICE LEGACY    EYE SURGERY   and     OTHER SURGICAL HISTORY  2016    Glossectomy    OTHER SURGICAL HISTORY  2016    Radical Neck Dissection    TRACHEOSTOMY TUBE PLACEMENT  2023       Patient  reports that she is not currently sexually active and has had partner(s) who are male. She reports using the following method of birth control/protection: Abstinence.    Family History   Problem Relation Name Age of Onset    Blood clot Mother Jhonny Luna     Other (ST elevation myocardial infarction) Father Ector Luna     Diabetes Father Ector Luna     Diabetes Sister Asia     Diabetes Brother Carlos     Diabetes Sister [place]     Diabetes Brother Carlos        Allergies   Allergen Reactions    Cefepime Other     tingling, chest pressure.    Sulfa (Sulfonamide Antibiotics) Rash       Prior to Admission medications    Medication Sig Start Date End Date Taking? Authorizing Provider   ALPRAZolam (Xanax) 0.25 mg tablet Take 1 tablet (0.25 mg) by mouth every 8 hours if needed for anxiety. 24  Eri Read,    levothyroxine (Synthroid, Levoxyl) 25 mcg tablet Take 1 tablet (25 mcg) by mouth once daily in the morning. Take before meals. Take on an empty stomach with water only.  Wait 1 hour before having food, medications or beverages other than water.  OK  to crush pill. 4/26/24   Brandy Christensen MD   loperamide (Imodium A-D) 1 mg/7.5 mL liquid Take 15 mL (2 mg) by mouth 4 times a day as needed for diarrhea. 11/12/24   Tacho Steele DO   nutritional supplements (Osmolite 1 Sanjiv) 0.04 gram-1.06 kcal/mL liquid 1 Can by peg tube route 3 times a day.    Historical Provider, MD   vancomycin (Firvanq) 50 mg/mL oral solution 2.5 mL (125 mg) by g-tube route 4 times a day for 10 days. Shake well before using, flush with 15mL of water 11/14/24 11/24/24  Salvador Faith DO        PAT ROS     PAT Physical Exam     Airway    Testing/Diagnostic:   [facility] US Carotid 2/14/24:  CONCLUSIONS:  Right Carotid: Findings are consistent with less than 50% stenosis of the right proximal internal carotid artery. Laminar flow seen by color Doppler. The right ICA is noted to be tortuous. Right external carotid artery appears patent with no evidence of stenosis. The right vertebral artery is patent with antegrade flow. No evidence of hemodynamically significant stenosis in the right subclavian artery.  Left Carotid: Findings are consistent with less than 50% stenosis of the left proximal internal carotid artery. Left external carotid artery appears patent with no evidence of stenosis. The left vertebral artery is patent with antegrade flow. No evidence of hemodynamically significant stenosis in the left subclavian artery.     ECG 5/30/23:  Sinus tachycardia with frequent premature ventricular complexes  Right axis deviation  Pulmonary disease pattern  Abnormal ECG  When compared with ECG of 16-FEB-2023 18:02,  premature ventricular complexes are now present  QRS axis shifted right  Confirmed by Sergio Lin ([id number]) on 5/30/2023 5:23:57 PM    Patient Specialist/PCP:   PCP - 11/14/24 - Eri Read DO - C.diff +, Anxiety    ENT - 11/22/24 - Jerrod Magana MD - s/p treatment of multiple different oral cavity cancers.  No evidence of any tumor recurrence. Persistent dysphagia, PEG  dependent. Benign thyroid nodule. Ongoing issues with some bone exposure as well as drainage around her chin.      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 Pseudomonas aeruginosa (resistant to ciprofloxacin and levofloxacin). She will require IV antibiotics for six weeks, possibly longer.       There were no vitals taken for this visit.    DASI Risk Score    No data to display       Caprini DVT Assessment    No data to display       Modified Frailty Index    No data to display       CHADS2 Stroke Risk  Current as of 2 hours ago        N/A 3 to 100%: High Risk   2 to < 3%: Medium Risk   0 to < 2%: Low Risk     Last Change: N/A          This score determines the patient's risk of having a stroke if the patient has atrial fibrillation.        This score is not applicable to this patient. Components are not calculated.          Revised Cardiac Risk Index    No data to display       Apfel Simplified Score    No data to display       Risk Analysis Index Results This Encounter    No data found in the last 10 encounters.       Stop Bang Score      Flowsheet Row Admission (Discharged) from 7/15/2024 in [facility] OR with Brandy Christensen MD   Do you snore loudly? 0 filed at 07/15/2024 0606   Do you often feel tired or fatigued after your sleep? 0 filed at 07/15/2024 0606   Has anyone ever observed you stop breathing in your sleep? 0 filed at 07/15/2024 0606   Do you have or are you being treated for high blood pressure? 0 filed at 07/15/2024 0606   Recent BMI (Calculated) 20.9 filed at 07/15/2024 0606   Is BMI greater than 35 kg/m2? 0=No filed at 07/15/2024 0606   Age older than 50 years old? 1=Yes filed at 07/15/2024 0606   Gender - Male 0=No filed at 07/15/2024 0606          Prodigy: High Risk  Total Score: 12               Prodigy Age Score           ARISCAT Score for Postoperative Pulmonary Complications    No data to display       Gina Perioperative Risk for Myocardial Infarction or Cardiac Arrest (MARYCHUY)    No data to display         Assessment and Plan:     {[facility] EMBEDDED ASSESSMENT AND PLAN:[id number]}

## 2024-11-27 ENCOUNTER — TELEPHONE (OUTPATIENT)
Dept: PRIMARY CARE | Facility: CLINIC | Age: 74
End: 2024-11-27
Payer: MEDICARE

## 2024-11-27 DIAGNOSIS — A04.72 C. DIFFICILE DIARRHEA: ICD-10-CM

## 2024-11-27 RX ORDER — VANCOMYCIN HCL 50 MG/ML
125 SOLUTION, RECONSTITUTED, ORAL ORAL 4 TIMES DAILY
Qty: 100 ML | Refills: 0 | Status: SHIPPED | OUTPATIENT
Start: 2024-11-27 | End: 2024-12-07

## 2024-11-27 NOTE — TELEPHONE ENCOUNTER
"Patient left a message on 's VM stating that \"her problem is almost gone except the diarrhea and she would like more medication called in\".   "

## 2024-12-02 ENCOUNTER — TELEPHONE (OUTPATIENT)
Dept: INFECTIOUS DISEASES | Facility: HOSPITAL | Age: 74
End: 2024-12-02

## 2024-12-02 ENCOUNTER — TELEPHONE (OUTPATIENT)
Dept: OTOLARYNGOLOGY | Facility: HOSPITAL | Age: 74
End: 2024-12-02
Payer: MEDICARE

## 2024-12-02 NOTE — TELEPHONE ENCOUNTER
Patient called to report she was instructed to reach out to you because the infection in her chin may be back please advise.

## 2024-12-02 NOTE — TELEPHONE ENCOUNTER
Sharon called and left me a message at 6:11 a.m. this morning.  She stated that she seems to have an infection in her chin.  She further stated I can reach her after 10 a.m. as she's waiting to board a flight.    I reviewed her chart.  On 11/12/24 she was in the ED and cultures were taken.  She received a follow up call from the hospital that the cultures were positive and she was started on oral vancomycin for 10 days.  I also noted that her PCP refilled the medication on 11/27/24 for another 10 days.    I will call her later this morning to further discuss.  She will likely need to reach back out to ID for further managements as well.    Brigette and I connected this afternoon at 1:15 p.m.  She appreciated the later call back as her flight was delayed.    The Vanco was to treat for her positive C-diff not the culture on her chin wound.  I recommended that she contact ID to further discuss as she's on the ATB they recommended for her wound infection.  I did explain that for her ORN of the mandible, the fix is surgery which is already scheduled for 2 weeks  from now.  Brigette stated she would give ID a call as soon as she hung up with me.

## 2024-12-03 ENCOUNTER — PRE-ADMISSION TESTING (OUTPATIENT)
Dept: PREADMISSION TESTING | Facility: HOSPITAL | Age: 74
End: 2024-12-03
Payer: MEDICARE

## 2024-12-03 VITALS
HEART RATE: 82 BPM | SYSTOLIC BLOOD PRESSURE: 93 MMHG | WEIGHT: 125 LBS | HEIGHT: 66 IN | OXYGEN SATURATION: 97 % | BODY MASS INDEX: 20.09 KG/M2 | DIASTOLIC BLOOD PRESSURE: 55 MMHG | TEMPERATURE: 98 F

## 2024-12-03 DIAGNOSIS — T84.498A EXPOSED ORTHOPAEDIC HARDWARE (CMS-HCC): ICD-10-CM

## 2024-12-03 DIAGNOSIS — M27.2 OSTEORADIONECROSIS OF JAW: ICD-10-CM
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
## 2024-12-03 LAB
ABO GROUP (TYPE) IN BLOOD: NORMAL
ANTIBODY SCREEN: NORMAL
RH FACTOR (ANTIGEN D): NORMAL

## 2024-12-03 PROCEDURE — 86923 COMPATIBILITY TEST ELECTRIC: CPT

## 2024-12-03 PROCEDURE — 87081 CULTURE SCREEN ONLY: CPT

## 2024-12-03 PROCEDURE — 86901 BLOOD TYPING SEROLOGIC RH(D): CPT

## 2024-12-03 PROCEDURE — 99205 OFFICE O/P NEW HI 60 MIN: CPT | Performed by: NURSE PRACTITIONER

## 2024-12-03 RX ORDER — CHLORHEXIDINE GLUCONATE 40 MG/ML
SOLUTION TOPICAL
Qty: 473 ML | Refills: 0 | Status: SHIPPED | OUTPATIENT
Start: 2024-12-03

## 2024-12-03 ASSESSMENT — ENCOUNTER SYMPTOMS
RESPIRATORY NEGATIVE: 1
CARDIOVASCULAR NEGATIVE: 1
TROUBLE SWALLOWING: 1
DIARRHEA: 1
NECK NEGATIVE: 1
ENDOCRINE NEGATIVE: 1
NEUROLOGICAL NEGATIVE: 1
EYES NEGATIVE: 1
CONSTITUTIONAL NEGATIVE: 1
NECK STIFFNESS: 1

## 2024-12-03 ASSESSMENT — DUKE ACTIVITY SCORE INDEX (DASI)
CAN YOU DO HEAVY WORK AROUND THE HOUSE LIKE SCRUBBING FLOORS OR LIFTING AND MOVING HEAVY FURNITURE: YES
CAN YOU DO YARD WORK LIKE RAKING LEAVES, WEEDING OR PUSHING A MOWER: YES
CAN YOU DO MODERATE WORK AROUND THE HOUSE LIKE VACUUMING, SWEEPING FLOORS OR CARRYING GROCERIES: YES
CAN YOU PARTICIPATE IN STRENOUS SPORTS LIKE SWIMMING, SINGLES TENNIS, FOOTBALL, BASKETBALL, OR SKIING: YES
CAN YOU CLIMB A FLIGHT OF STAIRS OR WALK UP A HILL: YES
CAN YOU PARTICIPATE IN MODERATE RECREATIONAL ACTIVITIES LIKE GOLF, BOWLING, DANCING, DOUBLES TENNIS OR THROWING A BASEBALL OR FOOTBALL: YES
CAN YOU WALK A BLOCK OR TWO ON LEVEL GROUND: YES
CAN YOU TAKE CARE OF YOURSELF (EAT, DRESS, BATHE, OR USE TOILET): YES
TOTAL_SCORE: 50.2
CAN YOU DO LIGHT WORK AROUND THE HOUSE LIKE DUSTING OR WASHING DISHES: YES
CAN YOU RUN A SHORT DISTANCE: NO
CAN YOU WALK INDOORS, SUCH AS AROUND YOUR HOUSE: YES
CAN YOU HAVE SEXUAL RELATIONS: YES
DASI METS SCORE: 8.9

## 2024-12-03 ASSESSMENT — LIFESTYLE VARIABLES: SMOKING_STATUS: NONSMOKER

## 2024-12-03 NOTE — PREPROCEDURE INSTRUCTIONS
Fasting Guidelines    NPO Instructions:    Do not eat any food after midnight the night before your surgery/procedure.  You may have up to 13.5 ounces of clear liquids until TWO hours before your instructed arrival time to the hospital. This includes water, black tea/coffee, (no milk or cream), apple juice, and/or electrolyte drinks (Gatorade).  You may chew gum up to TWO hours before your surgery/procedure.    Additional Instructions:     We have sent a prescription for Hibiclens soap and Peridex mouth wash to your preferred pharmacy.  If you have not already, Please  your prescription and start using as directed before surgery.  Follow the instruction sheet provided to you at your CPM/PAT appointment.    Avoid herbal supplements, multivitamins and NSAIDS (non-steroidal anti-inflammatory drugs) such as Advil, Aleve, Ibuprofen, Naproxen, Excedrin, Meloxicam or Celebrex for at least 7 days prior to surgery. May take Tylenol as needed.    Avoid tobacco and alcohol products for 24 hours prior to surgery.    CONTACT SURGEON'S OFFICE IF YOU DEVELOP:  * Fever = 100.4 F   * New respiratory symptoms (e.g. cough, shortness of breath, respiratory distress, sore throat)  * Recent loss of taste or smell  *Flu like symptoms such as headache, fatigue or gastrointestinal symptoms  * You develop any open sores, shingles, burning or painful urination   AND/OR:  * You no longer wish to have the surgery.  * Any other personal circumstances change that may lead to the need to cancel or defer this surgery.  *You were admitted to any hospital within one week of your planned procedure.    Seven/Six Days before Surgery:  Review your medication instructions, stop indicated medications    Day of Surgery:  Review your medication instructions, take indicated medications  Wear comfortable loose fitting clothing  Do not use moisturizers, creams, lotions or perfume  All jewelry and valuables should be left at home    Anastasia Pitts  [facility]  [phone]  [phone]  [email or web address]      Patient Information: Pre-Operative Infection Prevention Measures     Why did I have my nose, under my arms, and groin swabbed?  The purpose of the swab is to identify Staphylococcus aureus inside your nose or on your skin.  The swab was sent to the laboratory for culture.  A positive swab/culture for Staphylococcus aureus is called colonization or carriage.      What is Staphylococcus aureus?  Staphylococcus aureus, also known as “staph”, is a germ found on the skin or in the nose of healthy people.  Sometimes Staphylococcus aureus can get into the body and cause an infection.  This can be minor (such as pimples, boils, or other skin problems).  It might also be serious (such as a blood infection, pneumonia, or a surgical site infection).    What is Staphylococcus aureus colonization or carriage?  Colonization or carriage means that a person has the germ but is not sick from it.  These bacteria can be spread on the hands or when breathing or sneezing.    How is Staphylococcus aureus spread?  It is most often spread by close contact with a person or item that carries it.    What happens if my culture is positive for Staphylococcus aureus?  Your doctor/medical team will use this information to guide any antibiotic treatment which may be necessary.  Regardless of the culture results, we will clean the inside of your nose with a betadine swab just before you have your surgery.      Will I get an infection if I have Staphylococcus aureus in my nose or on my skin?  Anyone can get an infection with Staphylococcus aureus.  However, the best way to reduce your risk of infection is to follow the instructions provided to you for the use of your CHG soap and dental rinse.        Patient Information: Oral/Dental Rinse    What is oral/dental rinse?   It is a mouthwash. It is a way of cleaning the mouth with a  germ-killing solution before your surgery.  The solution contains chlorhexidine, commonly known as CHG.   It is used inside the mouth to kill a bacteria known as Staphylococcus aureus.  Let your doctor know if you are allergic to Chlorhexidine.    Why do I need to use CHG oral/dental rinse?  The CHG oral/dental rinse helps to kill a bacteria in your mouth known as Staphylococcus aureus.     This reduces the risk of infection at the surgical site.      Using your CHG oral/dental rinse  STEPS:  Use your CHG oral/dental rinse after you brush your teeth the night before (at bedtime) and the morning of your surgery.  Follow all directions on your prescription label.    Use the cap on the container to measure 15ml   Swish (gargle if you can) the mouthwash in your mouth for at least 30 seconds, (do not swallow) and spit out  After you use your CHG rinse, do not rinse your mouth with water, drink or eat.  Please refer to the prescription label for the appropriate time to resume oral intake      What side effects might I have using the CHG oral/dental rinse?  CHG rinse will stick to plaque on the teeth.  Brush and floss just before use.  Teeth brushing will help avoid staining of plaque during use.      Patient Information: Home Preoperative Antibacterial Shower      What is a home preoperative antibacterial shower?  This shower is a way of cleaning the skin with a germ-killing solution before surgery.  The solution contains chlorhexidine, commonly known as CHG.  CHG is a skin cleanser with germ-killing ability.  Let your doctor know if you are allergic to chlorhexidine.    Why do I need to take a preoperative antibacterial shower?  Skin is not sterile.  It is best to try to make your skin as free of germs as possible before surgery.  Proper cleansing with a germ-killing soap before surgery can lower the number of germs on your skin.  This helps to reduce the risk of infection at the surgical site.  Following the  instructions listed below will help you prepare your skin for surgery.      How do I use the solution?  Steps:  Begin using your CHG soap 5 days before your scheduled surgery on ________________________.    First, wash and rinse your hair using the CHG soap. Keep CHG soap away from ear canals and eyes.  Rinse completely, do not condition.  Hair extensions should be removed.  Wash your face with your normal soap and rinse.    Apply the CHG solution to a clean wet washcloth.  Turn the water off or move away from the water spray to avoid premature rinsing of the CHG soap as you are applying.   Firmly lather your entire body from the neck down.  Do not use on your face.  Pay special attention to the area(s) where your incision(s) will be located unless they are on your face.  Avoid scrubbing your skin too hard.  The important point is to have the CHG soap sit on your skin for 3 minutes.    When the 3 minutes are up, turn on the water and rinse the CHG solution off your body completely.   DO NOT wash with regular soap after you have used the CHG soap solution  Pat yourself dry with a clean, freshly-laundered towel.  DO NOT apply powders, deodorants, or lotions.  Dress in clean, freshly laundered nightclothes.    Be sure to sleep with clean, freshly laundered sheets.  Be aware that CHG will cause stains on fabrics; if you wash them with bleach after use.  Rinse your washcloth and other linens that have contact with CHG completely.  Use only non-chlorine detergents to launder the items used.   The morning of surgery is the fifth day.  Repeat the above steps and dress in clean comfortable clothing     Whom should I contact if I have any questions regarding the use of CHG soap?  Call the [facility] at [phone] if you have any questions.               Preoperative Brain Exercises    What are brain exercises?  A brain exercise is any activity that  engages your thinking (cognitive) skills.    What types of activities are considered brain exercises?  Jigsaw puzzles, crossword puzzles, word jumble, memory games, word search, and many more.  Many can be found free online or on your phone via a mobile benji.    Why should I do brain exercises before my surgery?  More recent research has shown brain exercise before surgery can lower the risk of postoperative delirium (confusion) which can be especially important for older adults.  Patients who did brain exercises for 5 to 10 hours the days before surgery, cut their risk of postoperative delirium in half up to 1 week after surgery.         [facility]    Preoperative Deep Breathing Exercises    Why it is important to do deep breathing exercises before my surgery?  Deep breathing exercises strengthen your breathing muscles.  This helps you to recover after your surgery and decreases the chance of breathing complications.      How are the deep breathing exercises done?  Sit straight with your back supported.  Breathe in deeply and slowly through your nose. Your lower rib cage should expand and your abdomen may move forward.  Hold that breath for 3 to 5 seconds.  Breathe out through pursed lips, slowly and completely.  Rest and repeat 10 times every hour while awake.  Rest longer if you become dizzy or lightheaded.         Patient and Family Education             Ways You Can Help Prevent Blood Clots             This handout explains some simple things you can do to help prevent blood clots.      Blood clots are blockages that can form in the body's veins. When a blood clot forms in your deep veins, it may be called a deep vein thrombosis, or DVT for short. Blood clots can happen in any part of the body where blood flows, but they are most common in the arms and legs. If a piece of a blood clot breaks free and travels to the lungs, it is called a pulmonary embolus (PE). A PE can be a very  serious problem.         Being in the hospital or having surgery can raise your chances of getting a blood clot because you may not be well enough to move around as much as you normally do.         Ways you can help prevent blood clots in the hospital         Wearing SCDs. SCDs stands for Sequential Compression Devices.   SCDs are special sleeves that wrap around your legs  They attach to a pump that fills them with air to gently squeeze your legs every few minutes.   This helps return the blood in your legs to your heart.   SCDs should only be taken off when walking or bathing.   SCDs may not be comfortable, but they can help save your life.               Wearing compression stockings - if your doctor orders them. These special snug fitting stockings gently squeeze your legs to help blood flow.       Walking. Walking helps move the blood in your legs.   If your doctor says it is ok, try walking the halls at least   5 times a day. Ask us to help you get up, so you don't fall.      Taking any blood thinning medicines your doctor orders.        Page 1 of 2     [facility]; 3/23   Ways you can help prevent blood clots at home       Wearing compression stockings - if your doctor orders them. ? Walking - to help move the blood in your legs.       Taking any blood thinning medicines your doctor orders.      Signs of a blood clot or PE      Tell your doctor or nurse know right away if you have of the problems listed below.    If you are at home, seek medical care right away. Call 911 for chest pain or problems breathing.               Signs of a blood clot (DVT) - such as pain,  swelling, redness or warmth in your arm or leg      Signs of a pulmonary embolism (PE) - such as chest     pain or feeling short of breath

## 2024-12-03 NOTE — H&P (VIEW-ONLY)
CPM/PAT Evaluation       Name: Brigette Romo (Brigette Romo)  /Age: 1950/74 y.o.     Visit Type:   In-Person       Chief Complaint: perioperative evaluation, osteoradionecrosis    HPI  The patient is a 74 year old  female with history of multiple different oral cavity cancers.  No current evidence of tumor recurrence. She has ongoing dysphagia and bone exposure as well as drainage around her chin. She presents today for perioperative evaluation in anticipation of Mandible Composite Resection - Left, Exploration Vessel Head/Neck - Left, Creation, Osteocutaneous Flap Head/Neck - Right, Excision Split Thickness Skin Graft, Head/Neck - Bilateral, Reconstruction Mandible - Left on 24 with Dr. Magana/Dr. Christensen.    Past Medical History:   Diagnosis Date    Cancer (Multi)     Cataract     Dysphagia 2023    Hypothyroid     Hypothyroidism     Personal history of irradiation 2023    Personal history of malignant neoplasm of tongue     History of tongue cancer       Past Surgical History:   Procedure Laterality Date    CT ANGIO AORTA AND BILATERAL ILIOFEMORAL RUN OFF INCLUDING WITHOUT CONTRAST IF PERFORMED  2023    CT AORTA AND BILATERAL ILIOFEMORAL RUNOFF ANGIOGRAM W AND/OR WO IV CONTRAST 2/3/2023 DOCTOR OFFICE [facility]GAC[facility]    EYE SURGERY   and     OTHER SURGICAL HISTORY  2016    Glossectomy    OTHER SURGICAL HISTORY  2016    Radical Neck Dissection    TRACHEOSTOMY TUBE PLACEMENT  2023       Patient  reports that she is not currently sexually active and has had partner(s) who are male. She reports using the following method of birth control/protection: Abstinence.    Family History   Problem Relation Name Age of Onset    Blood clot Mother Jhonny Luna     Other (ST elevation myocardial infarction) Father Ector Luna     Diabetes Father Ector Luna     Diabetes Sister Asia     Diabetes Brother Carlos     Diabetes Sister [place]     Diabetes Brother Carlos         Allergies   Allergen Reactions    Cefepime Other     tingling, chest pressure.    Sulfa (Sulfonamide Antibiotics) Rash       Prior to Admission medications    Medication Sig Start Date End Date Taking? Authorizing Provider   ALPRAZolam (Xanax) 0.25 mg tablet Take 1 tablet (0.25 mg) by mouth every 8 hours if needed for anxiety. 11/14/24 7/12/25 Yes Eri Read DO   levothyroxine (Synthroid, Levoxyl) 25 mcg tablet Take 1 tablet (25 mcg) by mouth once daily in the morning. Take before meals. Take on an empty stomach with water only.  Wait 1 hour before having food, medications or beverages other than water.  OK to crush pill. 4/26/24  Yes Brandy Christensen MD   nutritional supplements (Osmolite 1 Sanjiv) 0.04 gram-1.06 kcal/mL liquid 1 Can by peg tube route 3 times a day.   Yes Historical Provider, MD   loperamide (Imodium A-D) 1 mg/7.5 mL liquid Take 15 mL (2 mg) by mouth 4 times a day as needed for diarrhea. 11/12/24   Tacho Steele DO   vancomycin (Firvanq) 50 mg/mL oral solution 2.5 mL (125 mg) by g-tube route 4 times a day for 10 days. Shake well before using, flush with 15mL of water 11/27/24 12/7/24  Eri Read DO   vancomycin (Firvanq) 50 mg/mL oral solution 2.5 mL (125 mg) by g-tube route 4 times a day for 10 days. Shake well before using, flush with 15mL of water 11/14/24 11/27/24  Salvador Faith DO        PAT ROS:   Constitutional:   neg    Neuro/Psych:   neg    Eyes:   neg     use of corrective lenses  Ears:   Nose:   neg    Mouth:   neg    Throat:   neg     dysphagia  Neck:   neg     neck stiffness  Cardio:   neg    Respiratory:   neg    Endocrine:   neg    GI:    diarrhea  :   neg    Musculoskeletal:   Hematologic:   neg    Skin:  neg        Physical Exam  Vitals reviewed.   Constitutional:       Appearance: Normal appearance.   HENT:      Head: Normocephalic and atraumatic.        Comments: Mandibular hardware exposure     Nose: Nose normal.      Mouth/Throat:      Mouth:  "Mucous membranes are moist.      Pharynx: Oropharynx is clear.   Eyes:      Extraocular Movements: Extraocular movements intact.      Conjunctiva/sclera: Conjunctivae normal.      Pupils: Pupils are equal, round, and reactive to light.   Cardiovascular:      Rate and Rhythm: Normal rate and regular rhythm.      Pulses: Normal pulses.      Heart sounds: Normal heart sounds.   Pulmonary:      Effort: Pulmonary effort is normal.      Breath sounds: Normal breath sounds.   Musculoskeletal:         General: Normal range of motion.      Cervical back: Rigidity present.   Skin:     General: Skin is warm and dry.   Neurological:      General: No focal deficit present.      Mental Status: She is alert and oriented to person, place, and time.   Psychiatric:         Mood and Affect: Mood normal.         Behavior: Behavior normal.          PAT AIRWAY:   Airway:     Mallampati::  IV    Neck ROM::  Limited   Multiple missing teeth        Visit Vitals  BP 93/55   Pulse 82   Temp 36.7 °C (98 °F)   Ht 1.676 m (5' 6\")   Wt 56.7 kg (125 lb)   LMP  (LMP Unknown)   SpO2 97%   BMI 20.18 kg/m²   OB Status Postmenopausal   Smoking Status Never   BSA 1.62 m²          DASI Risk Score      Flowsheet Row Pre-Admission Testing from 12/3/2024 in [facility]   Can you take care of yourself (eat, dress, bathe, or use toilet)?  2.75 filed at 12/03/2024 1056   Can you walk indoors, such as around your house? 1.75 filed at 12/03/2024 1056   Can you walk a block or two on level ground?  2.75 filed at 12/03/2024 1056   Can you climb a flight of stairs or walk up a hill? 5.5 filed at 12/03/2024 1056   Can you run a short distance? 0 filed at 12/03/2024 1056   Can you do light work around the house like dusting or washing dishes? 2.7 filed at 12/03/2024 1056   Can you do moderate work around the house like vacuuming, sweeping floors or carrying groceries? 3.5 filed at 12/03/2024 1056   Can you do heavy work around the house like scrubbing " floors or lifting and moving heavy furniture?  8 filed at 12/03/2024 1056   Can you do yard work like raking leaves, weeding or pushing a mower? 4.5 filed at 12/03/2024 1056   Can you have sexual relations? 5.25 filed at 12/03/2024 1056   Can you participate in moderate recreational activities like golf, bowling, dancing, doubles tennis or throwing a baseball or football? 6 filed at 12/03/2024 1056   Can you participate in strenous sports like swimming, singles tennis, football, basketball, or skiing? 7.5 filed at 12/03/2024 1056   DASI SCORE 50.2 filed at 12/03/2024 1056   METS Score (Will be calculated only when all the questions are answered) 8.9 filed at 12/03/2024 1056          Caprini DVT Assessment      Flowsheet Row Pre-Admission Testing from 12/3/2024 in [facility]   DVT Score 10 filed at 12/03/2024 1122   Medical Factors Present cancer, chemotherapy, or previous malignancy filed at 12/03/2024 1122   Surgical Factors Major surgery planned, lasting over 3 hours filed at 12/03/2024 1122   BMI 30 or less filed at 12/03/2024 1122          Modified Frailty Index      Flowsheet Row Pre-Admission Testing from 12/3/2024 in [facility]   Non-independent functional status (problems with dressing, bathing, personal grooming, or cooking) 0 filed at 12/03/2024 1122   History of diabetes mellitus  0 filed at 12/03/2024 1122   History of COPD 0 filed at 12/03/2024 1122   History of CHF No filed at 12/03/2024 1122   History of MI 0 filed at 12/03/2024 1122   History of Percutaneous Coronary Intervention, Cardiac Surgery, or Angina No filed at 12/03/2024 1122   Hypertension requiring the use of medication  0 filed at 12/03/2024 1122   Peripheral vascular disease 0 filed at 12/03/2024 1122   Impaired sensorium (cognitive impairement or loss, clouding, or delirium) 0 filed at 12/03/2024 1122   TIA or CVA withouy residual deficit 0 filed at 12/03/2024 1122   Cerebrovascular accident with  deficit 0 filed at 12/03/2024 1122   Modified Frailty Index Calculator 0 filed at 12/03/2024 1122          CHADS2 Stroke Risk  Current as of 3 hours ago        N/A 3 to 100%: High Risk   2 to < 3%: Medium Risk   0 to < 2%: Low Risk     Last Change: N/A          This score determines the patient's risk of having a stroke if the patient has atrial fibrillation.        This score is not applicable to this patient. Components are not calculated.          Revised Cardiac Risk Index      Flowsheet Row Pre-Admission Testing from 12/3/2024 in [facility]   High-Risk Surgery (Intraperitoneal, Intrathoracic,Suprainguinal vascular) 0 filed at 12/03/2024 1122   History of ischemic heart disease (History of MI, History of positive exercuse test, Current chest paint considered due to myocardial ischemia, Use of nitrate therapy, ECG with pathological Q Waves) 0 filed at 12/03/2024 1122   History of congestive heart failure (pulmonary edemia, bilateral rales or S3 gallop, Paroxysmal nocturnal dyspnea, CXR showing pulmonary vascular redistribution) 0 filed at 12/03/2024 1122   History of cerebrovascular disease (Prior TIA or stroke) 0 filed at 12/03/2024 1122   Pre-operative insulin treatment 0 filed at 12/03/2024 1122   Pre-operative creatinine>2 mg/dl 0 filed at 12/03/2024 1122   Revised Cardiac Risk Calculator 0 filed at 12/03/2024 1122          Apfel Simplified Score      Flowsheet Row Pre-Admission Testing from 12/3/2024 in [facility]   Smoking status 1 filed at 12/03/2024 1122   History of motion sickness or PONV  0 filed at 12/03/2024 1122   Use of postoperative opioids 1 filed at 12/03/2024 1122   Gender - Female 1=Yes filed at 12/03/2024 1122   Apfel Simplified Score Calculator 3 filed at 12/03/2024 1122          Risk Analysis Index Results This Encounter    No data found in the last 10 encounters.       Stop Bang Score      Flowsheet Row Pre-Admission Testing from 12/3/2024 in [facility] Admission (Discharged) from 7/15/2024 in [facility] OR with Brandy Christensen MD   Do you snore loudly? 0 filed at 12/03/2024 1056 0 filed at 07/15/2024 0606   Do you often feel tired or fatigued after your sleep? 0 filed at 12/03/2024 1056 0 filed at 07/15/2024 0606   Has anyone ever observed you stop breathing in your sleep? 0 filed at 12/03/2024 1056 0 filed at 07/15/2024 0606   Do you have or are you being treated for high blood pressure? 0 filed at 12/03/2024 1056 0 filed at 07/15/2024 0606   Recent BMI (Calculated) 19.4 filed at 12/03/2024 1056 20.9 filed at 07/15/2024 0606   Is BMI greater than 35 kg/m2? 0=No filed at 12/03/2024 1056 0=No filed at 07/15/2024 0606   Age older than 50 years old? 1=Yes filed at 12/03/2024 1056 1=Yes filed at 07/15/2024 0606   Is your neck circumference greater than 17 inches (Male) or 16 inches (Female)? 0 filed at 12/03/2024 1056 --   Gender - Male 0=No filed at 12/03/2024 1056 0=No filed at 07/15/2024 0606   STOP-BANG Total Score 1 filed at 12/03/2024 1056 --          Prodigy: High Risk  Total Score: 12              Prodigy Age Score           ARISCAT Score for Postoperative Pulmonary Complications    No data to display       Fuentes Perioperative Risk for Myocardial Infarction or Cardiac Arrest (MARYCHUY)    No data to display       Recent Results (from the past week)   Type And Screen    Collection Time: 12/03/24 11:17 AM   Result Value Ref Range    ABO TYPE A     Rh TYPE POS     ANTIBODY SCREEN NEG    MRSA pending    Diagnostic Results    [facility] US Carotid 2/14/24:  CONCLUSIONS:  Right Carotid: Findings are consistent with less than 50% stenosis of the right proximal internal carotid artery. Laminar flow seen by color Doppler. The right ICA is noted to be tortuous. Right external carotid artery appears patent with no evidence of stenosis. The right vertebral artery is patent with antegrade flow. No evidence of hemodynamically significant  stenosis in the right subclavian artery.  Left Carotid: Findings are consistent with less than 50% stenosis of the left proximal internal carotid artery. Left external carotid artery appears patent with no evidence of stenosis. The left vertebral artery is patent with antegrade flow. No evidence of hemodynamically significant stenosis in the left subclavian artery.     ECG 5/30/23:  Sinus tachycardia with frequent premature ventricular complexes  Right axis deviation  Pulmonary disease pattern  Abnormal ECG     Assessment and Plan:     Anesthesia  The patient denies problems with anesthesia in the past such as PONV, prolonged sedation, awareness, dental damage, aspiration, cardiac arrest, difficult intubation, or unexpected hospital admissions.     Cardiovascular  No diagnoses or significant findings on chart review or clinical presentation and evaluation.  She is scheduled for non-cardiac surgery associated with elevated risk. The patient has no major cardiac contraindications to non- cardiac surgery.  Cardiology Evaluation  The patient is not followed by cardiology.  METS  The patient's functional capacity capacity is greater than 4 METS.  RCRI  The patient meets 0 RCRI criteria and therefor has a 3.9% risk of major adverse cardiac complications.  MARYCHUY score which indicates a 0.1% risk of intraoperative or 30-day postoperative MACE (major adverse cardiac event).    Pulmonary  No significant findings on chart review or clinical presentation and evaluation. The patient is at increased risk of perioperative pulmonary complications secondary to neck surgery, advanced age greater than 60.    STOP BANG 1, which places patient at low risk for having FRANK.    ARISCAT 26, Intermediate, 13.3% risk of in-hospital postoperative pulmonary complications  PRODIGY 12, intermediate risk of respiratory depression episode.     Patient given PI sheet for preoperative deep breathing exercises.  Encourage  incentive spirometry in the  postoperative period as deemed necessary.    Neurology  #Anxiety  -managed on xanax as needed. No acute neurological complaints.  The patient is at increased risk for postoperative delirium secondary to age 65 or older. The patient is at increased risk for perioperative stroke secondary to increased age, female gender, general anesthesia, operative time >2.5 hours. Handouts for preoperative brain exercises given to patient.    HEENT/Airway  #Tongue cancer  -s/p treatment of multiple different oral cavity cancers. No evidence of any tumor recurrence. Persistent dysphagia, PEG dependent. Ongoing issues with some bone exposure as well as drainage around her chin. She has osteoradionecrosis of the left mandible. Scheduled for surgery with Dr. Magana/Dr. Christensen  The patient is at increased risk of airway difficulty secondary to small mouth opening, small or receded mandible and maxilla, limited neck extension, prior neck surgery, fusion/fixation.    Endocrine  #Hypothyroidism  -managed on levothyroxine, instructed to continue as prescribed in the perioperative period    Gastrointestinal  #Dysphagia  -peg dependent    Eat 10- 2,  self-perceived oropharyngeal dysphagia scale (0-40)     Genitourinary  No diagnoses or significant findings on chart review or clinical presentation and evaluation.    Renal  No renal diagnoses or significant findings on chart review or clinical presentation and evaluation. The patient has specific risk factors associated with increased risk of perioperative renal complications related to age greater than 55. Preventative measures include preoperative hydration.    Hematology  No diagnoses or significant findings on chart review or clinical presentation and evaluation.    Caprini score 10, high risk of perioperative VTE.     Patient instructed to ambulate as soon as possible postoperatively to decrease thromboembolic risk. Initiate mechanical DVT prophylaxis as soon as possible and initiate  chemical prophylaxis when deemed safe from a bleeding standpoint post surgery.     Transfusion Evaluation  A type and screen was obtained given the likelihood for perioperative transfusion of blood or blood products.    Musculoskeletal  No diagnoses or significant findings on chart review or clinical presentation and evaluation.    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 Pseudomonas aeruginosa (resistant to ciprofloxacin and levofloxacin). She will require IV antibiotics for six weeks, possibly longer.   MRSA screening obtained. Prescriptions and instructions given for Hibiclens and Peridex.    -Preoperative medication instructions were provided and reviewed with the patient.  Any additional testing or evaluation was explained to the patient.  NPO Instructions were discussed, and the patient's questions were answered prior to conclusion of this encounter. Patient verbalized understanding of preoperative instructions. After Visit Summary given.        Diarrhea improved with antibiotics now restarting has contacted Dr. Lockwood.

## 2024-12-03 NOTE — CPM/PAT H&P
CPM/PAT Evaluation       Name: Brigette Romo (Brigette Romo)  /Age: 1950/74 y.o.     Visit Type:   In-Person       Chief Complaint: perioperative evaluation, osteoradionecrosis    HPI  The patient is a 74 year old  female with history of multiple different oral cavity cancers.  No current evidence of tumor recurrence. She has ongoing dysphagia and bone exposure as well as drainage around her chin. She presents today for perioperative evaluation in anticipation of Mandible Composite Resection - Left, Exploration Vessel Head/Neck - Left, Creation, Osteocutaneous Flap Head/Neck - Right, Excision Split Thickness Skin Graft, Head/Neck - Bilateral, Reconstruction Mandible - Left on 24 with Dr. Magana/Dr. Christensen.    Past Medical History:   Diagnosis Date    Cancer (Multi)     Cataract     Dysphagia 2023    Hypothyroid     Hypothyroidism     Personal history of irradiation 2023    Personal history of malignant neoplasm of tongue     History of tongue cancer       Past Surgical History:   Procedure Laterality Date    CT ANGIO AORTA AND BILATERAL ILIOFEMORAL RUN OFF INCLUDING WITHOUT CONTRAST IF PERFORMED  2023    CT AORTA AND BILATERAL ILIOFEMORAL RUNOFF ANGIOGRAM W AND/OR WO IV CONTRAST 2/3/2023 DOCTOR OFFICE [facility]GAC[facility]    EYE SURGERY   and     OTHER SURGICAL HISTORY  2016    Glossectomy    OTHER SURGICAL HISTORY  2016    Radical Neck Dissection    TRACHEOSTOMY TUBE PLACEMENT  2023       Patient  reports that she is not currently sexually active and has had partner(s) who are male. She reports using the following method of birth control/protection: Abstinence.    Family History   Problem Relation Name Age of Onset    Blood clot Mother Jhonny Luna     Other (ST elevation myocardial infarction) Father Ector Luna     Diabetes Father Ector Luna     Diabetes Sister Asia     Diabetes Brother Carlos     Diabetes Sister [place]     Diabetes Brother Carlos         Allergies   Allergen Reactions    Cefepime Other     tingling, chest pressure.    Sulfa (Sulfonamide Antibiotics) Rash       Prior to Admission medications    Medication Sig Start Date End Date Taking? Authorizing Provider   ALPRAZolam (Xanax) 0.25 mg tablet Take 1 tablet (0.25 mg) by mouth every 8 hours if needed for anxiety. 11/14/24 7/12/25 Yes Eri Read DO   levothyroxine (Synthroid, Levoxyl) 25 mcg tablet Take 1 tablet (25 mcg) by mouth once daily in the morning. Take before meals. Take on an empty stomach with water only.  Wait 1 hour before having food, medications or beverages other than water.  OK to crush pill. 4/26/24  Yes Brandy Christensen MD   nutritional supplements (Osmolite 1 Sanjiv) 0.04 gram-1.06 kcal/mL liquid 1 Can by peg tube route 3 times a day.   Yes Historical Provider, MD   loperamide (Imodium A-D) 1 mg/7.5 mL liquid Take 15 mL (2 mg) by mouth 4 times a day as needed for diarrhea. 11/12/24   Tacho Steele DO   vancomycin (Firvanq) 50 mg/mL oral solution 2.5 mL (125 mg) by g-tube route 4 times a day for 10 days. Shake well before using, flush with 15mL of water 11/27/24 12/7/24  Eri Read DO   vancomycin (Firvanq) 50 mg/mL oral solution 2.5 mL (125 mg) by g-tube route 4 times a day for 10 days. Shake well before using, flush with 15mL of water 11/14/24 11/27/24  Salvador Faith DO        PAT ROS:   Constitutional:   neg    Neuro/Psych:   neg    Eyes:   neg     use of corrective lenses  Ears:   Nose:   neg    Mouth:   neg    Throat:   neg     dysphagia  Neck:   neg     neck stiffness  Cardio:   neg    Respiratory:   neg    Endocrine:   neg    GI:    diarrhea  :   neg    Musculoskeletal:   Hematologic:   neg    Skin:  neg        Physical Exam  Vitals reviewed.   Constitutional:       Appearance: Normal appearance.   HENT:      Head: Normocephalic and atraumatic.        Comments: Mandibular hardware exposure     Nose: Nose normal.      Mouth/Throat:      Mouth:  "Mucous membranes are moist.      Pharynx: Oropharynx is clear.   Eyes:      Extraocular Movements: Extraocular movements intact.      Conjunctiva/sclera: Conjunctivae normal.      Pupils: Pupils are equal, round, and reactive to light.   Cardiovascular:      Rate and Rhythm: Normal rate and regular rhythm.      Pulses: Normal pulses.      Heart sounds: Normal heart sounds.   Pulmonary:      Effort: Pulmonary effort is normal.      Breath sounds: Normal breath sounds.   Musculoskeletal:         General: Normal range of motion.      Cervical back: Rigidity present.   Skin:     General: Skin is warm and dry.   Neurological:      General: No focal deficit present.      Mental Status: She is alert and oriented to person, place, and time.   Psychiatric:         Mood and Affect: Mood normal.         Behavior: Behavior normal.          PAT AIRWAY:   Airway:     Mallampati::  IV    Neck ROM::  Limited   Multiple missing teeth        Visit Vitals  BP 93/55   Pulse 82   Temp 36.7 °C (98 °F)   Ht 1.676 m (5' 6\")   Wt 56.7 kg (125 lb)   LMP  (LMP Unknown)   SpO2 97%   BMI 20.18 kg/m²   OB Status Postmenopausal   Smoking Status Never   BSA 1.62 m²          DASI Risk Score      Flowsheet Row Pre-Admission Testing from 12/3/2024 in [facility]   Can you take care of yourself (eat, dress, bathe, or use toilet)?  2.75 filed at 12/03/2024 1056   Can you walk indoors, such as around your house? 1.75 filed at 12/03/2024 1056   Can you walk a block or two on level ground?  2.75 filed at 12/03/2024 1056   Can you climb a flight of stairs or walk up a hill? 5.5 filed at 12/03/2024 1056   Can you run a short distance? 0 filed at 12/03/2024 1056   Can you do light work around the house like dusting or washing dishes? 2.7 filed at 12/03/2024 1056   Can you do moderate work around the house like vacuuming, sweeping floors or carrying groceries? 3.5 filed at 12/03/2024 1056   Can you do heavy work around the house like scrubbing " floors or lifting and moving heavy furniture?  8 filed at 12/03/2024 1056   Can you do yard work like raking leaves, weeding or pushing a mower? 4.5 filed at 12/03/2024 1056   Can you have sexual relations? 5.25 filed at 12/03/2024 1056   Can you participate in moderate recreational activities like golf, bowling, dancing, doubles tennis or throwing a baseball or football? 6 filed at 12/03/2024 1056   Can you participate in strenous sports like swimming, singles tennis, football, basketball, or skiing? 7.5 filed at 12/03/2024 1056   DASI SCORE 50.2 filed at 12/03/2024 1056   METS Score (Will be calculated only when all the questions are answered) 8.9 filed at 12/03/2024 1056          Caprini DVT Assessment      Flowsheet Row Pre-Admission Testing from 12/3/2024 in [facility]   DVT Score 10 filed at 12/03/2024 1122   Medical Factors Present cancer, chemotherapy, or previous malignancy filed at 12/03/2024 1122   Surgical Factors Major surgery planned, lasting over 3 hours filed at 12/03/2024 1122   BMI 30 or less filed at 12/03/2024 1122          Modified Frailty Index      Flowsheet Row Pre-Admission Testing from 12/3/2024 in [facility]   Non-independent functional status (problems with dressing, bathing, personal grooming, or cooking) 0 filed at 12/03/2024 1122   History of diabetes mellitus  0 filed at 12/03/2024 1122   History of COPD 0 filed at 12/03/2024 1122   History of CHF No filed at 12/03/2024 1122   History of MI 0 filed at 12/03/2024 1122   History of Percutaneous Coronary Intervention, Cardiac Surgery, or Angina No filed at 12/03/2024 1122   Hypertension requiring the use of medication  0 filed at 12/03/2024 1122   Peripheral vascular disease 0 filed at 12/03/2024 1122   Impaired sensorium (cognitive impairement or loss, clouding, or delirium) 0 filed at 12/03/2024 1122   TIA or CVA withouy residual deficit 0 filed at 12/03/2024 1122   Cerebrovascular accident with  deficit 0 filed at 12/03/2024 1122   Modified Frailty Index Calculator 0 filed at 12/03/2024 1122          CHADS2 Stroke Risk  Current as of 3 hours ago        N/A 3 to 100%: High Risk   2 to < 3%: Medium Risk   0 to < 2%: Low Risk     Last Change: N/A          This score determines the patient's risk of having a stroke if the patient has atrial fibrillation.        This score is not applicable to this patient. Components are not calculated.          Revised Cardiac Risk Index      Flowsheet Row Pre-Admission Testing from 12/3/2024 in [facility]   High-Risk Surgery (Intraperitoneal, Intrathoracic,Suprainguinal vascular) 0 filed at 12/03/2024 1122   History of ischemic heart disease (History of MI, History of positive exercuse test, Current chest paint considered due to myocardial ischemia, Use of nitrate therapy, ECG with pathological Q Waves) 0 filed at 12/03/2024 1122   History of congestive heart failure (pulmonary edemia, bilateral rales or S3 gallop, Paroxysmal nocturnal dyspnea, CXR showing pulmonary vascular redistribution) 0 filed at 12/03/2024 1122   History of cerebrovascular disease (Prior TIA or stroke) 0 filed at 12/03/2024 1122   Pre-operative insulin treatment 0 filed at 12/03/2024 1122   Pre-operative creatinine>2 mg/dl 0 filed at 12/03/2024 1122   Revised Cardiac Risk Calculator 0 filed at 12/03/2024 1122          Apfel Simplified Score      Flowsheet Row Pre-Admission Testing from 12/3/2024 in [facility]   Smoking status 1 filed at 12/03/2024 1122   History of motion sickness or PONV  0 filed at 12/03/2024 1122   Use of postoperative opioids 1 filed at 12/03/2024 1122   Gender - Female 1=Yes filed at 12/03/2024 1122   Apfel Simplified Score Calculator 3 filed at 12/03/2024 1122          Risk Analysis Index Results This Encounter    No data found in the last 10 encounters.       Stop Bang Score      Flowsheet Row Pre-Admission Testing from 12/3/2024 in [facility] Admission (Discharged) from 7/15/2024 in [facility] OR with Brandy Christensen MD   Do you snore loudly? 0 filed at 12/03/2024 1056 0 filed at 07/15/2024 0606   Do you often feel tired or fatigued after your sleep? 0 filed at 12/03/2024 1056 0 filed at 07/15/2024 0606   Has anyone ever observed you stop breathing in your sleep? 0 filed at 12/03/2024 1056 0 filed at 07/15/2024 0606   Do you have or are you being treated for high blood pressure? 0 filed at 12/03/2024 1056 0 filed at 07/15/2024 0606   Recent BMI (Calculated) 19.4 filed at 12/03/2024 1056 20.9 filed at 07/15/2024 0606   Is BMI greater than 35 kg/m2? 0=No filed at 12/03/2024 1056 0=No filed at 07/15/2024 0606   Age older than 50 years old? 1=Yes filed at 12/03/2024 1056 1=Yes filed at 07/15/2024 0606   Is your neck circumference greater than 17 inches (Male) or 16 inches (Female)? 0 filed at 12/03/2024 1056 --   Gender - Male 0=No filed at 12/03/2024 1056 0=No filed at 07/15/2024 0606   STOP-BANG Total Score 1 filed at 12/03/2024 1056 --          Prodigy: High Risk  Total Score: 12              Prodigy Age Score           ARISCAT Score for Postoperative Pulmonary Complications    No data to display       Fuentes Perioperative Risk for Myocardial Infarction or Cardiac Arrest (MARYCHUY)    No data to display       Recent Results (from the past week)   Type And Screen    Collection Time: 12/03/24 11:17 AM   Result Value Ref Range    ABO TYPE A     Rh TYPE POS     ANTIBODY SCREEN NEG    MRSA pending    Diagnostic Results    [facility] US Carotid 2/14/24:  CONCLUSIONS:  Right Carotid: Findings are consistent with less than 50% stenosis of the right proximal internal carotid artery. Laminar flow seen by color Doppler. The right ICA is noted to be tortuous. Right external carotid artery appears patent with no evidence of stenosis. The right vertebral artery is patent with antegrade flow. No evidence of hemodynamically significant  stenosis in the right subclavian artery.  Left Carotid: Findings are consistent with less than 50% stenosis of the left proximal internal carotid artery. Left external carotid artery appears patent with no evidence of stenosis. The left vertebral artery is patent with antegrade flow. No evidence of hemodynamically significant stenosis in the left subclavian artery.     ECG 5/30/23:  Sinus tachycardia with frequent premature ventricular complexes  Right axis deviation  Pulmonary disease pattern  Abnormal ECG     Assessment and Plan:     Anesthesia  The patient denies problems with anesthesia in the past such as PONV, prolonged sedation, awareness, dental damage, aspiration, cardiac arrest, difficult intubation, or unexpected hospital admissions.     Cardiovascular  No diagnoses or significant findings on chart review or clinical presentation and evaluation.  She is scheduled for non-cardiac surgery associated with elevated risk. The patient has no major cardiac contraindications to non- cardiac surgery.  Cardiology Evaluation  The patient is not followed by cardiology.  METS  The patient's functional capacity capacity is greater than 4 METS.  RCRI  The patient meets 0 RCRI criteria and therefor has a 3.9% risk of major adverse cardiac complications.  MARYCHUY score which indicates a 0.1% risk of intraoperative or 30-day postoperative MACE (major adverse cardiac event).    Pulmonary  No significant findings on chart review or clinical presentation and evaluation. The patient is at increased risk of perioperative pulmonary complications secondary to neck surgery, advanced age greater than 60.    STOP BANG 1, which places patient at low risk for having FRANK.    ARISCAT 26, Intermediate, 13.3% risk of in-hospital postoperative pulmonary complications  PRODIGY 12, intermediate risk of respiratory depression episode.     Patient given PI sheet for preoperative deep breathing exercises.  Encourage  incentive spirometry in the  postoperative period as deemed necessary.    Neurology  #Anxiety  -managed on xanax as needed. No acute neurological complaints.  The patient is at increased risk for postoperative delirium secondary to age 65 or older. The patient is at increased risk for perioperative stroke secondary to increased age, female gender, general anesthesia, operative time >2.5 hours. Handouts for preoperative brain exercises given to patient.    HEENT/Airway  #Tongue cancer  -s/p treatment of multiple different oral cavity cancers. No evidence of any tumor recurrence. Persistent dysphagia, PEG dependent. Ongoing issues with some bone exposure as well as drainage around her chin. She has osteoradionecrosis of the left mandible. Scheduled for surgery with Dr. Magana/Dr. Christensen  The patient is at increased risk of airway difficulty secondary to small mouth opening, small or receded mandible and maxilla, limited neck extension, prior neck surgery, fusion/fixation.    Endocrine  #Hypothyroidism  -managed on levothyroxine, instructed to continue as prescribed in the perioperative period    Gastrointestinal  #Dysphagia  -peg dependent    Eat 10- 2,  self-perceived oropharyngeal dysphagia scale (0-40)     Genitourinary  No diagnoses or significant findings on chart review or clinical presentation and evaluation.    Renal  No renal diagnoses or significant findings on chart review or clinical presentation and evaluation. The patient has specific risk factors associated with increased risk of perioperative renal complications related to age greater than 55. Preventative measures include preoperative hydration.    Hematology  No diagnoses or significant findings on chart review or clinical presentation and evaluation.    Caprini score 10, high risk of perioperative VTE.     Patient instructed to ambulate as soon as possible postoperatively to decrease thromboembolic risk. Initiate mechanical DVT prophylaxis as soon as possible and initiate  chemical prophylaxis when deemed safe from a bleeding standpoint post surgery.     Transfusion Evaluation  A type and screen was obtained given the likelihood for perioperative transfusion of blood or blood products.    Musculoskeletal  No diagnoses or significant findings on chart review or clinical presentation and evaluation.    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 Pseudomonas aeruginosa (resistant to ciprofloxacin and levofloxacin). She will require IV antibiotics for six weeks, possibly longer.   MRSA screening obtained. Prescriptions and instructions given for Hibiclens and Peridex.    -Preoperative medication instructions were provided and reviewed with the patient.  Any additional testing or evaluation was explained to the patient.  NPO Instructions were discussed, and the patient's questions were answered prior to conclusion of this encounter. Patient verbalized understanding of preoperative instructions. After Visit Summary given.        Diarrhea improved with antibiotics now restarting has contacted Dr. Lockwood.

## 2024-12-05 LAB — STAPHYLOCOCCUS SPEC CULT: NORMAL

## 2024-12-10 ENCOUNTER — OFFICE VISIT (OUTPATIENT)
Dept: URGENT CARE | Age: 74
End: 2024-12-10
Payer: MEDICARE

## 2024-12-10 ENCOUNTER — TELEPHONE (OUTPATIENT)
Facility: CLINIC | Age: 74
End: 2024-12-10
Payer: MEDICARE

## 2024-12-10 VITALS
HEIGHT: 66 IN | HEART RATE: 100 BPM | TEMPERATURE: 97.5 F | BODY MASS INDEX: 19.29 KG/M2 | SYSTOLIC BLOOD PRESSURE: 100 MMHG | RESPIRATION RATE: 18 BRPM | DIASTOLIC BLOOD PRESSURE: 78 MMHG | OXYGEN SATURATION: 96 % | WEIGHT: 120 LBS

## 2024-12-10 DIAGNOSIS — R05.9 COUGH, UNSPECIFIED TYPE: ICD-10-CM

## 2024-12-10 LAB — POC SARS-COV-2 AG BINAX: NORMAL

## 2024-12-10 PROCEDURE — 87811 SARS-COV-2 COVID19 W/OPTIC: CPT | Performed by: NURSE PRACTITIONER

## 2024-12-10 PROCEDURE — 3050F LDL-C >= 130 MG/DL: CPT | Performed by: NURSE PRACTITIONER

## 2024-12-10 PROCEDURE — 3008F BODY MASS INDEX DOCD: CPT | Performed by: NURSE PRACTITIONER

## 2024-12-10 PROCEDURE — 1159F MED LIST DOCD IN RCRD: CPT | Performed by: NURSE PRACTITIONER

## 2024-12-10 PROCEDURE — 3078F DIAST BP <80 MM HG: CPT | Performed by: NURSE PRACTITIONER

## 2024-12-10 PROCEDURE — 99203 OFFICE O/P NEW LOW 30 MIN: CPT | Performed by: NURSE PRACTITIONER

## 2024-12-10 PROCEDURE — 3074F SYST BP LT 130 MM HG: CPT | Performed by: NURSE PRACTITIONER

## 2024-12-10 PROCEDURE — 1036F TOBACCO NON-USER: CPT | Performed by: NURSE PRACTITIONER

## 2024-12-10 PROCEDURE — 1160F RVW MEDS BY RX/DR IN RCRD: CPT | Performed by: NURSE PRACTITIONER

## 2024-12-10 RX ORDER — BROMPHENIRAMINE MALEATE, PSEUDOEPHEDRINE HYDROCHLORIDE, AND DEXTROMETHORPHAN HYDROBROMIDE 2; 30; 10 MG/5ML; MG/5ML; MG/5ML
10 SYRUP ORAL 4 TIMES DAILY PRN
Qty: 200 ML | Refills: 0 | Status: SHIPPED | OUTPATIENT
Start: 2024-12-10 | End: 2024-12-15

## 2024-12-10 ASSESSMENT — PATIENT HEALTH QUESTIONNAIRE - PHQ9
2. FEELING DOWN, DEPRESSED OR HOPELESS: NOT AT ALL
1. LITTLE INTEREST OR PLEASURE IN DOING THINGS: NOT AT ALL
SUM OF ALL RESPONSES TO PHQ9 QUESTIONS 1 & 2: 0

## 2024-12-10 ASSESSMENT — ENCOUNTER SYMPTOMS
COUGH: 1
DEPRESSION: 0

## 2024-12-10 NOTE — TELEPHONE ENCOUNTER
Per pt, she had Cdiff recently. She has no more symptoms. She has a surgery coming up this Monday and she needs to make sure she no longer has Cdiff. She is asking for a stool order, please advise.

## 2024-12-10 NOTE — PROGRESS NOTES
Subjective   Patient ID: Brigette Romo is a 74 y.o. female. They present today with a chief complaint of Cough (Started 4 days ago /Loss voice yesterday ) and Nasal Congestion (Started 2 days ago ).    History of Present Illness  Pt presents to the  with the c/o cough x4 days and nasal congestion x2 days. No fever, no sob, cp or pain with deep inspiration. No other associated symptoms or concerns.       Cough        Past Medical History  Allergies as of 12/10/2024 - Reviewed 12/10/2024   Allergen Reaction Noted    Cefepime Other 11/01/2024    Sulfa (sulfonamide antibiotics) Rash 10/01/2023       (Not in a hospital admission)       Past Medical History:   Diagnosis Date    Cancer (Multi) 8/14    Cataract     Dysphagia 06/2023    Hypothyroid     Hypothyroidism     Personal history of irradiation 03/2023    Personal history of malignant neoplasm of tongue     History of tongue cancer       Past Surgical History:   Procedure Laterality Date    CT ANGIO AORTA AND BILATERAL ILIOFEMORAL RUN OFF INCLUDING WITHOUT CONTRAST IF PERFORMED  02/03/2023    CT AORTA AND BILATERAL ILIOFEMORAL RUNOFF ANGIOGRAM W AND/OR WO IV CONTRAST 2/3/2023 DOCTOR OFFICE [facility]GAC[facility]    EYE SURGERY  01/17 and 31/2024    OTHER SURGICAL HISTORY  08/30/2016    Glossectomy    OTHER SURGICAL HISTORY  08/30/2016    Radical Neck Dissection    TRACHEOSTOMY TUBE PLACEMENT  02/2023        reports that she has never smoked. She has never used smokeless tobacco. She reports that she does not currently use alcohol. She reports that she does not use drugs.    Review of Systems  Review of Systems   Respiratory:  Positive for cough.    10 point ROS completed and all are negative other than what is stated in the current HPI                                 Objective    Vitals:    12/10/24 0918   BP: 100/78   BP Location: Left arm   Patient Position: Sitting   BP Cuff Size: Adult   Pulse: 100   Resp: 18   Temp: 36.4 °C (97.5 °F)   SpO2: 96%   Weight: 54.4 kg (120 lb)  "  Height: 1.676 m (5' 6\")     No LMP recorded (lmp unknown). Patient is postmenopausal.    Physical Exam  Vitals and nursing note reviewed.   Constitutional:       Appearance: Normal appearance.   HENT:      Nose: No congestion or rhinorrhea.      Mouth/Throat:      Mouth: Mucous membranes are moist.      Comments: (+) clear post nasal discharge  Cardiovascular:      Rate and Rhythm: Normal rate and regular rhythm.      Heart sounds: Normal heart sounds.   Pulmonary:      Effort: Pulmonary effort is normal.      Breath sounds: No wheezing or rhonchi.   Skin:     General: Skin is warm and dry.      Findings: No rash.   Neurological:      Mental Status: She is alert and oriented to person, place, and time.   Psychiatric:         Behavior: Behavior normal.         Procedures    Point of Care Test & Imaging Results from this visit  Results for orders placed or performed in visit on 12/10/24   POCT Covid-19 Rapid Antigen   Result Value Ref Range    POC ALYCE-COV-2 AG  Presumptive negative test for SARS-CoV-2 (no antigen detected)     Presumptive negative test for SARS-CoV-2 (no antigen detected)      No results found.    Diagnostic study results (if any) were reviewed by EUSEBIO Juan.    Assessment/Plan   Allergies, medications, history, and pertinent labs/EKGs/Imaging reviewed by EUSEBIO Juan.     Medical Decision Making  Acute Cough/Upper Respiratory Illness:  - Neg COVID  - Good oral hydration; avoid milk products  - Nazario's Vapor rub; humidifier; warm showers  - Take medications as prescribed  - Advised on s/s to seek emergent care for  - f/u with PCP in the next 3-5 days if no better    Orders and Diagnoses  Diagnoses and all orders for this visit:  Cough, unspecified type  -     POCT Covid-19 Rapid Antigen      Medical Admin Record      Patient disposition: Home    Electronically signed by EUSEBIO Juan  9:54 AM      "

## 2024-12-10 NOTE — PATIENT INSTRUCTIONS
Acute Cough/Upper Respiratory Illness:  - Neg COVID  - Good oral hydration; avoid milk products  - Nazario's Vapor rub; humidifier; warm showers  - Take medications as prescribed  - Advised on s/s to seek emergent care for  - f/u with PCP in the next 3-5 days if no better

## 2024-12-11 ENCOUNTER — TELEPHONE (OUTPATIENT)
Dept: OTOLARYNGOLOGY | Facility: HOSPITAL | Age: 74
End: 2024-12-11
Payer: MEDICARE

## 2024-12-11 DIAGNOSIS — J06.9 UPPER RESPIRATORY TRACT INFECTION, UNSPECIFIED TYPE: ICD-10-CM

## 2024-12-11 RX ORDER — AZITHROMYCIN 250 MG/1
TABLET, FILM COATED ORAL
Qty: 6 TABLET | Refills: 0 | Status: SHIPPED | OUTPATIENT
Start: 2024-12-11 | End: 2024-12-16

## 2024-12-11 NOTE — TELEPHONE ENCOUNTER
"Sharon called left a message at 3:01 p.m. asking if Dr. Christensen has decided on where he's taking the bone from to do the reconstruction.  The rest of her message, I couldn't understand as she sounded terrible on the phone.    I called and spoke with her and she stated she's developed congestion and wanted to know if this would impact surgery at all.  I informed her it would.  I noted she was in the urgent care yesterday.  I informed her that I would see what Dr. Christensen's thoughts were and call her back.    I messaged him the following:  \"Brigette is calling to see if you have decided if the bone is coming from her leg or scapula. CTA is done and in the system. Also, she sounds terrible on the phone. She reports having congestion and she did got to urgent care yesterday (note in system). She asked if we would still proceed with her surgery if she's not better and I informed her no. What are your thoughts? Do you want to put her on ATB as a precaution and do the surgery anyway or shall we think about rescheduling?\"    He messaged back:  \"Yes, let's give her a Z pack. The flap choice will be an interpretive decision based on how much bone and/or soft tissue we need. More likely than not, it will be a fibula.\"    I called Brigette back and informed her of Dr. Christensen's response.  She was agreeable with the plan and appreciative.  Pharmacy confirmed and prescription pended for Dr. Christensen's authorization.  "

## 2024-12-12 ENCOUNTER — HOSPITAL ENCOUNTER (EMERGENCY)
Facility: HOSPITAL | Age: 74
Discharge: HOME | End: 2024-12-12
Attending: EMERGENCY MEDICINE
Payer: MEDICARE

## 2024-12-12 ENCOUNTER — APPOINTMENT (OUTPATIENT)
Dept: RADIOLOGY | Facility: HOSPITAL | Age: 74
End: 2024-12-12
Payer: MEDICARE

## 2024-12-12 ENCOUNTER — TELEPHONE (OUTPATIENT)
Dept: OTOLARYNGOLOGY | Facility: HOSPITAL | Age: 74
End: 2024-12-12
Payer: MEDICARE

## 2024-12-12 VITALS
HEART RATE: 84 BPM | TEMPERATURE: 97.3 F | OXYGEN SATURATION: 94 % | SYSTOLIC BLOOD PRESSURE: 107 MMHG | RESPIRATION RATE: 18 BRPM | HEIGHT: 66 IN | BODY MASS INDEX: 19.29 KG/M2 | DIASTOLIC BLOOD PRESSURE: 51 MMHG | WEIGHT: 120 LBS

## 2024-12-12 DIAGNOSIS — K92.1 MELENA: ICD-10-CM

## 2024-12-12 DIAGNOSIS — R10.9 ABDOMINAL PAIN, UNSPECIFIED ABDOMINAL LOCATION: Primary | ICD-10-CM

## 2024-12-12 LAB
ABO GROUP (TYPE) IN BLOOD: NORMAL
ALBUMIN SERPL BCP-MCNC: 4.4 G/DL (ref 3.4–5)
ALP SERPL-CCNC: 98 U/L (ref 33–136)
ALT SERPL W P-5'-P-CCNC: 12 U/L (ref 7–45)
ANION GAP SERPL CALC-SCNC: 12 MMOL/L (ref 10–20)
ANTIBODY SCREEN: NORMAL
APTT PPP: 30 SECONDS (ref 27–38)
AST SERPL W P-5'-P-CCNC: 17 U/L (ref 9–39)
BASOPHILS # BLD AUTO: 0.04 X10*3/UL (ref 0–0.1)
BASOPHILS NFR BLD AUTO: 0.6 %
BILIRUB SERPL-MCNC: 0.5 MG/DL (ref 0–1.2)
BUN SERPL-MCNC: 22 MG/DL (ref 6–23)
CALCIUM SERPL-MCNC: 10.4 MG/DL (ref 8.6–10.6)
CHLORIDE SERPL-SCNC: 100 MMOL/L (ref 98–107)
CO2 SERPL-SCNC: 30 MMOL/L (ref 21–32)
CREAT SERPL-MCNC: 0.81 MG/DL (ref 0.5–1.05)
EGFRCR SERPLBLD CKD-EPI 2021: 77 ML/MIN/1.73M*2
EOSINOPHIL # BLD AUTO: 0.12 X10*3/UL (ref 0–0.4)
EOSINOPHIL NFR BLD AUTO: 1.9 %
ERYTHROCYTE [DISTWIDTH] IN BLOOD BY AUTOMATED COUNT: 12.9 % (ref 11.5–14.5)
GLUCOSE SERPL-MCNC: 94 MG/DL (ref 74–99)
HCT VFR BLD AUTO: 34 % (ref 36–46)
HCT VFR BLD AUTO: 37.6 % (ref 36–46)
HGB BLD-MCNC: 11.6 G/DL (ref 12–16)
HGB BLD-MCNC: 13 G/DL (ref 12–16)
IMM GRANULOCYTES # BLD AUTO: 0.02 X10*3/UL (ref 0–0.5)
IMM GRANULOCYTES NFR BLD AUTO: 0.3 % (ref 0–0.9)
INR PPP: 1 (ref 0.9–1.1)
LYMPHOCYTES # BLD AUTO: 0.88 X10*3/UL (ref 0.8–3)
LYMPHOCYTES NFR BLD AUTO: 14.3 %
MCH RBC QN AUTO: 31.1 PG (ref 26–34)
MCHC RBC AUTO-ENTMCNC: 34.6 G/DL (ref 32–36)
MCV RBC AUTO: 90 FL (ref 80–100)
MONOCYTES # BLD AUTO: 0.7 X10*3/UL (ref 0.05–0.8)
MONOCYTES NFR BLD AUTO: 11.4 %
NEUTROPHILS # BLD AUTO: 4.4 X10*3/UL (ref 1.6–5.5)
NEUTROPHILS NFR BLD AUTO: 71.5 %
NRBC BLD-RTO: 0 /100 WBCS (ref 0–0)
PLATELET # BLD AUTO: 256 X10*3/UL (ref 150–450)
POTASSIUM SERPL-SCNC: 4.3 MMOL/L (ref 3.5–5.3)
PROT SERPL-MCNC: 6.8 G/DL (ref 6.4–8.2)
PROTHROMBIN TIME: 11.6 SECONDS (ref 9.8–12.8)
RBC # BLD AUTO: 4.18 X10*6/UL (ref 4–5.2)
RH FACTOR (ANTIGEN D): NORMAL
SODIUM SERPL-SCNC: 138 MMOL/L (ref 136–145)
WBC # BLD AUTO: 6.2 X10*3/UL (ref 4.4–11.3)

## 2024-12-12 PROCEDURE — 36415 COLL VENOUS BLD VENIPUNCTURE: CPT

## 2024-12-12 PROCEDURE — 84075 ASSAY ALKALINE PHOSPHATASE: CPT | Performed by: EMERGENCY MEDICINE

## 2024-12-12 PROCEDURE — 99285 EMERGENCY DEPT VISIT HI MDM: CPT | Performed by: EMERGENCY MEDICINE

## 2024-12-12 PROCEDURE — 85014 HEMATOCRIT: CPT | Mod: 59

## 2024-12-12 PROCEDURE — 74177 CT ABD & PELVIS W/CONTRAST: CPT | Performed by: RADIOLOGY

## 2024-12-12 PROCEDURE — 86901 BLOOD TYPING SEROLOGIC RH(D): CPT | Performed by: PHYSICIAN ASSISTANT

## 2024-12-12 PROCEDURE — 85025 COMPLETE CBC W/AUTO DIFF WBC: CPT | Performed by: EMERGENCY MEDICINE

## 2024-12-12 PROCEDURE — 96374 THER/PROPH/DIAG INJ IV PUSH: CPT | Mod: 59

## 2024-12-12 PROCEDURE — 74177 CT ABD & PELVIS W/CONTRAST: CPT

## 2024-12-12 PROCEDURE — 2550000001 HC RX 255 CONTRASTS: Performed by: EMERGENCY MEDICINE

## 2024-12-12 PROCEDURE — 85730 THROMBOPLASTIN TIME PARTIAL: CPT | Performed by: PHYSICIAN ASSISTANT

## 2024-12-12 PROCEDURE — 99285 EMERGENCY DEPT VISIT HI MDM: CPT | Mod: 25 | Performed by: EMERGENCY MEDICINE

## 2024-12-12 PROCEDURE — 2500000004 HC RX 250 GENERAL PHARMACY W/ HCPCS (ALT 636 FOR OP/ED)

## 2024-12-12 RX ORDER — FAMOTIDINE 40 MG/5ML
20 POWDER, FOR SUSPENSION ORAL 2 TIMES DAILY
Qty: 50 ML | Refills: 0 | Status: ON HOLD | OUTPATIENT
Start: 2024-12-12 | End: 2024-12-19

## 2024-12-12 RX ORDER — PANTOPRAZOLE SODIUM 40 MG/10ML
40 INJECTION, POWDER, LYOPHILIZED, FOR SOLUTION INTRAVENOUS DAILY
Status: DISCONTINUED | OUTPATIENT
Start: 2024-12-13 | End: 2024-12-12

## 2024-12-12 RX ORDER — FAMOTIDINE 10 MG/ML
40 INJECTION INTRAVENOUS ONCE
Status: COMPLETED | OUTPATIENT
Start: 2024-12-12 | End: 2024-12-12

## 2024-12-12 RX ADMIN — IOHEXOL 75 ML: 350 INJECTION, SOLUTION INTRAVENOUS at 20:22

## 2024-12-12 RX ADMIN — FAMOTIDINE 40 MG: 10 INJECTION INTRAVENOUS at 22:08

## 2024-12-12 ASSESSMENT — PAIN SCALES - GENERAL: PAINLEVEL_OUTOF10: 5 - MODERATE PAIN

## 2024-12-12 ASSESSMENT — LIFESTYLE VARIABLES
HAVE YOU EVER FELT YOU SHOULD CUT DOWN ON YOUR DRINKING: NO
TOTAL SCORE: 0
HAVE PEOPLE ANNOYED YOU BY CRITICIZING YOUR DRINKING: NO
EVER FELT BAD OR GUILTY ABOUT YOUR DRINKING: NO
EVER HAD A DRINK FIRST THING IN THE MORNING TO STEADY YOUR NERVES TO GET RID OF A HANGOVER: NO

## 2024-12-12 ASSESSMENT — PAIN - FUNCTIONAL ASSESSMENT: PAIN_FUNCTIONAL_ASSESSMENT: 0-10

## 2024-12-12 ASSESSMENT — PAIN DESCRIPTION - LOCATION: LOCATION: ABDOMEN

## 2024-12-12 ASSESSMENT — PAIN DESCRIPTION - PAIN TYPE: TYPE: ACUTE PAIN

## 2024-12-12 ASSESSMENT — PAIN DESCRIPTION - DESCRIPTORS: DESCRIPTORS: CRAMPING

## 2024-12-12 NOTE — TELEPHONE ENCOUNTER
"Sharon called and left me a message at 11:01 a.m.  \"I think I have some internal bleeding.\"  \"I had some really dark diarrhea last night.\"    I informed her that she needed to go to the ED for evaluation.  She wanted to know if she should  go her local ED or .    I recommended that she come to [facility].  This is where her physicians are.  I stated if she get admitted Ame Oliveira can see what's going on and decide what to do since surgery is scheduled for Monday.    She stated she would go to [facility].  I messaged Ame Oliveira.  "

## 2024-12-12 NOTE — ED PROVIDER NOTES
EMERGENCY DEPARTMENT ENCOUNTER      Pt Name: Brigette Romo  MRN: [id number]  Birthdate 1950  Date of evaluation: 12/12/2024  Provider: Cayetano Yu DO    CHIEF COMPLAINT       Chief Complaint   Patient presents with    Abdominal Pain    Black or Bloody Stool         HISTORY OF PRESENT ILLNESS    Patient is a 74-year-old female with past medical history of malignant neoplasm of the tongue, hypothyroidism, dysphagia, presenting today with a chief complaint of lower abdominal cramping as well as melena.  Denies any other areas abdominal pain.  No nausea or vomiting.  No chest pain or shortness of breath.  No fevers or chills.  Denies any recent iron supplementation.  Has not had this happen to her previously.  She is not on any blood thinners.  Of note patient is planned to have child reconstructive surgery this coming Monday, 4 days in the future.          Nursing Notes were reviewed.    PAST MEDICAL HISTORY     Past Medical History:   Diagnosis Date    Cancer (Multi) 8/14    Cataract     Dysphagia 06/2023    Hypothyroid     Hypothyroidism     Personal history of irradiation 03/2023    Personal history of malignant neoplasm of tongue     History of tongue cancer         SURGICAL HISTORY       Past Surgical History:   Procedure Laterality Date    CT ANGIO AORTA AND BILATERAL ILIOFEMORAL RUN OFF INCLUDING WITHOUT CONTRAST IF PERFORMED  02/03/2023    CT AORTA AND BILATERAL ILIOFEMORAL RUNOFF ANGIOGRAM W AND/OR WO IV CONTRAST 2/3/2023 DOCTOR OFFICE [facility]    EYE SURGERY  01/17 and 31/2024    OTHER SURGICAL HISTORY  08/30/2016    Glossectomy    OTHER SURGICAL HISTORY  08/30/2016    Radical Neck Dissection    TRACHEOSTOMY TUBE PLACEMENT  02/2023         CURRENT MEDICATIONS       Current Discharge Medication List        CONTINUE these medications which have NOT CHANGED    Details   ALPRAZolam (Xanax) 0.25 mg tablet Take 1 tablet (0.25 mg) by mouth every 8 hours if needed for anxiety.  Qty: 21 tablet, Refills: 0     Associated Diagnoses: Anxiety      azithromycin (Zithromax Z-Francisco J) 250 mg tablet Take 2 tabs (500 mg) by mouth today, then 1 tab (250 mg) daily for 4 days.  Qty: 6 tablet, Refills: 0    Associated Diagnoses: Upper respiratory tract infection, unspecified type      brompheniramine-pseudoeph-DM 2-30-10 mg/5 mL syrup Take 10 mL by mouth 4 times a day as needed for congestion or cough for up to 5 days.  Qty: 200 mL, Refills: 0    Associated Diagnoses: Cough, unspecified type      chlorhexidine (Hibiclens) 4 % external liquid Use as directed daily preoperatively  Qty: 473 mL, Refills: 0    Associated Diagnoses: Osteoradionecrosis of jaw; Exposed orthopaedic hardware (CMS-HCC)      levothyroxine (Synthroid, Levoxyl) 25 mcg tablet Take 1 tablet (25 mcg) by mouth once daily in the morning. Take before meals. Take on an empty stomach with water only.  Wait 1 hour before having food, medications or beverages other than water.  OK to crush pill.  Qty: 90 tablet, Refills: 3    Associated Diagnoses: Hypothyroidism (acquired)      loperamide (Imodium A-D) 1 mg/7.5 mL liquid Take 15 mL (2 mg) by mouth 4 times a day as needed for diarrhea.  Qty: 150 mL, Refills: 0    Associated Diagnoses: Dehydration; Diarrhea of infectious origin      nutritional supplements (Osmolite 1 Sanjiv) 0.04 gram-1.06 kcal/mL liquid 1 Can by peg tube route 3 times a day.             ALLERGIES     Cefepime and Sulfa (sulfonamide antibiotics)    FAMILY HISTORY       Family History   Problem Relation Name Age of Onset    Blood clot Mother Jhonny Luna     Other (ST elevation myocardial infarction) Father Ector Luna     Diabetes Father Ector Luna     Diabetes Sister Asia     Diabetes Brother Carlos     Diabetes Sister Asia     Diabetes Brother Carlos           SOCIAL HISTORY       Social History     Socioeconomic History    Marital status: Single   Tobacco Use    Smoking status: Never    Smokeless tobacco: Never   Vaping Use    Vaping status: Never Used    Substance and Sexual Activity    Alcohol use: Not Currently    Drug use: Never    Sexual activity: Not Currently     Partners: Male     Birth control/protection: Abstinence     Social Drivers of Health     Transportation Needs: No Transportation Needs (11/1/2024)    OASIS : Transportation     Lack of Transportation (Medical): No     Lack of Transportation (Non-Medical): No     Patient Unable or Declines to Respond: No   Social Connections: Feeling Socially Integrated (11/1/2024)    OASIS : Social Isolation     Frequency of experiencing loneliness or isolation: Never       SCREENINGS                        PHYSICAL EXAM    (up to 7 for level 4, 8 or more for level 5)     ED Triage Vitals   Temperature Heart Rate Respirations BP   12/12/24 1257 12/12/24 1257 12/12/24 1257 12/12/24 1257   36.2 °C (97.2 °F) 90 16 115/57      Pulse Ox Temp Source Heart Rate Source Patient Position   12/12/24 1257 12/12/24 1257 12/12/24 1738 --   97 % Temporal Monitor       BP Location FiO2 (%)     -- --             Physical Exam  Vitals and nursing note reviewed.   Constitutional:       General: She is not in acute distress.     Appearance: Normal appearance. She is not ill-appearing or toxic-appearing.   HENT:      Head: Normocephalic and atraumatic.      Right Ear: External ear normal.      Left Ear: External ear normal.      Nose: Nose normal.   Eyes:      General:         Right eye: No discharge.         Left eye: No discharge.      Extraocular Movements: Extraocular movements intact.      Conjunctiva/sclera: Conjunctivae normal.      Pupils: Pupils are equal, round, and reactive to light.   Cardiovascular:      Rate and Rhythm: Normal rate and regular rhythm.      Pulses: Normal pulses.      Heart sounds: Normal heart sounds.   Pulmonary:      Effort: Pulmonary effort is normal.      Breath sounds: Normal breath sounds.   Abdominal:      General: There is no distension.      Palpations: Abdomen is soft. There is no mass.       Tenderness: There is abdominal tenderness in the right lower quadrant. There is no guarding or rebound.      Comments: PEG tube in place without surrounding erythema warmth or induration.   Musculoskeletal:         General: No deformity or signs of injury.   Skin:     General: Skin is warm and dry.   Neurological:      General: No focal deficit present.      Mental Status: She is alert and oriented to person, place, and time. Mental status is at baseline.   Psychiatric:         Mood and Affect: Mood normal.         Behavior: Behavior normal.          DIAGNOSTIC RESULTS     LABS:  Labs Reviewed   COMPREHENSIVE METABOLIC PANEL - Normal       Result Value    Glucose 94      Sodium 138      Potassium 4.3      Chloride 100      Bicarbonate 30      Anion Gap 12      Urea Nitrogen 22      Creatinine 0.81      eGFR 77      Calcium 10.4      Albumin 4.4      Alkaline Phosphatase 98      Total Protein 6.8      AST 17      Bilirubin, Total 0.5      ALT 12     COAGULATION SCREEN - Normal    Protime 11.6      INR 1.0      aPTT 30      Narrative:     The APTT is no longer used for monitoring Unfractionated Heparin Therapy. For monitoring Heparin Therapy, use the Heparin Assay.   CBC WITH AUTO DIFFERENTIAL    WBC 6.2      nRBC 0.0      RBC 4.18      Hemoglobin 13.0      Hematocrit 37.6      MCV 90      MCH 31.1      MCHC 34.6      RDW 12.9      Platelets 256      Neutrophils % 71.5      Immature Granulocytes %, Automated 0.3      Lymphocytes % 14.3      Monocytes % 11.4      Eosinophils % 1.9      Basophils % 0.6      Neutrophils Absolute 4.40      Immature Granulocytes Absolute, Automated 0.02      Lymphocytes Absolute 0.88      Monocytes Absolute 0.70      Eosinophils Absolute 0.12      Basophils Absolute 0.04     TYPE AND SCREEN    ABO TYPE A      Rh TYPE POS      ANTIBODY SCREEN NEG     HEMOGLOBIN AND HEMATOCRIT, BLOOD       All other labs were within normal range or not returned as of this dictation.    Imaging  CT  abdomen pelvis w IV contrast   Final Result   Liquid stool seen in the colon, correlate with history of diarrhea.        Note is made of a asymmetric potential mass left lower breast.   Recommend diagnostic workup with mammograms and ultrasound on a   nonemergent outpatient basis.        Additional findings as above including nonobstructing left renal   calculus.        MACRO:   Critical Finding:  See findings. Notification was initiated on   12/12/2024 at 9:08 pm by  Rubén Cameron.  (**-YCF-**) Instructions:        Signed by: Rubén Cameron 12/12/2024 9:08 PM   Dictation workstation:   [id number]           Procedures  Please see separate procedure documentation for further details.     EMERGENCY DEPARTMENT COURSE/MDM:   Medical Decision Making  74-year-old female with past medical history as above, presenting today with a chief complaint of lower abdominal cramping localized to the left and right lower quadrant, also complaining of 1 episode of melena today.  She states that she usually has somewhat loose light-colored stools, today her stools dark.  She is not on any blood thinners.  Patient is otherwise well-appearing on exam. Has a small amount of right lower quadrant abdominal tenderness on exam, otherwise no focal areas of tenderness.  Abdominal exam is otherwise soft.  Vital signs are stable.  Will obtain basic labs including CBC, CMP, coags, type and screen as well as a CT of the abdomen pelvis to further evaluate her symptoms.  Her differential includes not limited to diverticular bleed, peptic ulcer disease, gastritis.         Please see ED course for additional MDM.    ED Course as of 12/12/24 2215   Thu Dec 12, 2024   2213 CT scan overall unremarkable, there is findings concerning for liquid stool in the colon, otherwise no other concerning findings.  Lab work today is overall normal and reassuring.  Hemoglobin normal at 13.0.  CMP unremarkable.  Coags normal.  No concerning findings.  Patient would like  to be discharged and will follow-up on her repeat H&H through [facility].  Otherwise given a prescription for Pepcid to treat any gastritis versus peptic ulcer that may be contributing to her symptoms.  Otherwise given information to follow-up with primary care provider.  Discharged in stable condition.  [CL]      ED Course User Index  [CL] Cayetano Yu DO         Diagnoses as of 12/12/24 2215   Abdominal pain, unspecified abdominal location   Melena        CT abdomen pelvis w IV contrast   Final Result   Liquid stool seen in the colon, correlate with history of diarrhea.        Note is made of a asymmetric potential mass left lower breast.   Recommend diagnostic workup with mammograms and ultrasound on a   nonemergent outpatient basis.        Additional findings as above including nonobstructing left renal   calculus.        MACRO:   Critical Finding:  See findings. Notification was initiated on   12/12/2024 at 9:08 pm by  Rubén Cameron.  (**-YCF-**) Instructions:        Signed by: Rubén Cameron 12/12/2024 9:08 PM   Dictation workstation:   [id number]          Patient and or family in agreement and understanding of treatment plan.  All questions answered.      I reviewed the case with the attending ED physician. The attending ED physician agrees with the plan. Patient and/or patient´s representative was counseled regarding labs, imaging, likely diagnosis, and plan. All questions were answered.    ED Medications administered this visit:    Medications   iohexol (OMNIPaque) 350 mg iodine/mL solution 75 mL (75 mL intravenous Given 12/12/24 2022)   famotidine PF (Pepcid) injection 40 mg (40 mg intravenous Given 12/12/24 2208)       New Prescriptions from this visit:    Current Discharge Medication List        START taking these medications    Details   famotidine (Pepcid) 40 mg/5 mL (8 mg/mL) suspension Take 2.5 mL (20 mg) by mouth 2 times a day.  Qty: 50 mL, Refills: 0    Associated Diagnoses: Abdominal pain,  unspecified abdominal location; Melena             Follow-up:  [place] Read [place]  [address] [facility]  [address]  [place] [id number] [phone]    Schedule an appointment as soon as possible for a visit           Final Impression:   1. Abdominal pain, unspecified abdominal location    2. Melena          (Please note that portions of this note were completed with a voice recognition program.  Efforts were made to edit the dictations but occasionally words are mis-transcribed.)     Cayetano Yu DO  Resident  12/12/24 1643

## 2024-12-13 ENCOUNTER — ANESTHESIA EVENT (OUTPATIENT)
Dept: OPERATING ROOM | Facility: HOSPITAL | Age: 74
End: 2024-12-13
Payer: MEDICARE

## 2024-12-13 ENCOUNTER — TELEPHONE (OUTPATIENT)
Dept: OTOLARYNGOLOGY | Facility: HOSPITAL | Age: 74
End: 2024-12-13
Payer: MEDICARE

## 2024-12-13 NOTE — DISCHARGE INSTRUCTIONS
You were evaluated in the Emergency Department today for abdominal pain and dark stools, which is most likely due to irritation of the lining of your stomach.  I have given you a prescription for Pepcid to help treat your symptoms at home.  Please take this as prescribed.  Avoid spicy or acidic foods.    Please follow up with your primary care physician within two days.    Return to the Emergency Department if you experience shortness of breath, worsening or uncontrolled abdominal or chest pain, headache, light headedness, feeling faint, nausea, vomiting, bloody vomit or stools, black tarry stools, or any other concerning symptoms.    Thank you for choosing us for your care.

## 2024-12-13 NOTE — PROGRESS NOTES
Pharmacy Medication History Review    Brigette Romo is a 74 y.o. female who is planned to be admitted for No Principal Problem: There is no principal problem currently on the Problem List. Please update the Problem List and refresh.. Pharmacy called the patient prior to their scheduled procedure and reviewed the patient's zncjp-al-cyekiyhgc medications for accuracy.    Medications ADDED:  none  Medications CHANGED:  none  Medications REMOVED:   Vancomycin 50mg/ml    Please review updated prior to admission medication list and comments regarding how patient may be taking medications differently by going to Admission tab --> Admission Orders --> Admit Orders / Review prior to admission medications.     Preferred pharmacy, last doses of medications, and allergies to be confirmed with patient by nursing the day of procedure.     Sources used to complete the med history include:  [facility]  Pharmacy dispense history  Patient interview  Chart Review  Care Everywhere     Below are additional concerns with the patient's PTA list.  Patient states they finished therapy of vancomycin 50mg/ml      Vikki Leon    [facility]  Please reach out via Secure Chat for questions

## 2024-12-16 ENCOUNTER — HOSPITAL ENCOUNTER (INPATIENT)
Facility: HOSPITAL | Age: 74
DRG: 012 | End: 2024-12-16
Attending: OTOLARYNGOLOGY | Admitting: OTOLARYNGOLOGY
Payer: MEDICARE

## 2024-12-16 ENCOUNTER — ANESTHESIA (OUTPATIENT)
Dept: OPERATING ROOM | Facility: HOSPITAL | Age: 74
End: 2024-12-16
Payer: MEDICARE

## 2024-12-16 DIAGNOSIS — T84.498A EXPOSED ORTHOPAEDIC HARDWARE (CMS-HCC): ICD-10-CM

## 2024-12-16 DIAGNOSIS — E03.9 HYPOTHYROIDISM (ACQUIRED): ICD-10-CM

## 2024-12-16 DIAGNOSIS — M27.2 OSTEORADIONECROSIS OF JAW: Primary | ICD-10-CM

## 2024-12-16 DIAGNOSIS — K92.1 MELENA: ICD-10-CM

## 2024-12-16 DIAGNOSIS — R10.9 ABDOMINAL PAIN, UNSPECIFIED ABDOMINAL LOCATION: ICD-10-CM
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
## 2024-12-16 DIAGNOSIS — E86.0 DEHYDRATION: ICD-10-CM

## 2024-12-16 DIAGNOSIS — A09 DIARRHEA OF INFECTIOUS ORIGIN: ICD-10-CM

## 2024-12-16 LAB
ALBUMIN SERPL BCP-MCNC: 3.7 G/DL (ref 3.4–5)
ANION GAP BLDA CALCULATED.4IONS-SCNC: 9 MMO/L (ref 10–25)
ANION GAP SERPL CALC-SCNC: 13 MMOL/L (ref 10–20)
BASE EXCESS BLDA CALC-SCNC: 0.3 MMOL/L (ref -2–3)
BODY TEMPERATURE: 37 DEGREES CELSIUS
BUN SERPL-MCNC: 15 MG/DL (ref 6–23)
CA-I BLDA-SCNC: 1.33 MMOL/L (ref 1.1–1.33)
CALCIUM SERPL-MCNC: 9.2 MG/DL (ref 8.6–10.6)
CHLORIDE BLDA-SCNC: 108 MMOL/L (ref 98–107)
CHLORIDE SERPL-SCNC: 108 MMOL/L (ref 98–107)
CO2 SERPL-SCNC: 24 MMOL/L (ref 21–32)
CREAT SERPL-MCNC: 0.73 MG/DL (ref 0.5–1.05)
EGFRCR SERPLBLD CKD-EPI 2021: 88 ML/MIN/1.73M*2
ERYTHROCYTE [DISTWIDTH] IN BLOOD BY AUTOMATED COUNT: 13.1 % (ref 11.5–14.5)
GLUCOSE BLDA-MCNC: 145 MG/DL (ref 74–99)
GLUCOSE SERPL-MCNC: 141 MG/DL (ref 74–99)
HCO3 BLDA-SCNC: 25.4 MMOL/L (ref 22–26)
HCT VFR BLD AUTO: 29.7 % (ref 36–46)
HCT VFR BLD EST: 28 % (ref 36–46)
HGB BLD-MCNC: 9.6 G/DL (ref 12–16)
HGB BLDA-MCNC: 9.3 G/DL (ref 12–16)
INHALED O2 CONCENTRATION: 50 %
LACTATE BLDA-SCNC: 0.9 MMOL/L (ref 0.4–2)
MAGNESIUM SERPL-MCNC: 2.03 MG/DL (ref 1.6–2.4)
MCH RBC QN AUTO: 31.6 PG (ref 26–34)
MCHC RBC AUTO-ENTMCNC: 32.3 G/DL (ref 32–36)
MCV RBC AUTO: 98 FL (ref 80–100)
NRBC BLD-RTO: 0 /100 WBCS (ref 0–0)
OXYHGB MFR BLDA: 97.9 % (ref 94–98)
PCO2 BLDA: 42 MM HG (ref 38–42)
PH BLDA: 7.39 PH (ref 7.38–7.42)
PHOSPHATE SERPL-MCNC: 3 MG/DL (ref 2.5–4.9)
PLATELET # BLD AUTO: 201 X10*3/UL (ref 150–450)
PO2 BLDA: 211 MM HG (ref 85–95)
POTASSIUM BLDA-SCNC: 3.8 MMOL/L (ref 3.5–5.3)
POTASSIUM SERPL-SCNC: 3.9 MMOL/L (ref 3.5–5.3)
RBC # BLD AUTO: 3.04 X10*6/UL (ref 4–5.2)
SAO2 % BLDA: 100 % (ref 94–100)
SODIUM BLDA-SCNC: 139 MMOL/L (ref 136–145)
SODIUM SERPL-SCNC: 141 MMOL/L (ref 136–145)
WBC # BLD AUTO: 15.5 X10*3/UL (ref 4.4–11.3)

## 2024-12-16 PROCEDURE — 12052 INTMD RPR FACE/MM 2.6-5.0 CM: CPT | Performed by: OTOLARYNGOLOGY

## 2024-12-16 PROCEDURE — 1200000003 HC ONCOLOGY  ROOM WITH TELEMETRY DAILY

## 2024-12-16 PROCEDURE — 2500000005 HC RX 250 GENERAL PHARMACY W/O HCPCS

## 2024-12-16 PROCEDURE — 3600000009 HC OR TIME - EACH INCREMENTAL 1 MINUTE - PROCEDURE LEVEL FOUR: Performed by: OTOLARYNGOLOGY

## 2024-12-16 PROCEDURE — 99100 ANES PT EXTEME AGE<1 YR&>70: CPT | Performed by: ANESTHESIOLOGY

## 2024-12-16 PROCEDURE — 2500000001 HC RX 250 WO HCPCS SELF ADMINISTERED DRUGS (ALT 637 FOR MEDICARE OP): Performed by: OTOLARYNGOLOGY

## 2024-12-16 PROCEDURE — 36415 COLL VENOUS BLD VENIPUNCTURE: CPT | Performed by: ANESTHESIOLOGY

## 2024-12-16 PROCEDURE — 2500000004 HC RX 250 GENERAL PHARMACY W/ HCPCS (ALT 636 FOR OP/ED)

## 2024-12-16 PROCEDURE — 0NRV07Z REPLACEMENT OF LEFT MANDIBLE WITH AUTOLOGOUS TISSUE SUBSTITUTE, OPEN APPROACH: ICD-10-PCS | Performed by: OTOLARYNGOLOGY

## 2024-12-16 PROCEDURE — 82435 ASSAY OF BLOOD CHLORIDE: CPT

## 2024-12-16 PROCEDURE — 7100000002 HC RECOVERY ROOM TIME - EACH INCREMENTAL 1 MINUTE: Performed by: OTOLARYNGOLOGY

## 2024-12-16 PROCEDURE — 0KBS0ZZ EXCISION OF RIGHT LOWER LEG MUSCLE, OPEN APPROACH: ICD-10-PCS | Performed by: OTOLARYNGOLOGY

## 2024-12-16 PROCEDURE — 87102 FUNGUS ISOLATION CULTURE: CPT | Performed by: OTOLARYNGOLOGY

## 2024-12-16 PROCEDURE — 21045 EXTENSIVE JAW SURGERY: CPT | Performed by: OTOLARYNGOLOGY

## 2024-12-16 PROCEDURE — 21245 RECONSTRUCTION OF JAW: CPT | Performed by: OTOLARYNGOLOGY

## 2024-12-16 PROCEDURE — 88304 TISSUE EXAM BY PATHOLOGIST: CPT | Mod: TC,SUR | Performed by: OTOLARYNGOLOGY

## 2024-12-16 PROCEDURE — 2500000005 HC RX 250 GENERAL PHARMACY W/O HCPCS: Performed by: OTOLARYNGOLOGY

## 2024-12-16 PROCEDURE — 7100000001 HC RECOVERY ROOM TIME - INITIAL BASE CHARGE: Performed by: OTOLARYNGOLOGY

## 2024-12-16 PROCEDURE — 0QBJ0ZZ EXCISION OF RIGHT FIBULA, OPEN APPROACH: ICD-10-PCS | Performed by: OTOLARYNGOLOGY

## 2024-12-16 PROCEDURE — 0HRKX74 REPLACEMENT OF RIGHT LOWER LEG SKIN WITH AUTOLOGOUS TISSUE SUBSTITUTE, PARTIAL THICKNESS, EXTERNAL APPROACH: ICD-10-PCS | Performed by: OTOLARYNGOLOGY

## 2024-12-16 PROCEDURE — A35701 PR EXPLORATION, CAROTID ARTERY: Performed by: ANESTHESIOLOGY

## 2024-12-16 PROCEDURE — 3600000004 HC OR TIME - INITIAL BASE CHARGE - PROCEDURE LEVEL FOUR: Performed by: OTOLARYNGOLOGY

## 2024-12-16 PROCEDURE — C1713 ANCHOR/SCREW BN/BN,TIS/BN: HCPCS | Performed by: OTOLARYNGOLOGY

## 2024-12-16 PROCEDURE — 0B113F4 BYPASS TRACHEA TO CUTANEOUS WITH TRACHEOSTOMY DEVICE, PERCUTANEOUS APPROACH: ICD-10-PCS | Performed by: OTOLARYNGOLOGY

## 2024-12-16 PROCEDURE — P9045 ALBUMIN (HUMAN), 5%, 250 ML: HCPCS | Mod: JZ | Performed by: STUDENT IN AN ORGANIZED HEALTH CARE EDUCATION/TRAINING PROGRAM

## 2024-12-16 PROCEDURE — 85027 COMPLETE CBC AUTOMATED: CPT

## 2024-12-16 PROCEDURE — 2500000004 HC RX 250 GENERAL PHARMACY W/ HCPCS (ALT 636 FOR OP/ED): Performed by: OTOLARYNGOLOGY

## 2024-12-16 PROCEDURE — 40845 RECONSTRUCTION OF MOUTH: CPT | Performed by: OTOLARYNGOLOGY

## 2024-12-16 PROCEDURE — 2720000007 HC OR 272 NO HCPCS: Performed by: OTOLARYNGOLOGY

## 2024-12-16 PROCEDURE — C1889 IMPLANT/INSERT DEVICE, NOC: HCPCS | Performed by: OTOLARYNGOLOGY

## 2024-12-16 PROCEDURE — 20680 REMOVAL OF IMPLANT DEEP: CPT | Performed by: OTOLARYNGOLOGY

## 2024-12-16 PROCEDURE — 80069 RENAL FUNCTION PANEL: CPT | Performed by: STUDENT IN AN ORGANIZED HEALTH CARE EDUCATION/TRAINING PROGRAM

## 2024-12-16 PROCEDURE — 2060000001 HC INTERMEDIATE ICU ROOM DAILY

## 2024-12-16 PROCEDURE — 20955 FIBULA BONE GRAFT MICROVASC: CPT | Performed by: STUDENT IN AN ORGANIZED HEALTH CARE EDUCATION/TRAINING PROGRAM

## 2024-12-16 PROCEDURE — 83735 ASSAY OF MAGNESIUM: CPT

## 2024-12-16 PROCEDURE — 2500000005 HC RX 250 GENERAL PHARMACY W/O HCPCS: Performed by: STUDENT IN AN ORGANIZED HEALTH CARE EDUCATION/TRAINING PROGRAM

## 2024-12-16 PROCEDURE — A4312 CATH W/O BAG 2-WAY SILICONE: HCPCS | Performed by: OTOLARYNGOLOGY

## 2024-12-16 PROCEDURE — 40845 RECONSTRUCTION OF MOUTH: CPT | Performed by: STUDENT IN AN ORGANIZED HEALTH CARE EDUCATION/TRAINING PROGRAM

## 2024-12-16 PROCEDURE — 35701 EXPL N/FLWD SURG NECK ART: CPT | Performed by: OTOLARYNGOLOGY

## 2024-12-16 PROCEDURE — 2500000004 HC RX 250 GENERAL PHARMACY W/ HCPCS (ALT 636 FOR OP/ED): Performed by: STUDENT IN AN ORGANIZED HEALTH CARE EDUCATION/TRAINING PROGRAM

## 2024-12-16 PROCEDURE — 31600 PLANNED TRACHEOSTOMY: CPT | Performed by: OTOLARYNGOLOGY

## 2024-12-16 PROCEDURE — 3700000002 HC GENERAL ANESTHESIA TIME - EACH INCREMENTAL 1 MINUTE: Performed by: OTOLARYNGOLOGY

## 2024-12-16 PROCEDURE — 87075 CULTR BACTERIA EXCEPT BLOOD: CPT | Performed by: OTOLARYNGOLOGY

## 2024-12-16 PROCEDURE — 15120 SPLT AGRFT F/S/N/H/F/G/M 1ST: CPT | Performed by: OTOLARYNGOLOGY

## 2024-12-16 PROCEDURE — 3700000001 HC GENERAL ANESTHESIA TIME - INITIAL BASE CHARGE: Performed by: OTOLARYNGOLOGY

## 2024-12-16 PROCEDURE — 0HR4X74 REPLACEMENT OF NECK SKIN WITH AUTOLOGOUS TISSUE SUBSTITUTE, PARTIAL THICKNESS, EXTERNAL APPROACH: ICD-10-PCS | Performed by: OTOLARYNGOLOGY

## 2024-12-16 PROCEDURE — 0CDXXZ1 EXTRACTION OF LOWER TOOTH, MULTIPLE, EXTERNAL APPROACH: ICD-10-PCS | Performed by: OTOLARYNGOLOGY

## 2024-12-16 PROCEDURE — 0NBV0ZZ EXCISION OF LEFT MANDIBLE, OPEN APPROACH: ICD-10-PCS | Performed by: OTOLARYNGOLOGY

## 2024-12-16 PROCEDURE — 2780000003 HC OR 278 NO HCPCS: Performed by: OTOLARYNGOLOGY

## 2024-12-16 PROCEDURE — 20955 FIBULA BONE GRAFT MICROVASC: CPT | Performed by: OTOLARYNGOLOGY

## 2024-12-16 PROCEDURE — 0NPW04Z REMOVAL OF INTERNAL FIXATION DEVICE FROM FACIAL BONE, OPEN APPROACH: ICD-10-PCS | Performed by: OTOLARYNGOLOGY

## 2024-12-16 PROCEDURE — 36620 INSERTION CATHETER ARTERY: CPT | Performed by: STUDENT IN AN ORGANIZED HEALTH CARE EDUCATION/TRAINING PROGRAM

## 2024-12-16 PROCEDURE — 37799 UNLISTED PX VASCULAR SURGERY: CPT

## 2024-12-16 DEVICE — THE GEM MICROVASCULAR ANASTOMOTIC COUPLER DEVICE RINGS ARE MADE OF POLYETHYLENE AND SURGICAL GRADE STAINLESS STEEL PINS. A PROTECTIVE COVER AND JAW ASSEMBLY PROTECT THE RINGS AND ALLOW FOR EASY LOADING ONTO THE ANASTOMOTIC INSTRUMENT. BOTH THE PROTECTIVE COVER AND JAW ASSEMBLY ARE DISPOSABLE. THE GEM MICROVASCULAR ANASTOMOTIC COUPLER DEVICE IS SINGLE-USE AND AVAILABLE IN VARIOUS SIZES
Type: IMPLANTABLE DEVICE | Site: NECK | Status: FUNCTIONAL
Brand: GEM MICROVASCULAR ANASTOMOTIC COUPLER

## 2024-12-16 DEVICE — SCREW, LOCK 2.0 X 13 MAXDRV TI ALLOY: Type: IMPLANTABLE DEVICE | Site: MANDIBLE | Status: FUNCTIONAL

## 2024-12-16 DEVICE — SCREW, LOCK 2.0 X 7 MAXDRV TI ALLOY: Type: IMPLANTABLE DEVICE | Site: MANDIBLE | Status: FUNCTIONAL

## 2024-12-16 DEVICE — IMPLANTABLE DEVICE: Type: IMPLANTABLE DEVICE | Site: MANDIBLE | Status: FUNCTIONAL

## 2024-12-16 DEVICE — SCREW, LOCK 2.0 X 11 MAXDRV TI ALLOY: Type: IMPLANTABLE DEVICE | Site: MANDIBLE | Status: FUNCTIONAL

## 2024-12-16 DEVICE — SCREW, LOCK 2.0 X 15 MAXDRV TI ALLOY: Type: IMPLANTABLE DEVICE | Site: MANDIBLE | Status: FUNCTIONAL

## 2024-12-16 DEVICE — SCREW, LOCK 2.0 X 9 MAXDRV TI ALLOY: Type: IMPLANTABLE DEVICE | Site: MANDIBLE | Status: FUNCTIONAL

## 2024-12-16 RX ORDER — HYDROMORPHONE HYDROCHLORIDE 0.2 MG/ML
0.1 INJECTION INTRAMUSCULAR; INTRAVENOUS; SUBCUTANEOUS EVERY 5 MIN PRN
Status: CANCELLED | OUTPATIENT
Start: 2024-12-16

## 2024-12-16 RX ORDER — ONDANSETRON HYDROCHLORIDE 2 MG/ML
INJECTION, SOLUTION INTRAVENOUS AS NEEDED
Status: DISCONTINUED | OUTPATIENT
Start: 2024-12-16 | End: 2024-12-16

## 2024-12-16 RX ORDER — BACITRACIN 500 [USP'U]/G
OINTMENT TOPICAL AS NEEDED
Status: DISCONTINUED | OUTPATIENT
Start: 2024-12-16 | End: 2024-12-16 | Stop reason: HOSPADM

## 2024-12-16 RX ORDER — OXYCODONE HYDROCHLORIDE 5 MG/1
5 TABLET ORAL EVERY 4 HOURS PRN
Status: CANCELLED | OUTPATIENT
Start: 2024-12-16

## 2024-12-16 RX ORDER — ONDANSETRON HYDROCHLORIDE 2 MG/ML
4 INJECTION, SOLUTION INTRAVENOUS EVERY 8 HOURS PRN
Status: DISCONTINUED | OUTPATIENT
Start: 2024-12-16 | End: 2024-12-23 | Stop reason: HOSPADM

## 2024-12-16 RX ORDER — BACITRACIN ZINC 500 UNIT/G
OINTMENT IN PACKET (EA) TOPICAL 3 TIMES DAILY
Status: COMPLETED | OUTPATIENT
Start: 2024-12-16 | End: 2024-12-18

## 2024-12-16 RX ORDER — ALBUMIN HUMAN 50 G/1000ML
SOLUTION INTRAVENOUS AS NEEDED
Status: DISCONTINUED | OUTPATIENT
Start: 2024-12-16 | End: 2024-12-16

## 2024-12-16 RX ORDER — FENTANYL CITRATE 50 UG/ML
INJECTION, SOLUTION INTRAMUSCULAR; INTRAVENOUS CONTINUOUS PRN
Status: DISCONTINUED | OUTPATIENT
Start: 2024-12-16 | End: 2024-12-16

## 2024-12-16 RX ORDER — SODIUM CHLORIDE, SODIUM LACTATE, POTASSIUM CHLORIDE, CALCIUM CHLORIDE 600; 310; 30; 20 MG/100ML; MG/100ML; MG/100ML; MG/100ML
INJECTION, SOLUTION INTRAVENOUS CONTINUOUS PRN
Status: DISCONTINUED | OUTPATIENT
Start: 2024-12-16 | End: 2024-12-16

## 2024-12-16 RX ORDER — OXYCODONE HYDROCHLORIDE 5 MG/1
10 TABLET ORAL EVERY 4 HOURS PRN
Status: CANCELLED | OUTPATIENT
Start: 2024-12-16

## 2024-12-16 RX ORDER — ACETAMINOPHEN 160 MG/5ML
1000 SOLUTION ORAL EVERY 8 HOURS SCHEDULED
Status: DISCONTINUED | OUTPATIENT
Start: 2024-12-16 | End: 2024-12-23 | Stop reason: HOSPADM

## 2024-12-16 RX ORDER — LIDOCAINE HYDROCHLORIDE AND EPINEPHRINE 10; 10 UG/ML; MG/ML
INJECTION, SOLUTION INFILTRATION; PERINEURAL AS NEEDED
Status: DISCONTINUED | OUTPATIENT
Start: 2024-12-16 | End: 2024-12-16 | Stop reason: HOSPADM

## 2024-12-16 RX ORDER — LIDOCAINE HYDROCHLORIDE 20 MG/ML
INJECTION, SOLUTION INFILTRATION; PERINEURAL AS NEEDED
Status: DISCONTINUED | OUTPATIENT
Start: 2024-12-16 | End: 2024-12-16 | Stop reason: HOSPADM

## 2024-12-16 RX ORDER — HYDROMORPHONE HYDROCHLORIDE 0.2 MG/ML
0.2 INJECTION INTRAMUSCULAR; INTRAVENOUS; SUBCUTANEOUS EVERY 5 MIN PRN
Status: DISCONTINUED | OUTPATIENT
Start: 2024-12-16 | End: 2024-12-16 | Stop reason: HOSPADM

## 2024-12-16 RX ORDER — WATER 1 ML/ML
IRRIGANT IRRIGATION AS NEEDED
Status: DISCONTINUED | OUTPATIENT
Start: 2024-12-16 | End: 2024-12-16 | Stop reason: HOSPADM

## 2024-12-16 RX ORDER — SODIUM CHLORIDE, SODIUM LACTATE, POTASSIUM CHLORIDE, CALCIUM CHLORIDE 600; 310; 30; 20 MG/100ML; MG/100ML; MG/100ML; MG/100ML
100 INJECTION, SOLUTION INTRAVENOUS CONTINUOUS
Status: DISCONTINUED | OUTPATIENT
Start: 2024-12-16 | End: 2024-12-16 | Stop reason: HOSPADM

## 2024-12-16 RX ORDER — LIDOCAINE HCL/PF 100 MG/5ML
SYRINGE (ML) INTRAVENOUS AS NEEDED
Status: DISCONTINUED | OUTPATIENT
Start: 2024-12-16 | End: 2024-12-16

## 2024-12-16 RX ORDER — ROCURONIUM BROMIDE 10 MG/ML
INJECTION, SOLUTION INTRAVENOUS AS NEEDED
Status: DISCONTINUED | OUTPATIENT
Start: 2024-12-16 | End: 2024-12-16

## 2024-12-16 RX ORDER — HYDROMORPHONE HYDROCHLORIDE 1 MG/ML
INJECTION, SOLUTION INTRAMUSCULAR; INTRAVENOUS; SUBCUTANEOUS AS NEEDED
Status: DISCONTINUED | OUTPATIENT
Start: 2024-12-16 | End: 2024-12-16

## 2024-12-16 RX ORDER — PETROLATUM 420 MG/G
1 OINTMENT TOPICAL 3 TIMES DAILY
Status: DISCONTINUED | OUTPATIENT
Start: 2024-12-18 | End: 2024-12-23 | Stop reason: HOSPADM

## 2024-12-16 RX ORDER — SENNOSIDES 8.8 MG/5ML
10 LIQUID ORAL 2 TIMES DAILY
Status: DISCONTINUED | OUTPATIENT
Start: 2024-12-16 | End: 2024-12-23

## 2024-12-16 RX ORDER — ESOMEPRAZOLE MAGNESIUM 40 MG/1
40 GRANULE, DELAYED RELEASE ORAL
Status: DISCONTINUED | OUTPATIENT
Start: 2024-12-17 | End: 2024-12-23 | Stop reason: HOSPADM

## 2024-12-16 RX ORDER — ENOXAPARIN SODIUM 100 MG/ML
40 INJECTION SUBCUTANEOUS EVERY 24 HOURS
Status: DISCONTINUED | OUTPATIENT
Start: 2024-12-16 | End: 2024-12-23 | Stop reason: HOSPADM

## 2024-12-16 RX ORDER — HYDROGEN PEROXIDE 3 %
1 SOLUTION, NON-ORAL MISCELLANEOUS 3 TIMES DAILY
Status: DISCONTINUED | OUTPATIENT
Start: 2024-12-16 | End: 2024-12-23 | Stop reason: HOSPADM

## 2024-12-16 RX ORDER — NALOXONE HYDROCHLORIDE 0.4 MG/ML
0.2 INJECTION, SOLUTION INTRAMUSCULAR; INTRAVENOUS; SUBCUTANEOUS EVERY 5 MIN PRN
Status: DISCONTINUED | OUTPATIENT
Start: 2024-12-16 | End: 2024-12-23 | Stop reason: HOSPADM

## 2024-12-16 RX ORDER — SODIUM CHLORIDE 0.9 G/100ML
IRRIGANT IRRIGATION AS NEEDED
Status: DISCONTINUED | OUTPATIENT
Start: 2024-12-16 | End: 2024-12-16 | Stop reason: HOSPADM

## 2024-12-16 RX ORDER — SODIUM CHLORIDE 9 MG/ML
70 INJECTION, SOLUTION INTRAVENOUS CONTINUOUS
Status: DISCONTINUED | OUTPATIENT
Start: 2024-12-16 | End: 2024-12-18

## 2024-12-16 RX ORDER — HYDROMORPHONE HCL/0.9% NACL/PF 15 MG/30ML
PATIENT CONTROLLED ANALGESIA SYRINGE INTRAVENOUS CONTINUOUS
Status: DISCONTINUED | OUTPATIENT
Start: 2024-12-16 | End: 2024-12-18

## 2024-12-16 RX ORDER — PROPOFOL 10 MG/ML
INJECTION, EMULSION INTRAVENOUS AS NEEDED
Status: DISCONTINUED | OUTPATIENT
Start: 2024-12-16 | End: 2024-12-16

## 2024-12-16 RX ORDER — PHENYLEPHRINE 10 MG/250 ML(40 MCG/ML)IN 0.9 % SOD.CHLORIDE INTRAVENOUS
CONTINUOUS PRN
Status: DISCONTINUED | OUTPATIENT
Start: 2024-12-16 | End: 2024-12-16

## 2024-12-16 RX ORDER — ONDANSETRON 4 MG/1
4 TABLET, FILM COATED ORAL EVERY 8 HOURS PRN
Status: DISCONTINUED | OUTPATIENT
Start: 2024-12-16 | End: 2024-12-23 | Stop reason: HOSPADM

## 2024-12-16 RX ORDER — ONDANSETRON HYDROCHLORIDE 2 MG/ML
4 INJECTION, SOLUTION INTRAVENOUS ONCE AS NEEDED
Status: DISCONTINUED | OUTPATIENT
Start: 2024-12-16 | End: 2024-12-16 | Stop reason: HOSPADM

## 2024-12-16 RX ORDER — ASPIRIN 300 MG/1
SUPPOSITORY RECTAL AS NEEDED
Status: DISCONTINUED | OUTPATIENT
Start: 2024-12-16 | End: 2024-12-16 | Stop reason: HOSPADM

## 2024-12-16 RX ORDER — ASPIRIN 325 MG
325 TABLET ORAL DAILY
Status: DISCONTINUED | OUTPATIENT
Start: 2024-12-16 | End: 2024-12-23 | Stop reason: HOSPADM

## 2024-12-16 RX ORDER — ACETAMINOPHEN 325 MG/1
975 TABLET ORAL EVERY 8 HOURS SCHEDULED
Status: DISCONTINUED | OUTPATIENT
Start: 2024-12-16 | End: 2024-12-23 | Stop reason: HOSPADM

## 2024-12-16 RX ORDER — ACETAMINOPHEN 325 MG/1
650 TABLET ORAL EVERY 4 HOURS PRN
Status: CANCELLED | OUTPATIENT
Start: 2024-12-16

## 2024-12-16 RX ORDER — LIDOCAINE HYDROCHLORIDE 10 MG/ML
0.1 INJECTION, SOLUTION INFILTRATION; PERINEURAL ONCE
Status: DISCONTINUED | OUTPATIENT
Start: 2024-12-16 | End: 2024-12-16 | Stop reason: HOSPADM

## 2024-12-16 RX ORDER — ESMOLOL HYDROCHLORIDE 10 MG/ML
INJECTION INTRAVENOUS AS NEEDED
Status: DISCONTINUED | OUTPATIENT
Start: 2024-12-16 | End: 2024-12-16

## 2024-12-16 RX ORDER — POLYETHYLENE GLYCOL 3350 17 G/17G
17 POWDER, FOR SOLUTION ORAL DAILY
Status: DISCONTINUED | OUTPATIENT
Start: 2024-12-16 | End: 2024-12-19

## 2024-12-16 RX ORDER — LEVOTHYROXINE SODIUM 25 UG/1
25 TABLET ORAL
Status: DISCONTINUED | OUTPATIENT
Start: 2024-12-17 | End: 2024-12-23 | Stop reason: HOSPADM

## 2024-12-16 RX ORDER — AMPICILLIN AND SULBACTAM 1; .5 G/1; G/1
INJECTION, POWDER, FOR SOLUTION INTRAMUSCULAR; INTRAVENOUS AS NEEDED
Status: DISCONTINUED | OUTPATIENT
Start: 2024-12-16 | End: 2024-12-16

## 2024-12-16 RX ORDER — REMIFENTANIL HYDROCHLORIDE 1 MG/ML
INJECTION, POWDER, LYOPHILIZED, FOR SOLUTION INTRAVENOUS AS NEEDED
Status: DISCONTINUED | OUTPATIENT
Start: 2024-12-16 | End: 2024-12-16

## 2024-12-16 RX ORDER — CHLORHEXIDINE GLUCONATE ORAL RINSE 1.2 MG/ML
15 SOLUTION DENTAL
Status: DISCONTINUED | OUTPATIENT
Start: 2024-12-17 | End: 2024-12-23 | Stop reason: HOSPADM

## 2024-12-16 RX ORDER — MIDAZOLAM HYDROCHLORIDE 1 MG/ML
INJECTION INTRAMUSCULAR; INTRAVENOUS AS NEEDED
Status: DISCONTINUED | OUTPATIENT
Start: 2024-12-16 | End: 2024-12-16

## 2024-12-16 RX ORDER — PHENYLEPHRINE HCL IN 0.9% NACL 1 MG/10 ML
SYRINGE (ML) INTRAVENOUS AS NEEDED
Status: DISCONTINUED | OUTPATIENT
Start: 2024-12-16 | End: 2024-12-16

## 2024-12-16 SDOH — HEALTH STABILITY: MENTAL HEALTH: CURRENT SMOKER: 0

## 2024-12-16 ASSESSMENT — COGNITIVE AND FUNCTIONAL STATUS - GENERAL
MOVING TO AND FROM BED TO CHAIR: A LITTLE
DAILY ACTIVITIY SCORE: 24
TURNING FROM BACK TO SIDE WHILE IN FLAT BAD: A LITTLE
MOVING FROM LYING ON BACK TO SITTING ON SIDE OF FLAT BED WITH BEDRAILS: A LITTLE
MOBILITY SCORE: 18
STANDING UP FROM CHAIR USING ARMS: A LITTLE
CLIMB 3 TO 5 STEPS WITH RAILING: A LITTLE
PATIENT BASELINE BEDBOUND: NO
WALKING IN HOSPITAL ROOM: A LITTLE

## 2024-12-16 ASSESSMENT — PAIN SCALES - GENERAL
PAINLEVEL_OUTOF10: 8
PAINLEVEL_OUTOF10: 0 - NO PAIN
PAINLEVEL_OUTOF10: 7
PAINLEVEL_OUTOF10: 0 - NO PAIN
PAINLEVEL_OUTOF10: 0 - NO PAIN
PAIN_LEVEL: 5

## 2024-12-16 ASSESSMENT — PAIN - FUNCTIONAL ASSESSMENT
PAIN_FUNCTIONAL_ASSESSMENT: UNABLE TO SELF-REPORT
PAIN_FUNCTIONAL_ASSESSMENT: 0-10
PAIN_FUNCTIONAL_ASSESSMENT: UNABLE TO SELF-REPORT
PAIN_FUNCTIONAL_ASSESSMENT: UNABLE TO SELF-REPORT
PAIN_FUNCTIONAL_ASSESSMENT: 0-10

## 2024-12-16 ASSESSMENT — ACTIVITIES OF DAILY LIVING (ADL)
PATIENT'S MEMORY ADEQUATE TO SAFELY COMPLETE DAILY ACTIVITIES?: YES
BATHING: INDEPENDENT
DRESSING YOURSELF: INDEPENDENT
HEARING - RIGHT EAR: FUNCTIONAL
HEARING - LEFT EAR: FUNCTIONAL
GROOMING: INDEPENDENT
ADEQUATE_TO_COMPLETE_ADL: YES
FEEDING YOURSELF: INDEPENDENT
TOILETING: INDEPENDENT
JUDGMENT_ADEQUATE_SAFELY_COMPLETE_DAILY_ACTIVITIES: YES
WALKS IN HOME: INDEPENDENT

## 2024-12-16 NOTE — ANESTHESIA POSTPROCEDURE EVALUATION
Patient: Brigette Romo    Procedure Summary       Date: 12/16/24 Room / Location: [facility] OR 03 / [facility] OR    Anesthesia Start: 0731 Anesthesia Stop:     Procedures:       Mandible Composite Resection, mandible resection with kls plating and removal of deep implant (Left)      Exploration Vessel Head/Neck (Left: Neck)      Creation Tracheostomy      Creation Osteocutaneous Flap Head/Neck (Right)      Excision Split Thickness Skin Graft Head/Neck (Bilateral)      Reconstruction Mandible (Left) Diagnosis:       Osteoradionecrosis of jaw      Exposed orthopaedic hardware (CMS-HCC)      (Osteoradionecrosis of jaw [M27.2, Y84.2])      (Exposed orthopaedic hardware (CMS-HCC) [T84.498A])    Surgeons: Jerrod Magana MD; Brandy Christensen MD Responsible Provider: Bran Walker MD    Anesthesia Type: general ASA Status: 3            Anesthesia Type: general    Vitals Value Taken Time   BP 89/53 12/16/24 1830   Temp 36.1 °C (97 °F) 12/16/24 1815   Pulse 74 12/16/24 1830   Resp 16 12/16/24 1830   SpO2 100 % 12/16/24 1830   Vitals shown include unfiled device data.    Anesthesia Post Evaluation    Patient location during evaluation: PACU  Patient participation: complete - patient participated  Level of consciousness: awake and alert  Pain score: 5  Pain management: adequate  Multimodal analgesia pain management approach  Airway patency: patent  Cardiovascular status: acceptable  Respiratory status: acceptable  Hydration status: acceptable  Postoperative Nausea and Vomiting: none        No notable events documented.

## 2024-12-16 NOTE — ANESTHESIA PREPROCEDURE EVALUATION
Patient: Brigette Romo    Procedure Information       Date/Time: 12/16/24 0645    Procedures:       Mandible Composite Resection (Left) - left composite resection      Exploration Vessel Head/Neck (Left) - left neck exploration for vessels      Creation Osteocutaneous Flap Head/Neck (Right) - right fibula free [id number]-nl-flkysns (20962)      Excision Split Thickness Skin Graft Head/Neck (Bilateral) - right-vs-left thigh skin graft      Reconstruction Mandible (Left)    Location: [facility] OR 03 / [facility] OR    Surgeons: Jerrod Magana MD; Brandy Christensen MD            Relevant Problems   Anesthesia (within normal limits)      Cardiac   (+) HLD (hyperlipidemia)      Pulmonary (within normal limits)      Neuro   (+) Anxiety      GI   (+) Dysphagia   (+) Esophageal reflux   (+) GI bleed      /Renal (within normal limits)      Endocrine   (+) Acquired hypothyroidism   (+) Type 2 diabetes mellitus with other specified complication, without long-term current use of insulin      Hematology   (+) Anemia      Musculoskeletal   (+) Primary osteoarthritis of left knee      HEENT  History of oral cavity cancer.  ORN mandible.   (+) Sinus congestion      ID   (+) Influenza B   (+) Viral upper respiratory tract infection   (+) Wound infection       Clinical information reviewed:   Tobacco  Allergies  Meds   Med Hx  Surg Hx   Fam Hx  Soc Hx        NPO Detail:  NPO/Void Status  Carbohydrate Drink Given Prior to Surgery? : Y  Date of Last Liquid: 12/16/24  Time of Last Liquid: 0300  Date of Last Solid: 12/15/24  Time of Last Solid: 2030  Last Intake Type: Clear fluids         Physical Exam    Airway  Mallampati: III  TM distance: >3 FB  Neck ROM: full     Cardiovascular - normal exam     Dental   Comments: No lower teeth, mandible plate visible on exam   Pulmonary - normal exam     Abdominal            Anesthesia Plan    History of general anesthesia?: yes  History of complications of general anesthesia?:  no    ASA 3     general     The patient is not a current smoker.    intravenous induction   Postoperative administration of opioids is intended.  Trial extubation is planned.  Anesthetic plan and risks discussed with patient.  Use of blood products discussed with patient who consented to blood products.    Plan discussed with attending.

## 2024-12-16 NOTE — ANESTHESIA PROCEDURE NOTES
Peripheral IV  Date/Time: 12/16/2024 8:10 AM      Placement  Needle size: 20 G  Laterality: left  Location: forearm  Site prep: chlorhexidine  Technique: anatomical landmarks  Attempts: 1

## 2024-12-16 NOTE — ANESTHESIA PROCEDURE NOTES
Airway  Date/Time: 12/16/2024 7:51 AM  Urgency: elective    Airway not difficult    Staffing  Performed: resident   Authorized by: Nicole Harry MD    Performed by: Ora Velasco DO  Patient location during procedure: OR    Indications and Patient Condition  Indications for airway management: anesthesia and airway protection  Spontaneous Ventilation: absent  Sedation level: deep  Preoxygenated: yes  Patient position: sniffing  Mask difficulty assessment: 1 - vent by mask    Final Airway Details  Final airway type: endotracheal airway      Successful airway: ETT  Cuffed: yes   Successful intubation technique: video laryngoscopy  Facilitating devices/methods: intubating stylet  Endotracheal tube insertion site: oral  Blade: Yury  Blade size: #3  ETT size (mm): 6.0  Cormack-Lehane Classification: grade IIb - view of arytenoids or posterior of glottis only  Placement verified by: chest auscultation, bronchoscopy and capnometry   Measured from: teeth  ETT to teeth (cm): 21  Number of attempts at approach: 2  Number of other approaches attempted: 0

## 2024-12-16 NOTE — ANESTHESIA PROCEDURE NOTES
Arterial Line:    Date/Time: 12/16/2024 8:00 AM    Staffing  Performed: resident   Authorized by: Nicole Harry MD    Performed by: Ora Velasco DO    An arterial line was placed. Procedure performed using surface landmarks.in the OR for the following indication(s): continuous blood pressure monitoring.    A 20 gauge (size) (length), Angiocath (type) catheter was placed into the Left radial artery, secured by tape,   Seldinger technique not used.  Events:  patient tolerated procedure well with no complications.

## 2024-12-16 NOTE — OP NOTE
[facility] - Operative Report  Name: Brigette Romo   MRN: [id number]  Date of Procedure: 12/16/24  Location: [facility]    Attending Surgeon: Jerrod Magana MD     Resident/Fellow: Kale Batista    Preoperative Diagnosis:  Osteoradionecrosis of the mandible    Postoperative Diagnosis:  Same    Operative indications:  This patient has been treated in the past for oral cavity cancers.  She now has had some issues with recurrent infection of the anterior mandible as well as the hardware around it.  She is here today to have this addressed surgically.  She understands the surgery and the possible complication and wishes to proceed.    Procedure:  Tracheotomy, removal of a deep mandibular implant, left mandibulectomy, mandibular reconstruction with plate (KLS premade), and left neck exploration for vessels  Operative procedure:  Under general anesthesia the patient was properly positioned and prepped and draped in usual sterile fashion.  A tracheotomy was performed through a small vertical incision of the left mid lower neck.  This patient has had a tracheotomy in the past and therefore the scar from the prior tracheotomy site was followed down to the trachea.  A window was made and a reinforced endotracheal tube was inserted and general anesthesia was pursued through that route.  The left neck incision was then outlined from the mastoid tip to the contralateral submental area running approximately 4 to 5 cm below the mandible.  The incision was made and carried through the lying soft tissue and through the platysma.  Immediately some scarring was encountered.  The flap was elevated superiorly to the mandible.  The exposed plate was uncovered to completely the mandible was freed of its outer periosteum to the angle of the mandible on the left.  The old plate was then removed and the underlying bone was exposed the new premade plate was then placed and screwed in place with 4 screws on either side  of the planned resection.  It was felt that 1 more screw could be placed posteriorly because it was not felt that we would remove all the mandible that have been outlined.  The plate was then removed.  The 2 remaining molars on that left the lower side were removed.  Then using a cutting bur the alveolar portion of the mandible posteriorly was drilled down and also a portion of the anterior mandible was removed until there was good bleeding.  The segment of the mandible was sent for cultures.  The neck was then explored for vessels of the superior thyroid artery, the lingual artery, and the facial artery were identified.  The external jugular system was also identified.  The plate was then screwed in place with 5 screws on the left side and 4 screws on the right.  The wound was irrigated.  Hemostasis was verified.  The estimated blood loss at this point was 30 cc.  The patient was then transferred to the care of the reconstruction colleague and this will be dictated separately.  This is dictated on 12/16/2024.    I was present for the entire procedure    Jerrod Magana MD

## 2024-12-16 NOTE — ANESTHESIA PROCEDURE NOTES
Peripheral IV  Date/Time: 12/16/2024 8:10 AM      Placement  Needle size: 20 G  Laterality: left  Location: hand  Site prep: chlorhexidine  Technique: anatomical landmarks  Attempts: 1

## 2024-12-17 LAB
ALBUMIN SERPL BCP-MCNC: 3.6 G/DL (ref 3.4–5)
ANION GAP SERPL CALC-SCNC: 16 MMOL/L (ref 10–20)
BUN SERPL-MCNC: 19 MG/DL (ref 6–23)
CALCIUM SERPL-MCNC: 8.9 MG/DL (ref 8.6–10.6)
CHLORIDE SERPL-SCNC: 108 MMOL/L (ref 98–107)
CO2 SERPL-SCNC: 21 MMOL/L (ref 21–32)
CREAT SERPL-MCNC: 0.85 MG/DL (ref 0.5–1.05)
EGFRCR SERPLBLD CKD-EPI 2021: 73 ML/MIN/1.73M*2
ERYTHROCYTE [DISTWIDTH] IN BLOOD BY AUTOMATED COUNT: 13.3 % (ref 11.5–14.5)
GLUCOSE BLD MANUAL STRIP-MCNC: 116 MG/DL (ref 74–99)
GLUCOSE SERPL-MCNC: 108 MG/DL (ref 74–99)
HCT VFR BLD AUTO: 24.2 % (ref 36–46)
HGB BLD-MCNC: 7.8 G/DL (ref 12–16)
MAGNESIUM SERPL-MCNC: 2.62 MG/DL (ref 1.6–2.4)
MCH RBC QN AUTO: 31.3 PG (ref 26–34)
MCHC RBC AUTO-ENTMCNC: 32.2 G/DL (ref 32–36)
MCV RBC AUTO: 97 FL (ref 80–100)
NRBC BLD-RTO: 0 /100 WBCS (ref 0–0)
PHOSPHATE SERPL-MCNC: 3.4 MG/DL (ref 2.5–4.9)
PLATELET # BLD AUTO: 197 X10*3/UL (ref 150–450)
POTASSIUM SERPL-SCNC: 4.4 MMOL/L (ref 3.5–5.3)
RBC # BLD AUTO: 2.49 X10*6/UL (ref 4–5.2)
SODIUM SERPL-SCNC: 141 MMOL/L (ref 136–145)
WBC # BLD AUTO: 11 X10*3/UL (ref 4.4–11.3)

## 2024-12-17 PROCEDURE — 85027 COMPLETE CBC AUTOMATED: CPT

## 2024-12-17 PROCEDURE — 82947 ASSAY GLUCOSE BLOOD QUANT: CPT

## 2024-12-17 PROCEDURE — 2500000005 HC RX 250 GENERAL PHARMACY W/O HCPCS

## 2024-12-17 PROCEDURE — 36415 COLL VENOUS BLD VENIPUNCTURE: CPT

## 2024-12-17 PROCEDURE — 2500000004 HC RX 250 GENERAL PHARMACY W/ HCPCS (ALT 636 FOR OP/ED)

## 2024-12-17 PROCEDURE — 1200000003 HC ONCOLOGY  ROOM WITH TELEMETRY DAILY

## 2024-12-17 PROCEDURE — 2500000001 HC RX 250 WO HCPCS SELF ADMINISTERED DRUGS (ALT 637 FOR MEDICARE OP)

## 2024-12-17 PROCEDURE — 80069 RENAL FUNCTION PANEL: CPT

## 2024-12-17 PROCEDURE — 2500000005 HC RX 250 GENERAL PHARMACY W/O HCPCS: Performed by: NURSE PRACTITIONER

## 2024-12-17 PROCEDURE — 2500000001 HC RX 250 WO HCPCS SELF ADMINISTERED DRUGS (ALT 637 FOR MEDICARE OP): Performed by: NURSE PRACTITIONER

## 2024-12-17 PROCEDURE — 83735 ASSAY OF MAGNESIUM: CPT

## 2024-12-17 PROCEDURE — 3E0G76Z INTRODUCTION OF NUTRITIONAL SUBSTANCE INTO UPPER GI, VIA NATURAL OR ARTIFICIAL OPENING: ICD-10-PCS

## 2024-12-17 PROCEDURE — 2060000001 HC INTERMEDIATE ICU ROOM DAILY

## 2024-12-17 RX ORDER — HYDROXYZINE HYDROCHLORIDE 25 MG/1
25 TABLET, FILM COATED ORAL EVERY 8 HOURS PRN
Status: DISCONTINUED | OUTPATIENT
Start: 2024-12-17 | End: 2024-12-23 | Stop reason: HOSPADM

## 2024-12-17 SDOH — ECONOMIC STABILITY: HOUSING INSECURITY: IN THE LAST 12 MONTHS, WAS THERE A TIME WHEN YOU WERE NOT ABLE TO PAY THE MORTGAGE OR RENT ON TIME?: NO

## 2024-12-17 SDOH — SOCIAL STABILITY: SOCIAL INSECURITY
WITHIN THE LAST YEAR, HAVE YOU BEEN RAPED OR FORCED TO HAVE ANY KIND OF SEXUAL ACTIVITY BY YOUR PARTNER OR EX-PARTNER?: NO

## 2024-12-17 SDOH — HEALTH STABILITY: MENTAL HEALTH: HOW MANY DRINKS CONTAINING ALCOHOL DO YOU HAVE ON A TYPICAL DAY WHEN YOU ARE DRINKING?: PATIENT DOES NOT DRINK

## 2024-12-17 SDOH — HEALTH STABILITY: MENTAL HEALTH: HOW OFTEN DO YOU HAVE A DRINK CONTAINING ALCOHOL?: NEVER

## 2024-12-17 SDOH — ECONOMIC STABILITY: INCOME INSECURITY: IN THE PAST 12 MONTHS HAS THE ELECTRIC, GAS, OIL, OR WATER COMPANY THREATENED TO SHUT OFF SERVICES IN YOUR HOME?: NO

## 2024-12-17 SDOH — SOCIAL STABILITY: SOCIAL INSECURITY: WITHIN THE LAST YEAR, HAVE YOU BEEN HUMILIATED OR EMOTIONALLY ABUSED IN OTHER WAYS BY YOUR PARTNER OR EX-PARTNER?: NO

## 2024-12-17 SDOH — ECONOMIC STABILITY: HOUSING INSECURITY: AT ANY TIME IN THE PAST 12 MONTHS, WERE YOU HOMELESS OR LIVING IN A SHELTER (INCLUDING NOW)?: NO

## 2024-12-17 SDOH — ECONOMIC STABILITY: TRANSPORTATION INSECURITY: IN THE PAST 12 MONTHS, HAS LACK OF TRANSPORTATION KEPT YOU FROM MEDICAL APPOINTMENTS OR FROM GETTING MEDICATIONS?: NO

## 2024-12-17 SDOH — HEALTH STABILITY: MENTAL HEALTH: HOW OFTEN DO YOU HAVE SIX OR MORE DRINKS ON ONE OCCASION?: NEVER

## 2024-12-17 SDOH — ECONOMIC STABILITY: FOOD INSECURITY: WITHIN THE PAST 12 MONTHS, YOU WORRIED THAT YOUR FOOD WOULD RUN OUT BEFORE YOU GOT THE MONEY TO BUY MORE.: NEVER TRUE

## 2024-12-17 SDOH — SOCIAL STABILITY: SOCIAL INSECURITY: HAS ANYONE EVER THREATENED TO HURT YOUR FAMILY OR YOUR PETS?: NO

## 2024-12-17 SDOH — ECONOMIC STABILITY: FOOD INSECURITY: WITHIN THE PAST 12 MONTHS, THE FOOD YOU BOUGHT JUST DIDN'T LAST AND YOU DIDN'T HAVE MONEY TO GET MORE.: NEVER TRUE

## 2024-12-17 SDOH — SOCIAL STABILITY: SOCIAL INSECURITY: WITHIN THE LAST YEAR, HAVE YOU BEEN AFRAID OF YOUR PARTNER OR EX-PARTNER?: NO

## 2024-12-17 SDOH — ECONOMIC STABILITY: FOOD INSECURITY: HOW HARD IS IT FOR YOU TO PAY FOR THE VERY BASICS LIKE FOOD, HOUSING, MEDICAL CARE, AND HEATING?: NOT VERY HARD

## 2024-12-17 SDOH — SOCIAL STABILITY: SOCIAL INSECURITY: HAVE YOU HAD ANY THOUGHTS OF HARMING ANYONE ELSE?: NO

## 2024-12-17 SDOH — ECONOMIC STABILITY: HOUSING INSECURITY: IN THE PAST 12 MONTHS, HOW MANY TIMES HAVE YOU MOVED WHERE YOU WERE LIVING?: 1

## 2024-12-17 SDOH — SOCIAL STABILITY: SOCIAL INSECURITY: ARE YOU OR HAVE YOU BEEN THREATENED OR ABUSED PHYSICALLY, EMOTIONALLY, OR SEXUALLY BY ANYONE?: NO

## 2024-12-17 SDOH — SOCIAL STABILITY: SOCIAL INSECURITY
WITHIN THE LAST YEAR, HAVE YOU BEEN KICKED, HIT, SLAPPED, OR OTHERWISE PHYSICALLY HURT BY YOUR PARTNER OR EX-PARTNER?: NO

## 2024-12-17 SDOH — SOCIAL STABILITY: SOCIAL INSECURITY: ARE THERE ANY APPARENT SIGNS OF INJURIES/BEHAVIORS THAT COULD BE RELATED TO ABUSE/NEGLECT?: NO

## 2024-12-17 SDOH — SOCIAL STABILITY: SOCIAL INSECURITY: HAVE YOU HAD THOUGHTS OF HARMING ANYONE ELSE?: NO

## 2024-12-17 SDOH — SOCIAL STABILITY: SOCIAL INSECURITY: DOES ANYONE TRY TO KEEP YOU FROM HAVING/CONTACTING OTHER FRIENDS OR DOING THINGS OUTSIDE YOUR HOME?: NO

## 2024-12-17 SDOH — SOCIAL STABILITY: SOCIAL INSECURITY: DO YOU FEEL UNSAFE GOING BACK TO THE PLACE WHERE YOU ARE LIVING?: NO

## 2024-12-17 SDOH — SOCIAL STABILITY: SOCIAL INSECURITY: WERE YOU ABLE TO COMPLETE ALL THE BEHAVIORAL HEALTH SCREENINGS?: YES

## 2024-12-17 SDOH — SOCIAL STABILITY: SOCIAL INSECURITY: ABUSE: ADULT

## 2024-12-17 ASSESSMENT — COGNITIVE AND FUNCTIONAL STATUS - GENERAL
MOVING TO AND FROM BED TO CHAIR: A LITTLE
WALKING IN HOSPITAL ROOM: A LITTLE
EATING MEALS: A LITTLE
DRESSING REGULAR UPPER BODY CLOTHING: A LITTLE
DAILY ACTIVITIY SCORE: 18
HELP NEEDED FOR BATHING: A LITTLE
DRESSING REGULAR LOWER BODY CLOTHING: A LITTLE
STANDING UP FROM CHAIR USING ARMS: A LITTLE
MOBILITY SCORE: 18
PERSONAL GROOMING: A LITTLE
CLIMB 3 TO 5 STEPS WITH RAILING: A LITTLE
MOVING FROM LYING ON BACK TO SITTING ON SIDE OF FLAT BED WITH BEDRAILS: A LITTLE
TURNING FROM BACK TO SIDE WHILE IN FLAT BAD: A LITTLE
TOILETING: A LITTLE

## 2024-12-17 ASSESSMENT — LIFESTYLE VARIABLES
HOW MANY STANDARD DRINKS CONTAINING ALCOHOL DO YOU HAVE ON A TYPICAL DAY: PATIENT DOES NOT DRINK
SKIP TO QUESTIONS 9-10: 1
HOW OFTEN DO YOU HAVE A DRINK CONTAINING ALCOHOL: NEVER
SKIP TO QUESTIONS 9-10: 1
AUDIT-C TOTAL SCORE: 0
AUDIT-C TOTAL SCORE: 0
HOW OFTEN DO YOU HAVE 6 OR MORE DRINKS ON ONE OCCASION: NEVER
AUDIT-C TOTAL SCORE: 0
SKIP TO QUESTIONS 9-10: 1
AUDIT-C TOTAL SCORE: 0

## 2024-12-17 ASSESSMENT — ACTIVITIES OF DAILY LIVING (ADL)
LACK_OF_TRANSPORTATION: NO

## 2024-12-17 ASSESSMENT — PAIN SCALES - GENERAL
PAINLEVEL_OUTOF10: 0 - NO PAIN
PAINLEVEL_OUTOF10: 1

## 2024-12-17 ASSESSMENT — PAIN - FUNCTIONAL ASSESSMENT: PAIN_FUNCTIONAL_ASSESSMENT: 0-10

## 2024-12-17 NOTE — CONSULTS
"Nutrition Initial Assessment:   Nutrition Assessment    --->Reason for Assessment: Enteral assessment/recommendation (TF)    Patient is a 74 y.o. female with osteonecrosis of the mandible, currently POD #1 from, \"triple endoscopy, tracheostomy, anterior/left oral cavity composite resection (skin, gingiva, and mandible), R selective neck dissection levels I-IV, reconstruction with R fibula free flap and split thickness skin graft.\"     Nutrition History:  This service met with pt and pt's family at bedside.     Per pt, has had a PEG for going on almost 2 years now. At present, administers 4 cartons of Osmolite (was unsure what calorie level formula was) per day (2 in the morning, 1 at lunch, 1 at dinner) via syringe bolus method. Overall, feels she tolerates her TF regimen well, did have c/o intermittent acid reflux. Denied n/v or abdominal pains. Has a h/o recent diarrhea but felt that was more so d/t recent antibiotics v the TF formula itself.    Did mention at one point she was on Isosource TF formula and preferred that over the Osmolite, caused less acid reflux (RDN unsure as to when pt was on Isosource and why it was changed to Osmolite).    Does also eat softer foods throughout the day. Denied any trouble chewing/swallowing at home.    --Vitamin/Herbal Supplement Use: none noted in review of EMR/home meds  --Food Allergies/Intolerances: none       Anthropometrics:  Height: 167.6 cm (5' 6\")   Weight: 54.6 kg (120 lb 5.9 oz)   BMI (Calculated): 19.44  IBW/kg (Dietitian Calculated): 59.1 kg  Percent of IBW: 92 %       Weight History:     Wt Readings per review of EMR:   12/16/24 54.6 kg (120 lb 5.9 oz) (current wt)   12/12/24 54.4 kg (120 lb)   12/10/24 54.4 kg (120 lb)   12/03/24 56.7 kg (125 lb)   11/22/24 54.4 kg (120 lb)   11/14/24 56.2 kg (124 lb)   11/12/24 54.4 kg (120 lb)   11/12/24 54.4 kg (120 lb)   11/04/24 55.6 kg (122 lb 9.6 oz)--->1.8% wt loss from this date to present (not significant)   10/08/24 " 57.6 kg (127 lb)   10/03/24 58.1 kg (128 lb)   09/09/24 58.5 kg (128 lb 14.4 oz)--->6.7% wt loss from this date to present (not significant)   08/19/24 58.5 kg (129 lb)   08/08/24 58.4 kg (128 lb 12.8 oz)   07/26/24 56.7 kg (125 lb)   07/15/24 58.6 kg (129 lb 3 oz)   06/27/24 58.2 kg (128 lb 4.9 oz)   06/24/24 57.6 kg (127 lb)   05/30/24 58 kg (127 lb 14.4 oz)   05/28/24 59 kg (130 lb)   04/12/24 59.4 kg (131 lb)   03/26/24 59.4 kg (131 lb)   03/19/24 58.6 kg (129 lb 3.2 oz)   03/04/24 58.1 kg (128 lb 1.4 oz)   02/14/24 59.7 kg (131 lb 9.8 oz)   02/02/24 59.4 kg (131 lb)   01/23/24 59.9 kg (132 lb)--->8.8% wt loss from this date to present (not significant)   12/19/23 58.9 kg (129 lb 14.4 oz)   11/27/23 57.4 kg (126 lb 8 oz)   11/13/23 57.2 kg (126 lb 1.7 oz)   10/24/23 56.7 kg (125 lb)   09/26/23 56.2 kg (124 lb)   09/21/23 55.7 kg (122 lb 11.2 oz)   09/11/23 56.8 kg (125 lb 4.8 oz)   09/01/23 54.7 kg (120 lb 8 oz)     Nutrition Focused Physical Exam Findings:  defer: nursing walked in to provide care; visually may have some muscle and fat losses to face/shoulders/arms but possibly d/t age?  Edema:  Edema: none  Physical Findings:  Skin: Positive (surgical incisions)    Nutrition Significant Labs:  CBC Trend:   Results from last 7 days   Lab Units 12/16/24 2143 12/12/24  2137 12/12/24  1420   WBC AUTO x10*3/uL 15.5*  --  6.2   RBC AUTO x10*6/uL 3.04*  --  4.18   HEMOGLOBIN g/dL 9.6*   < > 13.0   HEMATOCRIT % 29.7*   < > 37.6   MCV fL 98  --  90   PLATELETS AUTO x10*3/uL 201  --  256    < > = values in this interval not displayed.   BMP Trend:   Results from last 7 days   Lab Units 12/17/24  0542 12/16/24  2143 12/12/24  1420   GLUCOSE mg/dL 108* 141* 94   CALCIUM mg/dL 8.9 9.2 10.4   SODIUM mmol/L 141 141 138   POTASSIUM mmol/L 4.4 3.9 4.3   CO2 mmol/L 21 24 30   CHLORIDE mmol/L 108* 108* 100   BUN mg/dL 19 15 22   CREATININE mg/dL 0.85 0.73 0.81   Liver Function Trend:   Results from last 7 days   Lab Units  12/12/24  1420   ALK PHOS U/L 98   AST U/L 17   ALT U/L 12   BILIRUBIN TOTAL mg/dL 0.5   Renal Lab Trend:   Results from last 7 days   Lab Units 12/17/24  0542 12/16/24  2143 12/12/24  1420 12/12/24  1420   POTASSIUM mmol/L 4.4 3.9  --  4.3   PHOSPHORUS mg/dL 3.4 3.0   < >  --    SODIUM mmol/L 141 141  --  138   MAGNESIUM mg/dL 2.62* 2.03   < >  --    EGFR mL/min/1.73m*2 73 88  --  77   BUN mg/dL 19 15  --  22   CREATININE mg/dL 0.85 0.73  --  0.81    < > = values in this interval not displayed.     Medications:  Scheduled medications  acetaminophen, 975 mg, oral, q8h HARRIETT   Or  acetaminophen, 1,000 mg, oral, q8h HARRIETT   Or  acetaminophen, 1,000 mg, g-tube, q8h HARRIETT  ampicillin-sulbactam, 3 g, intravenous, q6h  aspirin, 325 mg, g-tube, Daily  bacitracin, , Topical, TID  chlorhexidine, 15 mL, Mouth/Throat, TID after meals  enoxaparin, 40 mg, subcutaneous, q24h  esomeprazole, 40 mg, g-tube, Daily before breakfast  hydrogen peroxide, 1 Application, Topical, TID  levothyroxine, 25 mcg, g-tube, Daily before breakfast  polyethylene glycol, 17 g, g-tube, Daily  senna, 10 mL, g-tube, BID  [START ON 12/18/2024] white petrolatum, 1 Application, Topical, TID    Continuous medications  HYDROmorphone,   sodium chloride 0.9%, 70 mL/hr, Last Rate: 70 mL/hr (12/17/24 0026)    PRN medications  PRN medications: naloxone, ondansetron **OR** ondansetron, oxygen    I/O:   --no documented BM yet, since admit    Dietary Orders (From admission, onward)       Start     Ordered    12/17/24 0812  Enteral feeding with NPO Pivot 1.5; PEG (percutaneous endoscopic gastric); 10  Diet effective now        Question Answer Comment   Tube feeding formula: Pivot 1.5    Feeding route: PEG (percutaneous endoscopic gastric)    Tube feeding continuous rate (mL/hr): 10        12/17/24 0811                Estimated Needs:   Total Energy Estimated Needs (kCal): 1750+  Method for Estimating Needs: 55 x ~32+  Total Protein Estimated Needs (g): 85+  Method for  Estimating Needs: 55 x ~1.5g/kg+  Total Fluid Estimated Needs (mL): 1ml/kcal or per MD/team        Nutrition Diagnosis   Malnutrition Diagnosis  Patient has Malnutrition Diagnosis--Unable to fully determine at this time--RDN unclear as to how many calories pt has been recieving via TF at home + unable to complete nutrition exam today. Will updated, as able, at nutrition follow-up.    Nutrition Diagnosis  Patient has Nutrition Diagnosis: Yes  Diagnosis Status (1): New  Nutrition Diagnosis 1: Increased nutrient needs  Related to (1): increased metabolic demand  As Evidenced by (1): pt with osteonecrosis s/p surgical repair       Nutrition Interventions/Recommendations     1. When medically appropriate, begin TF of Pivot 1.5 @ 15ml/hr and increase 10ml Q6H to goal rate of 55ml/hr, only as tolerated. Run TF x 22 hours/day (hold TF 1 hour pre and post administration of Synthroid).  --FWF of 225 mls, 4 times/day, or FWF per MD/team (TF + recommended FWF would provide a total of 1807 mls free water/day).  --Above TF regimen provides a total of 1815 kcals, 113 g pro--meets 100% estimated kcal and pro needs.    2. If pt tolerates TF @ continuous goal rate, can transition to bolus feeds; would do 300 mls, Pivot 1.5, 4 times/day, only as tolerated.  --Flush with 60 mls pre and post each feed with additional FWF of 225 mls BID between feeds (TF + recommended FWF would provide a total of 1830 mls free water/day).  --Above TF regimen provides a total of 1800 kcals, 112 g pro--meets 100% estimated kcal and pro needs.    3. For home, can transition pt to a standard/polymeric TF formula; would do Isosource 1.5, 300 mls, 4 times/day, only as tolerated (total of 5 cartons/day).  --Flush with 60 mls pre and post each feed with additional FWF of 225 mls BID between feeds, or FWF per MD/team (TF + recommended FWF would provide a total of 1846 mls free water/day).  --Above TF regimen would provide a total of 1800 kcals, 81 g pro--meets  100% estimated kcal needs, 95% estimated pro needs.    TF Monitoring:  --RFP + mag daily  --Accuchecks Q6H or per team  --Daily weights (standing preferred, if feasible)        Nutrition Prescription for Enteral Nutrition: 1750+ kcals/day, 85+ g pro/day        Nutrition Interventions:   Interventions: Enteral intake  Enteral Intake: Other--begin TF via PEG, when  medically appropriate  Goal: TF of Pivot 1.5 @ goal rate of 55ml/hr x 22 hours/day=1815 kcals, 113 g pro    Collaboration and Referral of Nutrition Care: Collaboration by nutrition professional with other providers  Goal: bedside RN    Nutrition Education: Explained the process of starting TF with Pivot 1.5 then transition to Isosource 1.5. Pt happy with plans for Isosource v her home TF formula of Osmolite. Denied any particular questions for RDN at this time.       Nutrition Monitoring and Evaluation   Food/Nutrient Related History Monitoring  Monitoring and Evaluation Plan: Enteral and parenteral nutrition intake  Enteral and Parenteral Nutrition Intake: Enteral nutrition intake  Criteria: tolerate TF @ goal rate    Body Composition/Growth/Weight History  Monitoring and Evaluation Plan: Weight  Weight: Measured weight  Criteria: maintain stable wt    Biochemical Data, Medical Tests and Procedures  Monitoring and Evaluation Plan: Electrolyte/renal panel, Glucose/endocrine profile  Electrolyte and Renal Panel: Magnesium, Phosphorus, Potassium, Sodium  Criteria: WNL  Glucose/Endocrine Profile: Glucose, casual  Criteria: WNL          Time Spent (min): 60 minutes

## 2024-12-17 NOTE — PROGRESS NOTES
Physical Therapy                 Therapy Communication Note    Patient Name: Brigette Romo  MRN: [id number]  Department: [facility]  Room: 5007/5007-A  Today's Date: 12/17/2024     Discipline: Physical Therapy          Missed Visit Reason: Missed Visit Reason: Other (Comment)    Missed Time: Attempt    Comment:  PT eval attempted; pt declined at this time. Per RN, feeling increased anxiety right now. Family member in room reports cuff size was just changed and not up for it.   Will re-attempt at later date as schedule permits.     PT did speak to Vira who reports TTWB RLE and splint is already provided.

## 2024-12-17 NOTE — PROGRESS NOTES
12/17/24 1100   Discharge Planning   Living Arrangements Alone   Support Systems Children;Family members;Friends/neighbors   Assistance Needed trach,peg   Type of Residence Private residence   Number of Stairs to Enter Residence 3   Number of Stairs Within Residence 0   Do you have animals or pets at home? No   Who is requesting discharge planning? Provider   Home or Post Acute Services In home services   Type of Home Care Services Home nursing visits;DME or oxygen;Home PT   Expected Discharge Disposition Home H   Does the patient need discharge transport arranged? No   Financial Resource Strain   How hard is it for you to pay for the very basics like food, housing, medical care, and heating? Not very   Housing Stability   In the last 12 months, was there a time when you were not able to pay the mortgage or rent on time? N   In the past 12 months, how many times have you moved where you were living? 1   At any time in the past 12 months, were you homeless or living in a shelter (including now)? N   Transportation Needs   In the past 12 months, has lack of transportation kept you from medical appointments or from getting medications? no   In the past 12 months, has lack of transportation kept you from meetings, work, or from getting things needed for daily living? No     Care Transitions Note    Plan per Medical/Surgical Team:s/p triple endoscopy, trach, anterior oral cavity composite resection, R SND, reconstruction with R FFF with Ame Magana and Fermin.   Status: inpatient   Payor Source: [employer] medicare  Discharge disposition: home with home care  Expected date of discharge: 12/22  Barriers: none at this time   PCP / Primary Oncologist: Eri Read DO   Preferred Pharmacy: [facility] [address] [place]  Preferred home care agency: [facility]    TCC met with patient and family at bedside to discuss anticipated discharge needs. Demographics and insurance verifed with patient. Patient lives at  home alone and was independent with ADLS prior to admission. Patient will discharge and stay with her caregiver/sister Audrey who lives at [address] [place] [address]. Patient is agreeable to using [facility] for home care and uses [facility] for tube feeds. Patient and family have requested a knee walker for patient to use upon discharge. TCC will update care team. Will continue to follow for any discharge planning needs.   Lexis Benoit RN TCC

## 2024-12-17 NOTE — SIGNIFICANT EVENT
ENT Flap Check    Subjective:  Resting comfortably.     Objective:  Vitals reviewed in EMR  Gen: NAD, AOx3, resting comfortably in bed  Face/Neck: All incisions c/d/i, neck soft and flat without evidence of hematoma or fluid collection  -Skin paddle soft and light pink, good cap refill. Muscle paddle with STSG inferior to skin paddle healthy appearing  -Internal doppler with strong arterial signal   -Trach in midline position without bleeding or thick tracheal secretions  Oral Cavity: All intraoral incisions c/d/i without evidence of dehiscence  -Flap soft and pink with good capillary refill  Extremities: right leg warm with intact movement and sensation  -right thigh skin graft donor site with tegaderm in place  Abdomen: PEG in place  Drains: All drains in place and holding suction with serosanguinous drainage (stripped)    Assessment/Plan:  Brigette Romo is a 74 y.o. female s/p triple endoscopy, trach, anterior oral cavity composite resection, R SND, reconstruction with R FFF with Ame Magana and Fermin.    -Continue q4hr flap checks    Tiffany Perdue MD  ENT [id number]

## 2024-12-17 NOTE — SIGNIFICANT EVENT
ENT Flap Check    Subjective:  Resting comfortably. No void.      Objective:  Vitals reviewed in EMR  Gen: NAD, AOx3, resting comfortably in bed  Face/Neck: All incisions c/d/i, neck soft and flat without evidence of hematoma or fluid collection  -Skin paddle soft and light pink, good cap refill. Muscle paddle with STSG inferior to skin paddle healthy appearing  -Internal doppler with strong arterial signal   -Trach in midline position without bleeding or thick tracheal secretions  Oral Cavity: All intraoral incisions c/d/i without evidence of dehiscence  -Flap soft and pink with good capillary refill  Extremities: right leg warm with intact movement and sensation  -right thigh skin graft donor site with tegaderm in place  Abdomen: PEG in place  Drains: All drains in place and holding suction with serosanguinous drainage (stripped)     Assessment/Plan:  Brigette Romo is a 74 y.o. female s/p triple endoscopy, trach, anterior oral cavity composite resection, R SND, reconstruction with R FFF with Ame Magana and Fermin.     -Continue q4hr flap checks  - repeat bladder scan    Tiffany Perdue MD

## 2024-12-17 NOTE — OP NOTE
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, Y84.2]     * Exposed orthopaedic hardware (CMS-[facility]) [T84.498A]     Procedures  Creation Right Fibula Free Flap Head/Neck  73225 - NM FREE OSTQ FLAP W/MVASC ANAST METAR/GREAT TOE    37476 - Vestibuloplasty    69055 Complex Closure face with adjacent tissue transfer, 30cm x 10cm     38412 Complex Closure right lower leg with adjacent tissue transfer, 30cm x 10cm     Excision Split Thickness Skin Graft Head/Neck  68105 - NM SPLIT AGRFT F/S/N/H/F/G/M/D GT 1ST 100 CM/</1 %    Application splint to right leg    Surgeons   Panel 1:     * Jerrod Magana - Primary  Panel 2:     * Brandy Christensen - Primary    Resident/Fellow/Other Assistant:  Surgeons and Role:  Panel 1:     * Kale Batista DO - Resident - Assisting  Panel 2:     * Betsy Florez MD - Resident - Assisting     * Mohamud Bueno MD - Fellow    Staff:   Circulator: Sergey  Scrub Person: Barb Costaub Person: Ilsa Armijo Circulator: Angela Armijo Scrub: Angela Costaub Person: Myrtle    Anesthesia Staff: Anesthesiologist: Bran Walker MD; Nicole Harry MD  CRNA: ROSAILNA Almonte-CRNA  C-AA: MEE Morales  Anesthesia Resident: Ora Velasco DO  RENÉ: True Zacarias  Frontline Breaker: MEE Sen    Procedure Summary  Anesthesia: General  ASA: III  Estimated Blood Loss: 100mL  Intra-op Medications:   Administrations occurring from 0645 to 1815 on 24:    Medication Name Total Dose   sodium chloride 0.9 % irrigation solution 3,000 mL   bacitracin ointment 1 Application   heparin (porcine) 25,000 Units in sodium chloride 0.9% 250 mL (100 Units/mL) infusion Cannot be calculated   lidocaine (Xylocaine) 20 mg/mL (2 %) injection 40 mL   aspirin suppository 300 mg   sterile water irrigation solution 1,000 mL   lidocaine-epinephrine (Xylocaine W/EPI) 1 %-1:100,000 injection 20 mL   mineral oil, light topical 2 Application   albumin human bottle 5% 750 mL   ampicillin-sulbactam (Unasyn) 1.5 g 9 g   dexAMETHasone (Decadron) injection 4 mg/mL 4 mg   esmolol (Brevibloc) injection 50 mg   HYDROmorphone (Dilaudid) injection 1 mg/mL 0.6 mg   lactated Ringer's infusion Cannot be calculated   lidocaine (cardiac) injection 2% prefilled syringe 70 mg   midazolam PF (Versed) injection 1 mg/mL 2 mg   ondansetron (Zofran) 2 mg/mL injection 4 mg   phenylephrine (Leonard-Synephrine) 10 mg/250 mL NS (40 mcg/mL) infusion 4.9 mg   phenylephrine 100 mcg/mL syringe 10 mL (prefilled) 1,800 mcg   propofol (Diprivan) injection 10 mg/mL 130 mg   remifentanil (Ultiva) 1,000 mcg in lactated Ringer's 50 mL (20 mcg/mL) infusion 0.75 mg   remifentanil (Ultiva) injection 170 mcg   rocuronium (ZeMuron) 50 mg/5 mL injection 190 mg   sugammadex (Bridion) 200 mg/2 mL injection 200 mg              Anesthesia Record               Intraprocedure I/O Totals          Intake    Remifentanil Drip 0.00 mL    The total shown is the total volume documented since Anesthesia Start was filed.    Phenylephrine Drip 0.00 mL    The total shown is the total volume documented since Anesthesia Start was filed.    lactated Ringer's 1250.00 mL    Total Intake 1250 mL       Output    Urine 640 mL    Total Output 640 mL       Net    Net Volume 610 mL          Specimen:   ID Type Source Tests Collected by Time   1 : neck debridment Tissue DEBRIDEMENT SURGICAL PATHOLOGY EXAM Jerrod Magana MD 12/16/2024 0937   A : left anterior  mandible Tissue MANDIBLE ANTERIOR COMPOSITE RESECTION (SPECIFY INCLUDED SITES) FUNGAL CULTURE/SMEAR, TISSUE/WOUND CULTURE/SMEAR Jerrod Magana MD 12/16/2024 0938      Drains and/or Catheters:   Closed/Suction Drain 1 Inferior;Right Leg Bulb 10 Fr. (Active)   Site Description Other (Comment) 12/17/24 0802   Dressing Status Clean;Dry 12/17/24 0802   Drainage Appearance Serosanguineous 12/16/24 1815   Status To bulb suction 12/16/24 1900   Output (mL) 20 mL 12/17/24 0600       Closed/Suction Drain 2 Right;Anterior Neck Bulb 10 Fr. (Active)   Site Description Other (Comment) 12/17/24 0803   Dressing Status Clean;Dry 12/17/24 0803   Drainage Appearance Serosanguineous 12/16/24 1815   Status To bulb suction 12/16/24 1900   Output (mL) 10 mL 12/17/24 0600       Urethral Catheter (Active)   Site Assessment Clean;Skin intact 12/17/24 0200   Collection Container Standard drainage bag 12/16/24 2223   Securement Method Securing device (Describe) 12/16/24 2000   Reason for Continuing Urinary Catheterization surgical procedures: urological/gynecological, pelvic oncology, anal, prolonged surgical procedure 12/16/24 2000   Output (mL) 150 mL 12/17/24 0600       Tourniquet Times:     Total Tourniquet Time Documented:  Leg (Right) - 89 minutes  Total: Leg (Right) - 89 minutes      Implants:  Implants       Type Name Action Serial No.      Screw SCREW, LOCK 2.0 X 9 MAXDRV TI ALLOY - SN/A - [id number] Implanted N/A     Screw SCREW, LOCK 2.0 X 11 MAXDRV TI ALLOY - SN/A - [id number] Implanted N/A     Screw SCREW, LOCK 2.0 X 13 MAXDRV TI ALLOY - SN/A - [id number] Implanted N/A     Screw SCREW, LOCK 2.0 X 15 MAXDRV TI ALLOY - SN/A - [id number] Implanted N/A     Screw SCREW, LOCK 2.0 X 17 MAXDRV TI ALLOY - [id number] Wasted      Plate IPS IMPLANT, MANDIBLE, 78283, LP Implanted [id number]     Screw SCREW, LOCK 2.0 X 7 MAXDRV TI ALLOY - [id number] Implanted      Surgical Instrument PROBE, FLOW DOPPLER HANK - [id number] Implanted       Implant DEVICE, ANASTOMATIC 4.0MM ORANGE - [id number] Implanted       PROBE, FLOW DOPPLER HANK - [id number] Implanted             Findings:   -3cm bony defect L mandibular body, 3cm x 1cm intraoral defect, 5cm x 6cm external chin defect  -Uneventful right fibula harvest, 20cm bone, 15cm x 7cm skin paddle  -Mandible reconstructed with fibula bone, good contact at all osteotomies, skin paddle used to reconstruct intraoral defect and vestibule, as well as external skin defect. Portion of flap buried under lower lip de-epithelialized  -Anastomosis to Left lingual artery & EJ vein, 4.0 .   -Neck skin elevated widely and rearranged to have fibula skin cover all mandible plates, and small area of exposed submental muscle from flap covered with STSG  -Leg closed with ATT, 30cm x 10cm, and STSG over exposed muscle  -Leg splinted in dorsiflexion    Indications: Brigette Romo is an 74 y.o. 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     The patient was seen in the preoperative area. The risks, benefits, complications, treatment options, non-operative alternatives, expected recovery and outcomes were discussed with the patient. The possibilities of reaction to medication, pulmonary aspiration, injury to surrounding structures, bleeding, recurrent infection, the need for additional procedures, failure to diagnose a condition, and creating a complication requiring transfusion or operation were discussed with the patient. The patient concurred with the proposed plan, giving informed consent.  The site of surgery was properly noted/marked if necessary per policy. The patient has been actively warmed in preoperative area. Preoperative antibiotics have been ordered and given within 1 hours of incision. Venous thrombosis prophylaxis have been ordered including unilateral sequential compression device    Procedure Details:   See Latonia's  Op Note for details regarding ablation and neck exploration.      I began the reconstruction by measuring the defect with the dimensions as above, and confirming the presence of adequate vessels for anastomosis.    The external landmarks of the fibular free flap were marked and Doppler was performed to investigate for perforators. A line from the lateral malleolus to the fibular head was drawn, it was divided at the middle thirds. A skin paddle with the approximate size of 15 x 7 cm was planned.  The incision was made beginning at the middle third proximally down into the lower third leaving 6 cm of skin and bone proximally at each end.  We cut down through the skin and subcutaneous tissue to the fascia over the peroneus longus and brevis, it was incised.  We elevated back, we found a skin  and centered our skin paddle over this .   Once this was completed we isolated in its entirety, anteriorly, we elevated off peroneus longus and brevis with a thin cuff of muscle to preserve the periosteum. Working anteriorly, we identified the interosseous membrane and incised it. We then made our proximal and distal bone cuts with an oscillating saw leaving 6 cm of bone proximally and distally. The fibula was rotated laterally and the main vascular pedicle was identified through the poisterior tibialis muscle. The distal end of the peroneal vessels were isolated, clipped, and ligated.  Once this was completed, we traced the pedicle up through deep musculature taking a small cuff of soleus and flexor hallicus longus in order to protect the septomusculo cutaneous (s) to our skin paddle.  The pedicle was traced proximally, dividing muscle and perforators with a harmonic scalpel. A vascular branch to the soleus muscle was identified and ligated for further mobilization.  We took this all the way up freeing up our pedicle all the way to the branching point between the posterior tibial vessels and  peroneal artery and vein..  We isolated our pedicle proximally.  The flap was completely mobilized at this point.  Once the recipient vessels in the neck were inspected and cleaned, the flap was harvested from the lower extremity.        The flap was then transferred up to the head for microvascular anastomosis and inset.     We first planned our bony inset. A template of the defect was made using a tongue depressor and used to plan our osteotomies. We designed a 1 segment reconstruction. The bone to be used for the reconstruction was planned distally on the fibula bone in order to maximized pedicle length and this included our skin perforators that had been identified. The periosteum of the bone not being used in the reconstruction was stripped and this bone removed using a reciprocating saw. We then used our template to rich out our osteotomies and cleaned the periosteum at this point. The pedicle was protected and the osteotomies made using a reciprocating saw. We then fit the fibula segment into the reconstruction, and after some minor modifications to the bone, we achieved good reconstruction with significant bony contact. The pedicle came off anteriorly.     We then brought the skin paddle over the mandible and into the mouth for the vestibuloplasty to reconstruct the depth and contour of the vestibule to improve speech, deglutition and function. We had designed the skin paddle large enough such that the distal portion would be used intraorally, with an area centrally that would be de-epithelialized to allow the lip to come across this, and the proximal portion used to reconstruct the soft tissue defect of the chin. The flap was able to accomplish this. We tacked it into place at the Fo and saw that the flap fit the soft tissue defect well. We then completed our oral cavity inset using horizontal mattress 3-0 vicryl working from posterior to anterior bilaterally. Anteriorly, We reconstructed the vestibule to  aid in deglution and oral competence.    We then proceeded to micro before completing the lip and external reconstruction.     We identified the left lingual artery which was skeletonized, trimmed and clamped after confirming flow. There was was a suitable facial vein which was mobilized. The adventitia over the arteries and veins was carefully cleaned off.       At this point, the arteries were anastomosed in a free-hand fashion using 9-0 nylon sutures in an interrupted fashion. After this was complete, the veins were coupled using a 4.0 . The vessel clamps were released. The artery had excellent arterial flow with no leaks.  A strip test was performed on the veins and they showed good flow. A doppler was placed on the artery and confirmed flow.      We then completed the lip and external defect reconstruction.  The mobilized lower lip was draped over the center of our skin paddle and the area to be de-epithelialized was marked using a permanent marking pen internally and externally and a #10 blade was used to de-epithelialize this approximately 1 cm strip.  Intraorally the wet lip was then sutured to the internal de-epithelialized skin edge using 3-0 Vicryl sutures in a mattress fashion.  We did this in such a way to ensure an adequate gingivobuccal sulcus and without putting her lip in a non-anatomical position.  Externally we broadly elevated her previously radiated neck skin and neck flaps to allow good mobility and the proximal tip of the skin paddle was rotated medially to cover the entire mandibular reconstruction plate that was placed today as well as her previously placed reconstruction plate.  The skin that had been over this part of her mandible was rotated inferiorly and used to cover some of the pedicle.  Her inferior neck skin was then advanced superior superiorly to cover as much of our expose neck structures as possible.  This was all closed with 3-0 Vicryl's for deep sutures and  full-thickness horizontal mattress sutures were needed for high areas of high tension.  The skin was closed with 4-0 Monocryl in a running fashion.  There was a small area of exposed muscle in the submental region approximately 3 cm x 2 cm.  We then obtained a split-thickness skin graft from the thigh with a thickness of 0.018 cm.  This was approximately 2 inches x 6 3 cm.  This was appropriately shaped and inset over the exposed muscle and submental area using 4-0 Monocryl suture.    The donor site was irrigated and hemostasis achieved.  GLORY drains were placed into the donor site cavity.  The leg was broadly undermined to allow tissue advancement in order to minimize the defect size needed to be skin grafter. The Proximal leg was closed with deep 3-0 Vicryl and 4-0 monocryl. The drain was secured at the distal leg and a 6 x 4 cm split-thickness skin graft 0.016 of an inch harvested from the ipsilateral thigh with a dermatome.  Aquacel and Tegaderm were placed over the skin graft donor site defect in the leg.  Skin graft was tacked into place with 4-0 Monocryl.  It was then sutured circumferentially in place with 4-0  Monocryl.  The graft was pie crusted with care to not expose any deep tendons and then a bolster was placed with Xeroform and sponge and silk sutures over top.  We then placed a splint at the end of the case with Webril, plaster cast splint, and an ace wrap.  Once that was completed and the leg was closed.    Patient was then returned to anesthesia for extubation.      The fellow was involved in the critical parts of the advanced portions of this procedure which include the harvest of the free flap, critical portions working under and using  the operating microscope for the microvascular anastomosis, and insetting of the flap as no qualified resident of suitable training available for these portions of the procedure. Resident involvement in other portions of the procedure going on simultaneously include  closure of the donor site as well as obtaining skin graft for closure    Complications:  None; patient tolerated the procedure well.    Disposition: PACU - hemodynamically stable.  Condition: stable     Additional Details: None    Attending Attestation: I was present and scrubbed for the key portions of the procedure.

## 2024-12-17 NOTE — SIGNIFICANT EVENT
ENT Flap Check    Subjective:  Sitting up in bed resting comfortable with sister at bedside.     Objective:  Vitals reviewed in EMR  Gen: NAD, AOx3, resting comfortably in bed  Face/Neck: All incisions c/d/i, neck soft and flat without evidence of hematoma or fluid collection  -Skin paddle soft and light pink, good cap refill. Muscle paddle with STSG inferior to skin paddle healthy appearing  -Internal doppler with strong arterial signal   -Trach in midline position without bleeding or thick tracheal secretions  Oral Cavity: All intraoral incisions c/d/i without evidence of dehiscence  -Flap soft and pink with good capillary refill  Extremities: right leg warm with intact movement and sensation  -right thigh skin graft donor site with tegaderm in place  Abdomen: PEG in place  Drains: All drains in place and holding suction with serosanguinous drainage (stripped)     Assessment/Plan:  Brigette Romo is a 74 y.o. female s/p triple endoscopy, trach, anterior oral cavity composite resection, R SND, reconstruction with R FFF with Ame Magana and Fermin.     -Continue q4hr flap checks  -follow up trial of viktor Monroe, ROSALINA-CNP

## 2024-12-17 NOTE — SIGNIFICANT EVENT
ENT Flap Check    Subjective:  Transferred to floor. Resting comfortably.     Objective:  Vitals reviewed in EMR  Gen: NAD, AOx3, resting comfortably in bed  Face/Neck: All incisions c/d/i, neck soft and flat without evidence of hematoma or fluid collection  -Skin paddle soft and light pink, good cap refill. Muscle paddle with STSG inferior to skin paddle healthy appearing  -Internal doppler with strong arterial signal   -Trach in midline position without bleeding or thick tracheal secretions  Oral Cavity: All intraoral incisions c/d/i without evidence of dehiscence  -Flap soft and pink with good capillary refill  Extremities: right leg warm with intact movement and sensation  -right thigh skin graft donor site with tegaderm in place  Abdomen: PEG in place  Drains: All drains in place and holding suction with serosanguinous drainage (stripped)    Assessment/Plan:  Brigette Romo is a 74 y.o. female s/p triple endoscopy, trach, anterior oral cavity composite resection, R SND, reconstruction with R FFF with Ame Magana and Fermin.    -Continue q4hr flap checks    Tiffany Perdue MD  ENT [id number]

## 2024-12-17 NOTE — PROGRESS NOTES
"  Ear Nose & Throat Progress Note     Brigette Romo is a 74 y.o. female on day 1 of admission presenting with Osteoradionecrosis of jaw. No acute events overnight. Flap checks stable.    Subjective   Patient doing well overnight. Pain being controlled with PCA and denies nausea. Overnight her SGST dressing did be come soaked through and a pressure wrap was place to help control oozing.       Objective   Physical Exam  Vitals reviewed in EMR  Gen: NAD, AOx3, resting comfortably in bed  Eyes: EOMI, sclera clear, PERRL  Ears: Normal external ears bilaterally  Nose: No rhinorrhea; anterior nares clear  Oral Cavity: All intraoral incisions c/d/i without evidence of dehiscence  -Flap soft and pink with good capillary refill  Head: normocephalic, atraumatic  Face/Neck: All incisions c/d/i, neck soft and flat without evidence of hematoma or fluid collection  -Skin paddle soft and light pink, good cap refill. Muscle paddle with STSG inferior to skin paddle healthy appearing  -Internal doppler with strong arterial signal   -Trach in midline position without bleeding or thick tracheal secretions  Resp: 6.0 cuffed Shiley, cuff up, non-labored breathing on 30% FI02 via trach collar  Cards: RRR on Doppler  Gastro: Soft, non-distended,  prior PEG tube in place  : Brumfield catheter in place clear yellow urine  MSK: right leg warm with intact movement and sensation  -right thigh skin graft donor site with tegaderm in place; Ace wrap in place  -Moves all extremities  Psych: Appropriate mood and affect  Drains: All drains in place and holding suction with serosanguinous drainage (stripped)    Last Recorded Vitals  Blood pressure 100/64, pulse 86, temperature 36.8 °C (98.2 °F), temperature source Temporal, resp. rate 14, height 1.676 m (5' 6\"), weight 54.6 kg (120 lb 5.9 oz), SpO2 100%.  Intake/Output last 3 Shifts:  I/O last 3 completed shifts:  In: 1966.2 (36 mL/kg) [I.V.:1966.2 (36 mL/kg)]  Out: 1190 (21.8 mL/kg) [Urine:1025 (0.5 " mL/kg/hr); Drains:165]  Weight: 54.6 kg     Relevant Results  Results for orders placed or performed during the hospital encounter of 12/16/24 (from the past 24 hours)   Tissue/Wound Culture/Smear    Specimen: MANDIBLE ANTERIOR COMPOSITE RESECTION (SPECIFY INCLUDED SITES); Tissue   Result Value Ref Range    Gram Stain (3+) Moderate Polymorphonuclear leukocytes     Gram Stain No organisms seen    BLOOD GAS ARTERIAL FULL PANEL   Result Value Ref Range    POCT pH, Arterial 7.39 7.38 - 7.42 pH    POCT pCO2, Arterial 42 38 - 42 mm Hg    POCT pO2, Arterial 211 (H) 85 - 95 mm Hg    POCT SO2, Arterial 100 94 - 100 %    POCT Oxy Hemoglobin, Arterial 97.9 94.0 - 98.0 %    POCT Hematocrit Calculated, Arterial 28.0 (L) 36.0 - 46.0 %    POCT Sodium, Arterial 139 136 - 145 mmol/L    POCT Potassium, Arterial 3.8 3.5 - 5.3 mmol/L    POCT Chloride, Arterial 108 (H) 98 - 107 mmol/L    POCT Ionized Calcium, Arterial 1.33 1.10 - 1.33 mmol/L    POCT Glucose, Arterial 145 (H) 74 - 99 mg/dL    POCT Lactate, Arterial 0.9 0.4 - 2.0 mmol/L    POCT Base Excess, Arterial 0.3 -2.0 - 3.0 mmol/L    POCT HCO3 Calculated, Arterial 25.4 22.0 - 26.0 mmol/L    POCT Hemoglobin, Arterial 9.3 (L) 12.0 - 16.0 g/dL    POCT Anion Gap, Arterial 9 (L) 10 - 25 mmo/L    Patient Temperature 37.0 degrees Celsius    FiO2 50 %   Renal Function Panel   Result Value Ref Range    Glucose 141 (H) 74 - 99 mg/dL    Sodium 141 136 - 145 mmol/L    Potassium 3.9 3.5 - 5.3 mmol/L    Chloride 108 (H) 98 - 107 mmol/L    Bicarbonate 24 21 - 32 mmol/L    Anion Gap 13 10 - 20 mmol/L    Urea Nitrogen 15 6 - 23 mg/dL    Creatinine 0.73 0.50 - 1.05 mg/dL    eGFR 88 >60 mL/min/1.73m*2    Calcium 9.2 8.6 - 10.6 mg/dL    Phosphorus 3.0 2.5 - 4.9 mg/dL    Albumin 3.7 3.4 - 5.0 g/dL   CBC   Result Value Ref Range    WBC 15.5 (H) 4.4 - 11.3 x10*3/uL    nRBC 0.0 0.0 - 0.0 /100 WBCs    RBC 3.04 (L) 4.00 - 5.20 x10*6/uL    Hemoglobin 9.6 (L) 12.0 - 16.0 g/dL    Hematocrit 29.7 (L) 36.0 -  46.0 %    MCV 98 80 - 100 fL    MCH 31.6 26.0 - 34.0 pg    MCHC 32.3 32.0 - 36.0 g/dL    RDW 13.1 11.5 - 14.5 %    Platelets 201 150 - 450 x10*3/uL   Magnesium   Result Value Ref Range    Magnesium 2.03 1.60 - 2.40 mg/dL     *Note: Due to a large number of results and/or encounters for the requested time period, some results have not been displayed. A complete set of results can be found in Results Review.        Scheduled medications  acetaminophen, 975 mg, oral, q8h HARRIETT   Or  acetaminophen, 1,000 mg, oral, q8h HARRIETT   Or  acetaminophen, 1,000 mg, g-tube, q8h HARRIETT  ampicillin-sulbactam, 3 g, intravenous, q6h  aspirin, 325 mg, g-tube, Daily  bacitracin, , Topical, TID  chlorhexidine, 15 mL, Mouth/Throat, TID after meals  enoxaparin, 40 mg, subcutaneous, q24h  esomeprazole, 40 mg, g-tube, Daily before breakfast  hydrogen peroxide, 1 Application, Topical, TID  levothyroxine, 25 mcg, g-tube, Daily before breakfast  polyethylene glycol, 17 g, g-tube, Daily  senna, 10 mL, g-tube, BID  [START ON 12/18/2024] white petrolatum, 1 Application, Topical, TID      Continuous medications  HYDROmorphone,   sodium chloride 0.9%, 70 mL/hr, Last Rate: 70 mL/hr (12/17/24 0026)      PRN medications  PRN medications: naloxone, ondansetron **OR** ondansetron            This patient has a urinary catheter   Reason for the urinary catheter remaining today? 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 female  with ORN with exposed hardware s/p triple endoscopy, tracheostomy, anterior/left oral cavity composite resection (skin, gingiva, and mandible), Right Selective Neck Dissection levels I-IV, reconstruction with Right Fibula Free Flap and Split thickness Skin Graft on 12/16/2024 by Dr. Magana and Dr. Christensen     Active Issues:  Osteoradionecrosis of Mandible  Acute on Chronic  Osteomyelitis  Hardware Failure  Hypothyroidism  HLD  Anxiety  Acute Blood Loss Anemia    Plan:  -Flap Checks: q4hrs  -Drains: Monitor output  -Analgesia:  Dilaudid PCA, Scheduled Acetaminophen  -FEN: mIVFs disontinue & start FWF, NPO, TF at trickle rate; monitor and replete electrolytes as needed  -Pulm: s/p trach, humidified air at all times, standard trach care/suctioning/teaching; wean oxygen to room air, Cuff down in the afternoon  -ID: Unasyn 3 g Q6hrs x 1 day, stop date 12/17  -Cardiac: Vitals per ENT free flap protocol then transition to Q4hr vitals; Aspirin 325mg daily,   -Endo: Home Levothyroxine 25mcg  -GI: Bowel regimen, PPI, and PRN anti-emetic  -: Brumfield catheter discontinued, afternoon void check  -Steroids/Special: Incision and oral care per ENT free flap protocol  -Embolic PPx: SQH, SCDs while in bed  -Dispo: [facility]. PT/OT ordered; Discharge planning for POD 6 home with home care vs skilled nursing facility        All plans discussed with attendings after morning rounds.       I spent 35 minutes in the professional and overall care of this patient.    Vira Monroe APRN, CNP  Certified Family Nurse Practitioner  Nurse Practitioner III  Department of Otolaryngology: Head & Neck Surgery  Personal Pager [phone]  ENT Team  Head and Neck Phone: [phone]

## 2024-12-17 NOTE — SIGNIFICANT EVENT
ENT Flap Check    Subjective:  Recovering in PACU    Objective:  Vitals reviewed in EMR  Gen: NAD, AOx3, resting comfortably in bed  Face/Neck: All incisions c/d/i, neck soft and flat without evidence of hematoma or fluid collection  -Skin paddle soft and light pink, good cap refill. Muscle paddle with STSG inferior to skin paddle healthy appearing  -Internal doppler with strong arterial signal   -Trach in midline position without bleeding or thick tracheal secretions  Oral Cavity: All intraoral incisions c/d/i without evidence of dehiscence  -Flap soft and pink with good capillary refill  Extremities: right leg warm with intact movement and sensation  -right thigh skin graft donor site with tegaderm in place  Abdomen: PEG in place  Drains: All drains in place and holding suction with serosanguinous drainage (stripped)    Assessment/Plan:  Brigette Romo is a 74 y.o. female s/p triple endoscopy, trach, anterior oral cavity composite resection, R SND, reconstruction with R FFF with Ame Magana and Fermin.    -Continue q4hr flap checks    Tiffany Perdue MD  ENT [id number]

## 2024-12-17 NOTE — PROGRESS NOTES
Speech-Language Pathology                 Therapy Communication Note    Patient Name: Brigette Romo  MRN: [id number]  Department: [facility]  Room: 5007/5007-A  Today's Date: 12/17/2024     Discipline: Speech Language Pathology    Speech Therapy consult received.  Discussed w/ team.  Per ENT, saroj Cortez Speaking Valve evaluation at this time; reassess on POD #3.  Will cancel order.  ENT to re-consult as appropriate.       12/17/24 at 3:13 PM - Lidia Nelson, SLP

## 2024-12-17 NOTE — SIGNIFICANT EVENT
ENT Flap Check    Subjective:  Sitting up in bed resting comfortable with sister at bedside.    Objective:  Vitals reviewed in EMR  Gen: NAD, AOx3, resting comfortably in bed  Face/Neck: All incisions c/d/i, neck soft and flat without evidence of hematoma or fluid collection  -Skin paddle soft and light pink, good cap refill. Muscle paddle with STSG inferior to skin paddle healthy appearing  -Internal doppler with strong arterial signal   -Trach in midline position without bleeding or thick tracheal secretions  Oral Cavity: All intraoral incisions c/d/i without evidence of dehiscence  -Flap soft and pink with good capillary refill  Extremities: right leg warm with intact movement and sensation  -right thigh skin graft donor site with tegaderm in place  Abdomen: PEG in place  Drains: All drains in place and holding suction with serosanguinous drainage (stripped)    Assessment/Plan:  Brigette Romo is a 74 y.o. female s/p triple endoscopy, trach, anterior oral cavity composite resection, R SND, reconstruction with R FFF with Ame Magana and Fermin.    -Continue q4hr flap checks    Kale Batista, DO  ENT [id number]

## 2024-12-18 LAB
ABO GROUP (TYPE) IN BLOOD: NORMAL
ALBUMIN SERPL BCP-MCNC: 3 G/DL (ref 3.4–5)
ANION GAP SERPL CALC-SCNC: 8 MMOL/L (ref 10–20)
ANTIBODY SCREEN: NORMAL
BLOOD EXPIRATION DATE: NORMAL
BUN SERPL-MCNC: 15 MG/DL (ref 6–23)
CALCIUM SERPL-MCNC: 8.6 MG/DL (ref 8.6–10.6)
CHLORIDE SERPL-SCNC: 113 MMOL/L (ref 98–107)
CO2 SERPL-SCNC: 29 MMOL/L (ref 21–32)
CREAT SERPL-MCNC: 0.68 MG/DL (ref 0.5–1.05)
DISPENSE STATUS: NORMAL
EGFRCR SERPLBLD CKD-EPI 2021: >90 ML/MIN/1.73M*2
ERYTHROCYTE [DISTWIDTH] IN BLOOD BY AUTOMATED COUNT: 13.5 % (ref 11.5–14.5)
GLUCOSE BLD MANUAL STRIP-MCNC: 91 MG/DL (ref 74–99)
GLUCOSE SERPL-MCNC: 98 MG/DL (ref 74–99)
HCT VFR BLD AUTO: 21.2 % (ref 36–46)
HGB BLD-MCNC: 6.6 G/DL (ref 12–16)
MAGNESIUM SERPL-MCNC: 2.21 MG/DL (ref 1.6–2.4)
MCH RBC QN AUTO: 31 PG (ref 26–34)
MCHC RBC AUTO-ENTMCNC: 31.1 G/DL (ref 32–36)
MCV RBC AUTO: 100 FL (ref 80–100)
NRBC BLD-RTO: 0 /100 WBCS (ref 0–0)
PHOSPHATE SERPL-MCNC: 1.9 MG/DL (ref 2.5–4.9)
PLATELET # BLD AUTO: 153 X10*3/UL (ref 150–450)
POTASSIUM SERPL-SCNC: 3.5 MMOL/L (ref 3.5–5.3)
PRODUCT BLOOD TYPE: 6200
PRODUCT CODE: NORMAL
RBC # BLD AUTO: 2.13 X10*6/UL (ref 4–5.2)
RH FACTOR (ANTIGEN D): NORMAL
SODIUM SERPL-SCNC: 146 MMOL/L (ref 136–145)
UNIT ABO: NORMAL
UNIT NUMBER: NORMAL
UNIT RH: NORMAL
UNIT VOLUME: 281
WBC # BLD AUTO: 8 X10*3/UL (ref 4.4–11.3)
XM INTEP: NORMAL

## 2024-12-18 PROCEDURE — 2500000005 HC RX 250 GENERAL PHARMACY W/O HCPCS: Performed by: NURSE PRACTITIONER

## 2024-12-18 PROCEDURE — 85027 COMPLETE CBC AUTOMATED: CPT

## 2024-12-18 PROCEDURE — 36415 COLL VENOUS BLD VENIPUNCTURE: CPT | Performed by: NURSE PRACTITIONER

## 2024-12-18 PROCEDURE — 2060000001 HC INTERMEDIATE ICU ROOM DAILY

## 2024-12-18 PROCEDURE — 36415 COLL VENOUS BLD VENIPUNCTURE: CPT

## 2024-12-18 PROCEDURE — 97166 OT EVAL MOD COMPLEX 45 MIN: CPT | Mod: GO

## 2024-12-18 PROCEDURE — 2500000005 HC RX 250 GENERAL PHARMACY W/O HCPCS

## 2024-12-18 PROCEDURE — 99024 POSTOP FOLLOW-UP VISIT: CPT | Performed by: STUDENT IN AN ORGANIZED HEALTH CARE EDUCATION/TRAINING PROGRAM

## 2024-12-18 PROCEDURE — 97165 OT EVAL LOW COMPLEX 30 MIN: CPT | Mod: GO

## 2024-12-18 PROCEDURE — 2500000004 HC RX 250 GENERAL PHARMACY W/ HCPCS (ALT 636 FOR OP/ED): Performed by: NURSE PRACTITIONER

## 2024-12-18 PROCEDURE — P9016 RBC LEUKOCYTES REDUCED: HCPCS

## 2024-12-18 PROCEDURE — 85027 COMPLETE CBC AUTOMATED: CPT | Performed by: NURSE PRACTITIONER

## 2024-12-18 PROCEDURE — 82947 ASSAY GLUCOSE BLOOD QUANT: CPT

## 2024-12-18 PROCEDURE — 2500000004 HC RX 250 GENERAL PHARMACY W/ HCPCS (ALT 636 FOR OP/ED)

## 2024-12-18 PROCEDURE — 1200000003 HC ONCOLOGY  ROOM WITH TELEMETRY DAILY

## 2024-12-18 PROCEDURE — 97162 PT EVAL MOD COMPLEX 30 MIN: CPT | Mod: GP

## 2024-12-18 PROCEDURE — 36430 TRANSFUSION BLD/BLD COMPNT: CPT

## 2024-12-18 PROCEDURE — 86901 BLOOD TYPING SEROLOGIC RH(D): CPT

## 2024-12-18 PROCEDURE — 83735 ASSAY OF MAGNESIUM: CPT

## 2024-12-18 PROCEDURE — 2500000001 HC RX 250 WO HCPCS SELF ADMINISTERED DRUGS (ALT 637 FOR MEDICARE OP)

## 2024-12-18 PROCEDURE — 2500000001 HC RX 250 WO HCPCS SELF ADMINISTERED DRUGS (ALT 637 FOR MEDICARE OP): Performed by: NURSE PRACTITIONER

## 2024-12-18 PROCEDURE — 84100 ASSAY OF PHOSPHORUS: CPT

## 2024-12-18 PROCEDURE — 99024 POSTOP FOLLOW-UP VISIT: CPT | Performed by: OTOLARYNGOLOGY

## 2024-12-18 RX ORDER — OXYCODONE HYDROCHLORIDE 5 MG/1
10 TABLET ORAL EVERY 4 HOURS PRN
Status: DISCONTINUED | OUTPATIENT
Start: 2024-12-18 | End: 2024-12-20

## 2024-12-18 RX ORDER — POTASSIUM CHLORIDE 1.5 G/1.58G
40 POWDER, FOR SOLUTION ORAL ONCE
Status: COMPLETED | OUTPATIENT
Start: 2024-12-18 | End: 2024-12-18

## 2024-12-18 RX ORDER — OXYCODONE HYDROCHLORIDE 5 MG/1
5 TABLET ORAL EVERY 4 HOURS PRN
Status: DISCONTINUED | OUTPATIENT
Start: 2024-12-18 | End: 2024-12-20

## 2024-12-18 ASSESSMENT — COGNITIVE AND FUNCTIONAL STATUS - GENERAL
DRESSING REGULAR LOWER BODY CLOTHING: A LITTLE
TURNING FROM BACK TO SIDE WHILE IN FLAT BAD: A LITTLE
PERSONAL GROOMING: A LITTLE
HELP NEEDED FOR BATHING: A LITTLE
TURNING FROM BACK TO SIDE WHILE IN FLAT BAD: A LITTLE
TURNING FROM BACK TO SIDE WHILE IN FLAT BAD: A LITTLE
WALKING IN HOSPITAL ROOM: A LITTLE
HELP NEEDED FOR BATHING: A LITTLE
CLIMB 3 TO 5 STEPS WITH RAILING: A LOT
TOILETING: A LITTLE
MOBILITY SCORE: 17
STANDING UP FROM CHAIR USING ARMS: A LITTLE
MOVING FROM LYING ON BACK TO SITTING ON SIDE OF FLAT BED WITH BEDRAILS: A LITTLE
STANDING UP FROM CHAIR USING ARMS: A LITTLE
DRESSING REGULAR LOWER BODY CLOTHING: A LOT
PERSONAL GROOMING: A LITTLE
MOVING TO AND FROM BED TO CHAIR: A LITTLE
MOBILITY SCORE: 17
MOVING TO AND FROM BED TO CHAIR: A LITTLE
DRESSING REGULAR UPPER BODY CLOTHING: A LITTLE
MOVING TO AND FROM BED TO CHAIR: A LITTLE
HELP NEEDED FOR BATHING: A LOT
TOILETING: A LITTLE
CLIMB 3 TO 5 STEPS WITH RAILING: A LOT
PERSONAL GROOMING: A LITTLE
EATING MEALS: A LITTLE
DAILY ACTIVITIY SCORE: 17
TOILETING: A LITTLE
MOBILITY SCORE: 17
MOVING FROM LYING ON BACK TO SITTING ON SIDE OF FLAT BED WITH BEDRAILS: A LITTLE
DRESSING REGULAR LOWER BODY CLOTHING: A LITTLE
DAILY ACTIVITIY SCORE: 18
DRESSING REGULAR UPPER BODY CLOTHING: A LITTLE
CLIMB 3 TO 5 STEPS WITH RAILING: A LOT
DAILY ACTIVITIY SCORE: 18
EATING MEALS: A LITTLE
STANDING UP FROM CHAIR USING ARMS: A LITTLE
MOVING FROM LYING ON BACK TO SITTING ON SIDE OF FLAT BED WITH BEDRAILS: A LITTLE
DRESSING REGULAR UPPER BODY CLOTHING: A LITTLE
WALKING IN HOSPITAL ROOM: A LITTLE
WALKING IN HOSPITAL ROOM: A LITTLE

## 2024-12-18 ASSESSMENT — PAIN SCALES - GENERAL
PAINLEVEL_OUTOF10: 3
PAINLEVEL_OUTOF10: 5 - MODERATE PAIN
PAINLEVEL_OUTOF10: 5 - MODERATE PAIN
PAINLEVEL_OUTOF10: 8
PAINLEVEL_OUTOF10: 2

## 2024-12-18 ASSESSMENT — PAIN - FUNCTIONAL ASSESSMENT
PAIN_FUNCTIONAL_ASSESSMENT: 0-10

## 2024-12-18 ASSESSMENT — ACTIVITIES OF DAILY LIVING (ADL)
ADL_ASSISTANCE: INDEPENDENT
ADL_ASSISTANCE: INDEPENDENT
BATHING_ASSISTANCE: MODERATE

## 2024-12-18 NOTE — SIGNIFICANT EVENT
ENT Flap Check    Subjective:  Resting comfortably.  Running 1u PRBC.      Objective:  Vitals reviewed in EMR  Gen: NAD, AOx3, resting comfortably in bed  Face/Neck: All incisions c/d/i, neck soft and flat without evidence of hematoma or fluid collection  -Skin paddle soft and light pink, good cap refill. Muscle paddle with STSG inferior to skin paddle healthy appearing  -Internal doppler with strong arterial signal   -Trach in midline position without bleeding or thick tracheal secretions  Oral Cavity: All intraoral incisions c/d/i without evidence of dehiscence  -Flap soft and pink with good capillary refill  Extremities: right leg warm with intact movement and sensation  -right thigh skin graft donor site with tegaderm in place  Abdomen: PEG in place  Drains: All drains in place and holding suction with serosanguinous drainage (stripped)     Assessment/Plan:  Brigette Romo is a 74 y.o. female s/p triple endoscopy, trach, anterior oral cavity composite resection, R SND, reconstruction with R FFF with Ame Magana and Fermin.     -Continue q6hr flap checks  -follow up post transfusion CBC    Tiffany Perdue MD

## 2024-12-18 NOTE — SIGNIFICANT EVENT
ENT Flap Check    Subjective:  Resting comfortably.     Objective:  Vitals reviewed in EMR  Gen: NAD, AOx3, resting comfortably in bed  Face/Neck: All incisions c/d/i, neck soft and flat without evidence of hematoma or fluid collection  -Skin paddle soft and light pink, good cap refill. Muscle paddle with STSG inferior to skin paddle healthy appearing  -Internal doppler with strong arterial signal   -Trach in midline position without bleeding or thick tracheal secretions  Oral Cavity: All intraoral incisions c/d/i without evidence of dehiscence  -Flap soft and pink with good capillary refill  Extremities: right leg warm with intact movement and sensation  -right thigh skin graft donor site with tegaderm in place  Abdomen: PEG in place  Drains: All drains in place and holding suction with serosanguinous drainage (stripped)     Assessment/Plan:  Brigette Romo is a 74 y.o. female s/p triple endoscopy, trach, anterior oral cavity composite resection, R SND, reconstruction with R FFF with Ame Magana and Fermin.     -Continue q4hr flap checks    Tiffany Perdue MD

## 2024-12-18 NOTE — CARE PLAN
The patient's goals for the shift include      The clinical goals for the shift include To have adequate perfusion, stable flap, safe and free from falls and/or injury      Problem: Pain  Goal: Takes deep breaths with improved pain control throughout the shift  Outcome: Progressing  Goal: Turns in bed with improved pain control throughout the shift  Outcome: Progressing  Goal: Walks with improved pain control throughout the shift  Outcome: Progressing  Goal: Performs ADL's with improved pain control throughout shift  Outcome: Progressing  Goal: Participates in PT with improved pain control throughout the shift  Outcome: Progressing  Goal: Free from opioid side effects throughout the shift  Outcome: Progressing  Goal: Free from acute confusion related to pain meds throughout the shift  Outcome: Progressing

## 2024-12-18 NOTE — PROGRESS NOTES
Physical Therapy    Physical Therapy Evaluation    Patient Name: Brigette Romo  MRN: [id number]  Department: [facility]  Room: [facility]500-  Today's Date: 12/18/2024   Time Calculation  Start Time: 0921  Stop Time: 0945  Time Calculation (min): 24 min    Assessment/Plan   PT Assessment  PT Assessment Results: Decreased endurance, Impaired balance, Decreased mobility  Rehab Prognosis: Good  Evaluation/Treatment Tolerance: Patient tolerated treatment well  Medical Staff Made Aware: Yes  End of Session Communication: Bedside nurse  Assessment Comment: Patient is a 73yo F s/p triple endoscopy, trach, anterior oral cavity composite resection, R SND, reconstruction with R FFF with Ame Magana and Fermin. Patient is indep at baseline and has supportive family. patient was CGA for mobility. dc rec low intensity  End of Session Patient Position: Up in chair, Alarm on  IP OR SWING BED PT PLAN  Inpatient or Swing Bed: Inpatient  PT Plan  Treatment/Interventions: Bed mobility, Transfer training, Gait training, Stair training, Balance training, Neuromuscular re-education, Strengthening, Endurance training, Range of motion, Therapeutic exercise, Therapeutic activity  PT Plan: Ongoing PT  PT Frequency: 3 times per week  PT Discharge Recommendations: Low intensity level of continued care  Equipment Recommended upon Discharge: Wheeled walker  PT Recommended Transfer Status: Contact guard  PT - OK to Discharge: Yes (indicates PT eval complete and dc rec determined)    Subjective   General Visit Information:  General  Reason for Referral: triple endoscopy, trach, anterior oral cavity composite resection, R SND, reconstruction with R FFF with Ame Oliveira  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 pleasant and cooperative  Home Living:  Home Living  Type of Home: House  Lives With: Alone  Home Adaptive Equipment: None  Home Layout: One level  Bathroom Shower/Tub: Walk-in shower  Bathroom Equipment: Shower chair with back  Home Living Comments: plan to dc to sisters home where she will have 24/7 support  Prior Level of Function:  Prior Function Per Pt/Caregiver Report  Level of [place]: Independent with ADLs and functional transfers, Independent with homemaking with ambulation  ADL Assistance: Independent  Homemaking Assistance: Independent  Ambulatory Assistance: Independent  Vocational: Retired  Prior Function Comments: son reports pt was cleaning her gutters last week and very independent  Precautions:  Precautions  LE Weight Bearing Status: Right Toe-Touch Weight Bearing (through POD 5)  Medical Precautions: Fall precautions, Oxygen therapy device and L/min         Vital Signs Comment: O2 remained 98% during session     Objective   Pain:  Pain Assessment  Pain Assessment: 0-10  0-10 (Numeric) Pain Score: 5 - Moderate pain  Pain Type: Surgical pain  Pain Location:  (neck and RLE)  Pain Interventions: Repositioned (PCA)  Cognition:  Cognition  Overall Cognitive Status: Within Functional Limits  Orientation Level: Oriented X4    General Assessments:     Activity Tolerance  Endurance: Tolerates 10 - 20 min exercise with multiple rests       Functional Assessments:  Bed Mobility  Bed Mobility: Yes  Bed Mobility 1  Bed Mobility 1: Supine to sitting  Level of Assistance 1: Contact guard  Bed Mobility Comments 1: cues for sequencing, HOB elevated slightly    Transfers  Transfer: Yes  Transfer 1  Transfer From 1: Sit to, Stand to  Transfer to 1: Stand, Sit  Technique 1: Sit to stand, Stand to sit  Transfer Device 1: Walker  Transfer Level of Assistance 1: Contact guard  Trials/Comments 1: edu on TTWB and PT  demonstrated prior to patient completion. stood from EOB    Ambulation/Gait Training  Ambulation/Gait Training Performed: Yes  Ambulation/Gait Training 1  Surface 1: Level tile  Device 1: Rolling walker  Assistance 1: Contact guard  Quality of Gait 1: Decreased step length, Antalgic  Comments/Distance (ft) 1: cues for TTWB and sequencing; 3' to chair  Extremity/Trunk Assessments:  RLE   RLE :  (observed functionally WFL)  LLE   LLE : Within Functional Limits  Outcome Measures:  [facility] Basic Mobility  Turning from your back to your side while in a flat bed without using bedrails: A little  Moving from lying on your back to sitting on the side of a flat bed without using bedrails: A little  Moving to and from bed to chair (including a wheelchair): A little  Standing up from a chair using your arms (e.g. wheelchair or bedside chair): A little  To walk in hospital room: A little  Climbing 3-5 steps with railing: A lot  Basic Mobility - Total Score: 17    Encounter Problems       Encounter Problems (Active)       Mobility       STG - Patient will ambulate > 100' with LRAD, progress with precautions as indicated, modif indep  (Progressing)       Start:  12/18/24    Expected End:  01/08/25               PT Transfers       STG - Transfer from bed to chair LRAD modif indep  (Progressing)       Start:  12/18/24    Expected End:  01/08/25            STG - Patient will perform bed mobility indep (Progressing)       Start:  12/18/24    Expected End:  01/08/25            STG - Patient will transfer sit to and from stand modif indep LRAD (Progressing)       Start:  12/18/24    Expected End:  01/08/25               Pain - Adult              Education Documentation  Precautions, taught by Lidia Aviles PT at 12/18/2024 12:17 PM.  Learner: Patient  Readiness: Acceptance  Method: Explanation  Response: Verbalizes Understanding  Comment: edu on importance of OOB to chair, role of PT and dc planning. reviewed TTWB and demonstrated  gait    Mobility Training, taught by Lidia Aviles, PT at 12/18/2024 12:17 PM.  Learner: Patient  Readiness: Acceptance  Method: Explanation  Response: Verbalizes Understanding  Comment: edu on importance of OOB to chair, role of PT and dc planning. reviewed TTWB and demonstrated gait    Education Comments  No comments found.

## 2024-12-18 NOTE — CARE PLAN
The patient's goals for the shift include      The clinical goals for the shift include To remain safe and free from falls and or injruy, to have urine output      Problem: Pain  Goal: Takes deep breaths with improved pain control throughout the shift  Outcome: Progressing  Goal: Turns in bed with improved pain control throughout the shift  Outcome: Progressing  Goal: Walks with improved pain control throughout the shift  Outcome: Progressing  Goal: Performs ADL's with improved pain control throughout shift  Outcome: Progressing  Goal: Participates in PT with improved pain control throughout the shift  Outcome: Progressing  Goal: Free from opioid side effects throughout the shift  Outcome: Progressing  Goal: Free from acute confusion related to pain meds throughout the shift  Outcome: Progressing     Problem: Pain - Adult  Goal: Verbalizes/displays adequate comfort level or baseline comfort level  Outcome: Progressing     Problem: Safety - Adult  Goal: Free from fall injury  Outcome: Progressing     Problem: Discharge Planning  Goal: Discharge to home or other facility with appropriate resources  Outcome: Progressing     Problem: Chronic Conditions and Co-morbidities  Goal: Patient's chronic conditions and co-morbidity symptoms are monitored and maintained or improved  Outcome: Progressing     Problem: Skin  Goal: Prevent/minimize sheer/friction injuries  Outcome: Progressing  Flowsheets (Taken 12/18/2024 1531)  Prevent/minimize sheer/friction injuries:   Complete micro-shifts as needed if patient unable. Adjust patient position to relieve pressure points, not a full turn   Use pull sheet  Goal: Promote/optimize nutrition  Outcome: Progressing  Flowsheets (Taken 12/18/2024 1531)  Promote/optimize nutrition: Monitor/record intake including meals  Goal: Promote skin healing  Outcome: Progressing  Flowsheets (Taken 12/18/2024 1531)  Promote skin healing: Protective dressings over bony prominences

## 2024-12-18 NOTE — CARE PLAN
Problem: Skin  Goal: Prevent/minimize sheer/friction injuries  Flowsheets (Taken 12/18/2024 0039)  Prevent/minimize sheer/friction injuries:   Increase activity/out of bed for meals   Use pull sheet  Goal: Promote/optimize nutrition  Flowsheets (Taken 12/18/2024 0039)  Promote/optimize nutrition:   Monitor/record intake including meals   Assist with feeding  Goal: Promote skin healing  Flowsheets (Taken 12/18/2024 0039)  Promote skin healing:   Rotate device position/do not position patient on device   Protective dressings over bony prominences   Assess skin/pad under line(s)/device(s)   The patient's goals for the shift include      The clinical goals for the shift include Pt will be safe, comfortable, HD stable, and maintain adequate flap perfusion overnight.

## 2024-12-18 NOTE — SIGNIFICANT EVENT
Rapid Response Nurse Note: RADAR alert: 6    Pager time: [phone]  Arrival time: 1000  Event end time: 100  Location: [facility] 5  [] Triage by phone or secure messaging    Rapid response initiated by:  [] Rapid response RN [] Family [] Nursing Supervisor [] Physician   [x] RADAR auto page [] Sepsis auto-page [] RN [] RT   [] NP/PA [] Other:     Primary reason for call:   [] BAT [] New CPAP/BiPAP [] Bleeding [] Change in mental status   [] Chest pain [] Code blue [] FiO2 >/= 50% [] HR </= 40 bpm   [] HR >/= 130 bpm [] Hyperglycemia [] Hypoglycemia [x] RADAR    [] RR </= 8 bpm [] RR >/= 30 bpm [] SBP </= 90 mmHg [] SpO2 < 90%   [] Seizure [] Sepsis [] Shortness of breath  [] Staff concern: see comments     Initial VS and/or RADAR VS: T 36.6 °C; HR 97; RR 16; BP 85/42; SPO2 98%.    Providers present at bedside (if applicable):     Name of ICU Provider contacted (if applicable):     Interventions:  [x] None [] ABG/VBG [] Assist w/ICU transfer [] BAT paged    [] Bag mask [] Blood [] Cardioversion [] Code Blue   [] Code blue for intubation [] Code status changed [] Chest x-ray [] EKG   [] IV fluid/bolus [] KUB x-ray [] Labs/cultures [] Medication   [] Nebulizer treatment [] NIPPV (CPAP/BiPAP) [] Oxygen [] Oral airway   [] Peripheral IV [] Palliative care consult [] CT/MRI [] Sepsis protocol    [] Suctioned [] Other:     Outcome:  [] Coded and  [] Code blue for intubation [] Coded and transferred to ICU []  on division   [x] Remained on division (no change) [] Remained on division + additional monitoring [] Remained in ED [] Transferred to ED   [] Transferred to ICU [] Transferred to inpatient status [] Transferred for interventions (procedure) [] Transferred to ICU stepdown    [] Transferred to surgery [] Transferred to telemetry [] Sepsis protocol [] STEMI protocol   [] Stroke protocol [x] Bedside nurse instructed to page rapid response for any concerns or acute change in condition/VS     Additional Comments: PAUL  alert-score of 6. Upon arrival pt sitting up in the bedside chair and no complaints. BP in the upper 80's. Recheck and BP 89/48-pt with no dizziness or lightheadedness. Spoke with bedside nurse and plan for transfusion of blood (1U) today. No addtl concerns at this time.

## 2024-12-18 NOTE — PROGRESS NOTES
Occupational Therapy    Evaluation    Patient Name: Brigette Romo  MRN: [id number]  Today's Date: 12/18/2024  Room: [address] [place] [address]  Time Calculation  Start Time: 0921  Stop Time: 0945  Time Calculation (min): 24 min    Assessment  IP OT Assessment  OT Assessment: Pt's abilty to complete ADLs currently limited by TTWB in R LE, pain, and deficits in functional strength, activity tolerance, and dynamic balance. The pt demos the ability to complete the majority of ADL tasks with Mod A - CGA. Pt will benefit from continued OT while admitted to increase IND and safety prior to d/c.  Prognosis: Good  Barriers to Discharge Home: No anticipated barriers  Evaluation/Treatment Tolerance: Patient limited by pain, Patient limited by fatigue  Medical Staff Made Aware: Yes  End of Session Communication: Bedside nurse  End of Session Patient Position: Up in chair, Alarm on  Plan:  Inpatient Plan  Treatment Interventions: ADL retraining, Functional transfer training, Endurance training, Patient/family training, Equipment evaluation/education, Compensatory technique education  OT Frequency: 2 times per week  OT Discharge Recommendations: Low intensity level of continued care  Equipment Recommended upon Discharge: Wheeled walker  OT Recommended Transfer Status: Minimal assist, Assist of 1  OT - OK to Discharge: Yes  OT Assessment  OT Assessment Results: Decreased ADL status, Decreased endurance, Decreased functional mobility, Decreased IADLs  Prognosis: Good  Barriers to Discharge: None  Evaluation/Treatment Tolerance: Patient limited by pain, Patient limited by fatigue  Medical Staff Made Aware: Yes  Strengths: Attitude of self, Ability to acquire knowledge, Support of Caregivers, Housing layout, Premorbid level of function  Barriers to Participation: Comorbidities    Subjective   Current Problem:  1. Osteoradionecrosis of jaw  Fungal Culture/Smear    Fungal Culture/Smear    Tissue/Wound Culture/Smear    Tissue/Wound Culture/Smear     Surgical Pathology Exam    Surgical Pathology Exam      2. 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  Co-Treatment: PT  Co-Treatment Reason: Maximize pt's safety and benefit from provided therapy  Prior to Session Communication: Bedside nurse  Patient Position Received: Bed, 3 rail up, Alarm off, caregiver present  Family/Caregiver Present: Yes  Caregiver Feedback: Son present and engaged in OT eval  General Comment: Pt pleasant and agreeable to OT eval - asnwers yes/no questions and uses writing pad as needed for communication   Precautions:  LE Weight Bearing Status: Right Toe-Touch Weight Bearing  Medical Precautions: Fall precautions  Vital Signs:  Vital Signs (Past 2hrs)    BP: 93/49, HR: 91, O2: 97%    Pain:  Pain Assessment  Pain Assessment: 0-10  0-10 (Numeric) Pain Score: 5 - Moderate pain  Pain Type: Surgical pain  Pain Location:  (R LE and neck)  Pain Interventions: Repositioned  Response to Interventions: Content/relaxed  Lines/Tubes/Drains:  Surgical Airway Cuffed 6 (Active)   Number of days: 1       Closed/Suction Drain 1 Inferior;Right Leg Bulb 10 Fr. (Active)   Number of days: 2       Closed/Suction Drain 2 Right;Anterior Neck Bulb 10 Fr. (Active)   Number of days: 1       Gastrostomy/Enterostomy LUQ (Active)   Number of days: 1     Objective   Cognition:  Overall Cognitive Status: Within Functional Limits  Orientation Level: Oriented X4           Home Living:  Type of Home: House  Lives With: Alone (Plan is to d/c to her sister's house - sister's setup listed below.)  Home Adaptive Equipment: None  Home  Layout: One level  Bathroom Shower/Tub: Walk-in shower  Bathroom Toilet: Standard  Bathroom Equipment: Shower chair with back   Prior Function:  Level of [place]: Independent with ADLs and functional transfers, Independent with homemaking with ambulation  ADL Assistance: Independent  Homemaking Assistance: Independent  Ambulatory Assistance: Independent  Vocational: Retired  Hand Dominance: Left  IADL History:  Homemaking Responsibilities: Yes  Meal Prep Responsibility: Primary  Laundry Responsibility: Primary  Cleaning Responsibility: Primary  Bill Paying/Finance Responsibility: Primary  Shopping Responsibility: Primary  Current License: Yes  Mode of Transportation: Car  Occupation: Retired  ADL:  Eating Assistance: Independent (Anticipated)  Grooming Assistance: Stand by (Anticipated)  Grooming Deficit: Supervision/safety  Bathing Assistance: Moderate (Anticipated)  UE Dressing Assistance: Minimal (Anticipated)  LE Dressing Assistance: Moderate (Anticipated)  Toileting Assistance with Device: Minimal (Anticipated)  ADL Comments: Limited by pain, lines/tubes, and fatigue  Activity Tolerance:  Endurance: Tolerates 10 - 20 min exercise with multiple rests  Balance:  Dynamic Standing Balance  Dynamic Standing-Balance Support: Bilateral upper extremity supported  Dynamic Standing-Level of Assistance: Contact guard  Dynamic Standing-Comments: FWW  Static Sitting Balance  Static Sitting-Balance Support: Feet supported, No upper extremity supported  Static Sitting-Level of Assistance: Distant supervision  Static Sitting-Comment/Number of Minutes: EOB  Static Standing Balance  Static Standing-Balance Support: Bilateral upper extremity supported  Static Standing-Level of Assistance: Contact guard  Static Standing-Comment/Number of Minutes: FWW  Bed Mobility/Transfers: Bed Mobility  Bed Mobility: Yes  Bed Mobility 1  Bed Mobility 1: Supine to sitting  Level of Assistance 1: Contact guard  Bed Mobility Comments 1: Cues  for technique and positioning   and Transfers  Transfer: Yes  Transfer 1  Transfer From 1: Bed to  Transfer to 1: Chair with arms  Technique 1: Stand pivot  Transfer Device 1: Walker  Transfer Level of Assistance 1: Contact guard  Trials/Comments 1: Education on TTWB - effective follow through  IADL's:   Homemaking Responsibilities: Yes  Meal Prep Responsibility: Primary  Laundry Responsibility: Primary  Cleaning Responsibility: Primary  Bill Paying/Finance Responsibility: Primary  Shopping Responsibility: Primary  Current License: Yes  Mode of Transportation: Car  Occupation: Retired  Vision: Vision - Basic Assessment  Current Vision: Wears glasses all the time   and    Sensation:  Light Touch: No apparent deficits  Strength:  Strength Comments: AROM WFL - not resisted at this time  Perception:  Inattention/Neglect: Appears intact  Coordination:  Movements are Fluid and Coordinated: Yes   Hand Function:  Hand Function  Gross Grasp: Functional  Coordination: Functional  Extremities: RUE   RUE : Within Functional Limits, LUE   LUE: Within Functional Limits,  , and      Outcome Measures: [facility] Daily Activity  Putting on and taking off regular lower body clothing: A lot  Bathing (including washing, rinsing, drying): A lot  Putting on and taking off regular upper body clothing: A little  Toileting, which includes using toilet, bedpan or urinal: A little  Taking care of personal grooming such as brushing teeth: A little  Eating Meals: None  Daily Activity - Total Score: 17         ,     OT Adult Other Outcome Measures  4AT: 4 AT -    Education Documentation  Body Mechanics, taught by Hollis Segura OT at 12/18/2024 11:38 AM.  Learner: Patient  Readiness: Acceptance  Method: Explanation, Demonstration  Response: Verbalizes Understanding    Precautions, taught by Hollis Segura OT at 12/18/2024 11:38 AM.  Learner: Patient  Readiness: Acceptance  Method: Explanation, Demonstration  Response: Verbalizes Understanding    ADL  Training, taught by Hollis Segura OT at 12/18/2024 11:38 AM.  Learner: Patient  Readiness: Acceptance  Method: Explanation, Demonstration  Response: Verbalizes Understanding    Education Comments  No comments found.    Goals:     Encounter Problems       Encounter Problems (Active)       ADLs       Patient with complete lower body dressing with modified independent level of assistance donning and doffing all LE clothes  with PRN adaptive equipment while edge of bed  and standing (Progressing)       Start:  12/18/24    Expected End:  01/08/25            Patient will complete daily grooming tasks brushing teeth and washing face/hair with modified independent level of assistance and PRN adaptive equipment while standing. (Progressing)       Start:  12/18/24    Expected End:  01/08/25            Patient will complete toileting including hygiene clothing management/hygiene with modified independent level of assistance and raised toilet seat and grab bars. (Progressing)       Start:  12/18/24    Expected End:  01/08/25               BALANCE       Pt will maintain dynamic standing balance during ADL task with modified independent level of assistance in order to demonstrate decreased risk of falling and improved postural control. (Progressing)       Start:  12/18/24    Expected End:  01/08/25               MOBILITY       Patient will perform Functional mobility max Household distances/Community Distances with modified independent level of assistance and least restrictive device in order to improve safety and functional mobility. (Progressing)       Start:  12/18/24    Expected End:  01/08/25 12/18/24 at 11:39 AM   Hollis Segura OT   Rehab Office: [phone]

## 2024-12-18 NOTE — SIGNIFICANT EVENT
ENT Flap Check    Subjective:  Resting comfortably.  Patient was able to get up to chair.  She did not void after Brumfield was removed and straight cath was performed overnight; therefore, the Brumfield catheter was replaced. Hgb dropped, will give 1u and repeat CBC     Objective:  Vitals reviewed in EMR  Gen: NAD, AOx3, resting comfortably in bed  Face/Neck: All incisions c/d/i, neck soft and flat without evidence of hematoma or fluid collection  -Skin paddle soft and light pink, good cap refill. Muscle paddle with STSG inferior to skin paddle healthy appearing  -Internal doppler with strong arterial signal   -Trach in midline position without bleeding or thick tracheal secretions  Oral Cavity: All intraoral incisions c/d/i without evidence of dehiscence  -Flap soft and pink with good capillary refill  Extremities: right leg warm with intact movement and sensation  -right thigh skin graft donor site with tegaderm in place  Abdomen: PEG in place  Drains: All drains in place and holding suction with serosanguinous drainage (stripped)     Assessment/Plan:  Brigette Romo is a 74 y.o. female s/p triple endoscopy, trach, anterior oral cavity composite resection, R SND, reconstruction with R FFF with Ame Magana and Fermin.     -Continue q4hr flap checks  -follow up post transfusion CBC    Quinn Calle MD

## 2024-12-18 NOTE — SIGNIFICANT EVENT
ENT Flap Check    Subjective:  Resting comfortably. Required straight cath for inability to void after green removal, 500 cc     Objective:  Vitals reviewed in EMR  Gen: NAD, AOx3, resting comfortably in bed  Face/Neck: All incisions c/d/i, neck soft and flat without evidence of hematoma or fluid collection  -Skin paddle soft and light pink, good cap refill. Muscle paddle with STSG inferior to skin paddle healthy appearing  -Internal doppler with strong arterial signal   -Trach in midline position without bleeding or thick tracheal secretions  Oral Cavity: All intraoral incisions c/d/i without evidence of dehiscence  -Flap soft and pink with good capillary refill  Extremities: right leg warm with intact movement and sensation  -right thigh skin graft donor site with tegaderm in place  Abdomen: PEG in place  Drains: All drains in place and holding suction with serosanguinous drainage (stripped)     Assessment/Plan:  Brigette Romo is a 74 y.o. female s/p triple endoscopy, trach, anterior oral cavity composite resection, R SND, reconstruction with R FFF with Ame Magana and Fermin.     -Continue q4hr flap checks  - void trial     Tiffany Perdue MD

## 2024-12-18 NOTE — CARE PLAN
The patient's goals for the shift include      The clinical goals for the shift include Pt will be safe, comfortable, HD stable, and maintain adequate flap perfusion overnight.

## 2024-12-18 NOTE — PROGRESS NOTES
"  Ear Nose & Throat Progress Note     Brigette Romo is a 74 y.o. female on day 2 of admission presenting with Osteoradionecrosis of jaw. No acute events overnight. Flap checks stable.    Subjective   Green removed yesterday, patient failed trial of void now s/p straight cath at midnight with 500 cc out.  Repeat trial of void this morning failed after 11 hours, replaced green.  Hemoglobin dropped from 7.8-6.6 overnight.       Objective   Physical Exam  Vitals reviewed in EMR  Gen: NAD, AOx3, resting comfortably in bed  Eyes: EOMI, sclera clear, PERRL  Ears: Normal external ears bilaterally  Nose: No rhinorrhea; anterior nares clear  Oral Cavity: All intraoral incisions c/d/i without evidence of dehiscence  -Flap soft and pink with good capillary refill  Head: normocephalic, atraumatic  Face/Neck: All incisions c/d/i, neck soft and flat without evidence of hematoma or fluid collection  -Skin paddle soft and light pink, good cap refill. Muscle paddle with STSG inferior to skin paddle healthy appearing  -Internal doppler with strong arterial signal   -Trach in midline position without bleeding or thick tracheal secretions  Resp: 6.0 cuffed Shiley, cuff up, non-labored breathing on 30% FI02 via trach collar  Cards: RRR on Doppler  Gastro: Soft, non-distended,  prior PEG tube in place  : Green catheter in place clear yellow urine  MSK: right leg warm with intact movement and sensation  -right thigh skin graft donor site with tegaderm in place; Ace wrap in place  -Moves all extremities  Psych: Appropriate mood and affect  Drains: All drains in place and holding suction with serosanguinous drainage (stripped)    Last Recorded Vitals  Blood pressure (!) 86/45, pulse 91, temperature 36.6 °C (97.9 °F), resp. rate 16, height 1.676 m (5' 6\"), weight 54.6 kg (120 lb 5.9 oz), SpO2 99%.  Intake/Output last 3 Shifts:  I/O last 3 completed shifts:  In: 3774.3 (69.1 mL/kg) [I.V.:2557.3 (46.8 mL/kg); NG/GT:517; IV " Piggyback:700]  Out: 1197.5 (21.9 mL/kg) [Urine:1030 (0.5 mL/kg/hr); Drains:167.5]  Weight: 54.6 kg     Relevant Results  Results for orders placed or performed during the hospital encounter of 12/16/24 (from the past 24 hours)   Renal Function Panel   Result Value Ref Range    Glucose 98 74 - 99 mg/dL    Sodium 146 (H) 136 - 145 mmol/L    Potassium 3.5 3.5 - 5.3 mmol/L    Chloride 113 (H) 98 - 107 mmol/L    Bicarbonate 29 21 - 32 mmol/L    Anion Gap 8 (L) 10 - 20 mmol/L    Urea Nitrogen 15 6 - 23 mg/dL    Creatinine 0.68 0.50 - 1.05 mg/dL    eGFR >90 >60 mL/min/1.73m*2    Calcium 8.6 8.6 - 10.6 mg/dL    Phosphorus 1.9 (L) 2.5 - 4.9 mg/dL    Albumin 3.0 (L) 3.4 - 5.0 g/dL   CBC   Result Value Ref Range    WBC 8.0 4.4 - 11.3 x10*3/uL    nRBC 0.0 0.0 - 0.0 /100 WBCs    RBC 2.13 (L) 4.00 - 5.20 x10*6/uL    Hemoglobin 6.6 (L) 12.0 - 16.0 g/dL    Hematocrit 21.2 (L) 36.0 - 46.0 %     80 - 100 fL    MCH 31.0 26.0 - 34.0 pg    MCHC 31.1 (L) 32.0 - 36.0 g/dL    RDW 13.5 11.5 - 14.5 %    Platelets 153 150 - 450 x10*3/uL   Magnesium   Result Value Ref Range    Magnesium 2.21 1.60 - 2.40 mg/dL   Prepare RBC: 1 Units   Result Value Ref Range    PRODUCT CODE M4844W78     Unit Number [id number]     Unit ABO A     Unit RH POS     XM INTEP COMP     Dispense Status IS     Blood Expiration Date 12/31/2024 11:59:00 PM EST     PRODUCT BLOOD TYPE 6200     UNIT VOLUME 281    Type and screen   Result Value Ref Range    ABO TYPE A     Rh TYPE POS     ANTIBODY SCREEN NEG    POCT GLUCOSE   Result Value Ref Range    POCT Glucose 91 74 - 99 mg/dL     *Note: Due to a large number of results and/or encounters for the requested time period, some results have not been displayed. A complete set of results can be found in Results Review.        Scheduled medications  acetaminophen, 975 mg, oral, q8h HARRIETT   Or  acetaminophen, 1,000 mg, oral, q8h HARRIETT   Or  acetaminophen, 1,000 mg, g-tube, q8h HARRIETT  aspirin, 325 mg, g-tube,  Daily  chlorhexidine, 15 mL, Mouth/Throat, TID after meals  enoxaparin, 40 mg, subcutaneous, q24h  esomeprazole, 40 mg, g-tube, Daily before breakfast  hydrogen peroxide, 1 Application, Topical, TID  levothyroxine, 25 mcg, g-tube, Daily before breakfast  polyethylene glycol, 17 g, g-tube, Daily  potassium phosphate, 21 mmol, intravenous, Once  senna, 10 mL, g-tube, BID  white petrolatum, 1 Application, Topical, TID      Continuous medications       PRN medications  PRN medications: hydrOXYzine HCL, naloxone, ondansetron **OR** ondansetron, oxyCODONE, oxyCODONE, oxygen            This patient has a urinary catheter   Reason for the urinary catheter remaining today? 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 female  with ORN with exposed hardware s/p triple endoscopy, tracheostomy, anterior/left oral cavity composite resection (skin, gingiva, and mandible), Right Selective Neck Dissection levels I-IV, reconstruction with Right Fibula Free Flap and Split thickness Skin Graft on 12/16/2024 by Dr. Magana and Dr. Christensen     Active Issues:  Osteoradionecrosis of Mandible  Acute on Chronic Osteomyelitis  Hardware Failure  Hypothyroidism  HLD  Anxiety  Acute Blood Loss Anemia    Plan:  -Flap Checks: q4hrs  -Drains: Monitor output  -Analgesia: Discontinued PCA and started Oxy 5 and 10 as needed, Scheduled Acetaminophen  -FEN: mIVFs disontinue & start FWF, NPO, TF at trickle rate; monitor and replete electrolytes as needed  -Pulm: s/p trach, humidified air at all times, standard trach care/suctioning/teaching; wean oxygen to room air, Cuff down in the afternoon  -ID: Unasyn 3 g Q6hrs x 1 day, stop date 12/17  -Cardiac: Vitals per ENT free flap protocol then transition to Q4hr vitals; Aspirin 325mg daily  -Heme: Transfuse 1 unit pRBCs, posttransfusion  CBC  -Endo: Home Levothyroxine 25mcg  -GI: Bowel regimen, PPI, and PRN anti-emetic  -: Brumfield catheter replaced after failing trial of void  -Steroids/Special: Incision and oral care per ENT free flap protocol  -Embolic PPx: SQH, SCDs while in bed  -Dispo: [facility]. PT/OT ordered; Discharge planning for POD 6 home with home care vs skilled nursing facility      Quinn Calle MD  Resident, PGY-2  Otolaryngology - Head & Neck Surgery  ENT Consult Pager: [phone]  Head and Neck Phone: [phone]  ENT Peds Pager: [phone]  ENT Subspecialty Team: [email or web address]   ENT Outpatient [phone]     Some or all of this note was completed using dictation software, please contact the author of this note if there is any confusion.    The above resident note has been reviewed and confirmed.  Patient was seen and examined with the resident today.  Documentation has been reviewed.

## 2024-12-19 LAB
ALBUMIN SERPL BCP-MCNC: 2.9 G/DL (ref 3.4–5)
ANION GAP SERPL CALC-SCNC: 10 MMOL/L (ref 10–20)
BUN SERPL-MCNC: 14 MG/DL (ref 6–23)
CALCIUM SERPL-MCNC: 8.7 MG/DL (ref 8.6–10.6)
CHLORIDE SERPL-SCNC: 110 MMOL/L (ref 98–107)
CO2 SERPL-SCNC: 26 MMOL/L (ref 21–32)
CREAT SERPL-MCNC: 0.58 MG/DL (ref 0.5–1.05)
EGFRCR SERPLBLD CKD-EPI 2021: >90 ML/MIN/1.73M*2
ERYTHROCYTE [DISTWIDTH] IN BLOOD BY AUTOMATED COUNT: 14.5 % (ref 11.5–14.5)
ERYTHROCYTE [DISTWIDTH] IN BLOOD BY AUTOMATED COUNT: 14.9 % (ref 11.5–14.5)
GLUCOSE SERPL-MCNC: 115 MG/DL (ref 74–99)
HCT VFR BLD AUTO: 21.8 % (ref 36–46)
HCT VFR BLD AUTO: 22.3 % (ref 36–46)
HGB BLD-MCNC: 7.2 G/DL (ref 12–16)
HGB BLD-MCNC: 7.4 G/DL (ref 12–16)
MAGNESIUM SERPL-MCNC: 2.18 MG/DL (ref 1.6–2.4)
MCH RBC QN AUTO: 30.6 PG (ref 26–34)
MCH RBC QN AUTO: 30.9 PG (ref 26–34)
MCHC RBC AUTO-ENTMCNC: 32.3 G/DL (ref 32–36)
MCHC RBC AUTO-ENTMCNC: 33.9 G/DL (ref 32–36)
MCV RBC AUTO: 90 FL (ref 80–100)
MCV RBC AUTO: 96 FL (ref 80–100)
NRBC BLD-RTO: 0 /100 WBCS (ref 0–0)
NRBC BLD-RTO: 0 /100 WBCS (ref 0–0)
PHOSPHATE SERPL-MCNC: 2.1 MG/DL (ref 2.5–4.9)
PLATELET # BLD AUTO: 150 X10*3/UL (ref 150–450)
PLATELET # BLD AUTO: 165 X10*3/UL (ref 150–450)
POTASSIUM SERPL-SCNC: 3.8 MMOL/L (ref 3.5–5.3)
RBC # BLD AUTO: 2.33 X10*6/UL (ref 4–5.2)
RBC # BLD AUTO: 2.42 X10*6/UL (ref 4–5.2)
SODIUM SERPL-SCNC: 142 MMOL/L (ref 136–145)
WBC # BLD AUTO: 8 X10*3/UL (ref 4.4–11.3)
WBC # BLD AUTO: 8.7 X10*3/UL (ref 4.4–11.3)

## 2024-12-19 PROCEDURE — 2500000004 HC RX 250 GENERAL PHARMACY W/ HCPCS (ALT 636 FOR OP/ED)

## 2024-12-19 PROCEDURE — 2500000001 HC RX 250 WO HCPCS SELF ADMINISTERED DRUGS (ALT 637 FOR MEDICARE OP)

## 2024-12-19 PROCEDURE — L8501 TRACHEOSTOMY SPEAKING VALVE: HCPCS

## 2024-12-19 PROCEDURE — 2500000001 HC RX 250 WO HCPCS SELF ADMINISTERED DRUGS (ALT 637 FOR MEDICARE OP): Performed by: NURSE PRACTITIONER

## 2024-12-19 PROCEDURE — RXMED WILLOW AMBULATORY MEDICATION CHARGE

## 2024-12-19 PROCEDURE — 80069 RENAL FUNCTION PANEL: CPT

## 2024-12-19 PROCEDURE — 83735 ASSAY OF MAGNESIUM: CPT

## 2024-12-19 PROCEDURE — 2060000001 HC INTERMEDIATE ICU ROOM DAILY

## 2024-12-19 PROCEDURE — 36415 COLL VENOUS BLD VENIPUNCTURE: CPT

## 2024-12-19 PROCEDURE — 85027 COMPLETE CBC AUTOMATED: CPT

## 2024-12-19 PROCEDURE — 99211 OFF/OP EST MAY X REQ PHY/QHP: CPT | Performed by: OTOLARYNGOLOGY

## 2024-12-19 PROCEDURE — 92507 TX SP LANG VOICE COMM INDIV: CPT | Mod: GN

## 2024-12-19 PROCEDURE — 1200000003 HC ONCOLOGY  ROOM WITH TELEMETRY DAILY

## 2024-12-19 PROCEDURE — 92597 ORAL SPEECH DEVICE EVAL: CPT | Mod: GN

## 2024-12-19 RX ORDER — ACETAMINOPHEN 160 MG/5ML
1000 LIQUID ORAL EVERY 8 HOURS SCHEDULED
Qty: 1218 ML | Refills: 0 | Status: SHIPPED | OUTPATIENT
Start: 2024-12-19

## 2024-12-19 RX ORDER — PETROLATUM 420 MG/G
1 OINTMENT TOPICAL 3 TIMES DAILY
Qty: 100 G | Refills: 0 | Status: SHIPPED | OUTPATIENT
Start: 2024-12-19

## 2024-12-19 RX ORDER — SENNOSIDES 8.8 MG/5ML
10 LIQUID ORAL 2 TIMES DAILY
Qty: 237 ML | Refills: 0 | Status: SHIPPED | OUTPATIENT
Start: 2024-12-19

## 2024-12-19 RX ORDER — SODIUM,POTASSIUM PHOSPHATES 280-250MG
1 POWDER IN PACKET (EA) ORAL 4 TIMES DAILY
Status: DISPENSED | OUTPATIENT
Start: 2024-12-19 | End: 2024-12-19

## 2024-12-19 RX ORDER — POLYETHYLENE GLYCOL 3350 17 G/17G
17 POWDER, FOR SOLUTION ORAL DAILY
Qty: 14 PACKET | Refills: 0 | Status: SHIPPED | OUTPATIENT
Start: 2024-12-20

## 2024-12-19 RX ORDER — POLYETHYLENE GLYCOL 3350 17 G/17G
17 POWDER, FOR SOLUTION ORAL 2 TIMES DAILY
Status: DISCONTINUED | OUTPATIENT
Start: 2024-12-20 | End: 2024-12-23

## 2024-12-19 RX ORDER — LEVOTHYROXINE SODIUM 25 UG/1
25 TABLET ORAL
Start: 2024-12-19

## 2024-12-19 RX ORDER — CHLORHEXIDINE GLUCONATE ORAL RINSE 1.2 MG/ML
15 SOLUTION DENTAL
Qty: 473 ML | Refills: 0 | Status: SHIPPED | OUTPATIENT
Start: 2024-12-19

## 2024-12-19 RX ORDER — FAMOTIDINE 40 MG/5ML
20 POWDER, FOR SUSPENSION ORAL 2 TIMES DAILY
Start: 2024-12-19

## 2024-12-19 RX ORDER — ASPIRIN 325 MG
325 TABLET ORAL DAILY
Qty: 27 TABLET | Refills: 0 | Status: SHIPPED | OUTPATIENT
Start: 2024-12-20 | End: 2025-01-18

## 2024-12-19 RX ORDER — OXYCODONE HCL 5 MG/5 ML
5 SOLUTION, ORAL ORAL EVERY 6 HOURS PRN
Qty: 140 ML | Refills: 0 | Status: SHIPPED | OUTPATIENT
Start: 2024-12-19 | End: 2024-12-30

## 2024-12-19 RX ORDER — LOPERAMIDE HCL 1MG/7.5ML
2 LIQUID (ML) ORAL 4 TIMES DAILY PRN
Start: 2024-12-19

## 2024-12-19 RX ORDER — BISMUTH TRIBROMOPH/PETROLATUM 5"X9"
1 BANDAGE TOPICAL DAILY
Qty: 30 EACH | Refills: 2 | Status: SHIPPED | OUTPATIENT
Start: 2024-12-19 | End: 2025-01-18

## 2024-12-19 RX ORDER — SODIUM CHLORIDE FOR INHALATION 0.9 %
VIAL, NEBULIZER (ML) INHALATION
Qty: 300 ML | Refills: 3 | Status: SHIPPED | OUTPATIENT
Start: 2024-12-19

## 2024-12-19 RX ORDER — SODIUM CHLORIDE 0.9 G/100ML
IRRIGANT IRRIGATION
Qty: 6000 ML | Refills: 2 | Status: SHIPPED | OUTPATIENT
Start: 2024-12-19

## 2024-12-19 ASSESSMENT — COGNITIVE AND FUNCTIONAL STATUS - GENERAL
TOILETING: A LITTLE
DRESSING REGULAR UPPER BODY CLOTHING: A LITTLE
MOVING FROM LYING ON BACK TO SITTING ON SIDE OF FLAT BED WITH BEDRAILS: A LITTLE
TURNING FROM BACK TO SIDE WHILE IN FLAT BAD: A LITTLE
PERSONAL GROOMING: A LITTLE
WALKING IN HOSPITAL ROOM: A LITTLE
EATING MEALS: A LOT
HELP NEEDED FOR BATHING: A LITTLE
DRESSING REGULAR LOWER BODY CLOTHING: A LITTLE
MOVING TO AND FROM BED TO CHAIR: A LITTLE
STANDING UP FROM CHAIR USING ARMS: A LITTLE
DAILY ACTIVITIY SCORE: 17

## 2024-12-19 ASSESSMENT — PAIN SCALES - GENERAL
PAINLEVEL_OUTOF10: 0 - NO PAIN
PAINLEVEL_OUTOF10: 0 - NO PAIN
PAINLEVEL_OUTOF10: 8
PAINLEVEL_OUTOF10: 8

## 2024-12-19 NOTE — PROGRESS NOTES
Speech-Language Pathology    SLP Adult Inpatient [place] Speaking Valve Evaluation     Patient Name: Brigette Romo  MRN: [id number]  Today's Date: 12/19/2024   Time Calculation  Start Time: 1124  Stop Time: 1156  Time Calculation (min): 32 min       SLP Assessment:  Aph[place] 2' to tracheostomy; good tolerance of PMSV- stable vitals, functional voicing     Pt independent in donning and doffing PMSV.  Safe to wear during at patient's discretion during WAKING HOURS w/ cuff DEFLATED.  Remove prior to sleeping.  Clean PMSV daily.     SLP Plan:  Plan  SLP Plan: Skilled SLP  SLP Frequency: 1x per week  Duration: 2 weeks  Discussed POC: Patient, Caregiver/family, Nursing  Discussed Risks/Benefits: Yes  Patient/Caregiver Agreeable: Yes  SLP - OK to Discharge: Yes    Goals:  Pt will indicate understanding of care/maintenance and safety considerations re: PMSV with > 90% accuracy via teach back method.     Date initiated: 12/19/24   Expected Time Frame to Meet Goal: 1-2 weeks   Status: Goal initiated     Pt will tolerate PMSV with stable vital signs for 60+ minutes.   Date initiated: 12/19/24   Expected Time Frame to Meet Goal: 1-2 weeks   Status: Goal initiated       Subjective   RN cleared pt for evaluation.  Pt properly identified and evaluated bedside.  Awake and alert, with no c/o pain.  Visitor present x1.  Trach collar.     Brigette Romo is a 74 y.o. 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 Magana and Dr. Christensen     Objective     Cognition:  Orientation Level: Oriented X4  Attention: Within Functional Limits      Tracheostomy:  Tracheostomy: Yes  Tracheostomy Type: Shiley  Tracheostomy Size (mm): 6 mm  Cuffed or Uncuffed: Cuffed  Tracheostomy Cuffed:  Deflated      Pass[place] Speaking Valve:  [place] Speaking Valve Type: PMV-2001 (Purple)  PMV Placement Recommendations: Speech/RN/Trained family member and staff  Passy-[place] Valve Tolerance - Minutes:  (20+)  Response to PMV: Adequate Tolerance, Able to Vocalize    Pt seen for PMSV assessment. Omar #6 cuffed trach in place.  O2 via trach collar.  Education provided to pt re: purpose and protocol of PMSV trials including changes in respiratory status post tracheotomy and with PMSV in place.  Pt indicated agreement in proceeding with trials. Visitor suctioned pt prior to PMSV placement.  SLP placed PMSV on hub of trach.  Pt tolerated w/ stable vitals and no c/o respiratory distress for duration of wear time.  Able to voice; volume reduced, but functional.  Speech intelligibility moderately/severely impaired 2' to sx changes. SLP demonstrated proper removal and placement of valve.  No stoma blast/breath stacking noted upon removal.  Pt able to complete donning/doffing s/p demonstration independently w/ use of mirror.  At end of session, PMSV left in place at pt's request.  Reviewed care and maintenance of PMSV.  Explained need to assure cuff deflation prior to placement and to remove when sleeping per  recommendations.  Understanding indicated.  Appropriate precaution signage hung.   No needs voiced at end of session.  Call bell within reach.              12/19/24 at 1:13 PM - Lidia Nelson, SLP

## 2024-12-19 NOTE — SIGNIFICANT EVENT
Active Issue:  Underweight per BMI  Hypokalemia  Hypophosphatemia  Hypotension    Vira Monroe, APRN-CNP

## 2024-12-19 NOTE — CARE PLAN
The patient's goals for the shift include safety and comfort.      The clinical goals for the shift include Pt will maintain adequate flap perfusion and safely ambulate throughout shift.    Over the shift, the patient did not make progress toward the following goals. Barriers to progression include . Recommendations to address these barriers include .      Problem: Pain  Goal: Takes deep breaths with improved pain control throughout the shift  Outcome: Progressing  Goal: Turns in bed with improved pain control throughout the shift  Outcome: Progressing  Goal: Walks with improved pain control throughout the shift  Outcome: Progressing  Goal: Performs ADL's with improved pain control throughout shift  Outcome: Progressing  Goal: Participates in PT with improved pain control throughout the shift  Outcome: Progressing  Goal: Free from opioid side effects throughout the shift  Outcome: Progressing  Goal: Free from acute confusion related to pain meds throughout the shift  Outcome: Progressing     Problem: Safety - Adult  Goal: Free from fall injury  Outcome: Progressing     Problem: Discharge Planning  Goal: Discharge to home or other facility with appropriate resources  Outcome: Progressing     Problem: Chronic Conditions and Co-morbidities  Goal: Patient's chronic conditions and co-morbidity symptoms are monitored and maintained or improved  Outcome: Progressing     Problem: Skin  Goal: Prevent/minimize sheer/friction injuries  Outcome: Progressing  Flowsheets (Taken 12/19/2024 1247)  Prevent/minimize sheer/friction injuries:   Complete micro-shifts as needed if patient unable. Adjust patient position to relieve pressure points, not a full turn   HOB 30 degrees or less   Increase activity/out of bed for meals   Use pull sheet  Goal: Promote/optimize nutrition  Outcome: Progressing  Flowsheets (Taken 12/19/2024 1247)  Promote/optimize nutrition:   Assist with feeding   Monitor/record intake including meals   Discuss  with provider if NPO > 2 days  Goal: Promote skin healing  Outcome: Progressing  Flowsheets (Taken 12/19/2024 1247)  Promote skin healing:   Assess skin/pad under line(s)/device(s)   Protective dressings over bony prominences   Rotate device position/do not position patient on device

## 2024-12-19 NOTE — NURSING NOTE
ENT Note:  RN to bedside to provide tracheostomy and PEG teaching. Pt with previous trach and PEG experience from previous surgeries. Pt sister present at bedside performed successful suctioning of patient. Pt hands on with PEG tube throughout shift. RN placed ENT mirror in front of patient to visualize airway, pt was not hands on at this time. Will continue to provide trach education to support safe discharge.

## 2024-12-19 NOTE — CARE PLAN
The patient's goals for the shift include  patient will remain comfortable this shift    The clinical goals for the shift include patient will  remain free from injury

## 2024-12-19 NOTE — CARE PLAN
The patient's goals for the shift include  patient will remain comfortable    The clinical goals for the shift include patient will  remain free from injury

## 2024-12-19 NOTE — CARE PLAN
The patient's goals for the shift include  patient will continue to ambulate     The clinical goals for the shift include patient will  remain free from injury

## 2024-12-19 NOTE — PROGRESS NOTES
"  Ear Nose & Throat Progress Note     Brigette Romo is a 74 y.o. female on day 3 of admission presenting with Osteoradionecrosis of jaw. No acute events overnight. Flap checks stable.    Subjective   Patient resting in bed. Pain being controlled with current regimen. SLP to see and attempt to place PMV.       Objective   Physical Exam  Vitals reviewed in EMR  Gen: NAD, AOx3, resting comfortably in bed  Eyes: EOMI, sclera clear, PERRL  Ears: Normal external ears bilaterally  Nose: No rhinorrhea; anterior nares clear  Oral Cavity: All intraoral incisions c/d/i without evidence of dehiscence  -Flap soft and pink with good capillary refill  Head: normocephalic, atraumatic  Face/Neck: All incisions c/d/i, neck soft and flat without evidence of hematoma or fluid collection  -Skin paddle soft and light pink, good cap refill. Muscle paddle with STSG inferior to skin paddle healthy appearing  -Internal doppler with strong arterial signal   -Trach in midline position without bleeding or thick tracheal secretions  Resp: 6.0 cuffed Shiley, cuff up, non-labored breathing on 30% FI02 via trach collar  Cards: RRR on Doppler  Gastro: Soft, non-distended,  prior PEG tube in place  : Brumfield catheter in place clear yellow urine  MSK: right leg warm with intact movement and sensation  -right thigh skin graft donor site with tegaderm in place; Ace wrap in place  -Moves all extremities  Psych: Appropriate mood and affect  Drains: All drains in place and holding suction with serosanguinous drainage (stripped)    Last Recorded Vitals  Blood pressure 108/64, pulse 73, temperature 36.3 °C (97.3 °F), temperature source Temporal, resp. rate 18, height 1.676 m (5' 6\"), weight 54.6 kg (120 lb 5.9 oz), SpO2 98%.  Intake/Output last 3 Shifts:  I/O last 3 completed shifts:  In: 3470.7 (63.6 mL/kg) [I.V.:761.7 (14 mL/kg); Blood:375; NG/GT:1577; IV Piggyback:757]  Out: 1153 (21.1 mL/kg) [Urine:1100 (0.6 mL/kg/hr); Drains:53]  Weight: 54.6 kg "     Relevant Results  Results for orders placed or performed during the hospital encounter of 12/16/24 (from the past 24 hours)   Type and screen   Result Value Ref Range    ABO TYPE A     Rh TYPE POS     ANTIBODY SCREEN NEG    POCT GLUCOSE   Result Value Ref Range    POCT Glucose 91 74 - 99 mg/dL   CBC   Result Value Ref Range    WBC 8.7 4.4 - 11.3 x10*3/uL    nRBC 0.0 0.0 - 0.0 /100 WBCs    RBC 2.42 (L) 4.00 - 5.20 x10*6/uL    Hemoglobin 7.4 (L) 12.0 - 16.0 g/dL    Hematocrit 21.8 (L) 36.0 - 46.0 %    MCV 90 80 - 100 fL    MCH 30.6 26.0 - 34.0 pg    MCHC 33.9 32.0 - 36.0 g/dL    RDW 14.5 11.5 - 14.5 %    Platelets 150 150 - 450 x10*3/uL   Renal Function Panel   Result Value Ref Range    Glucose 115 (H) 74 - 99 mg/dL    Sodium 142 136 - 145 mmol/L    Potassium 3.8 3.5 - 5.3 mmol/L    Chloride 110 (H) 98 - 107 mmol/L    Bicarbonate 26 21 - 32 mmol/L    Anion Gap 10 10 - 20 mmol/L    Urea Nitrogen 14 6 - 23 mg/dL    Creatinine 0.58 0.50 - 1.05 mg/dL    eGFR >90 >60 mL/min/1.73m*2    Calcium 8.7 8.6 - 10.6 mg/dL    Phosphorus 2.1 (L) 2.5 - 4.9 mg/dL    Albumin 2.9 (L) 3.4 - 5.0 g/dL   CBC   Result Value Ref Range    WBC 8.0 4.4 - 11.3 x10*3/uL    nRBC 0.0 0.0 - 0.0 /100 WBCs    RBC 2.33 (L) 4.00 - 5.20 x10*6/uL    Hemoglobin 7.2 (L) 12.0 - 16.0 g/dL    Hematocrit 22.3 (L) 36.0 - 46.0 %    MCV 96 80 - 100 fL    MCH 30.9 26.0 - 34.0 pg    MCHC 32.3 32.0 - 36.0 g/dL    RDW 14.9 (H) 11.5 - 14.5 %    Platelets 165 150 - 450 x10*3/uL   Magnesium   Result Value Ref Range    Magnesium 2.18 1.60 - 2.40 mg/dL     *Note: Due to a large number of results and/or encounters for the requested time period, some results have not been displayed. A complete set of results can be found in Results Review.       No results found.     Scheduled medications  acetaminophen, 975 mg, oral, q8h HARRIETT   Or  acetaminophen, 1,000 mg, oral, q8h HARRIETT   Or  acetaminophen, 1,000 mg, g-tube, q8h HARRIETT  aspirin, 325 mg, g-tube, Daily  chlorhexidine, 15 mL,  Mouth/Throat, TID after meals  enoxaparin, 40 mg, subcutaneous, q24h  esomeprazole, 40 mg, g-tube, Daily before breakfast  hydrogen peroxide, 1 Application, Topical, TID  levothyroxine, 25 mcg, g-tube, Daily before breakfast  polyethylene glycol, 17 g, g-tube, Daily  potassium, sodium phosphates, 1 packet, g-tube, 4x daily  senna, 10 mL, g-tube, BID  white petrolatum, 1 Application, Topical, TID      Continuous medications     PRN medications  PRN medications: hydrOXYzine HCL, naloxone, ondansetron **OR** ondansetron, oxyCODONE, oxyCODONE, oxygen            This patient has a urinary catheter   Reason for the urinary catheter remaining today? 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 female  with ORN with exposed hardware s/p triple endoscopy, tracheostomy, anterior/left oral cavity composite resection (skin, gingiva, and mandible), Right Selective Neck Dissection levels I-IV, reconstruction with Right Fibula Free Flap and Split thickness Skin Graft on 12/16/2024 by Dr. Magana and Dr. Christensen      Active Issues:  Osteoradionecrosis of Mandible  Acute on Chronic Osteomyelitis  Hardware Failure  Hypothyroidism  HLD  Anxiety  Acute Blood Loss Anemia  Urinary retention     Plan:  -Flap Checks: q8hrs  -Drains: Monitor output  -Analgesia: Discontinued PCA and started Oxy 5 and 10 as needed, Scheduled Acetaminophen  -FEN: mIVFs disontinue & start FWF, NPO, TF at trickle rate; monitor and replete electrolytes as needed  -Pulm: s/p trach, humidified air at all times, standard trach care/suctioning/teaching; wean oxygen to room air, Cuff down in the afternoon  -ID: Unasyn 3 g Q6hrs x 1 day, stop date 12/17  -Cardiac: Vitals per ENT free flap protocol then transition to Q4hr vitals; Aspirin 325mg daily  -Heme: Transfuse 1 unit pRBCs,  posttransfusion CBC->7.4  -Endo: Home Levothyroxine 25mcg  -GI: Bowel regimen, PPI, and PRN anti-emetic  -: Brumfield catheter replaced after failing trial of void  -Steroids/Special: Incision and oral care per ENT free flap protocol; SLP consult  -Embolic PPx: SQH, SCDs while in bed  -Dispo: [facility]. PT/OT ordered; Discharge planning for POD 6 home with home care vs skilled nursing facility          I spent 35 minutes in the professional and overall care of this patient.  Vira Monroe APRN, CNP  Certified Family Nurse Practitioner  Nurse Practitioner III  Department of Otolaryngology: Head & Neck Surgery  Personal Pager [phone]  ENT Team  Head and Neck Phone: [phone]

## 2024-12-19 NOTE — SIGNIFICANT EVENT
ENT Flap Check    Subjective:  Resting comfortably     Objective:  Vitals reviewed in EMR  Gen: NAD, AOx3, resting comfortably in bed  Face/Neck: All incisions c/d/i, neck soft and flat without evidence of hematoma or fluid collection  -Skin paddle soft and light pink, good cap refill. Muscle paddle with STSG inferior to skin paddle healthy appearing  -Internal doppler with strong arterial signal   -Trach in midline position without bleeding or thick tracheal secretions  Oral Cavity: All intraoral incisions c/d/i without evidence of dehiscence  -Flap soft and pink with good capillary refill  Extremities: right leg warm with intact movement and sensation  -right thigh skin graft donor site with tegaderm in place  Abdomen: PEG in place  Drains: All drains in place and holding suction with serosanguinous drainage (stripped)     Assessment/Plan:  Brigette Romo is a 74 y.o. female s/p triple endoscopy, trach, anterior oral cavity composite resection, R SND, reconstruction with R FFF with Ame Magana and Fermin.     -Continue q6hr flap checks  - post transfusion Hgb 7.4    Tiffany Perdue MD

## 2024-12-20 ENCOUNTER — HOME HEALTH ADMISSION (OUTPATIENT)
Dept: HOME HEALTH SERVICES | Facility: HOME HEALTH | Age: 74
End: 2024-12-20
Payer: MEDICARE

## 2024-12-20 LAB
ALBUMIN SERPL BCP-MCNC: 3 G/DL (ref 3.4–5)
ANION GAP SERPL CALC-SCNC: 11 MMOL/L (ref 10–20)
BUN SERPL-MCNC: 13 MG/DL (ref 6–23)
CALCIUM SERPL-MCNC: 8.6 MG/DL (ref 8.6–10.6)
CHLORIDE SERPL-SCNC: 108 MMOL/L (ref 98–107)
CO2 SERPL-SCNC: 26 MMOL/L (ref 21–32)
CREAT SERPL-MCNC: 0.58 MG/DL (ref 0.5–1.05)
EGFRCR SERPLBLD CKD-EPI 2021: >90 ML/MIN/1.73M*2
ERYTHROCYTE [DISTWIDTH] IN BLOOD BY AUTOMATED COUNT: 14.4 % (ref 11.5–14.5)
FUNGUS SPEC CULT: NORMAL
FUNGUS SPEC FUNGUS STN: NORMAL
GLUCOSE SERPL-MCNC: 94 MG/DL (ref 74–99)
HCT VFR BLD AUTO: 23.6 % (ref 36–46)
HGB BLD-MCNC: 7.5 G/DL (ref 12–16)
MAGNESIUM SERPL-MCNC: 2.26 MG/DL (ref 1.6–2.4)
MCH RBC QN AUTO: 30.6 PG (ref 26–34)
MCHC RBC AUTO-ENTMCNC: 31.8 G/DL (ref 32–36)
MCV RBC AUTO: 96 FL (ref 80–100)
NRBC BLD-RTO: 0 /100 WBCS (ref 0–0)
PHOSPHATE SERPL-MCNC: 2.4 MG/DL (ref 2.5–4.9)
PLATELET # BLD AUTO: 196 X10*3/UL (ref 150–450)
POTASSIUM SERPL-SCNC: 3.8 MMOL/L (ref 3.5–5.3)
RBC # BLD AUTO: 2.45 X10*6/UL (ref 4–5.2)
SODIUM SERPL-SCNC: 141 MMOL/L (ref 136–145)
WBC # BLD AUTO: 6.9 X10*3/UL (ref 4.4–11.3)

## 2024-12-20 PROCEDURE — 85027 COMPLETE CBC AUTOMATED: CPT

## 2024-12-20 PROCEDURE — 83735 ASSAY OF MAGNESIUM: CPT

## 2024-12-20 PROCEDURE — 2500000001 HC RX 250 WO HCPCS SELF ADMINISTERED DRUGS (ALT 637 FOR MEDICARE OP)

## 2024-12-20 PROCEDURE — 97116 GAIT TRAINING THERAPY: CPT | Mod: GP,CQ

## 2024-12-20 PROCEDURE — 36415 COLL VENOUS BLD VENIPUNCTURE: CPT

## 2024-12-20 PROCEDURE — 97530 THERAPEUTIC ACTIVITIES: CPT | Mod: GP,CQ

## 2024-12-20 PROCEDURE — 2500000004 HC RX 250 GENERAL PHARMACY W/ HCPCS (ALT 636 FOR OP/ED)

## 2024-12-20 PROCEDURE — 1200000003 HC ONCOLOGY  ROOM WITH TELEMETRY DAILY

## 2024-12-20 PROCEDURE — 99211 OFF/OP EST MAY X REQ PHY/QHP: CPT | Performed by: OTOLARYNGOLOGY

## 2024-12-20 PROCEDURE — 2500000001 HC RX 250 WO HCPCS SELF ADMINISTERED DRUGS (ALT 637 FOR MEDICARE OP): Performed by: NURSE PRACTITIONER

## 2024-12-20 PROCEDURE — 80069 RENAL FUNCTION PANEL: CPT

## 2024-12-20 RX ORDER — OXYCODONE HCL 5 MG/5 ML
10 SOLUTION, ORAL ORAL EVERY 4 HOURS PRN
Status: DISCONTINUED | OUTPATIENT
Start: 2024-12-20 | End: 2024-12-23 | Stop reason: HOSPADM

## 2024-12-20 RX ORDER — ADHESIVE BANDAGE
30 BANDAGE TOPICAL DAILY
Status: DISCONTINUED | OUTPATIENT
Start: 2024-12-20 | End: 2024-12-23

## 2024-12-20 RX ORDER — SODIUM,POTASSIUM PHOSPHATES 280-250MG
1 POWDER IN PACKET (EA) ORAL 4 TIMES DAILY
Status: DISPENSED | OUTPATIENT
Start: 2024-12-20 | End: 2024-12-20

## 2024-12-20 RX ORDER — OXYCODONE HCL 5 MG/5 ML
5 SOLUTION, ORAL ORAL EVERY 4 HOURS PRN
Status: DISCONTINUED | OUTPATIENT
Start: 2024-12-20 | End: 2024-12-23 | Stop reason: HOSPADM

## 2024-12-20 ASSESSMENT — COGNITIVE AND FUNCTIONAL STATUS - GENERAL
MOVING TO AND FROM BED TO CHAIR: A LITTLE
EATING MEALS: A LOT
MOVING FROM LYING ON BACK TO SITTING ON SIDE OF FLAT BED WITH BEDRAILS: A LITTLE
WALKING IN HOSPITAL ROOM: A LITTLE
CLIMB 3 TO 5 STEPS WITH RAILING: A LOT
STANDING UP FROM CHAIR USING ARMS: A LITTLE
STANDING UP FROM CHAIR USING ARMS: A LITTLE
PERSONAL GROOMING: A LITTLE
MOBILITY SCORE: 17
DAILY ACTIVITIY SCORE: 17
DRESSING REGULAR LOWER BODY CLOTHING: A LITTLE
HELP NEEDED FOR BATHING: A LITTLE
MOVING FROM LYING ON BACK TO SITTING ON SIDE OF FLAT BED WITH BEDRAILS: A LITTLE
WALKING IN HOSPITAL ROOM: A LITTLE
DRESSING REGULAR UPPER BODY CLOTHING: A LITTLE
TOILETING: A LITTLE
TURNING FROM BACK TO SIDE WHILE IN FLAT BAD: A LITTLE
MOVING TO AND FROM BED TO CHAIR: A LITTLE
CLIMB 3 TO 5 STEPS WITH RAILING: A LOT
MOBILITY SCORE: 17
TURNING FROM BACK TO SIDE WHILE IN FLAT BAD: A LITTLE

## 2024-12-20 ASSESSMENT — PAIN DESCRIPTION - LOCATION
LOCATION: FACE
LOCATION: FACE

## 2024-12-20 ASSESSMENT — PAIN SCALES - GENERAL
PAINLEVEL_OUTOF10: 5 - MODERATE PAIN
PAINLEVEL_OUTOF10: 8
PAINLEVEL_OUTOF10: 4
PAINLEVEL_OUTOF10: 6

## 2024-12-20 ASSESSMENT — PAIN DESCRIPTION - ORIENTATION
ORIENTATION: LEFT
ORIENTATION: LEFT

## 2024-12-20 NOTE — HOSPITAL COURSE
Brigette Romo is a 74 y.o. female with Osteoradionecrosis of jaw who underwent trach, triple, right composite mandibulectomy, recon with L fib (A/v: R sup thyroid, R IJ end to end), stsg w wound vac by Ame Magana and Fermin 12/16/2024. Patient had an uncomplicated surgical course. Patient recovered in PACU and was transferred to [address] for post-operative care.    Patient post-operative course was uncomplicated. IV pain medications were transitioned to enteral medications, green was removed, tube feeds were initiated and advanced to home bolus, and initial tracheostomy was changed to a 4.0 cuffless and capped on 12/20/24. Patient passed decannulation trial and was decannulated on 12/21/24. Surgical drains were monitored until output had decreased and were removed.     On day of discharge, post-operative pain was well controlled with enteral pain medication, breathing on room air, voiding spontaneously ambulating well, and was tolerating a diet. Home health/supplies arranged and delivered. Follow-up arranged.

## 2024-12-20 NOTE — SIGNIFICANT EVENT
ENT Flap Check    Subjective:  Resting comfortably.  No complaints     Objective:  Vitals reviewed in EMR  Gen: NAD, AOx3, resting comfortably in bed  Face/Neck: All incisions c/d/i, neck soft and flat without evidence of hematoma or fluid collection  -Skin paddle soft and light pink, good cap refill. Muscle paddle with STSG inferior to skin paddle healthy appearing  -Internal doppler with strong arterial signal   -Trach in midline position without bleeding or thick tracheal secretions  Oral Cavity: All intraoral incisions c/d/i without evidence of dehiscence  -Flap soft and pink with good capillary refill  Extremities: right leg warm with intact movement and sensation  -right thigh skin graft donor site with tegaderm in place  Abdomen: PEG in place  Drains: All drains in place and holding suction with serosanguinous drainage (stripped)     Assessment/Plan:  Brigette Romo is a 74 y.o. female s/p triple endoscopy, trach, anterior oral cavity composite resection, R SND, reconstruction with R FFF with Ame Magana and Fermin.     -Continue q12hr flap checks    Evaristo Castano MD

## 2024-12-20 NOTE — CARE PLAN
The patient's goals for the shift include  patient will be free from injury    The clinical goals for the shift include patient will remain stable this shift

## 2024-12-20 NOTE — CARE PLAN
The patient's goals for the shift include  patient will remain comfortable this shift    The clinical goals for the shift include patient will remain stable this shift

## 2024-12-20 NOTE — CARE PLAN
The patient's goals for the shift include  patient will remain free from falls or injury    The clinical goals for the shift include patient will remain stable this shift

## 2024-12-20 NOTE — PROGRESS NOTES
OTOLARYNGOLOGY - HEAD AND NECK SURGERY ROUNDING NOTE    Subjective:   Doing well.  No changes from morning rounds.    Objective:  Visit Vitals  /68 (BP Location: Left arm, Patient Position: Sitting)   Pulse 110   Temp 36.6 °C (97.9 °F) (Temporal)   Resp 16     Physical Exam  Vitals reviewed in EMR  Gen: NAD, AOx3, resting comfortably in bed  Eyes: EOMI, sclera clear, PERRL  Ears: Normal external ears bilaterally  Nose: No rhinorrhea; anterior nares clear  Oral Cavity: All intraoral incisions c/d/i without evidence of dehiscence  -Flap soft and pink with good capillary refill  Head: normocephalic, atraumatic  Face/Neck: All incisions c/d/i, neck soft and flat without evidence of hematoma or fluid collection  -Skin paddle soft and light pink, good cap refill. Muscle paddle with STSG inferior to skin paddle healthy appearing  -Internal doppler with strong arterial signal   -Trach in midline position without bleeding or thick tracheal secretions  Resp: 6.0 cuffed Shiley, cuff up, non-labored breathing on 30% FI02 via trach collar  Cards: RRR on Doppler  Gastro: Soft, non-distended,  prior PEG tube in place  : Brumfield catheter in place clear yellow urine  MSK: right leg warm with intact movement and sensation  -right thigh skin graft donor site with tegaderm in place; Ace wrap in place  -Moves all extremities  Psych: Appropriate mood and affect  Drains: All drains in place and holding suction with serosanguinous drainage (stripped)      Assessment/Plan:  Brigette Romo is a 74 y.o. 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 Magana and Dr. Christensen     - Continue flap checks per protocol  - Continue current management    ENT [id number]

## 2024-12-20 NOTE — PROGRESS NOTES
OTOLARYNGOLOGY - HEAD AND NECK SURGERY ROUNDING NOTE    Subjective:   Doing well.  No changes from morning rounds.    Objective:  Visit Vitals  /68 (BP Location: Left arm, Patient Position: Sitting)   Pulse 110   Temp 36.6 °C (97.9 °F) (Temporal)   Resp 16       Physical Exam  Vitals reviewed in EMR  Gen: NAD, AOx3, resting comfortably in bed  Eyes: EOMI, sclera clear, PERRL  Ears: Normal external ears bilaterally  Nose: No rhinorrhea; anterior nares clear  Oral Cavity: All intraoral incisions c/d/i without evidence of dehiscence  -Flap soft and pink with good capillary refill  Head: normocephalic, atraumatic  Face/Neck: All incisions c/d/i, neck soft and flat without evidence of hematoma or fluid collection  -Skin paddle soft and light pink, good cap refill. Muscle paddle with STSG inferior to skin paddle healthy appearing  -Internal doppler with strong arterial signal   -Trach in midline position without bleeding or thick tracheal secretions  Resp: 6.0 cuffed Shiley, cuff down, non-labored breathing on 30% FI02 via trach collar  Cards: RRR on Doppler  Gastro: Soft, non-distended,  prior PEG tube in place  : Brumfield catheter in place clear yellow urine  MSK: right leg warm with intact movement and sensation  -right thigh skin graft donor site with tegaderm in place; Ace wrap in place  -Moves all extremities  Psych: Appropriate mood and affect  Drains: All drains in place and holding suction with serosanguinous drainage (stripped)    Assessment/Plan:  Brigette Romo is a 74 y.o. 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 Magana and Dr. Christensen     - Continue flap checks per protocol  - Continue current management    ENT [id number]

## 2024-12-20 NOTE — PROGRESS NOTES
Physical Therapy    Physical Therapy Treatment    Patient Name: Brigette Romo  MRN: [id number]  Department: [facility]  Room: [facility]5007-  Today's Date: 12/20/2024  Time Calculation  Start Time: 1609  Stop Time: 1633  Time Calculation (min): 24 min         Assessment/Plan   PT Assessment  End of Session Communication: Bedside nurse  Assessment Comment: .  End of Session Patient Position: Up in chair, Alarm on     PT Plan  Treatment/Interventions: Bed mobility, Transfer training, Gait training, Stair training, Balance training, Neuromuscular re-education, Strengthening, Endurance training, Range of motion, Therapeutic exercise, Therapeutic activity  PT Plan: Ongoing PT  PT Frequency: 3 times per week  PT Discharge Recommendations: Low intensity level of continued care  Equipment Recommended upon Discharge: Wheeled walker  PT Recommended Transfer Status: Contact guard  PT - OK to Discharge: Yes (indicates PT eval complete and dc rec determined)      General Visit Information:   PT  Visit  PT Received On: 12/20/24  General  Prior to Session Communication: Bedside nurse  Patient Position Received: Bed, 3 rail up  General Comment: pt supine in bed, RN cleared. pleasant and cooperative. RN reports pt ambulating approx 200' in hallway with mobility aide. PTA provided education to pt and RN on importance of maintaining WBing restrictions until cleared byt MD and that mobility aides are not supposed to ambulate patients wth weight bearing restrictions. pt only able to ambualte 12' with wheeled walker prior to fatigue while maintaining precautions and reports she will fllow technique taught by previous PT and by PTA during session until instructed by MD.    Subjective   Precautions:  Precautions  LE Weight Bearing Status: Right Toe-Touch Weight Bearing  Medical Precautions: Fall precautions, Oxygen therapy device and L/min    Vital Signs (Past 2hrs)        Date/Time Vitals Session Patient Position Pulse Resp SpO2 BP MAP (mmHg)     12/20/24 1517 --  --  94  16  97 %  126/95  103                         Objective   Pain:  Pain Assessment  0-10 (Numeric) Pain Score: 4  Pain Interventions: Repositioned  Cognition:  Cognition  Orientation Level: Oriented X4       Treatments:       Bed Mobility 1  Bed Mobility 1: Supine to sitting  Level of Assistance 1: Contact guard  Bed Mobility Comments 1: additional time to complete    Ambulation/Gait Training 1  Surface 1: Level tile  Device 1: Rolling walker  Assistance 1: Contact guard  Quality of Gait 1: Decreased step length, Antalgic  Comments/Distance (ft) 1: 12', demnstrations and cues for safe technique to maintain TTWBing. pt demonstrates increased understanding  Transfer 1  Technique 1: Sit to stand, Stand to sit  Transfer Device 1: Walker  Transfer Level of Assistance 1: Contact guard  Trials/Comments 1: cues/ education on TTWBing         Outcome Measures:  [facility] Basic Mobility  Turning from your back to your side while in a flat bed without using bedrails: A little  Moving from lying on your back to sitting on the side of a flat bed without using bedrails: A little  Moving to and from bed to chair (including a wheelchair): A little  Standing up from a chair using your arms (e.g. wheelchair or bedside chair): A little  To walk in hospital room: A little  Climbing 3-5 steps with railing: A lot  Basic Mobility - Total Score: 17    Education Documentation  Precautions, taught by Vidal Langston PTA at 12/20/2024  5:12 PM.  Learner: Patient  Readiness: Acceptance  Method: Explanation  Response: Verbalizes Understanding  Comment: education on TTWBing including definition, techniques to maintain, and importance of maintaining per MD's protocol.    Education Comments  No comments found.        OP EDUCATION:       Encounter Problems       Encounter Problems (Active)       Mobility       STG - Patient will ambulate > 100' with LRAD, progress with precautions as indicated, modif indep  (Progressing)        Start:  12/18/24    Expected End:  01/08/25               PT Transfers       STG - Transfer from bed to chair LRAD modif indep  (Progressing)       Start:  12/18/24    Expected End:  01/08/25            STG - Patient will perform bed mobility indep (Progressing)       Start:  12/18/24    Expected End:  01/08/25            STG - Patient will transfer sit to and from stand modif indep LRAD (Progressing)       Start:  12/18/24    Expected End:  01/08/25               Pain - Adult

## 2024-12-20 NOTE — PROGRESS NOTES
"  Ear Nose & Throat Progress Note     Brigette Romo is a 74 y.o. female on day 4 of admission presenting with Osteoradionecrosis of jaw. No acute events overnight. Flap checks stable.    Subjective   Patient overnight has a few episodes of mucus plugging. The bedside RN was able to clear them without issues. The overnight resident scoped and it showed that the trachea was clear and the beth was easily visible.  Patient states she has not had a BM since before being admitted.       Objective   Physical Exam  Vitals reviewed in EMR  Gen: NAD, AOx3, resting comfortably in bed  Eyes: EOMI, sclera clear, PERRL  Ears: Normal external ears bilaterally  Nose: No rhinorrhea; anterior nares clear  Oral Cavity: All intraoral incisions c/d/i without evidence of dehiscence  -Flap soft and pink with good capillary refill  Head: normocephalic, atraumatic  Face/Neck: All incisions c/d/i, neck soft and flat without evidence of hematoma or fluid collection  -Skin paddle soft and light pink, good cap refill. Muscle paddle with STSG inferior to skin paddle healthy appearing  -Internal doppler with strong arterial signal   -Trach in midline position without bleeding or thick tracheal secretions  Resp: 6.0 cuffed Shiley, cuff down, non-labored breathing on 30% FI02 via trach collar  Cards: RRR on Doppler  Gastro: Soft, non-distended,  prior PEG tube in place  : Brumfield catheter in place clear yellow urine  MSK: right leg warm with intact movement and sensation  -right thigh skin graft donor site with tegaderm in place; Ace wrap in place  -Moves all extremities  Psych: Appropriate mood and affect  Drains: All drains in place and holding suction with serosanguinous drainage (stripped)     Last Recorded Vitals  Blood pressure 108/61, pulse 76, temperature 36.6 °C (97.9 °F), temperature source Temporal, resp. rate 16, height 1.676 m (5' 6\"), weight 54.6 kg (120 lb 5.9 oz), SpO2 97%.  Intake/Output last 3 Shifts:  I/O last 3 completed " shifts:  In: 1560 (28.6 mL/kg) [NG/GT:1560]  Out: 1584 (29 mL/kg) [Urine:1450 (0.7 mL/kg/hr); Emesis/NG output:110; Drains:24]  Weight: 54.6 kg     Relevant Results  Results for orders placed or performed during the hospital encounter of 12/16/24 (from the past 24 hours)   Renal Function Panel   Result Value Ref Range    Glucose 94 74 - 99 mg/dL    Sodium 141 136 - 145 mmol/L    Potassium 3.8 3.5 - 5.3 mmol/L    Chloride 108 (H) 98 - 107 mmol/L    Bicarbonate 26 21 - 32 mmol/L    Anion Gap 11 10 - 20 mmol/L    Urea Nitrogen 13 6 - 23 mg/dL    Creatinine 0.58 0.50 - 1.05 mg/dL    eGFR >90 >60 mL/min/1.73m*2    Calcium 8.6 8.6 - 10.6 mg/dL    Phosphorus 2.4 (L) 2.5 - 4.9 mg/dL    Albumin 3.0 (L) 3.4 - 5.0 g/dL   CBC   Result Value Ref Range    WBC 6.9 4.4 - 11.3 x10*3/uL    nRBC 0.0 0.0 - 0.0 /100 WBCs    RBC 2.45 (L) 4.00 - 5.20 x10*6/uL    Hemoglobin 7.5 (L) 12.0 - 16.0 g/dL    Hematocrit 23.6 (L) 36.0 - 46.0 %    MCV 96 80 - 100 fL    MCH 30.6 26.0 - 34.0 pg    MCHC 31.8 (L) 32.0 - 36.0 g/dL    RDW 14.4 11.5 - 14.5 %    Platelets 196 150 - 450 x10*3/uL   Magnesium   Result Value Ref Range    Magnesium 2.26 1.60 - 2.40 mg/dL     *Note: Due to a large number of results and/or encounters for the requested time period, some results have not been displayed. A complete set of results can be found in Results Review.      No results found.     Scheduled medications  acetaminophen, 975 mg, oral, q8h HARRIETT   Or  acetaminophen, 1,000 mg, oral, q8h HARRIETT   Or  acetaminophen, 1,000 mg, g-tube, q8h HARRIETT  aspirin, 325 mg, g-tube, Daily  chlorhexidine, 15 mL, Mouth/Throat, TID after meals  enoxaparin, 40 mg, subcutaneous, q24h  esomeprazole, 40 mg, g-tube, Daily before breakfast  hydrogen peroxide, 1 Application, Topical, TID  levothyroxine, 25 mcg, g-tube, Daily before breakfast  magnesium hydroxide, 30 mL, g-tube, Daily  polyethylene glycol, 17 g, g-tube, BID  potassium, sodium phosphates, 1 packet, g-tube, 4x daily  senna, 10 mL,  g-tube, BID  white petrolatum, 1 Application, Topical, TID      Continuous medications     PRN medications  PRN medications: hydrOXYzine HCL, naloxone, ondansetron **OR** ondansetron, oxyCODONE, oxyCODONE, oxygen                         Assessment/Plan   Assessment & Plan  Osteoradionecrosis of jaw    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 female  with ORN with exposed hardware s/p triple endoscopy, tracheostomy, anterior/left oral cavity composite resection (skin, gingiva, and mandible), Right Selective Neck Dissection levels I-IV, reconstruction with Right Fibula Free Flap and Split thickness Skin Graft on 12/16/2024 by Dr. Magana and Dr. Christensen      Active Issues:  Osteoradionecrosis of Mandible  Acute on Chronic Osteomyelitis  Hardware Failure  Hypothyroidism  HLD  Anxiety  Acute Blood Loss Anemia  Urinary retention  Underweight per BMI  Hypokalemia  Hypophosphatemia  Hypotension     Plan:  -Flap Checks: q12hrs  -Drains: Monitor output  -Analgesia: Discontinued PCA and started Oxy 5 and 10 as needed, Scheduled Acetaminophen  -FEN: mIVFs disontinue & start FWF, NPO, TF advancing to goal rate; monitor and replete electrolytes as needed  -Pulm: s/p trach, humidified air at all times, standard trach care/suctioning/teaching; wean oxygen to room air,   -ID: Unasyn 3 g Q6hrs x 1 day, stop date 12/17  -Cardiac: Vitals per ENT free flap protocol then transition to Q4hr vitals; Aspirin 325mg daily  -Heme: Transfuse 1 unit pRBCs, posttransfusion CBC->7.4  -Endo: Home Levothyroxine 25mcg  -GI: Bowel regimen increased, PPI, and PRN anti-emetic  -: Brumfield catheter replaced after failing trial of void; repeat trial of void on 12/21  -Steroids/Special: Incision and oral care per ENT free flap protocol; SLP consult->tolerating PMV  -Embolic PPx: SQH, SCDs while in bed  -Dispo: [facility]. PT/OT ordered; Discharge planning for POD 6 home with home care vs skilled nursing  facility    All plans discussed with attending after morning rounds        I spent 35 minutes in the professional and overall care of this patient.      Vira Monroe APRN, CNP  Certified Family Nurse Practitioner  Nurse Practitioner III  Department of Otolaryngology: Head & Neck Surgery  Personal Pager [phone]  ENT Team  Head and Neck Phone: [phone]

## 2024-12-20 NOTE — SIGNIFICANT EVENT
ENT Flap Check    Subjective:  Resting comfortably. Having stomach fullness and cramping     Objective:  Vitals reviewed in EMR  Gen: NAD, AOx3, resting comfortably in bed  Face/Neck: All incisions c/d/i, neck soft and flat without evidence of hematoma or fluid collection  -Skin paddle soft and light pink, good cap refill. Muscle paddle with STSG inferior to skin paddle healthy appearing  -Internal doppler with strong arterial signal   -Trach in midline position without bleeding or thick tracheal secretions  Oral Cavity: All intraoral incisions c/d/i without evidence of dehiscence  -Flap soft and pink with good capillary refill  Extremities: right leg warm with intact movement and sensation  -right thigh skin graft donor site with tegaderm in place  Abdomen: PEG in place  Drains: All drains in place and holding suction with serosanguinous drainage (stripped)     Assessment/Plan:  Brigette Romo is a 74 y.o. female s/p triple endoscopy, trach, anterior oral cavity composite resection, R SND, reconstruction with R FFF with Ame Magana and Fermin.     -Continue q8hr flap checks  - tube feeds pasued, will resume when able     Tiffany Perdue MD

## 2024-12-20 NOTE — CARE PLAN
The patient's goals for the shift include tolerate tube feedings and safety.      The clinical goals for the shift include Pt will maintain patent airway and remain free from injury throughout shift.    Over the shift, the patient did not make progress toward the following goals. Barriers to progression include . Recommendations to address these barriers include .      Problem: Pain  Goal: Takes deep breaths with improved pain control throughout the shift  Outcome: Progressing  Goal: Turns in bed with improved pain control throughout the shift  Outcome: Progressing  Goal: Walks with improved pain control throughout the shift  Outcome: Progressing  Goal: Performs ADL's with improved pain control throughout shift  Outcome: Progressing  Goal: Participates in PT with improved pain control throughout the shift  Outcome: Progressing  Goal: Free from opioid side effects throughout the shift  Outcome: Progressing  Goal: Free from acute confusion related to pain meds throughout the shift  Outcome: Progressing     Problem: Pain - Adult  Goal: Verbalizes/displays adequate comfort level or baseline comfort level  Outcome: Progressing     Problem: Safety - Adult  Goal: Free from fall injury  Outcome: Progressing     Problem: Discharge Planning  Goal: Discharge to home or other facility with appropriate resources  Outcome: Progressing     Problem: Chronic Conditions and Co-morbidities  Goal: Patient's chronic conditions and co-morbidity symptoms are monitored and maintained or improved  Outcome: Progressing     Problem: Skin  Goal: Prevent/minimize sheer/friction injuries  Outcome: Progressing  Flowsheets (Taken 12/20/2024 1248)  Prevent/minimize sheer/friction injuries:   Complete micro-shifts as needed if patient unable. Adjust patient position to relieve pressure points, not a full turn   HOB 30 degrees or less   Increase activity/out of bed for meals   Turn/reposition every 2 hours/use positioning/transfer devices   Use pull  sheet  Goal: Promote/optimize nutrition  Outcome: Progressing  Flowsheets (Taken 12/20/2024 1248)  Promote/optimize nutrition:   Consume > 50% meals/supplements   Monitor/record intake including meals   Discuss with provider if NPO > 2 days  Goal: Promote skin healing  Outcome: Progressing  Flowsheets (Taken 12/20/2024 1248)  Promote skin healing:   Assess skin/pad under line(s)/device(s)   Ensure correct size (line/device) and apply per  instructions   Protective dressings over bony prominences   Rotate device position/do not position patient on device   Turn/reposition every 2 hours/use positioning/transfer devices

## 2024-12-20 NOTE — SIGNIFICANT EVENT
Procedure Note: Trach Change & Capping  The patient's name and personal identifier's were verified. Nursing was present for the tracheostomy tube change. The patient was placed in the supine position. The old # 6.0 cuffed shiley tracheostomy tube was removed and immediately replaced with a # 4.0 uncuffed shiley tracheostomy tube. Confirmation of placement was achieved with positive return of tracheal secretions. The patient was returned to an inclined position and tolerated the position well. There were no complications.     A cap was then placed.  The patient was taught how to remove the cap incase she began to experience shortness of breath.  She was able to place and remove the on her own.      Kale Batista, DO PGY-3  I am the day resident. I can only be contacted from 6 AM to 5 PM. Page on weekends or off hours.   Otolaryngology - Head & Neck Surgery  ENT Consult pager: [phone]  Peds pager: [phone]  Adult Head & Neck Phone: [phone]  ENT subspecialty team: Tanja individual resident who wrote today's note  Please page if urgent

## 2024-12-20 NOTE — NURSING NOTE
Paged the team regarding patient having a hard time clearing her trach RN suctioned and Lavaged but nothing appeared to be plugged. Team to bedside and scoped . Irritation was seen but no mucus plug was evident. Will continue to monitor

## 2024-12-20 NOTE — PROGRESS NOTES
12/17/24 1100   Discharge Planning   Living Arrangements Alone   Support Systems Children;Family members;Friends/neighbors   Assistance Needed trach,peg   Type of Residence Private residence   Number of Stairs to Enter Residence 3   Number of Stairs Within Residence 0   Do you have animals or pets at home? No   Who is requesting discharge planning? Provider   Home or Post Acute Services In home services   Type of Home Care Services Home nursing visits;DME or oxygen;Home PT   Expected Discharge Disposition Home H   Does the patient need discharge transport arranged? No   Financial Resource Strain   How hard is it for you to pay for the very basics like food, housing, medical care, and heating? Not very   Housing Stability   In the last 12 months, was there a time when you were not able to pay the mortgage or rent on time? N   In the past 12 months, how many times have you moved where you were living? 1   At any time in the past 12 months, were you homeless or living in a shelter (including now)? N   Transportation Needs   In the past 12 months, has lack of transportation kept you from medical appointments or from getting medications? no   In the past 12 months, has lack of transportation kept you from meetings, work, or from getting things needed for daily living? No     Care Transitions Note    Plan per Medical/Surgical Team:s/p triple endoscopy, trach, anterior oral cavity composite resection, R SND, reconstruction with R FFF with Ame Magana and Fermin.   Status: inpatient   Payor Source: united [employer] medicare  Discharge disposition: home with home care  Expected date of discharge: 12/22  Barriers: none at this time   PCP / Primary Oncologist: Eri Read DO   Preferred Pharmacy: [facility] [address] [place]  Preferred home care agency: [facility]    TCC met with patient and family at bedside to discuss anticipated discharge needs. Demographics and insurance verifed with patient. Patient lives at  home alone and was independent with ADLS prior to admission. Patient will discharge and stay with her caregiver/sister Audrey who lives at [address]  [place] [address]. Patient is agreeable to using [facility] for home care and uses [facility] for tube feeds. Patient and family have requested a knee walker for patient to use upon discharge. TCC will update care team. Will continue to follow for any discharge planning needs.   Lexis MARTIN     12/20/24- TCC sent home going trach supplies and dressing supplies to [facility] as patient is currently active with them for tube feeds. Will continue to follow patient for discharge needs.  Lexis MARTIN     Update @226pm- TCC gave patient 5 days of trach and dressing supplies. TCC educated patient to contact [facility] when supplies are low. Will continue to follow patient for discharge needs.  Lexis MARTIN

## 2024-12-21 LAB
ALBUMIN SERPL BCP-MCNC: 3 G/DL (ref 3.4–5)
ANION GAP SERPL CALC-SCNC: 10 MMOL/L (ref 10–20)
BUN SERPL-MCNC: 15 MG/DL (ref 6–23)
CALCIUM SERPL-MCNC: 8.9 MG/DL (ref 8.6–10.6)
CHLORIDE SERPL-SCNC: 106 MMOL/L (ref 98–107)
CO2 SERPL-SCNC: 28 MMOL/L (ref 21–32)
CREAT SERPL-MCNC: 0.61 MG/DL (ref 0.5–1.05)
EGFRCR SERPLBLD CKD-EPI 2021: >90 ML/MIN/1.73M*2
ERYTHROCYTE [DISTWIDTH] IN BLOOD BY AUTOMATED COUNT: 14.2 % (ref 11.5–14.5)
GLUCOSE SERPL-MCNC: 116 MG/DL (ref 74–99)
HCT VFR BLD AUTO: 22.1 % (ref 36–46)
HGB BLD-MCNC: 7.1 G/DL (ref 12–16)
MAGNESIUM SERPL-MCNC: 2.2 MG/DL (ref 1.6–2.4)
MCH RBC QN AUTO: 31.3 PG (ref 26–34)
MCHC RBC AUTO-ENTMCNC: 32.1 G/DL (ref 32–36)
MCV RBC AUTO: 97 FL (ref 80–100)
NRBC BLD-RTO: 0 /100 WBCS (ref 0–0)
PHOSPHATE SERPL-MCNC: 2.8 MG/DL (ref 2.5–4.9)
PLATELET # BLD AUTO: 216 X10*3/UL (ref 150–450)
POTASSIUM SERPL-SCNC: 3.3 MMOL/L (ref 3.5–5.3)
RBC # BLD AUTO: 2.27 X10*6/UL (ref 4–5.2)
SODIUM SERPL-SCNC: 141 MMOL/L (ref 136–145)
WBC # BLD AUTO: 6.2 X10*3/UL (ref 4.4–11.3)

## 2024-12-21 PROCEDURE — 2500000001 HC RX 250 WO HCPCS SELF ADMINISTERED DRUGS (ALT 637 FOR MEDICARE OP)

## 2024-12-21 PROCEDURE — 85027 COMPLETE CBC AUTOMATED: CPT

## 2024-12-21 PROCEDURE — 2500000004 HC RX 250 GENERAL PHARMACY W/ HCPCS (ALT 636 FOR OP/ED)

## 2024-12-21 PROCEDURE — 36415 COLL VENOUS BLD VENIPUNCTURE: CPT

## 2024-12-21 PROCEDURE — 2500000005 HC RX 250 GENERAL PHARMACY W/O HCPCS

## 2024-12-21 PROCEDURE — 80069 RENAL FUNCTION PANEL: CPT

## 2024-12-21 PROCEDURE — 1200000003 HC ONCOLOGY  ROOM WITH TELEMETRY DAILY

## 2024-12-21 PROCEDURE — 83735 ASSAY OF MAGNESIUM: CPT

## 2024-12-21 RX ORDER — POTASSIUM CHLORIDE 1.5 G/1.58G
40 POWDER, FOR SOLUTION ORAL ONCE
Status: COMPLETED | OUTPATIENT
Start: 2024-12-21 | End: 2024-12-21

## 2024-12-21 ASSESSMENT — PAIN DESCRIPTION - LOCATION
LOCATION: FACE
LOCATION: FACE

## 2024-12-21 ASSESSMENT — PAIN SCALES - GENERAL
PAINLEVEL_OUTOF10: 6
PAINLEVEL_OUTOF10: 4
PAINLEVEL_OUTOF10: 6
PAINLEVEL_OUTOF10: 4

## 2024-12-21 ASSESSMENT — PAIN DESCRIPTION - ORIENTATION
ORIENTATION: ANTERIOR
ORIENTATION: ANTERIOR

## 2024-12-21 ASSESSMENT — PAIN - FUNCTIONAL ASSESSMENT
PAIN_FUNCTIONAL_ASSESSMENT: 0-10

## 2024-12-21 NOTE — PROGRESS NOTES
"  Ear Nose & Throat Progress Note     Brigette Romo is a 74 y.o. female on day 5 of admission presenting with Osteoradionecrosis of jaw. No acute events overnight. Flap checks stable.    Subjective   NAOE. TF's were held overnight due to intolerance and possible reflux that patient was endorsing. Otherwise doing well without concerns. No issues with cap placement overnight.        Objective   Physical Exam  Vitals reviewed in EMR  Gen: NAD, AOx3, resting comfortably in bed  Eyes: EOMI, sclera clear, PERRL  Ears: Normal external ears bilaterally  Nose: No rhinorrhea; anterior nares clear  Oral Cavity: All intraoral incisions c/d/i without evidence of dehiscence  -Flap soft and pink with good capillary refill  Head: normocephalic, atraumatic  Face/Neck: All incisions c/d/i, neck soft and flat without evidence of hematoma or fluid collection  -Skin paddle soft and light pink, good cap refill. Muscle paddle with STSG inferior to skin paddle healthy appearing  -Internal doppler with strong arterial signal   -Trach in midline position without bleeding or thick tracheal secretions  Resp: 6.0 cuffed Shiley, cuff down, non-labored breathing on 30% FI02 via trach collar  Cards: RRR on Doppler  Gastro: Soft, non-distended,  prior PEG tube in place  : Brumfield catheter in place clear yellow urine  MSK: right leg warm with intact movement and sensation  -right thigh skin graft donor site with tegaderm in place  -Moves all extremities  Psych: Appropriate mood and affect  Drains: All drains in place and holding suction with serosanguinous drainage (stripped)     Last Recorded Vitals  Blood pressure 106/55, pulse 73, temperature 36.4 °C (97.5 °F), temperature source Temporal, resp. rate 18, height 1.676 m (5' 6\"), weight 54.6 kg (120 lb 5.9 oz), SpO2 100%.  Intake/Output last 3 Shifts:  I/O last 3 completed shifts:  In: 1130 (20.7 mL/kg) [NG/GT:1130]  Out: 1221 (22.4 mL/kg) [Urine:1000 (0.5 mL/kg/hr); Emesis/NG output:210; " Drains:11]  Weight: 54.6 kg     Relevant Results  Results for orders placed or performed during the hospital encounter of 12/16/24 (from the past 24 hours)   Renal Function Panel   Result Value Ref Range    Glucose 116 (H) 74 - 99 mg/dL    Sodium 141 136 - 145 mmol/L    Potassium 3.3 (L) 3.5 - 5.3 mmol/L    Chloride 106 98 - 107 mmol/L    Bicarbonate 28 21 - 32 mmol/L    Anion Gap 10 10 - 20 mmol/L    Urea Nitrogen 15 6 - 23 mg/dL    Creatinine 0.61 0.50 - 1.05 mg/dL    eGFR >90 >60 mL/min/1.73m*2    Calcium 8.9 8.6 - 10.6 mg/dL    Phosphorus 2.8 2.5 - 4.9 mg/dL    Albumin 3.0 (L) 3.4 - 5.0 g/dL   CBC   Result Value Ref Range    WBC 6.2 4.4 - 11.3 x10*3/uL    nRBC 0.0 0.0 - 0.0 /100 WBCs    RBC 2.27 (L) 4.00 - 5.20 x10*6/uL    Hemoglobin 7.1 (L) 12.0 - 16.0 g/dL    Hematocrit 22.1 (L) 36.0 - 46.0 %    MCV 97 80 - 100 fL    MCH 31.3 26.0 - 34.0 pg    MCHC 32.1 32.0 - 36.0 g/dL    RDW 14.2 11.5 - 14.5 %    Platelets 216 150 - 450 x10*3/uL   Magnesium   Result Value Ref Range    Magnesium 2.20 1.60 - 2.40 mg/dL     *Note: Due to a large number of results and/or encounters for the requested time period, some results have not been displayed. A complete set of results can be found in Results Review.      No results found.     Scheduled medications  acetaminophen, 975 mg, oral, q8h HARRIETT   Or  acetaminophen, 1,000 mg, oral, q8h HARRIETT   Or  acetaminophen, 1,000 mg, g-tube, q8h HARRIETT  aspirin, 325 mg, g-tube, Daily  chlorhexidine, 15 mL, Mouth/Throat, TID after meals  enoxaparin, 40 mg, subcutaneous, q24h  esomeprazole, 40 mg, g-tube, Daily before breakfast  hydrogen peroxide, 1 Application, Topical, TID  levothyroxine, 25 mcg, g-tube, Daily before breakfast  magnesium hydroxide, 30 mL, g-tube, Daily  polyethylene glycol, 17 g, g-tube, BID  potassium phosphate, 15 mmol, intravenous, Once  senna, 10 mL, g-tube, BID  white petrolatum, 1 Application, Topical, TID      Continuous medications     PRN medications  PRN medications:  hydrOXYzine HCL, naloxone, ondansetron **OR** ondansetron, oxyCODONE, oxyCODONE, oxygen                         Assessment/Plan   Assessment & Plan  Osteoradionecrosis of jaw    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 female  with ORN with exposed hardware s/p triple endoscopy, tracheostomy, anterior/left oral cavity composite resection (skin, gingiva, and mandible), Right Selective Neck Dissection levels I-IV, reconstruction with Right Fibula Free Flap and Split thickness Skin Graft on 12/16/2024 by Dr. Magana and Dr. Christensen      Active Issues:  Osteoradionecrosis of Mandible  Acute on Chronic Osteomyelitis  Hardware Failure  Hypothyroidism  HLD  Anxiety  Acute Blood Loss Anemia  Urinary retention  Underweight per BMI  Hypokalemia  Hypophosphatemia  Hypotension     Plan:  -Flap Checks: q12hrs  -Drains: Monitor output  -Analgesia: Discontinued PCA and started Oxy 5 and 10 as needed, Scheduled Acetaminophen  -FEN: mIVFs disontinue & start FWF, NPO, TF advancing to goal rate -> switched patient to Isosource as she states she tolerated is better; monitor and replete electrolytes as needed  -Pulm: s/p trach, humidified air at all times, standard trach care/suctioning/teaching; wean oxygen to room air,   -ID: Unasyn 3 g Q6hrs x 1 day, stop date 12/17  -Cardiac: Vitals per ENT free flap protocol then transition to Q4hr vitals; Aspirin 325mg daily  -Heme: Hgb 7.1, continue to trend CBC daily  -Endo: Home Levothyroxine 25mcg  -GI: Bowel regimen PPI, and PRN anti-emetic  -: Brumfield catheter replaced after failing trial of void; trial of void on 12/21  -Steroids/Special: Incision and oral care per ENT free flap protocol; Tolerating capping trial, will consider decannulation in PM   -Embolic PPx: SQH, SCDs while in bed  -Dispo: [facility]. PT/OT ordered; Discharge planning for POD 6 home with home care vs skilled nursing facility    All plans discussed with attending after morning  padma Thomason MD - PGY1  Otolaryngology - Head and Neck Surgery  Head & Neck team phone: [phone]  ENT consult pager: [phone]   Pediatric ENT pager: [phone]  ENT Outpatient scheduling number: [phone]

## 2024-12-21 NOTE — CONSULTS
"Nutrition Note:   Nutrition Assessment    Reason for Assessment: Dietitian discretion    Per team: pt feeling full with Pivot 1.5 continuous unable to tolerate above  45ml/hr.     Team requesting new TF recommendations     Pt has hx of Osmolite prior to admission, pt was on Isosource 1.5 but changed to Osmolite, reason unclear.     Discussed with team will try Isosource 1.5 prior to trying Osmolite , team agreeable  Nutrition Significant Labs:  CBC Trend:   Results from last 7 days   Lab Units 12/21/24  0643 12/20/24  0618 12/19/24  0606 12/18/24  2251   WBC AUTO x10*3/uL 6.2 6.9 8.0 8.7   RBC AUTO x10*6/uL 2.27* 2.45* 2.33* 2.42*   HEMOGLOBIN g/dL 7.1* 7.5* 7.2* 7.4*   HEMATOCRIT % 22.1* 23.6* 22.3* 21.8*   MCV fL 97 96 96 90   PLATELETS AUTO x10*3/uL 216 196 165 150    , BMP Trend:   Results from last 7 days   Lab Units 12/21/24  0643 12/20/24  0618 12/19/24  0606 12/18/24  0553   GLUCOSE mg/dL 116* 94 115* 98   CALCIUM mg/dL 8.9 8.6 8.7 8.6   SODIUM mmol/L 141 141 142 146*   POTASSIUM mmol/L 3.3* 3.8 3.8 3.5   CO2 mmol/L 28 26 26 29   CHLORIDE mmol/L 106 108* 110* 113*   BUN mg/dL 15 13 14 15   CREATININE mg/dL 0.61 0.58 0.58 0.68    , A1C:No results found for: \"HGBA1C\", BG POCT trend:   Results from last 7 days   Lab Units 12/18/24  1244 12/17/24  1159   POCT GLUCOSE mg/dL 91 116*        Nutrition Specific Medications:  Scheduled medications  acetaminophen, 975 mg, oral, q8h HARRIETT   Or  acetaminophen, 1,000 mg, oral, q8h HARRIETT   Or  acetaminophen, 1,000 mg, g-tube, q8h HARRIETT  aspirin, 325 mg, g-tube, Daily  chlorhexidine, 15 mL, Mouth/Throat, TID after meals  enoxaparin, 40 mg, subcutaneous, q24h  esomeprazole, 40 mg, g-tube, Daily before breakfast  hydrogen peroxide, 1 Application, Topical, TID  levothyroxine, 25 mcg, g-tube, Daily before breakfast  magnesium hydroxide, 30 mL, g-tube, Daily  polyethylene glycol, 17 g, g-tube, BID  potassium phosphate, 15 mmol, intravenous, Once  senna, 10 mL, g-tube, BID  white " petrolatum, 1 Application, Topical, TID      I/O:   Last BM Date: 12/20/24; Stool Appearance: Loose (12/20/24 1608)    Dietary Orders (From admission, onward)       Start     Ordered    12/18/24 0909  Enteral feeding with NPO Pivot 1.5; PEG (percutaneous endoscopic gastric); 15; 225; Water; Tap water; Every 6 hours  Diet effective now        Comments: Pivot 1.5 @ 15ml/hr and increase 10ml Q6H to goal rate of 55ml/hr, only as tolerated. Run TF x 22 hours/day (hold TF 1 hour pre and post administration of Synthroid).  --FWF of 225 mls, 4 times/day,   Question Answer Comment   Tube feeding formula: Pivot 1.5    Feeding route: PEG (percutaneous endoscopic gastric)    Tube feeding continuous rate (mL/hr): 15    Tube feeding flush (mL): 225    Flush type: Water    Water type: Tap water    Flush frequency: Every 6 hours        12/18/24 0909                     Estimated Needs:   Total Energy Estimated Needs (kCal):  (1750+)  Method for Estimating Needs: 55 x ~32+  Total Protein Estimated Needs (g):  (85+)  Method for Estimating Needs: 55 x ~1.5g/kg+  Total Fluid Estimated Needs (mL):  (1ml/kcal or per MD/team)                Nutrition Interventions/Recommendations         Nutrition Prescription:  When ready to initiate Isosource 1.5 @ 30ml/hr and increase by 10ml/hr every 10 hrs as tolerated to a goal rate of 55 ml/hr x 22 hours (hold TF 1 hour pre and post synthroid)   Provides: 1210 ml total volume, 1815 kcal, 83 g PRO, 924 ml free water  FWF: 225mls QID or FWF per MD/team,  TF provides 924 ml free water  For home, can transition pt to a standard/polymeric TF formula; would do Isosource 1.5, 300 mls, 4 times/day, only as tolerated (total of 5 cartons/day).  --Flush with 60 mls pre and post each feed with additional FWF of 225 mls BID between feeds, or FWF per MD/team (TF + recommended FWF would provide a total of 1846 mls free water/day).  --Above TF regimen would provide a total of 1800 kcals, 81 g pro--meets 100%  estimated kcal needs, 95% estimated pro needs.    TF Monitoring:  --RFP + mag daily  --Accuchecks Q6H or per team  --Daily weights (standing preferred, if feasible)      If tolerance issues continue please reconsult as needed         Nutrition Interventions:   Interventions: Enteral intake    Nutrition Monitoring and Evaluation   Food/Nutrient Related History Monitoring  Monitoring and Evaluation Plan: Enteral and parenteral nutrition intake    Body Composition/Growth/Weight History  Monitoring and Evaluation Plan: Weight    Biochemical Data, Medical Tests and Procedures  Monitoring and Evaluation Plan: Electrolyte/renal panel, Glucose/endocrine profile  Electrolyte and Renal Panel: Sodium, Potassium, Phosphorus, Magnesium  Criteria: WNL  Glucose/Endocrine Profile: Glucose, casual  Criteria: WNL    Time Spent (min): 60 minutes

## 2024-12-21 NOTE — CARE PLAN
The patient's goals for the shift include prepare for safe discharge.      The clinical goals for the shift include Pt will void post green removal    Over the shift, the patient did not make progress toward the following goals. Barriers to progression include . Recommendations to address these barriers include .        Problem: Pain  Goal: Takes deep breaths with improved pain control throughout the shift  12/21/2024 1528 by Yanci Greene RN  Outcome: Progressing  12/21/2024 1528 by Yanci Greene RN  Outcome: Progressing  Goal: Turns in bed with improved pain control throughout the shift  12/21/2024 1528 by Yanci Greene RN  Outcome: Progressing  12/21/2024 1528 by Yanci Greene RN  Outcome: Progressing  Goal: Walks with improved pain control throughout the shift  12/21/2024 1528 by Yanci Greene RN  Outcome: Progressing  12/21/2024 1528 by Yanci Greene RN  Outcome: Progressing  Goal: Performs ADL's with improved pain control throughout shift  12/21/2024 1528 by Yanci Greene RN  Outcome: Progressing  12/21/2024 1528 by Yanci Greene RN  Outcome: Progressing  Goal: Participates in PT with improved pain control throughout the shift  12/21/2024 1528 by Yanci Greene RN  Outcome: Progressing  12/21/2024 1528 by Yanci Greene RN  Outcome: Progressing  Goal: Free from opioid side effects throughout the shift  12/21/2024 1528 by Yanci Greene RN  Outcome: Progressing  12/21/2024 1528 by Yanci Greene RN  Outcome: Progressing  Goal: Free from acute confusion related to pain meds throughout the shift  Outcome: Progressing     Problem: Pain - Adult  Goal: Verbalizes/displays adequate comfort level or baseline comfort level  Outcome: Progressing     Problem: Safety - Adult  Goal: Free from fall injury  Outcome: Progressing     Problem: Discharge  Planning  Goal: Discharge to home or other facility with appropriate resources  Outcome: Progressing     Problem: Chronic Conditions and Co-morbidities  Goal: Patient's chronic conditions and co-morbidity symptoms are monitored and maintained or improved  Outcome: Progressing     Problem: Skin  Goal: Prevent/minimize sheer/friction injuries  Outcome: Progressing  Goal: Promote/optimize nutrition  Outcome: Progressing  Goal: Promote skin healing  Outcome: Progressing

## 2024-12-21 NOTE — CONSULTS
Nutrition Note:   Nutrition Assessment    Reason for Assessment: Dietitian discretion    Communicated with team d/t c/f pt not tolerating TF (Pivot bolus). Team requesting to try homegoing bolus feeds with Isosource 1.5     Will try Isosource 1.5 bolus if does not tolerate recommend reconsulting as needed.     Refer to RDN note 12/17    Time Spent (min): 15 minutes

## 2024-12-22 ENCOUNTER — DOCUMENTATION (OUTPATIENT)
Dept: HOME HEALTH SERVICES | Facility: HOME HEALTH | Age: 74
End: 2024-12-22
Payer: MEDICARE

## 2024-12-22 ENCOUNTER — PHARMACY VISIT (OUTPATIENT)
Dept: PHARMACY | Facility: CLINIC | Age: 74
End: 2024-12-22
Payer: COMMERCIAL

## 2024-12-22 VITALS
WEIGHT: 120.37 LBS | HEIGHT: 66 IN | RESPIRATION RATE: 16 BRPM | OXYGEN SATURATION: 95 % | DIASTOLIC BLOOD PRESSURE: 62 MMHG | HEART RATE: 74 BPM | BODY MASS INDEX: 19.35 KG/M2 | SYSTOLIC BLOOD PRESSURE: 110 MMHG | TEMPERATURE: 97.7 F

## 2024-12-22 LAB
ALBUMIN SERPL BCP-MCNC: 3 G/DL (ref 3.4–5)
ANION GAP SERPL CALC-SCNC: 11 MMOL/L (ref 10–20)
B-LACTAMASE ORGANISM ISLT: NEGATIVE
BACTERIA SPEC CULT: ABNORMAL
BACTERIA SPEC CULT: ABNORMAL
BUN SERPL-MCNC: 12 MG/DL (ref 6–23)
CALCIUM SERPL-MCNC: 8.9 MG/DL (ref 8.6–10.6)
CHLORIDE SERPL-SCNC: 107 MMOL/L (ref 98–107)
CO2 SERPL-SCNC: 27 MMOL/L (ref 21–32)
CREAT SERPL-MCNC: 0.57 MG/DL (ref 0.5–1.05)
EGFRCR SERPLBLD CKD-EPI 2021: >90 ML/MIN/1.73M*2
ERYTHROCYTE [DISTWIDTH] IN BLOOD BY AUTOMATED COUNT: 14.4 % (ref 11.5–14.5)
GLUCOSE BLD MANUAL STRIP-MCNC: 120 MG/DL (ref 74–99)
GLUCOSE SERPL-MCNC: 106 MG/DL (ref 74–99)
GRAM STN SPEC: ABNORMAL
GRAM STN SPEC: ABNORMAL
HCT VFR BLD AUTO: 22.8 % (ref 36–46)
HGB BLD-MCNC: 7.2 G/DL (ref 12–16)
MAGNESIUM SERPL-MCNC: 2.11 MG/DL (ref 1.6–2.4)
MCH RBC QN AUTO: 30.4 PG (ref 26–34)
MCHC RBC AUTO-ENTMCNC: 31.6 G/DL (ref 32–36)
MCV RBC AUTO: 96 FL (ref 80–100)
NRBC BLD-RTO: 0 /100 WBCS (ref 0–0)
PHOSPHATE SERPL-MCNC: 3 MG/DL (ref 2.5–4.9)
PLATELET # BLD AUTO: 276 X10*3/UL (ref 150–450)
POTASSIUM SERPL-SCNC: 4 MMOL/L (ref 3.5–5.3)
RBC # BLD AUTO: 2.37 X10*6/UL (ref 4–5.2)
SODIUM SERPL-SCNC: 141 MMOL/L (ref 136–145)
WBC # BLD AUTO: 5.6 X10*3/UL (ref 4.4–11.3)

## 2024-12-22 PROCEDURE — 2500000001 HC RX 250 WO HCPCS SELF ADMINISTERED DRUGS (ALT 637 FOR MEDICARE OP): Performed by: NURSE PRACTITIONER

## 2024-12-22 PROCEDURE — 83735 ASSAY OF MAGNESIUM: CPT

## 2024-12-22 PROCEDURE — 36415 COLL VENOUS BLD VENIPUNCTURE: CPT

## 2024-12-22 PROCEDURE — 84100 ASSAY OF PHOSPHORUS: CPT

## 2024-12-22 PROCEDURE — 1200000003 HC ONCOLOGY  ROOM WITH TELEMETRY DAILY

## 2024-12-22 PROCEDURE — 82947 ASSAY GLUCOSE BLOOD QUANT: CPT

## 2024-12-22 PROCEDURE — 85027 COMPLETE CBC AUTOMATED: CPT

## 2024-12-22 PROCEDURE — 2500000001 HC RX 250 WO HCPCS SELF ADMINISTERED DRUGS (ALT 637 FOR MEDICARE OP)

## 2024-12-22 PROCEDURE — 2500000004 HC RX 250 GENERAL PHARMACY W/ HCPCS (ALT 636 FOR OP/ED)

## 2024-12-22 ASSESSMENT — COGNITIVE AND FUNCTIONAL STATUS - GENERAL
DRESSING REGULAR UPPER BODY CLOTHING: A LITTLE
TOILETING: A LITTLE
DRESSING REGULAR LOWER BODY CLOTHING: A LITTLE
HELP NEEDED FOR BATHING: A LITTLE
EATING MEALS: A LOT
MOVING TO AND FROM BED TO CHAIR: A LITTLE
STANDING UP FROM CHAIR USING ARMS: A LITTLE
WALKING IN HOSPITAL ROOM: A LITTLE
MOVING FROM LYING ON BACK TO SITTING ON SIDE OF FLAT BED WITH BEDRAILS: A LITTLE
MOBILITY SCORE: 17
PERSONAL GROOMING: A LITTLE
TURNING FROM BACK TO SIDE WHILE IN FLAT BAD: A LITTLE
DAILY ACTIVITIY SCORE: 17
CLIMB 3 TO 5 STEPS WITH RAILING: A LOT

## 2024-12-22 ASSESSMENT — PAIN SCALES - GENERAL
PAINLEVEL_OUTOF10: 7
PAINLEVEL_OUTOF10: 5 - MODERATE PAIN
PAINLEVEL_OUTOF10: 7
PAINLEVEL_OUTOF10: 7
PAINLEVEL_OUTOF10: 0 - NO PAIN
PAINLEVEL_OUTOF10: 4
PAINLEVEL_OUTOF10: 7

## 2024-12-22 ASSESSMENT — PAIN - FUNCTIONAL ASSESSMENT
PAIN_FUNCTIONAL_ASSESSMENT: 0-10

## 2024-12-22 NOTE — SIGNIFICANT EVENT
ENT Flap Check    Subjective:  Patient resting comfortably in bed, pain well-controlled.  Decannulated today.  Dressing over tracheostoma clean and dry.     Objective:  Vitals reviewed in EMR  Gen: NAD, AOx3, resting comfortably in bed  Face/Neck: All incisions c/d/i, neck soft and flat without evidence of hematoma or fluid collection  -Skin paddle soft and light pink, good cap refill. Muscle paddle with STSG inferior to skin paddle healthy appearing  -Internal doppler with strong arterial signal   -Tracheostoma covered with dressing  Oral Cavity: All intraoral incisions c/d/i without evidence of dehiscence  -Flap soft and pink with good capillary refill  Extremities: right leg warm with intact movement and sensation  -right thigh skin graft donor site with tegaderm in place  Abdomen: PEG in place  Drains: All drains in place and holding suction with serosanguinous drainage (stripped)     Assessment/Plan:  Brigette Romo is a 74 y.o. female s/p triple endoscopy, trach, anterior oral cavity composite resection, R SND, reconstruction with R FFF with Ame Magana and Fermin.     -Continue q12hr flap checks    Quinn Calle MD

## 2024-12-22 NOTE — CARE PLAN
The patient's goals for the shift include      The clinical goals for the shift include Patient will tolerate continous tube feeds without nausea or vomiting.    Patient was safe and injury free throughout shift. VS WNL. Patient out of bed today.       Problem: Pain  Goal: Takes deep breaths with improved pain control throughout the shift  Outcome: Progressing  Goal: Turns in bed with improved pain control throughout the shift  Outcome: Progressing  Goal: Walks with improved pain control throughout the shift  Outcome: Progressing  Goal: Performs ADL's with improved pain control throughout shift  Outcome: Progressing  Goal: Participates in PT with improved pain control throughout the shift  Outcome: Progressing  Goal: Free from opioid side effects throughout the shift  Outcome: Progressing  Goal: Free from acute confusion related to pain meds throughout the shift  Outcome: Progressing     Problem: Pain - Adult  Goal: Verbalizes/displays adequate comfort level or baseline comfort level  Outcome: Progressing     Problem: Safety - Adult  Goal: Free from fall injury  Outcome: Progressing     Problem: Discharge Planning  Goal: Discharge to home or other facility with appropriate resources  Outcome: Progressing     Problem: Chronic Conditions and Co-morbidities  Goal: Patient's chronic conditions and co-morbidity symptoms are monitored and maintained or improved  Outcome: Progressing     Problem: Skin  Goal: Prevent/minimize sheer/friction injuries  Outcome: Progressing  Flowsheets (Taken 12/22/2024 1535)  Prevent/minimize sheer/friction injuries:   Use pull sheet   Turn/reposition every 2 hours/use positioning/transfer devices  Goal: Promote/optimize nutrition  Outcome: Progressing  Flowsheets (Taken 12/22/2024 1535)  Promote/optimize nutrition: Monitor/record intake including meals  Goal: Promote skin healing  Outcome: Progressing  Flowsheets (Taken 12/22/2024 1535)  Promote skin healing: Turn/reposition every 2  hours/use positioning/transfer devices

## 2024-12-22 NOTE — NURSING NOTE
ENT Education Note:    Patient was hands on with PEG tube throughout shift. She administered medication via peg tube throughout shift. Patient is still on trickle tube feeds this shift, and she will need practice with bolus tube feeds. RN verbalized incision care throughout shift. Patient will need to be shown how to do lower leg dressing change prior to discharge.

## 2024-12-22 NOTE — HH CARE COORDINATION
Home Care received a Referral for Nursing, Physical Therapy, and Occupational Therapy. We have processed the referral for a Start of Care on 12/22/2024.     If you have any questions or concerns, please feel free to contact us at [phone]. Follow the prompts, enter your five digit zip code, and you will be directed to your care team on [facility].

## 2024-12-23 ENCOUNTER — PHARMACY VISIT (OUTPATIENT)
Dept: PHARMACY | Facility: CLINIC | Age: 74
End: 2024-12-23

## 2024-12-23 VITALS
DIASTOLIC BLOOD PRESSURE: 54 MMHG | RESPIRATION RATE: 16 BRPM | HEART RATE: 84 BPM | TEMPERATURE: 96.6 F | OXYGEN SATURATION: 97 % | BODY MASS INDEX: 19.35 KG/M2 | SYSTOLIC BLOOD PRESSURE: 109 MMHG | WEIGHT: 120.37 LBS | HEIGHT: 66 IN

## 2024-12-23 LAB
ABO GROUP (TYPE) IN BLOOD: NORMAL
ALBUMIN SERPL BCP-MCNC: 3.1 G/DL (ref 3.4–5)
ANION GAP SERPL CALC-SCNC: 12 MMOL/L (ref 10–20)
ANTIBODY SCREEN: NORMAL
B-LACTAMASE ORGANISM ISLT: NEGATIVE
BACTERIA SPEC CULT: ABNORMAL
BACTERIA SPEC CULT: ABNORMAL
BLOOD EXPIRATION DATE: NORMAL
BUN SERPL-MCNC: 11 MG/DL (ref 6–23)
CALCIUM SERPL-MCNC: 9.2 MG/DL (ref 8.6–10.6)
CHLORIDE SERPL-SCNC: 104 MMOL/L (ref 98–107)
CO2 SERPL-SCNC: 28 MMOL/L (ref 21–32)
CREAT SERPL-MCNC: 0.54 MG/DL (ref 0.5–1.05)
DISPENSE STATUS: NORMAL
EGFRCR SERPLBLD CKD-EPI 2021: >90 ML/MIN/1.73M*2
ERYTHROCYTE [DISTWIDTH] IN BLOOD BY AUTOMATED COUNT: 14.5 % (ref 11.5–14.5)
ERYTHROCYTE [DISTWIDTH] IN BLOOD BY AUTOMATED COUNT: 14.7 % (ref 11.5–14.5)
FUNGUS SPEC CULT: NORMAL
FUNGUS SPEC FUNGUS STN: NORMAL
GLUCOSE SERPL-MCNC: 102 MG/DL (ref 74–99)
GRAM STN SPEC: ABNORMAL
GRAM STN SPEC: ABNORMAL
HCT VFR BLD AUTO: 21.7 % (ref 36–46)
HCT VFR BLD AUTO: 23.2 % (ref 36–46)
HGB BLD-MCNC: 6.8 G/DL (ref 12–16)
HGB BLD-MCNC: 7.8 G/DL (ref 12–16)
LABORATORY COMMENT REPORT: NORMAL
MAGNESIUM SERPL-MCNC: 2.26 MG/DL (ref 1.6–2.4)
MCH RBC QN AUTO: 30.4 PG (ref 26–34)
MCH RBC QN AUTO: 30.9 PG (ref 26–34)
MCHC RBC AUTO-ENTMCNC: 31.3 G/DL (ref 32–36)
MCHC RBC AUTO-ENTMCNC: 33.6 G/DL (ref 32–36)
MCV RBC AUTO: 90 FL (ref 80–100)
MCV RBC AUTO: 99 FL (ref 80–100)
NRBC BLD-RTO: 0 /100 WBCS (ref 0–0)
NRBC BLD-RTO: 0 /100 WBCS (ref 0–0)
PATH REPORT.FINAL DX SPEC: NORMAL
PATH REPORT.GROSS SPEC: NORMAL
PATH REPORT.RELEVANT HX SPEC: NORMAL
PATH REPORT.TOTAL CANCER: NORMAL
PHOSPHATE SERPL-MCNC: 2.9 MG/DL (ref 2.5–4.9)
PLATELET # BLD AUTO: 302 X10*3/UL (ref 150–450)
PLATELET # BLD AUTO: 309 X10*3/UL (ref 150–450)
POTASSIUM SERPL-SCNC: 4.1 MMOL/L (ref 3.5–5.3)
PRODUCT BLOOD TYPE: 6200
PRODUCT CODE: NORMAL
RBC # BLD AUTO: 2.2 X10*6/UL (ref 4–5.2)
RBC # BLD AUTO: 2.57 X10*6/UL (ref 4–5.2)
RESIDENT REVIEW: NORMAL
RH FACTOR (ANTIGEN D): NORMAL
SODIUM SERPL-SCNC: 140 MMOL/L (ref 136–145)
UNIT ABO: NORMAL
UNIT NUMBER: NORMAL
UNIT RH: NORMAL
UNIT VOLUME: 282
WBC # BLD AUTO: 4.9 X10*3/UL (ref 4.4–11.3)
WBC # BLD AUTO: 5.7 X10*3/UL (ref 4.4–11.3)
XM INTEP: NORMAL

## 2024-12-23 PROCEDURE — 2500000004 HC RX 250 GENERAL PHARMACY W/ HCPCS (ALT 636 FOR OP/ED)

## 2024-12-23 PROCEDURE — 85027 COMPLETE CBC AUTOMATED: CPT

## 2024-12-23 PROCEDURE — 2500000001 HC RX 250 WO HCPCS SELF ADMINISTERED DRUGS (ALT 637 FOR MEDICARE OP)

## 2024-12-23 PROCEDURE — 36415 COLL VENOUS BLD VENIPUNCTURE: CPT

## 2024-12-23 PROCEDURE — 83735 ASSAY OF MAGNESIUM: CPT

## 2024-12-23 PROCEDURE — 99232 SBSQ HOSP IP/OBS MODERATE 35: CPT | Performed by: OTOLARYNGOLOGY

## 2024-12-23 PROCEDURE — 36430 TRANSFUSION BLD/BLD COMPNT: CPT

## 2024-12-23 PROCEDURE — 86850 RBC ANTIBODY SCREEN: CPT

## 2024-12-23 PROCEDURE — P9016 RBC LEUKOCYTES REDUCED: HCPCS

## 2024-12-23 PROCEDURE — 80069 RENAL FUNCTION PANEL: CPT

## 2024-12-23 RX ORDER — ADHESIVE BANDAGE
30 BANDAGE TOPICAL DAILY PRN
Status: DISCONTINUED | OUTPATIENT
Start: 2024-12-23 | End: 2024-12-23 | Stop reason: HOSPADM

## 2024-12-23 RX ORDER — POLYETHYLENE GLYCOL 3350 17 G/17G
17 POWDER, FOR SOLUTION ORAL DAILY PRN
Status: DISCONTINUED | OUTPATIENT
Start: 2024-12-23 | End: 2024-12-23 | Stop reason: HOSPADM

## 2024-12-23 RX ORDER — SENNOSIDES 8.8 MG/5ML
10 LIQUID ORAL NIGHTLY PRN
Status: DISCONTINUED | OUTPATIENT
Start: 2024-12-23 | End: 2024-12-23 | Stop reason: HOSPADM

## 2024-12-23 ASSESSMENT — COGNITIVE AND FUNCTIONAL STATUS - GENERAL
DAILY ACTIVITIY SCORE: 18
MOBILITY SCORE: 18
WALKING IN HOSPITAL ROOM: A LITTLE
CLIMB 3 TO 5 STEPS WITH RAILING: A LITTLE
EATING MEALS: A LITTLE
TURNING FROM BACK TO SIDE WHILE IN FLAT BAD: A LITTLE
DRESSING REGULAR UPPER BODY CLOTHING: A LITTLE
HELP NEEDED FOR BATHING: A LITTLE
TOILETING: A LITTLE
PERSONAL GROOMING: A LITTLE
MOVING TO AND FROM BED TO CHAIR: A LITTLE
MOVING FROM LYING ON BACK TO SITTING ON SIDE OF FLAT BED WITH BEDRAILS: A LITTLE
STANDING UP FROM CHAIR USING ARMS: A LITTLE
DRESSING REGULAR LOWER BODY CLOTHING: A LITTLE

## 2024-12-23 ASSESSMENT — PAIN SCALES - GENERAL
PAINLEVEL_OUTOF10: 5 - MODERATE PAIN
PAINLEVEL_OUTOF10: 5 - MODERATE PAIN

## 2024-12-23 NOTE — DISCHARGE SUMMARY
Discharge Diagnosis  Osteoradionecrosis of jaw    Issues Requiring Follow-Up  Osteoradionecrosis of Mandible  Acute on Chronic Osteomyelitis  Hardware Failure  Hypothyroidism  HLD  Anxiety  Acute Blood Loss Anemia  Urinary retention  Underweight per BMI  Hypokalemia  Hypophosphatemia  Hypotension    Test Results Pending At Discharge  Pending Labs       Order Current Status    Type and screen In process    Fungal Culture/Smear Preliminary result            Hospital Course  Brigette Romo is a 74 y.o. female with Osteoradionecrosis of jaw who underwent trach, triple, right composite mandibulectomy, recon with L fib (A/v: R sup thyroid, R IJ end to end), stsg w wound vac by Ame Magana and Fermin 12/16/2024. Patient had an uncomplicated surgical course. Patient recovered in PACU and was transferred to [address] for post-operative care.    Patient post-operative course was uncomplicated. IV pain medications were transitioned to enteral medications, green was removed, tube feeds were initiated and advanced to home bolus, and initial tracheostomy was changed to a 4.0 cuffless and capped on 12/20/24. Patient passed decannulation trial and was decannulated on 12/21/24. Surgical drains were monitored until output had decreased and were removed.     On day of discharge, post-operative pain was well controlled with enteral pain medication, breathing on room air, voiding spontaneously ambulating well, and was tolerating a diet. Home health/supplies arranged and delivered. Follow-up arranged.          Pertinent Physical Exam At Time of Discharge  Physical Exam  Heart Rate:  [74-92]   Temp:  [35.9 °C (96.6 °F)-36.5 °C (97.7 °F)]   Resp:  [16-18]   BP: ()/(50-70)   SpO2:  [95 %-100 %]   Gen: NAD, AOx3, resting comfortably in bed  Face/Neck: All incisions c/d/i, neck soft and flat without evidence of hematoma or fluid collection  -Skin paddle soft and light pink, good cap refill. Muscle paddle with STSG inferior to skin  "paddle healthy appearing  -Internal doppler with strong arterial signal   -Tracheostoma covered with dressing  Oral Cavity: All intraoral incisions c/d/i without evidence of dehiscence  -Flap soft and pink with good capillary refill  Extremities: right leg warm with intact movement and sensation  -right thigh skin graft donor site open to air  Abdomen: PEG in place  Drains: All drains removed    Home Medications     Medication List      START taking these medications     aspirin 325 mg tablet; 1 tablet (325 mg) by g-tube route once daily for   27 doses.   Children's acetaminophen 160 mg/5 mL liquid; Generic drug:   acetaminophen; 31.3 mL (1,000 mg) by g-tube route every 8 hours.   chlorhexidine 0.12 % solution; Commonly known as: Peridex; Use 15 mL in   the mouth or throat 3 times a day after meals.; Replaces: chlorhexidine 4   % external liquid   gauze bandage 4 1/2 X 22 \" sponge; Apply 1 each topically once daily.   Dressing to free flap site is to be changed daily. Apply Xeroform then   cover with telfa. Wrap with kerlix to hold in place. Wrap with an ace   wrap.   Minerin Creme cream; Generic drug: eucerin; Apply topically 2 times a   day.   non-adherent bandage 3 X 8 \" bandage; Apply 1 each topically once daily.   Dressing to free flap site is to be changed daily. Apply Xeroform then   cover with telfa. Wrap with kerlix to hold in place. Wrap with an ace   wrap.   oxyCODONE 5 mg/5 mL solution; Commonly known as: Roxicodone; Take 5 mL   (5 mg) by mouth every 6 hours if needed for severe pain (7 - 10) or   moderate pain (4 - 6) for up to 7 days.   polyethylene glycol 17 gram packet; Commonly known as: Glycolax,   Miralax; 17 g by g-tube route once daily.   senna 8.8 mg/5 mL syrup; Commonly known as: Senokot; 10 mL by g-tube   route 2 times a day.   sodium chloride 0.9 % irrigation solution; For use with tracheostomy   care and suctioning 3 times a day and as needed   sodium chloride 0.9 % nebulizer solution; " "Instill 2 to 3 mL into   tracheostomy tube 3 times a day and as needed for thick secretions   white petrolatum 41 % ointment ointment; Commonly known as: Aquaphor;   Apply 1 Application topically 3 times a day.   Xeroform 5 X 9 \" bandage; Generic drug: bismuth tribrom-petrolatum,wh;   Apply 1 each topically once daily. Dressing to free flap site is to be   changed daily. Apply Xeroform then cover with telfa. Wrap with kerlix to   hold in place. Wrap with an ace wrap.     CHANGE how you take these medications     famotidine 40 mg/5 mL (8 mg/mL) suspension; Commonly known as: Pepcid;   2.5 mL (20 mg) by g-tube route 2 times a day.; What changed: how to take   this   levothyroxine 25 mcg tablet; Commonly known as: Synthroid, Levoxyl; 1   tablet (25 mcg) by g-tube route once daily in the morning. Take before   meals. Take on an empty stomach with water only.  Wait 1 hour before   having food, medications or beverages other than water.  OK to crush   pill.; What changed: how to take this   loperamide 1 mg/7.5 mL liquid; Commonly known as: Imodium A-D; 15 mL (2   mg) by g-tube route 4 times a day as needed for diarrhea.; What changed:   how to take this     STOP taking these medications     ALPRAZolam 0.25 mg tablet; Commonly known as: Xanax   azithromycin 250 mg tablet; Commonly known as: Zithromax Z-Francisco J   brompheniramine-pseudoeph-DM 2-30-10 mg/5 mL syrup   chlorhexidine 4 % external liquid; Commonly known as: Hibiclens;   Replaced by: chlorhexidine 0.12 % solution   Osmolite 1 Sanjiv 0.04 gram-1.06 kcal/mL liquid; Generic drug: nutritional   supplements       Outpatient Follow-Up  Future Appointments   Date Time Provider Department Center   1/6/2025  9:30 AM ROSALINA Gan-CNP [id number]   1/14/2025  8:45 AM Jerrod Magana MD [id number] [place]       Quinn Calle MD  "

## 2024-12-23 NOTE — PROGRESS NOTES
"Brigette Romo is a 74 y.o. female on day 7 of admission presenting with Osteoradionecrosis of jaw. No acute events overnight. Flap checks stable.    Subjective   No acute events overnight.   Patient laying comfortably in bed with no complaints or concerns at this time.  12/22/24: Hemoglobin 7.2.       Objective   Vitals reviewed in EMR  Gen: NAD, AOx3, resting comfortably in bed  Face/Neck: All incisions c/d/i, neck soft and flat without evidence of hematoma or fluid collection  -Skin paddle soft and light pink, good cap refill. Muscle paddle with STSG inferior to skin paddle healthy appearing  -Internal doppler with strong arterial signal   -Tracheostoma covered with dressing  Oral Cavity: All intraoral incisions c/d/i without evidence of dehiscence  -Flap soft and pink with good capillary refill  Extremities: right leg warm with intact movement and sensation  -right thigh skin graft donor site open to air  Abdomen: PEG in place  Drains: All drains removed      Last Recorded Vitals  Blood pressure 132/52, pulse 89, temperature 36.5 °C (97.7 °F), temperature source Temporal, resp. rate 16, height 1.676 m (5' 6\"), weight 54.6 kg (120 lb 5.9 oz), SpO2 97%.  Intake/Output last 3 Shifts:  I/O last 3 completed shifts:  In: 2516 (46.1 mL/kg) [NG/GT:2516]  Out: 760 (13.9 mL/kg) [Urine:750 (0.4 mL/kg/hr); Emesis/NG output:10]  Weight: 54.6 kg     Relevant Results        Scheduled medications  acetaminophen, 975 mg, oral, q8h HARRIETT   Or  acetaminophen, 1,000 mg, oral, q8h HARRIETT   Or  acetaminophen, 1,000 mg, g-tube, q8h HARRIETT  aspirin, 325 mg, g-tube, Daily  chlorhexidine, 15 mL, Mouth/Throat, TID after meals  enoxaparin, 40 mg, subcutaneous, q24h  esomeprazole, 40 mg, g-tube, Daily before breakfast  hydrogen peroxide, 1 Application, Topical, TID  levothyroxine, 25 mcg, g-tube, Daily before breakfast  white petrolatum, 1 Application, Topical, TID      Continuous medications     PRN medications  PRN medications: hydrOXYzine HCL, " magnesium hydroxide, naloxone, ondansetron **OR** ondansetron, oxyCODONE, oxyCODONE, oxygen, polyethylene glycol, senna                         Assessment/Plan   Assessment & Plan  Osteoradionecrosis of jaw    Exposed orthopaedic hardware (CMS-HCC)    Brigette Romo is a 74 y.o. female  with ORN with exposed hardware s/p triple endoscopy, tracheostomy, anterior/left oral cavity composite resection (skin, gingiva, and mandible), Right Selective Neck Dissection levels I-IV, reconstruction with Right Fibula Free Flap and Split thickness Skin Graft on 12/16/2024 by Dr. Magana and Dr. Christensen      Active Issues:  Osteoradionecrosis of Mandible  Acute on Chronic Osteomyelitis  Hardware Failure  Hypothyroidism  HLD  Anxiety  Acute Blood Loss Anemia  Urinary retention  Underweight per BMI  Hypokalemia  Hypophosphatemia  Hypotension     Plan:  -Flap Checks: q24hrs  -Drains: All drains removed  -Analgesia: Discontinued PCA and started Oxy 5 and 10 as needed, Scheduled Acetaminophen  -FEN: mIVFs disontinue & start FWF, NPO, TF reach goal rate on 12/22/2024.  Advance to home bolus on 12/23 ; monitor and replete electrolytes as needed  -Pulm: s/p trach, decannulated (12/21/24)  -ID: Unasyn 3 g Q6hrs x 1 day, stop date 12/17  -Cardiac: Vitals per ENT free flap protocol then transition to Q4hr vitals; Aspirin 325mg daily  -Heme: Hgb 7.2 (12/22) continue to trend CBC daily; Hgb 6.8 (12/23/24) +1u pRBCs; Follow up post transfusion CBC  -Endo: Home Levothyroxine 25mcg  -GI: Bowel regimen PPI, and PRN anti-emetic  -: Brumfield catheter replaced after failing trial of void; Passed trial of void on 12/21  -Steroids/Special: Incision and oral care per ENT free flap protocol; Tolerating capping trial, will consider decannulation in PM   -Embolic PPx: SQH, SCDs while in bed  -Dispo: [facility]. PT/OT ordered; Discharge planning for home health care on 12/23/2024 vs 12/24/24 - depending Hgb    Kale Batista DO PGY 3      The above resident  note has been reviewed and confirmed.  Patient was seen and examined with the resident today.  Documentation has been reviewed.

## 2024-12-23 NOTE — CARE PLAN
Problem: Pain  Goal: Takes deep breaths with improved pain control throughout the shift  Outcome: Progressing  Goal: Turns in bed with improved pain control throughout the shift  Outcome: Progressing  Goal: Walks with improved pain control throughout the shift  Outcome: Progressing  Goal: Performs ADL's with improved pain control throughout shift  Outcome: Progressing  Goal: Participates in PT with improved pain control throughout the shift  Outcome: Progressing  Goal: Free from opioid side effects throughout the shift  Outcome: Progressing  Goal: Free from acute confusion related to pain meds throughout the shift  Outcome: Progressing     Problem: Pain - Adult  Goal: Verbalizes/displays adequate comfort level or baseline comfort level  Outcome: Progressing     Problem: Safety - Adult  Goal: Free from fall injury  Outcome: Progressing     Problem: Discharge Planning  Goal: Discharge to home or other facility with appropriate resources  Outcome: Progressing     Problem: Chronic Conditions and Co-morbidities  Goal: Patient's chronic conditions and co-morbidity symptoms are monitored and maintained or improved  Outcome: Progressing     Problem: Skin  Goal: Prevent/minimize sheer/friction injuries  Outcome: Progressing  Goal: Promote/optimize nutrition  Outcome: Progressing  Goal: Promote skin healing  Outcome: Progressing   The patient's goals for the shift include      The clinical goals for the shift include pt will remain safe

## 2024-12-24 ENCOUNTER — TELEPHONE (OUTPATIENT)
Facility: CLINIC | Age: 74
End: 2024-12-24
Payer: MEDICARE

## 2024-12-24 DIAGNOSIS — R11.2 POSTOPERATIVE NAUSEA AND VOMITING: ICD-10-CM

## 2024-12-24 DIAGNOSIS — Z98.890 POSTOPERATIVE NAUSEA AND VOMITING: ICD-10-CM

## 2024-12-24 RX ORDER — ONDANSETRON 4 MG/1
4 TABLET, ORALLY DISINTEGRATING ORAL EVERY 8 HOURS PRN
Qty: 21 TABLET | Refills: 0 | Status: SHIPPED | OUTPATIENT
Start: 2024-12-24 | End: 2024-12-31

## 2024-12-24 NOTE — TELEPHONE ENCOUNTER
Rachael Brigette's daughter called and left me a message at 10:24 a.m. asking if Zofran can be called in.  She reports she wasn't sent home with any and the pain medication that she needs seems to make her nauseated at times.    I reviewed the discharge summary and no Zofran was listed.  I spoke with Dr. Magana who was fine with the prescription.    I called Racheal back as I needed a different pharmacy as Brigette apparently is staying in [place] per Racheal's message.  Pharmacy provided and prescription pended for authorization.  Racheal was very appreciative of the call back and help.

## 2024-12-25 ENCOUNTER — HOME CARE VISIT (OUTPATIENT)
Dept: HOME HEALTH SERVICES | Facility: HOME HEALTH | Age: 74
End: 2024-12-25
Payer: MEDICARE

## 2024-12-25 PROCEDURE — G0299 HHS/HOSPICE OF RN EA 15 MIN: HCPCS

## 2024-12-26 ENCOUNTER — HOME CARE VISIT (OUTPATIENT)
Dept: HOME HEALTH SERVICES | Facility: HOME HEALTH | Age: 74
End: 2024-12-26
Payer: MEDICARE

## 2024-12-26 PROCEDURE — G0151 HHCP-SERV OF PT,EA 15 MIN: HCPCS

## 2024-12-27 VITALS
DIASTOLIC BLOOD PRESSURE: 60 MMHG | RESPIRATION RATE: 18 BRPM | HEART RATE: 75 BPM | OXYGEN SATURATION: 96 % | SYSTOLIC BLOOD PRESSURE: 100 MMHG | TEMPERATURE: 98.2 F

## 2024-12-27 VITALS
TEMPERATURE: 97 F | OXYGEN SATURATION: 92 % | RESPIRATION RATE: 16 BRPM | SYSTOLIC BLOOD PRESSURE: 112 MMHG | WEIGHT: 120 LBS | DIASTOLIC BLOOD PRESSURE: 54 MMHG | HEART RATE: 95 BPM | BODY MASS INDEX: 19.29 KG/M2 | HEIGHT: 66 IN

## 2024-12-27 SDOH — HEALTH STABILITY: PHYSICAL HEALTH: EXERCISE ACTIVITIES SETS: 1

## 2024-12-27 SDOH — HEALTH STABILITY: PHYSICAL HEALTH: EXERCISE ACTIVITY: APS, MARCHING, LAQ, HIP ABD/ADD

## 2024-12-27 SDOH — HEALTH STABILITY: PHYSICAL HEALTH: PHYSICAL EXERCISE: 10

## 2024-12-27 SDOH — HEALTH STABILITY: PHYSICAL HEALTH

## 2024-12-27 SDOH — HEALTH STABILITY: PHYSICAL HEALTH: PHYSICAL EXERCISE: SITTING

## 2024-12-27 ASSESSMENT — ACTIVITIES OF DAILY LIVING (ADL)
CURRENT_FUNCTION: STAND BY ASSIST
AMBULATION_DISTANCE/DURATION_TOLERATED: 50 FEET
AMBULATION ASSISTANCE: STAND BY ASSIST
ENTERING_EXITING_HOME: STAND BY ASSIST
OASIS_M1830: 05

## 2024-12-27 ASSESSMENT — ENCOUNTER SYMPTOMS
PAIN LOCATION - RELIEVING FACTORS: MEDICATION
NAUSEA: 1
BOWEL PATTERN NORMAL: 1
PAIN LOCATION - EXACERBATING FACTORS: NOTHING
SUBJECTIVE PAIN PROGRESSION: WAXING AND WANING
PAIN LOCATION - RELIEVING FACTORS: REST
PAIN: 1
PAIN LOCATION - PAIN FREQUENCY: CONSTANT
PERSON REPORTING PAIN: PATIENT
PAIN LOCATION - EXACERBATING FACTORS: MOVEMENT
PAIN LOCATION: RIGHT LEG
PAIN: 1
PAIN LOCATION - PAIN DURATION: CONSTANT
PAIN SEVERITY GOAL: 2/10
PAIN LOCATION - PAIN SEVERITY: 5/10
PERSON REPORTING PAIN: PATIENT
PAIN LOCATION - PAIN SEVERITY: 4/10
LOWEST PAIN SEVERITY IN PAST 24 HOURS: 5/10
STOOL FREQUENCY: DAILY
HIGHEST PAIN SEVERITY IN PAST 24 HOURS: 4/10
MUSCLE WEAKNESS: 1
PAIN LOCATION - PAIN QUALITY: ACHING
HIGHEST PAIN SEVERITY IN PAST 24 HOURS: 8/10
PAIN LOCATION: JAW

## 2024-12-30 ENCOUNTER — TELEPHONE (OUTPATIENT)
Dept: OTOLARYNGOLOGY | Facility: HOSPITAL | Age: 74
End: 2024-12-30
Payer: MEDICARE

## 2024-12-30 DIAGNOSIS — M27.2 OSTEORADIONECROSIS OF JAW: ICD-10-CM
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
## 2024-12-30 LAB
FUNGUS SPEC CULT: NORMAL
FUNGUS SPEC FUNGUS STN: NORMAL

## 2024-12-30 RX ORDER — BISMUTH TRIBROMOPH/PETROLATUM 5"X9"
1 BANDAGE TOPICAL DAILY
Qty: 30 EACH | Refills: 2 | Status: SHIPPED | OUTPATIENT
Start: 2024-12-30 | End: 2025-01-29

## 2024-12-30 RX ORDER — OXYCODONE HCL 5 MG/5 ML
5 SOLUTION, ORAL ORAL EVERY 6 HOURS PRN
Qty: 140 ML | Refills: 0 | Status: SHIPPED | OUTPATIENT
Start: 2024-12-30 | End: 2025-01-06

## 2024-12-30 NOTE — TELEPHONE ENCOUNTER
Received call from patient family requesting prescription [facility] as home care nurse did not have these in her care.

## 2024-12-30 NOTE — TELEPHONE ENCOUNTER
Daughter asking for a script to purchase zerofoam gauze from the [facility] on file.  Please advise.

## 2024-12-30 NOTE — TELEPHONE ENCOUNTER
Prescription submitted for Dr. Magana approval. The homecare nurse did not have any in her care and family has not received supply yet.

## 2024-12-31 ENCOUNTER — HOME CARE VISIT (OUTPATIENT)
Dept: HOME HEALTH SERVICES | Facility: HOME HEALTH | Age: 74
End: 2024-12-31
Payer: MEDICARE

## 2024-12-31 PROCEDURE — G0152 HHCP-SERV OF OT,EA 15 MIN: HCPCS

## 2024-12-31 ASSESSMENT — ACTIVITIES OF DAILY LIVING (ADL)
BATHING ASSESSED: 1
TOILETING: INDEPENDENT
GROOMING ASSESSED: 1
AMBULATION ASSISTANCE: SUPERVISION
FEEDING: INDEPENDENT
GROOMING_CURRENT_FUNCTION: INDEPENDENT
TOILETING: 1
PHYSICAL TRANSFERS ASSESSED: 1
DRESSING_UB_CURRENT_FUNCTION: INDEPENDENT
BATHING_CURRENT_FUNCTION: STAND BY ASSIST
AMBULATION ASSISTANCE: 1
CURRENT_FUNCTION: SUPERVISION
DRESSING_LB_CURRENT_FUNCTION: MINIMUM ASSIST
FEEDING ASSESSED: 1

## 2024-12-31 ASSESSMENT — ENCOUNTER SYMPTOMS
PAIN LOCATION - PAIN FREQUENCY: CONSTANT
PAIN SEVERITY GOAL: 0/10
PERSON REPORTING PAIN: PATIENT
SUBJECTIVE PAIN PROGRESSION: UNCHANGED
PAIN LOCATION: COCCYX
PAIN LOCATION: LEFT LEG
PAIN LOCATION - PAIN QUALITY: STABBING
PAIN LOCATION - PAIN SEVERITY: 4/10
PAIN LOCATION - PAIN QUALITY: STINGING
HIGHEST PAIN SEVERITY IN PAST 24 HOURS: 5/10
PAIN LOCATION - PAIN SEVERITY: 5/10
PAIN LOCATION - PAIN FREQUENCY: CONSTANT
PAIN: 1
LOWEST PAIN SEVERITY IN PAST 24 HOURS: 5/10

## 2024-12-31 NOTE — CASE COMMUNICATION
OT EV complete. pt functioning at baseline for ADL except LB dressing which family assists with. Pt uses knee walker for functional mobility, as weight bearing is not specified in the chart. Pt is safe with all functional mobility.     no further OT warrented.

## 2025-01-02 ENCOUNTER — TELEPHONE (OUTPATIENT)
Dept: RADIATION ONCOLOGY | Facility: HOSPITAL | Age: 75
End: 2025-01-02
Payer: MEDICARE

## 2025-01-02 ENCOUNTER — HOME CARE VISIT (OUTPATIENT)
Dept: HOME HEALTH SERVICES | Facility: HOME HEALTH | Age: 75
End: 2025-01-02
Payer: MEDICARE

## 2025-01-02 VITALS
RESPIRATION RATE: 18 BRPM | DIASTOLIC BLOOD PRESSURE: 60 MMHG | OXYGEN SATURATION: 99 % | SYSTOLIC BLOOD PRESSURE: 90 MMHG | HEART RATE: 74 BPM

## 2025-01-02 VITALS
DIASTOLIC BLOOD PRESSURE: 72 MMHG | SYSTOLIC BLOOD PRESSURE: 124 MMHG | OXYGEN SATURATION: 95 % | RESPIRATION RATE: 18 BRPM | HEART RATE: 82 BPM | TEMPERATURE: 97.4 F

## 2025-01-02 PROCEDURE — G0299 HHS/HOSPICE OF RN EA 15 MIN: HCPCS

## 2025-01-02 PROCEDURE — G0157 HHC PT ASSISTANT EA 15: HCPCS | Mod: CQ

## 2025-01-02 SDOH — ECONOMIC STABILITY: GENERAL

## 2025-01-02 ASSESSMENT — ENCOUNTER SYMPTOMS
PAIN LOCATION: LEFT EAR
PAIN LOCATION - PAIN QUALITY: SHOOTING
HIGHEST PAIN SEVERITY IN PAST 24 HOURS: 7/10
LOWEST PAIN SEVERITY IN PAST 24 HOURS: 0/10
LOWEST PAIN SEVERITY IN PAST 24 HOURS: 0/10
SUBJECTIVE PAIN PROGRESSION: WAXING AND WANING
PAIN LOCATION - PAIN SEVERITY: 0/10
CHANGE IN APPETITE: UNCHANGED
PAIN SEVERITY GOAL: 2/10
APPETITE LEVEL: GOOD
MUSCLE WEAKNESS: 1
HIGHEST PAIN SEVERITY IN PAST 24 HOURS: 7/10
PAIN: 1

## 2025-01-02 ASSESSMENT — ACTIVITIES OF DAILY LIVING (ADL)
MONEY MANAGEMENT (EXPENSES/BILLS): INDEPENDENT
CURRENT_FUNCTION: STAND BY ASSIST
AMBULATION ASSISTANCE: STAND BY ASSIST

## 2025-01-02 NOTE — PROGRESS NOTES
Provider Impressions     Status post treatment of multiple different oral cavity cancers.  I do not see any evidence of any tumor recurrence.    Dysphagia which is unlikely to get better.  She remains PEG dependent.    Thyroid nodule that was picked up on the PET scan.  This turned out to be benign on a biopsy that was eventually done after the ultrasound.    Status post the recent surgery for radionecrosis of the mandible.  He does have some left sided facial discomfort for which I cannot find a good explanation.    I will see her in 1 month.    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 She had surgical excision and a neck dissection followed by radiation therapy. She was found to have tumor recurrence and on September 22, 2016 she underwent reexcision. The margins were felt to be clear. There was no evidence of pedro luis metastasis. She had a TSH level in July 2023 which was normal. She had a chest CT in April 2020 which was negative. She was having some issues with discomfort on the lateral tongue on the left. This led to a biopsy that showed cancer. On April 13, 2020 she underwent a partial glossectomy. The margins were clear. She was presented at our tumor board and it was not felt that anything further needed to be done. She admits that her swallowing is somewhat difficult. She had a chest CT in May 2023 that showed a possible small nodule. I saw her in January 2023 because she had some concern about a lesion in her mouth. 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 She had a CT of the neck done on January 20, 2023 that showed a lesion involving the mandible on the right side. On February 7, 2023 she underwent surgery. There was bone involvement. There was a margin that was described as being close. She completed radiation therapy in  April 2023. She had a PET scan done in August 2023 that really does not show anything worrisome.  At the same time she has some uptake in the right thyroid.  This was followed by an ultrasound that showed a suspicious nodule which was eventually biopsied and turned out to be benign.  I will follow this clinically.  She  had issues with drainage around infected reconstruction plates.  On December 16, 2024 she underwent a composite resection and reconstruction.  The pathology did not show anything worrisome.  She has had significant discomfort of her left face since the surgery.  She continues with the oxycodone.  He also has some issues with reflux.    Physical Exam      Examination of the oral cavity and oropharynx shows all the changes from the recent surgery.  Anteriorly she does have some raw surfaces.  At the same time there is no dehiscence and no bone exposure.  The neck incisions are also healing nicely.  Her tracheotomy site is closed.    The PEG tube was examined.  The distal tip was falling apart and she was provided with a new tip.    The donor site on the leg was also examined and is healing nicely.

## 2025-01-02 NOTE — TELEPHONE ENCOUNTER
Called pt to remind of appointment on 01/06/25 at 9:30. Pt. cancelled appointment.  Pt. Will call to reschedule appointment with [facility].

## 2025-01-03 ENCOUNTER — OFFICE VISIT (OUTPATIENT)
Dept: OTOLARYNGOLOGY | Facility: HOSPITAL | Age: 75
End: 2025-01-03
Payer: MEDICARE

## 2025-01-03 DIAGNOSIS — M27.2 OSTEORADIONECROSIS OF JAW: Primary | ICD-10-CM

## 2025-01-03 DIAGNOSIS — K92.1 MELENA: ICD-10-CM

## 2025-01-03 DIAGNOSIS — R10.9 ABDOMINAL PAIN, UNSPECIFIED ABDOMINAL LOCATION: ICD-10-CM
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
## 2025-01-03 PROCEDURE — 1036F TOBACCO NON-USER: CPT | Performed by: OTOLARYNGOLOGY

## 2025-01-03 PROCEDURE — 99211 OFF/OP EST MAY X REQ PHY/QHP: CPT | Performed by: OTOLARYNGOLOGY

## 2025-01-03 PROCEDURE — 1159F MED LIST DOCD IN RCRD: CPT | Performed by: OTOLARYNGOLOGY

## 2025-01-03 PROCEDURE — 1160F RVW MEDS BY RX/DR IN RCRD: CPT | Performed by: OTOLARYNGOLOGY

## 2025-01-03 PROCEDURE — 1111F DSCHRG MED/CURRENT MED MERGE: CPT | Performed by: OTOLARYNGOLOGY

## 2025-01-03 RX ORDER — FAMOTIDINE 40 MG/5ML
20 POWDER, FOR SUSPENSION ORAL 2 TIMES DAILY
Qty: 150 ML | Refills: 2 | Status: SHIPPED | OUTPATIENT
Start: 2025-01-03 | End: 2025-01-03

## 2025-01-03 RX ORDER — FAMOTIDINE 40 MG/5ML
20 POWDER, FOR SUSPENSION ORAL 2 TIMES DAILY
Qty: 150 ML | Refills: 2 | Status: SHIPPED | OUTPATIENT
Start: 2025-01-03

## 2025-01-03 ASSESSMENT — PATIENT HEALTH QUESTIONNAIRE - PHQ9
SUM OF ALL RESPONSES TO PHQ9 QUESTIONS 1 AND 2: 0
1. LITTLE INTEREST OR PLEASURE IN DOING THINGS: NOT AT ALL
2. FEELING DOWN, DEPRESSED OR HOPELESS: NOT AT ALL

## 2025-01-06 ENCOUNTER — APPOINTMENT (OUTPATIENT)
Dept: RADIATION ONCOLOGY | Facility: HOSPITAL | Age: 75
End: 2025-01-06
Payer: MEDICARE

## 2025-01-06 LAB
FUNGUS SPEC CULT: NORMAL
FUNGUS SPEC FUNGUS STN: NORMAL

## 2025-01-07 ENCOUNTER — HOME CARE VISIT (OUTPATIENT)
Dept: HOME HEALTH SERVICES | Facility: HOME HEALTH | Age: 75
End: 2025-01-07
Payer: MEDICARE

## 2025-01-07 VITALS
OXYGEN SATURATION: 97 % | TEMPERATURE: 98.4 F | SYSTOLIC BLOOD PRESSURE: 138 MMHG | RESPIRATION RATE: 18 BRPM | DIASTOLIC BLOOD PRESSURE: 72 MMHG | HEART RATE: 79 BPM

## 2025-01-07 PROCEDURE — G0299 HHS/HOSPICE OF RN EA 15 MIN: HCPCS

## 2025-01-07 ASSESSMENT — ENCOUNTER SYMPTOMS
DENIES PAIN: 1
MUSCLE WEAKNESS: 1
LAST BOWEL MOVEMENT: 67212

## 2025-01-07 ASSESSMENT — ACTIVITIES OF DAILY LIVING (ADL)
CURRENT_FUNCTION: STAND BY ASSIST
AMBULATION ASSISTANCE: STAND BY ASSIST

## 2025-01-08 ENCOUNTER — HOME CARE VISIT (OUTPATIENT)
Dept: HOME HEALTH SERVICES | Facility: HOME HEALTH | Age: 75
End: 2025-01-08
Payer: MEDICARE

## 2025-01-08 VITALS
SYSTOLIC BLOOD PRESSURE: 100 MMHG | OXYGEN SATURATION: 100 % | RESPIRATION RATE: 18 BRPM | HEART RATE: 76 BPM | DIASTOLIC BLOOD PRESSURE: 70 MMHG

## 2025-01-08 PROCEDURE — G0157 HHC PT ASSISTANT EA 15: HCPCS | Mod: CQ

## 2025-01-08 ASSESSMENT — ENCOUNTER SYMPTOMS
PERSON REPORTING PAIN: PATIENT
PAIN: 1
LOWEST PAIN SEVERITY IN PAST 24 HOURS: 0/10
HIGHEST PAIN SEVERITY IN PAST 24 HOURS: 7/10

## 2025-01-10 ENCOUNTER — HOME CARE VISIT (OUTPATIENT)
Dept: HOME HEALTH SERVICES | Facility: HOME HEALTH | Age: 75
End: 2025-01-10
Payer: MEDICARE

## 2025-01-10 PROCEDURE — A6252 ABSORPT DRG >16 <=48 W/O BDR: HCPCS

## 2025-01-13 ENCOUNTER — EVALUATION (OUTPATIENT)
Dept: SPEECH THERAPY | Facility: HOSPITAL | Age: 75
End: 2025-01-13
Payer: MEDICARE

## 2025-01-13 ENCOUNTER — OFFICE VISIT (OUTPATIENT)
Dept: OTOLARYNGOLOGY | Facility: HOSPITAL | Age: 75
End: 2025-01-13
Payer: MEDICARE

## 2025-01-13 ENCOUNTER — HOSPITAL ENCOUNTER (OUTPATIENT)
Dept: RADIOLOGY | Facility: HOSPITAL | Age: 75
Discharge: HOME | End: 2025-01-13
Payer: MEDICARE

## 2025-01-13 VITALS — TEMPERATURE: 97.5 F | BODY MASS INDEX: 18.64 KG/M2 | WEIGHT: 116 LBS | HEIGHT: 66 IN
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
## 2025-01-13 DIAGNOSIS — C03.1 CANCER OF LOWER GUM (MULTI): ICD-10-CM

## 2025-01-13 DIAGNOSIS — M27.2 OSTEORADIONECROSIS OF JAW: ICD-10-CM

## 2025-01-13 DIAGNOSIS — C02.9 MALIGNANT NEOPLASM OF TONGUE (MULTI): ICD-10-CM

## 2025-01-13 DIAGNOSIS — R13.12 DYSPHAGIA, OROPHARYNGEAL PHASE: Primary | ICD-10-CM

## 2025-01-13 DIAGNOSIS — R13.12 OROPHARYNGEAL DYSPHAGIA: ICD-10-CM

## 2025-01-13 PROCEDURE — 92526 ORAL FUNCTION THERAPY: CPT | Mod: GN

## 2025-01-13 PROCEDURE — 70355 PANORAMIC X-RAY OF JAWS: CPT | Performed by: RADIOLOGY

## 2025-01-13 PROCEDURE — 1036F TOBACCO NON-USER: CPT | Performed by: OTOLARYNGOLOGY

## 2025-01-13 PROCEDURE — 1125F AMNT PAIN NOTED PAIN PRSNT: CPT | Performed by: OTOLARYNGOLOGY

## 2025-01-13 PROCEDURE — 99211 OFF/OP EST MAY X REQ PHY/QHP: CPT | Performed by: OTOLARYNGOLOGY

## 2025-01-13 PROCEDURE — 99024 POSTOP FOLLOW-UP VISIT: CPT | Performed by: OTOLARYNGOLOGY

## 2025-01-13 PROCEDURE — 70355 PANORAMIC X-RAY OF JAWS: CPT

## 2025-01-13 PROCEDURE — 1111F DSCHRG MED/CURRENT MED MERGE: CPT | Performed by: OTOLARYNGOLOGY

## 2025-01-13 PROCEDURE — 1160F RVW MEDS BY RX/DR IN RCRD: CPT | Performed by: OTOLARYNGOLOGY

## 2025-01-13 PROCEDURE — 92612 ENDOSCOPY SWALLOW (FEES) VID: CPT | Mod: GN

## 2025-01-13 PROCEDURE — 1159F MED LIST DOCD IN RCRD: CPT | Performed by: OTOLARYNGOLOGY

## 2025-01-13 PROCEDURE — 3008F BODY MASS INDEX DOCD: CPT | Performed by: OTOLARYNGOLOGY

## 2025-01-13 ASSESSMENT — PATIENT HEALTH QUESTIONNAIRE - PHQ9
SUM OF ALL RESPONSES TO PHQ9 QUESTIONS 1 & 2: 0
1. LITTLE INTEREST OR PLEASURE IN DOING THINGS: NOT AT ALL
2. FEELING DOWN, DEPRESSED OR HOPELESS: NOT AT ALL

## 2025-01-13 ASSESSMENT — PAIN SCALES - GENERAL: PAINLEVEL_OUTOF10: 6

## 2025-01-13 NOTE — PROGRESS NOTES
Ms. Romo returns today for her first postoperative visit following a composite resection and removal of hardware for osteoradionecrosis of her mandible.  Brigette had the body of her left native mandible resected following a previous fibula free flap reconstruction for symphysis and right body of mandible resection for malignancy.  She tolerated the operation well and was discharged from the hospital approximately 10 days postoperatively.  Since her discharge she has complained primarily of left TMJ related pain which is episodic and poorly controlled.  She has oxycodone but because of the episodic nature of this discomfort she has not been taking it regularly.  Otherwise she is ambulatory and has no significant wound complaints.  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  She is also insensate in her lower lip contributing to this problem.    On physical examination, she is a healthy-appearing woman in no distress.  Her neck is clean dry and intact.  The flap is well-healed and there is no evidence of fistula.  The fibula donor site reveals a 95% take of the split-thickness skin graft with some minor crusting along the incision line.  Intraorally, there was some debris anteriorly which was debrided however there is abundant granulation tissue and viable intraoral skin flap.  Her extraoral skin flap is also viable and well-healed.    Overall, Brigette is making good progress following her significant reconstructive procedure.  I have recommended that she see our speech and swallowing therapist for progression towards oral alimentation.  Also we are send her to a wound clinic to consider hyperbaric oxygen therapy to optimize her wound healing.  Finally, due to this discomfort in the left side of her jaw and TMJ region we will obtain a Panorex to evaluate the hardware and her TMJ.  She will return to see me in 4 weeks for further evaluation.

## 2025-01-13 NOTE — PROCEDURES
Speech-Language Pathology    SLP Adult Outpatient   FEES Evaluation of the Swallow    Patient Name: Brigette Romo  MRN: [id number]  Today's Date: 1/13/2025   Time Calculation  Start Time: 0915  Stop Time: 1000  Time Calculation (min): 45 min       Reason for Consult:   Concern for dysphagia s/p multimodality treatment for head and neck cancer. 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 Magana and Dr. Christensen. Pt with two rounds of RXT.     Diet Recommendations:  Thin liquids/clears via squirt bottle.  Multiple hard swallows  Slight head tilt to assist with oral clearance    Additional Recommendations:  - Complete oral care frequently throughout the day.   - Follow up SLP services for speech and dysphagia   - Consider MBSS after several treatment sessions to fully assess the swallow continuum.  - Physical therapy for myofascial release and trismus given fibrotic changes to the tissues post radiation     Prognosis: Good    SLP Plan:   - Began SLP services   Frequency: EOW   Duration: 3 months     Impression:  Severe Oropharyngeal Dysphagia secondary to multimodality treatment for head and neck cancer  Swallowing function characterized by deficits in both swallow safety and efficiency.    Safety:  Reduced white out duration followed by inconsistent evidence of min amounts of post swallow residue penetration via the intra-arytenoid space. Pt with reflexive throat clear which was productive to clear penetrated materials from the laryngeal vestibule. No evidence of aspiration or stasis in the subglottic space.     Rosenbeck:  Rosenbek's Penetration Aspiration Scale  Thin Liquids: 3. PENETRATION with LOW ASPIRATION risk - contrast remains above vocal cords, visible residue]   After the  Swallow  Nectar Thick Liquids: not presented at this time given severity of the oral phase and pt's comfort level.  Puree: not presented at this time given severity of the oral phase and pt's comfort level.    Efficiency:  Reduced lips seal and lingual ROM resulting in inability to sip from cup or straw. Thin liquids placed in squirt bottle to facilitate oral deficits. Pt able to initiate thin liquid sips via bottle without anterior oral spillage. Prominent premature spillage of liquids into the pharynx with delayed arytenoid tilting (onset of the swallow). Post swallow residues at the level of the pyriform sinuses in minimal amounts which pt cleared with subsequent dry swallows x4 per sip.      Compensatory Strategies: chin tuck, head rotation to the left and right ineffective at this time to reduce post swallow stasis in the pharynx.      Short Term Goals:  - Pt will complete effortful swallows 10 reps, 3 times per day for 7 days a week; to strengthen pharyngeal muscles for improve bolus clearance through the pharynx.    - Patient will tolerate the least restrictive diet without overt s/s of oropharyngeal dysphagia 10/10 trials.   - Pt will utilize and recall compensatory strategies independent of cues 100% of the time to tolerate the least restrictive diet without overt s/s of pharyngeal deficits  - Pt will complete a series of jaw exercises/stretches independent of cues 3x a day, 7 days a week; to reduced the effects of radiation on the jaw muscles necessary to consume PO safely and efficiently.   - Pt will complete a series of neck stretches independent of cues, 5 times a day for 7 days a week; to reduce the effects of radiation on the muscles necessary for optimal head position for safe and efficient PO intake.  - Pt will produce a series of lingual exercises/stretches independent of cues 10 reps, 3 times per day, 7 days a week; to maintain strength and motility of the lingual muscles and reduce the effects of  radiation for safe and efficient PO intake.    Long Term Goals:  - Patient will tolerate the least restrictive diet without overt s/s pulmonary compromise by time of discharge.    Cognition:  Attention: WFL  Follows Commands: WFL  Orientation: Oriented x4        Oral Motor Assessment:    PHYSICAL EXAMINATION including ORAL MECHANISM     Trigeminal Nerve (V)   Jaw open/close: Reduced     Jaw laterization:  Reduced    Bite:  Reduced    Sensation of face:  Reduced    Sensation of oral cavity:  Reduced    Sensation of tongue: Reduced    Facial Nerve (VII)    Asymmetry at rest: Adequate    Eyebrow raise: Adequate    Lip pucker: Absent     Lip retraction/smile: Absent       Cheek puffed: Absent     Taste: Adequate    Vagus (X)  Palate at rest: Adequate    Palatal elevation /a/: Adequate    Pitch glides: n/a    Volitional cough: Adequate    Spinal Accessory (XI)  Head rotation: Adequate    Shoulder shrug: n/a    Hypoglossal (XII)       Lingual protrusion: Reduced    Tongue ROM/laterization, elevation: Reduced    Tongue strength: Reduced        FEES:  FEES Verbal Consent: Fiberoptic Endoscopic Evaluation of Swallowing exam was completed once informed verbal consent was obtained- Yes  Scoped Passed: R naris   Following Application Of: Surgilube  Patient Tolerated Procedure: Yes   A flexible endoscopic examination of swallowing (FEES) was completed in clinic today to visualize the pharyngeal-laryngeal anatomy, secretion management, swallowing function, and effectiveness of compensatory strategies while consuming foods and liquids of various volumes and textures. Surgical lube was utilized to lubricate the endoscope prior to insertion.  All boluses were self-administered or a by clinician. Natural and single swallowing conditions were provided with the instructions. All liquid and solids trials were dyed white and green with clear water given between trials to improve quality of images.         Anatomical Observations for  Non-Swallow Tasks:  -  Reduced posterior pharyngeal wall constriction  - Secretion burden- Yes, min amounts of clear to white thin secretions at the level of the pyriforms without aspiration.      Edema:  Epiglottis: 0- Normal   Vallecula: 0- Normal   Pharyngoepiglottic folds: 0- Normal   Aryepiglottic folds: 0- Normal   Arytenoids: 0- Normal   Pyriform sinuses: 0- Normal   False Vocal folds: 0- Normal   True Vocal folds:  1- Mild edema         Functional Oral Intake Scale   FIOS level Comment   X Level 1  Nothing by Mouth    Level 2 Tube dependent with minimal attempts of food and liquid.    Level 3 Tube dependent with consistent oral intake of food and liquid.    Level 4 Total oral diet of a single consistency.    Level 5 Total oral diet with multiple consistencies, but requires special preparations and compensations.    Level 6 Total oral diet with multiple consistencies without special preparations but specific food limitations.    Level 7 Total oral diet with no restrictions.        Education/Treatment:   Instructed patient this date in:  - Results and recommendations from today's FEES exam discussed with patient in detail.   - Swallow guidelines and recommendations reviewed  - Prognostic indicators for risk in development of aspiration PNA discussed and provided in handout form given identified aspiration on instrumental exam.   - Tongue, jaw, neck stretches and effortful swallow introduced with appropriate skill and teach back.  Clinician modeled all techniques and accurate patient follow through confirmed.  Handouts provided to facilitate optimal home carryover

## 2025-01-14 ENCOUNTER — HOME CARE VISIT (OUTPATIENT)
Dept: HOME HEALTH SERVICES | Facility: HOME HEALTH | Age: 75
End: 2025-01-14
Payer: MEDICARE

## 2025-01-14 ENCOUNTER — APPOINTMENT (OUTPATIENT)
Facility: CLINIC | Age: 75
End: 2025-01-14
Payer: MEDICARE

## 2025-01-14 VITALS
DIASTOLIC BLOOD PRESSURE: 60 MMHG | OXYGEN SATURATION: 99 % | TEMPERATURE: 96.7 F | HEART RATE: 111 BPM | RESPIRATION RATE: 18 BRPM | SYSTOLIC BLOOD PRESSURE: 92 MMHG

## 2025-01-14 PROCEDURE — G0299 HHS/HOSPICE OF RN EA 15 MIN: HCPCS

## 2025-01-14 PROCEDURE — G0151 HHCP-SERV OF PT,EA 15 MIN: HCPCS

## 2025-01-14 SDOH — HEALTH STABILITY: PHYSICAL HEALTH: EXERCISE TYPE: BLE PER HEP

## 2025-01-14 ASSESSMENT — ACTIVITIES OF DAILY LIVING (ADL)
PHYSICAL TRANSFERS ASSESSED: 1
AMBULATION ASSISTANCE: INDEPENDENT
AMBULATION ASSISTANCE: 1
AMBULATION ASSISTANCE ON FLAT SURFACES: 1
AMBULATION_DISTANCE/DURATION_TOLERATED: 150'
CURRENT_FUNCTION: INDEPENDENT

## 2025-01-14 ASSESSMENT — ENCOUNTER SYMPTOMS
LIMITED RANGE OF MOTION: 1
DENIES PAIN: 1
APPETITE LEVEL: FAIR
PERSON REPORTING PAIN: PATIENT
OCCASIONAL FEELINGS OF UNSTEADINESS: 0
MUSCLE WEAKNESS: 1

## 2025-01-15 ENCOUNTER — OFFICE VISIT (OUTPATIENT)
Dept: WOUND CARE | Facility: CLINIC | Age: 75
End: 2025-01-15
Payer: MEDICARE

## 2025-01-15 PROCEDURE — 99214 OFFICE O/P EST MOD 30 MIN: CPT

## 2025-01-16 ENCOUNTER — OFFICE VISIT (OUTPATIENT)
Dept: WOUND CARE | Facility: CLINIC | Age: 75
End: 2025-01-16
Payer: MEDICARE

## 2025-01-16 ENCOUNTER — TELEPHONE (OUTPATIENT)
Dept: RADIATION ONCOLOGY | Facility: HOSPITAL | Age: 75
End: 2025-01-16
Payer: MEDICARE

## 2025-01-16 ENCOUNTER — HOME CARE VISIT (OUTPATIENT)
Dept: HOME HEALTH SERVICES | Facility: HOME HEALTH | Age: 75
End: 2025-01-16
Payer: MEDICARE

## 2025-01-16 VITALS
BODY MASS INDEX: 18.72 KG/M2 | DIASTOLIC BLOOD PRESSURE: 60 MMHG | SYSTOLIC BLOOD PRESSURE: 114 MMHG | WEIGHT: 116 LBS | RESPIRATION RATE: 20 BRPM | OXYGEN SATURATION: 98 % | TEMPERATURE: 97.4 F | HEART RATE: 83 BPM

## 2025-01-16 PROCEDURE — G0277 HBOT, FULL BODY CHAMBER, 30M: HCPCS

## 2025-01-16 PROCEDURE — 99183 HYPERBARIC OXYGEN THERAPY: CPT | Performed by: PLASTIC SURGERY

## 2025-01-16 PROCEDURE — G0299 HHS/HOSPICE OF RN EA 15 MIN: HCPCS

## 2025-01-16 SDOH — ECONOMIC STABILITY: GENERAL

## 2025-01-16 ASSESSMENT — ACTIVITIES OF DAILY LIVING (ADL)
AMBULATION ASSISTANCE: 1
PHYSICAL TRANSFERS ASSESSED: 1
CURRENT_FUNCTION: STAND BY ASSIST
AMBULATION ASSISTANCE: STAND BY ASSIST
PHYSICAL_TRANSFERS_DEVICES: WALKER
MONEY MANAGEMENT (EXPENSES/BILLS): INDEPENDENT

## 2025-01-16 ASSESSMENT — ENCOUNTER SYMPTOMS
LOWEST PAIN SEVERITY IN PAST 24 HOURS: 0/10
MUSCLE WEAKNESS: 1
LAST BOWEL MOVEMENT: 67221
TROUBLE SWALLOWING: 1
SUBJECTIVE PAIN PROGRESSION: WAXING AND WANING
LOWER EXTREMITY EDEMA: 1
PAIN SEVERITY GOAL: 0/10
LIMITED RANGE OF MOTION: 1
HIGHEST PAIN SEVERITY IN PAST 24 HOURS: 5/10

## 2025-01-17 ENCOUNTER — OFFICE VISIT (OUTPATIENT)
Dept: WOUND CARE | Facility: CLINIC | Age: 75
End: 2025-01-17
Payer: MEDICARE

## 2025-01-17 PROCEDURE — G0277 HBOT, FULL BODY CHAMBER, 30M: HCPCS

## 2025-01-17 PROCEDURE — 1111F DSCHRG MED/CURRENT MED MERGE: CPT | Performed by: SURGERY

## 2025-01-17 PROCEDURE — 99183 HYPERBARIC OXYGEN THERAPY: CPT | Performed by: SURGERY

## 2025-01-20 ENCOUNTER — HOME CARE VISIT (OUTPATIENT)
Dept: HOME HEALTH SERVICES | Facility: HOME HEALTH | Age: 75
End: 2025-01-20
Payer: MEDICARE

## 2025-01-20 ENCOUNTER — APPOINTMENT (OUTPATIENT)
Dept: RADIATION ONCOLOGY | Facility: HOSPITAL | Age: 75
End: 2025-01-20
Payer: MEDICARE

## 2025-01-20 ENCOUNTER — OFFICE VISIT (OUTPATIENT)
Dept: WOUND CARE | Facility: CLINIC | Age: 75
End: 2025-01-20
Payer: MEDICARE

## 2025-01-20 PROCEDURE — G0277 HBOT, FULL BODY CHAMBER, 30M: HCPCS

## 2025-01-20 PROCEDURE — G0299 HHS/HOSPICE OF RN EA 15 MIN: HCPCS

## 2025-01-20 PROCEDURE — 99183 HYPERBARIC OXYGEN THERAPY: CPT | Performed by: PLASTIC SURGERY

## 2025-01-20 ASSESSMENT — ENCOUNTER SYMPTOMS
LIMITED RANGE OF MOTION: 1
MUSCLE WEAKNESS: 1
APPETITE LEVEL: GOOD
OCCASIONAL FEELINGS OF UNSTEADINESS: 0

## 2025-01-21 ENCOUNTER — APPOINTMENT (OUTPATIENT)
Dept: RADIATION ONCOLOGY | Facility: HOSPITAL | Age: 75
End: 2025-01-21
Payer: MEDICARE

## 2025-01-21 ENCOUNTER — OFFICE VISIT (OUTPATIENT)
Dept: WOUND CARE | Facility: CLINIC | Age: 75
End: 2025-01-21
Payer: MEDICARE

## 2025-01-21 ENCOUNTER — APPOINTMENT (OUTPATIENT)
Dept: WOUND CARE | Facility: CLINIC | Age: 75
End: 2025-01-21
Payer: MEDICARE

## 2025-01-21 PROCEDURE — G0277 HBOT, FULL BODY CHAMBER, 30M: HCPCS

## 2025-01-21 PROCEDURE — 99183 HYPERBARIC OXYGEN THERAPY: CPT | Performed by: SURGERY

## 2025-01-22 ENCOUNTER — OFFICE VISIT (OUTPATIENT)
Dept: WOUND CARE | Facility: CLINIC | Age: 75
End: 2025-01-22
Payer: MEDICARE

## 2025-01-22 PROCEDURE — G0277 HBOT, FULL BODY CHAMBER, 30M: HCPCS

## 2025-01-22 PROCEDURE — 11042 DBRDMT SUBQ TIS 1ST 20SQCM/<: CPT

## 2025-01-23 ENCOUNTER — OFFICE VISIT (OUTPATIENT)
Dept: WOUND CARE | Facility: CLINIC | Age: 75
End: 2025-01-23
Payer: MEDICARE

## 2025-01-23 PROCEDURE — G0277 HBOT, FULL BODY CHAMBER, 30M: HCPCS

## 2025-01-23 PROCEDURE — 99183 HYPERBARIC OXYGEN THERAPY: CPT | Performed by: PLASTIC SURGERY

## 2025-01-24 ENCOUNTER — OFFICE VISIT (OUTPATIENT)
Dept: WOUND CARE | Facility: CLINIC | Age: 75
End: 2025-01-24
Payer: MEDICARE

## 2025-01-24 PROCEDURE — G0277 HBOT, FULL BODY CHAMBER, 30M: HCPCS

## 2025-01-27 ENCOUNTER — HOME CARE VISIT (OUTPATIENT)
Dept: HOME HEALTH SERVICES | Facility: HOME HEALTH | Age: 75
End: 2025-01-27
Payer: MEDICARE

## 2025-01-27 ENCOUNTER — OFFICE VISIT (OUTPATIENT)
Dept: WOUND CARE | Facility: CLINIC | Age: 75
End: 2025-01-27
Payer: MEDICARE

## 2025-01-27 PROCEDURE — 99183 HYPERBARIC OXYGEN THERAPY: CPT | Performed by: PLASTIC SURGERY

## 2025-01-27 PROCEDURE — G0277 HBOT, FULL BODY CHAMBER, 30M: HCPCS

## 2025-01-28 ENCOUNTER — TREATMENT (OUTPATIENT)
Dept: SPEECH THERAPY | Facility: CLINIC | Age: 75
End: 2025-01-28
Payer: MEDICARE

## 2025-01-28 ENCOUNTER — OFFICE VISIT (OUTPATIENT)
Dept: WOUND CARE | Facility: CLINIC | Age: 75
End: 2025-01-28
Payer: MEDICARE

## 2025-01-28 DIAGNOSIS — R13.12 DYSPHAGIA, OROPHARYNGEAL PHASE: Primary | ICD-10-CM

## 2025-01-28 PROCEDURE — 92526 ORAL FUNCTION THERAPY: CPT | Mod: GN

## 2025-01-28 PROCEDURE — 99183 HYPERBARIC OXYGEN THERAPY: CPT | Performed by: SURGERY

## 2025-01-28 PROCEDURE — G0277 HBOT, FULL BODY CHAMBER, 30M: HCPCS

## 2025-01-28 NOTE — PROGRESS NOTES
Speech-Language Pathology    [facility]   Speech-Language Pathology Treatment     Patient Name: Brigette Romo  MRN: [id number]  Today's Date: 1/28/2025  Time Calculation  Start Time: 1515  Stop Time: 1555  Time Calculation (min): 40 min       Current Problem: Concern for dysphagia s/p multimodality treatment for head and neck cancer. 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 Magana and Dr. Christensen. Pt with two rounds of RXT.     Current Diet: Thin liquids    - Squirt bottles    - Multiple hard swallows    - Slight head tilt to assist with oral clearance     Functional Oral Intake Scale   FIOS level Comment    Level 1  Nothing by Mouth    Level 2 Tube dependent with minimal attempts of food and liquid.   X Level 3 Tube dependent with consistent oral intake of food and liquid.    Level 4 Total oral diet of a single consistency.    Level 5 Total oral diet with multiple consistencies, but requires special preparations and compensations.    Level 6 Total oral diet with multiple consistencies without special preparations but specific food limitations.    Level 7 Total oral diet with no restrictions.        Type of Exam/ Assessment:  In office PO trials this date with thin liquids via straw presentation as a syringe with head tilting. Pt continues to have min amounts of anterior oral spillage and mildly reduced lips seal during the swallow given oral/jaw reconstruction. Despite oral spillage; Pt tolerated sips of thin liquids without evidence of airway invasion.     Treatment:  Instructed patient this date in:  - Review of swallow exercises and oral/motor stretches to max swallow efficiency, safety and speech intelligibility.  - SLP noted improvement in articulation  production this date with increased conversational intelligibility.  - Dental care, and oral hygiene in relation to aspiration discussed. As well as the additional prognostic indicators that can contribute to higher risks of development of aspiration pneumonia.   - Discussion on work to continue improving oral motor manipulation of bolus in order to complete a full assessment of the swallow via MBSS.   Clinician modeled all techniques and accurate patient follow through confirmed. Handouts provided to facilitate optimal home carryover.    Plan:  Diet Recommendations: Continue prescribed diet of thin liquids with compensatory strategies.    Home Exercise Program:  Established: 1/13/25  Status: Progressing  Duration: 3 months    Short Term Goals:  - Pt will complete effortful swallows 10 reps, 3 times per day for 7 days a week; to strengthen pharyngeal muscles for improve bolus clearance through the pharynx.    - Patient will tolerate the least restrictive diet without overt s/s of oropharyngeal dysphagia 10/10 trials.   - Pt will utilize and recall compensatory strategies independent of cues 100% of the time to tolerate the least restrictive diet without overt s/s of pharyngeal deficits  - Pt will complete a series of jaw exercises/stretches independent of cues 3x a day, 7 days a week; to reduced the effects of radiation on the jaw muscles necessary to consume PO safely and efficiently.   - Pt will complete a series of neck stretches independent of cues, 5 times a day for 7 days a week; to reduce the effects of radiation on the muscles necessary for optimal head position for safe and efficient PO intake.  - Pt will produce a series of lingual exercises/stretches independent of cues 10 reps, 3 times per day, 7 days a week; to maintain strength and motility of the lingual muscles and reduce the effects of radiation for safe and efficient PO intake.     Long Term Goals:  - Patient will tolerate the least restrictive  diet without overt s/s pulmonary compromise by time of discharge.    [facility] Follow-up:  - [facility] 2/25 @ 3:15pm    Continue current plan of care or Goals adjusted   Monitor home program  Patient instructed to call if there are problems  Patient to follow up with   Progress with POC, as tolerated  Discussed POC with  Patient/caregiver agreeable with POC

## 2025-01-29 ENCOUNTER — OFFICE VISIT (OUTPATIENT)
Dept: WOUND CARE | Facility: CLINIC | Age: 75
End: 2025-01-29
Payer: MEDICARE

## 2025-01-29 PROCEDURE — 11042 DBRDMT SUBQ TIS 1ST 20SQCM/<: CPT

## 2025-01-29 PROCEDURE — G0277 HBOT, FULL BODY CHAMBER, 30M: HCPCS

## 2025-01-30 ENCOUNTER — OFFICE VISIT (OUTPATIENT)
Dept: WOUND CARE | Facility: CLINIC | Age: 75
End: 2025-01-30
Payer: MEDICARE

## 2025-01-30 PROCEDURE — G0277 HBOT, FULL BODY CHAMBER, 30M: HCPCS

## 2025-01-30 PROCEDURE — 99183 HYPERBARIC OXYGEN THERAPY: CPT | Performed by: PLASTIC SURGERY

## 2025-01-31 ENCOUNTER — APPOINTMENT (OUTPATIENT)
Dept: WOUND CARE | Facility: CLINIC | Age: 75
End: 2025-01-31
Payer: MEDICARE

## 2025-02-03 ENCOUNTER — APPOINTMENT (OUTPATIENT)
Dept: WOUND CARE | Facility: CLINIC | Age: 75
End: 2025-02-03
Payer: MEDICARE

## 2025-02-03 ENCOUNTER — HOME CARE VISIT (OUTPATIENT)
Dept: HOME HEALTH SERVICES | Facility: HOME HEALTH | Age: 75
End: 2025-02-03
Payer: MEDICARE

## 2025-02-04 ENCOUNTER — APPOINTMENT (OUTPATIENT)
Dept: WOUND CARE | Facility: CLINIC | Age: 75
End: 2025-02-04
Payer: MEDICARE

## 2025-02-04 ENCOUNTER — APPOINTMENT (OUTPATIENT)
Dept: OTOLARYNGOLOGY | Facility: HOSPITAL | Age: 75
End: 2025-02-04
Payer: MEDICARE

## 2025-02-05 ENCOUNTER — APPOINTMENT (OUTPATIENT)
Dept: WOUND CARE | Facility: CLINIC | Age: 75
End: 2025-02-05
Payer: MEDICARE

## 2025-02-06 ENCOUNTER — APPOINTMENT (OUTPATIENT)
Dept: WOUND CARE | Facility: CLINIC | Age: 75
End: 2025-02-06
Payer: MEDICARE

## 2025-02-07 ENCOUNTER — APPOINTMENT (OUTPATIENT)
Dept: WOUND CARE | Facility: CLINIC | Age: 75
End: 2025-02-07
Payer: MEDICARE

## 2025-02-10 ENCOUNTER — OFFICE VISIT (OUTPATIENT)
Dept: WOUND CARE | Facility: CLINIC | Age: 75
End: 2025-02-10
Payer: MEDICARE

## 2025-02-10 ENCOUNTER — HOME CARE VISIT (OUTPATIENT)
Dept: HOME HEALTH SERVICES | Facility: HOME HEALTH | Age: 75
End: 2025-02-10
Payer: MEDICARE

## 2025-02-10 PROCEDURE — G0277 HBOT, FULL BODY CHAMBER, 30M: HCPCS

## 2025-02-10 PROCEDURE — 99183 HYPERBARIC OXYGEN THERAPY: CPT | Performed by: PLASTIC SURGERY

## 2025-02-10 ASSESSMENT — ACTIVITIES OF DAILY LIVING (ADL)
OASIS_M1830: 00
HOME_HEALTH_OASIS: 00

## 2025-02-11 ENCOUNTER — APPOINTMENT (OUTPATIENT)
Facility: CLINIC | Age: 75
End: 2025-02-11
Payer: MEDICARE

## 2025-02-11 ENCOUNTER — OFFICE VISIT (OUTPATIENT)
Dept: WOUND CARE | Facility: CLINIC | Age: 75
End: 2025-02-11
Payer: MEDICARE

## 2025-02-11 PROCEDURE — G0277 HBOT, FULL BODY CHAMBER, 30M: HCPCS

## 2025-02-11 PROCEDURE — 99183 HYPERBARIC OXYGEN THERAPY: CPT | Performed by: SURGERY

## 2025-02-12 ENCOUNTER — OFFICE VISIT (OUTPATIENT)
Dept: WOUND CARE | Facility: CLINIC | Age: 75
End: 2025-02-12
Payer: MEDICARE

## 2025-02-12 PROCEDURE — G0277 HBOT, FULL BODY CHAMBER, 30M: HCPCS

## 2025-02-13 ENCOUNTER — OFFICE VISIT (OUTPATIENT)
Dept: WOUND CARE | Facility: CLINIC | Age: 75
End: 2025-02-13
Payer: MEDICARE

## 2025-02-13 ENCOUNTER — PREP FOR PROCEDURE (OUTPATIENT)
Dept: SURGERY | Facility: HOSPITAL | Age: 75
End: 2025-02-13

## 2025-02-13 ENCOUNTER — TELEPHONE (OUTPATIENT)
Dept: RADIATION ONCOLOGY | Facility: HOSPITAL | Age: 75
End: 2025-02-13
Payer: MEDICARE

## 2025-02-13 PROCEDURE — 11042 DBRDMT SUBQ TIS 1ST 20SQCM/<: CPT | Performed by: PLASTIC SURGERY

## 2025-02-13 PROCEDURE — G0277 HBOT, FULL BODY CHAMBER, 30M: HCPCS

## 2025-02-13 PROCEDURE — 11042 DBRDMT SUBQ TIS 1ST 20SQCM/<: CPT

## 2025-02-13 PROCEDURE — 99183 HYPERBARIC OXYGEN THERAPY: CPT | Performed by: PLASTIC SURGERY

## 2025-02-13 NOTE — TELEPHONE ENCOUNTER
Called pt to remind of [facility] appointment on 02/18/25 at 2:00. Pt's phone went to voicemail left number if needs to reschedule.

## 2025-02-14 ENCOUNTER — OFFICE VISIT (OUTPATIENT)
Dept: WOUND CARE | Facility: CLINIC | Age: 75
End: 2025-02-14
Payer: MEDICARE

## 2025-02-14 PROCEDURE — G0277 HBOT, FULL BODY CHAMBER, 30M: HCPCS

## 2025-02-14 PROCEDURE — 99183 HYPERBARIC OXYGEN THERAPY: CPT | Performed by: SURGERY

## 2025-02-17 ENCOUNTER — OFFICE VISIT (OUTPATIENT)
Dept: WOUND CARE | Facility: CLINIC | Age: 75
End: 2025-02-17
Payer: MEDICARE

## 2025-02-17 PROCEDURE — G0277 HBOT, FULL BODY CHAMBER, 30M: HCPCS

## 2025-02-17 PROCEDURE — 99183 HYPERBARIC OXYGEN THERAPY: CPT | Performed by: PLASTIC SURGERY

## 2025-02-18 ENCOUNTER — OFFICE VISIT (OUTPATIENT)
Dept: WOUND CARE | Facility: CLINIC | Age: 75
End: 2025-02-18
Payer: MEDICARE

## 2025-02-18 ENCOUNTER — HOSPITAL ENCOUNTER (OUTPATIENT)
Dept: RADIATION ONCOLOGY | Facility: HOSPITAL | Age: 75
Setting detail: RADIATION/ONCOLOGY SERIES
Discharge: HOME | End: 2025-02-18
Payer: MEDICARE

## 2025-02-18 VITALS
WEIGHT: 111.77 LBS | OXYGEN SATURATION: 99 % | HEART RATE: 69 BPM | BODY MASS INDEX: 18.04 KG/M2 | SYSTOLIC BLOOD PRESSURE: 105 MMHG | DIASTOLIC BLOOD PRESSURE: 55 MMHG | RESPIRATION RATE: 18 BRPM | TEMPERATURE: 97.9 F

## 2025-02-18 DIAGNOSIS — C02.3 CANCER OF ANTERIOR TWO-THIRDS OF TONGUE (MULTI): ICD-10-CM

## 2025-02-18 DIAGNOSIS — C02.9 MALIGNANT NEOPLASM OF TONGUE (MULTI): ICD-10-CM

## 2025-02-18 DIAGNOSIS — C41.1 MALIGNANT NEOPLASM OF MANDIBLE (MULTI): ICD-10-CM

## 2025-02-18 PROCEDURE — 99214 OFFICE O/P EST MOD 30 MIN: CPT

## 2025-02-18 PROCEDURE — 99183 HYPERBARIC OXYGEN THERAPY: CPT | Performed by: SURGERY

## 2025-02-18 PROCEDURE — G0277 HBOT, FULL BODY CHAMBER, 30M: HCPCS

## 2025-02-18 ASSESSMENT — ENCOUNTER SYMPTOMS
VOMITING: 0
ARTHRALGIAS: 0
SPEECH DIFFICULTY: 0
HEMOPTYSIS: 0
CHILLS: 0
ABDOMINAL PAIN: 0
EYE PROBLEMS: 1
LIGHT-HEADEDNESS: 0
FATIGUE: 0
PALPITATIONS: 0
NAUSEA: 0
BACK PAIN: 0
CONSTIPATION: 0
CONFUSION: 0
COUGH: 0
DIFFICULTY URINATING: 0
HEMATURIA: 0
DIARRHEA: 0
DIZZINESS: 0
FEVER: 0
SORE THROAT: 0
BLOOD IN STOOL: 0
DIAPHORESIS: 0
NERVOUS/ANXIOUS: 0
TROUBLE SWALLOWING: 1
HEADACHES: 0
APPETITE CHANGE: 0
LOSS OF SENSATION IN FEET: 0
NUMBNESS: 0
ABDOMINAL DISTENTION: 0
ADENOPATHY: 0
DEPRESSION: 0
SEIZURES: 0
WHEEZING: 0
CHEST TIGHTNESS: 0
OCCASIONAL FEELINGS OF UNSTEADINESS: 0

## 2025-02-18 ASSESSMENT — COLUMBIA-SUICIDE SEVERITY RATING SCALE - C-SSRS
6. HAVE YOU EVER DONE ANYTHING, STARTED TO DO ANYTHING, OR PREPARED TO DO ANYTHING TO END YOUR LIFE?: NO
1. IN THE PAST MONTH, HAVE YOU WISHED YOU WERE DEAD OR WISHED YOU COULD GO TO SLEEP AND NOT WAKE UP?: NO
6. HAVE YOU EVER DONE ANYTHING, STARTED TO DO ANYTHING, OR PREPARED TO DO ANYTHING TO END YOUR LIFE?: NO
2. HAVE YOU ACTUALLY HAD ANY THOUGHTS OF KILLING YOURSELF?: NO
1. IN THE PAST MONTH, HAVE YOU WISHED YOU WERE DEAD OR WISHED YOU COULD GO TO SLEEP AND NOT WAKE UP?: NO
2. HAVE YOU ACTUALLY HAD ANY THOUGHTS OF KILLING YOURSELF?: NO

## 2025-02-18 ASSESSMENT — PAIN SCALES - GENERAL: PAINLEVEL_OUTOF10: 0-NO PAIN

## 2025-02-18 NOTE — PROGRESS NOTES
Radiation Oncology Follow-Up    Patient Name:  Brigette Romo  MRN:  [id number]  :  1950    Referring Provider: Evaristo Winston, *  Primary Care Provider: Eri Read DO  Care Team: Patient Care Team:  Eri Read DO as PCP - General (Family Medicine)  Eri Read DO as PCP - [employer] PCP  Brandy Christensen MD as Surgeon (Otolaryngology)  Sierra Gagnon MD as Radiation Oncologist (Radiation Oncology)  Maximino Aguilera DMD as Dentist (Prosthodontics)  Bacilio Lockwood MD MPH as Consulting Physician (Infectious Diseases)    Date of Service: 2025     Diagnosis: T4N0M0 SCC of the  alveolar ridge.     Oncological History:   This lady was treated back in 2014 for a T1 N1 lesion of her right tongue. She had surgical excision and a neck dissection followed by radiation therapy.      She was found to have tumor recurrence and on 2016 she underwent reexcision. The margins were felt to be clear. There was no evidence of pedro luis metastasis.      She had a TSH level in 2021 which was normal. She had a chest CT in 2020 which was negative.      She was having some issues with discomfort on the lateral tongue on the left. This led to a biopsy that showed cancer. On 2020 she underwent a partial glossectomy. The margins were clear. She was presented at our tumor board and it was not felt  that anything further needed to be done.     She admits that her swallowing is somewhat difficult. She had a chest x-ray in 2021 which was negative.     Is found to have a suspicious area on her right anterior gum line in 2023.  On clinical exam she was found to have swelling on the lingual aspect of the mandible on the right side.  2 of the adjacent teeth were mobile.  Biopsy was performed on 1/3/2023  and revealed moderately differentiated squamous cell carcinoma.     CT of the neck with contrast on 2023  revealed:  IMPRESSION:1. The body of the right mandible has bony erosion from the rightincisor tooth posteriorly to the anterior aspect of the anteriormolar tooth with soft tissue masses along the oral and buccal aspectsof the erosion as noted. No adenopathy is  noted.2. The right thyroid gland has a 17 mm mass similar to the prior examallowing for differences in exam technique (prior exam wasunenhanced).3. Mixed density plaque without significant stenosis in right commoncarotid bifurcation and aberrant right  subclavian artery      Patient underwent triple endoscopy with the right composite resection of the mandible and floor of mouth and reconstruction with a locking plate, left fibula osteocutaneous free flap.  The tumor was found to be eroding through the buccal aspect of the  mandible.   Pathology from the surgery on 2/7/2023 revealed a 3.7 cm tumor involving the mandible, gingiva, and floor of mouth with depth of invasion of 2.5 cm.  It was moderately differentiated squamous cell carcinoma.  There was no lymphovascular invasion,  but there was focal perineural invasion.  The distance to the closest margin was 1 mm in the deep and lateral orientation.  0 lymph nodes were submitted.  The patient had pathologic T4NX oral cavity cancer.     She recently saw Dr. Christensen  and was advised to begin a full liquid diet and assess for signs of aspiration.  She was asked to keep her tracheotomy tube capped continuously in order to assess for possibility of removal in future visits.    Patient underwent photon radiation (60Gy in 30 fractions) to the oral cavity ending on 4/28/23.  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 On 11/4/24 the  patient met with Dr. Christensen and agreed to removal of all hardware with a bony free flap reconstruction.  The patient was agreeable to the plan.     12/16/24 the patinet underwent removal of hardware for ORN of her left mandible and composite resection and reconstruction under the direction of Ame Magana and Fermin.     SUBJECTIVE  History of Present Illness:   Mrs. Rmoo is a pleasant 75 y/o who is in clinic today for her first Radiation Oncology survival visit s/p diagnosis with a new primary T4N0M0 SCC of the  alveolar ridge.  She underwent a direct laryngoscopy, bronchoscopy, and Esophagoscopy with segmental mandibulectomy/ composite resection, right mandibular reconstruction with plating and right sided neck exploration of vessels on 2/27/23.  She then underwent  photon radiation (60Gy in 30 fractions) to the oral cavity ending on 4/28/23.  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  Today the patient states that she is doing well and has good energy.   Continues to have a fistula on the right jaw which is scabbed over on the outside.  Pt denies liquids draining from the site.  Is currently on Augmentin, per Dr. Magana.  Patient continues to follow the recommendations from her 6/1/23 MBS.  She eats soft pureed foods and does thin liquid and medications through her PEG.  Denies coughing or choking when eating.  Denies mucositis or any new lumps/bumps in the mouth or around the neck.  She endorses being able to maintain her weight with her current diet.  She continues to endorse xerostomia, which she's had since her head and neck cancer in 2014.  She manages with extra hydration and Biotene spray throughout the day. She endorses significant trismus which began after her most recent treatments.  She continues to follow with SLP.  She does have some hearing loss which has been stable.  She does have some  "visual changes which she attributes to b/l cataracts.  She is scheduled to have eye surgery in Jan 2024.  Denies chills, fatigue, fever, dyspnea or chest pain.  Denies seizures,  headaches, dizziness and focal/sensory neurological changes.  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  She has a prism in the left lens of her glasses which helps.  Denies abdominal pain, nausea, vomiting, diarrhea or constipation.      2/14/24 Interim Follow-up visit:   Today the patient states that she's \"okay\".  She continues to have issues with a with a fistula (with exposed hardware) in her right anterior jaw.  She states that it's not painful but bothersome with drainage.  She is currently back on ABX and is scheduled for a revision on 3/4/24.  Additionally, she is anxious about a biopsy of her thyroid that is taking place on 2/22/24.  Continue to have issues with PO nutrition.  She states that the immobility of her tongue along with xerostomia poses a problem with the transit of food to the back of her throat.  She does endorse being able to eat creamy soups and mashed potatoes with gravy.  The rest of her nutrition is via PEG.  She states that she's doing 4 boxes of TF/day.  Endorses being able to sustain her weight with her current diet.  Denies chills, fatigue, fever, dyspnea or chest pain.  Denies seizures,  headaches, dizziness and focal/sensory neurological changes.  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  She has a prism in the left lens of her glasses which helps.  Patient underwent cataract surgery in Jan 2024 and stats that her vision is much better.  Denies abdominal pain, nausea, vomiting, diarrhea or constipation.      6/27/24 Interval Visit:   Today the patient is in clinic for a Routine Radiation Oncology FU visit.  She states that she's doing well.  Endorses surgery on 7/15/24 for a left anterior mandibular debridement related to ongoing ORN.  She " "states that she's hopeful that this will the last procedure that she'll need.  She's also hoping that at some point she'll be able to wear dentures to eat more solid foods.  Continues to have issues eating foods because of dryness and the lack of mobility of her tongue.  She continue to get almost all of her nutrition via a PEG.  Is currently consuming 4 boxes of TF/day.   Does occasionally attempt to eat soup, but states that it often just drips out of her mouth which is can be messy.  Denies coughing or choking.  Denies odynophagia, mucositis, or new lumps/bumps/discolorations in her mouth our around her head/neck.  Does endorse significant trismus.  Also endorses some mild right jaw pain.  Denies chills, fatigue, fever, dyspnea or chest pain. Denies seizures,  headaches, dizziness and focal/sensory neurological changes.  Endorses having cataract surgery in Jan but states that she still has some vision issue.  Will be following up with her eye doctor to manage.   Denies abdominal pain, nausea, vomiting, diarrhea or constipation.      2/18/25 Interval FU visit:   Today the patient arrives to clinic for a routine Radiation Oncology FU visit.  Overall the patient states that \"she's doing the best she can given the circumstances.\".  Since my last visit with Brigette she underwent removal of hardware for ORN of her left mandible and composite resection and reconstruction under the direction of Ame Magana and Fermin.  Because of the extent of her surgery, she is NPO and dependent on a PEG for enteral nutrition.  She currently ingests 4 isosource/day.  She also supplements with blenderized foods such as eggs, cheese and fruit.  Overall her weight has dropped a bit since Jan 16, 2025.  Her weight today is 50.7 kg which is a 3.6% decrease since the 16th.  She does have home health care helping her with her nutrition.  She continue to see SLP but only for her ongoing trismus and left TMJ discomfort (8/10).  She states that " since the pain is intermittent she only manages with a hot compress which tends to help a bit.  Patient also endorses undergoing HBO as well.   Denies odynophagia, mucositis, or new lumps/bumps/discolorations in her mouth our around her head/neck.  Patient does endorse a lot of drooling because of the surgical alterations of her face.  She carries a spit rag with her to mange.  Denies chills, fatigue, fever, dyspnea or chest pain. Denies seizures, headaches, dizziness and focal/sensory neurological changes.     Denies abdominal pain, nausea, vomiting, diarrhea or constipation.        Treatment Rendered:   Radiation Treatments       No radiation treatments to show. (Treatments may have been administered in another system.)            Review of Systems:   Review of Systems   Constitutional:  Negative for appetite change, chills, diaphoresis, fatigue and fever.   HENT:   Positive for hearing loss and trouble swallowing. Negative for lump/mass, mouth sores, nosebleeds, sore throat and tinnitus.         Is following a soft/puree diet for swallowing issues.    Eyes:  Positive for eye problems.   Respiratory:  Negative for chest tightness, cough, hemoptysis and wheezing.    Cardiovascular:  Negative for chest pain and palpitations.   Gastrointestinal:  Negative for abdominal distention, abdominal pain, blood in stool, constipation, diarrhea, nausea and vomiting.   Genitourinary:  Negative for difficulty urinating and hematuria.    Musculoskeletal:  Negative for arthralgias, back pain and gait problem.   Neurological:  Negative for dizziness, gait problem, headaches, light-headedness, numbness, seizures and speech difficulty.   Hematological:  Negative for adenopathy.   Psychiatric/Behavioral:  Negative for confusion and depression. The patient is not nervous/anxious.      The patient's current pain level was assessed.  They report currently having a pain of 8 out of 10 (right jaw).  They feel their pain is under control  "without the use of pain medications.  Patient manages with a warm compress as the pain is intermittent.     Performance Status:   The Karnofsky performance scale today is 100, Fully active, able to carry on all pre-disease performed without restriction (ECOG equivalent 0).      OBJECTIVE  Vital Signs:      1/2/2025     1:10 PM 1/7/2025    11:30 AM 1/8/2025     9:57 AM 1/13/2025     8:57 AM 1/14/2025     9:47 AM 1/16/2025     1:10 PM 2/18/2025     2:01 PM   Vitals   Systolic 124 138 100  92 114 105   Diastolic 72 72 70  60 60 55   BP Location Left arm Left arm Left arm  Right arm     Heart Rate 82 79 76  111 83 69   Temp 36.3 °C (97.4 °F) 36.9 °C (98.4 °F)  36.4 °C (97.5 °F) 35.9 °C (96.7 °F) 36.3 °C (97.4 °F) 36.6 °C (97.9 °F)   Resp 18 18 18  18 20 18   Height    1.676 m (5' 6\")      Weight (lb)    116  116 111.77   BMI    18.72 kg/m2  18.72 kg/m2 18.04 kg/m2   BSA (m2)    1.57 m2  1.57 m2 1.54 m2   Visit Report    Report  Report Report      Physical Exam:  Physical Exam  Vitals and nursing note reviewed.   Constitutional:       General: She is not in acute distress.     Appearance: Normal appearance. She is normal weight. She is not ill-appearing.   HENT:      Head: Normocephalic and atraumatic. No masses.      Jaw: Trismus present. No tenderness, swelling or pain on movement.      Salivary Glands: Right salivary gland is not diffusely enlarged. Left salivary gland is not diffusely enlarged.      Comments: Some tenderness in her left TMJ.     Nose: Nose normal. No congestion or rhinorrhea.      Mouth/Throat:      Mouth: Mucous membranes are dry. No injury.      Dentition: Normal dentition. Does not have dentures. No dental tenderness, gingival swelling or gum lesions.      Tongue: No lesions. Tongue does not deviate from midline.      Palate: No mass and lesions.      Pharynx: No pharyngeal swelling, oropharyngeal exudate or posterior oropharyngeal erythema.      Tonsils: No tonsillar exudate or tonsillar " abscesses.      Comments: Surgically altered tongue.   Eyes:      General: No scleral icterus.     Extraocular Movements: Extraocular movements intact.      Pupils: Pupils are equal, round, and reactive to light.   Cardiovascular:      Rate and Rhythm: Normal rate and regular rhythm.      Pulses: Normal pulses.      Heart sounds: Normal heart sounds. No murmur heard.  Pulmonary:      Effort: Pulmonary effort is normal. No respiratory distress.      Breath sounds: Normal breath sounds. No stridor. No wheezing or rhonchi.   Chest:      Chest wall: No tenderness.   Abdominal:      General: Abdomen is flat. Bowel sounds are normal.      Palpations: Abdomen is soft. There is no mass.      Tenderness: There is no abdominal tenderness. There is no guarding or rebound.      Comments: PEG in place.    Musculoskeletal:         General: No swelling or tenderness. Normal range of motion.      Cervical back: Normal range of motion and neck supple. No tenderness.   Skin:     General: Skin is warm and dry.      Coloration: Skin is not jaundiced.      Findings: No erythema or lesion.   Neurological:      General: No focal deficit present.      Mental Status: She is alert and oriented to person, place, and time.      Motor: No weakness.      Coordination: Coordination normal.      Gait: Gait normal.      Deep Tendon Reflexes: Reflexes normal.   Psychiatric:         Mood and Affect: Mood normal.         Behavior: Behavior normal.         Thought Content: Thought content normal.         Judgment: Judgment normal.        ASSESSMENT:  Since my last visit with Brigette she underwent removal of hardware for ORN of her left mandible and composite resection and reconstruction under the direction of Ame Magana and Fermin.  Because of the extent of her surgery, she is NPO and dependent on a PEG for enteral nutrition.  She currently ingests 4 isosource/day.  She also supplements with blenderized foods such as eggs, cheese and fruit.  Overall  her weight has dropped a bit since Jan 16, 2025.  Her weight today is 50.7 kg which is a 3.6% decrease since the 16th.  She does have home health care helping her with her nutrition.  She continue to see SLP but only for her ongoing trismus and left TMJ discomfort (8/10).  She states that since the pain is intermittent she only manages with a hot compress which tends to help a bit.  Patient also endorses undergoing HBO as well.   Denies odynophagia, mucositis, or new lumps/bumps/discolorations in her mouth our around her head/neck.  Patient does endorse a lot of drooling because of the surgical alterations of her face.  She carries a spit rag with her to mange.  Denies chills, fatigue, fever, dyspnea or chest pain. Denies seizures, headaches, dizziness and focal/sensory neurological changes.     Denies abdominal pain, nausea, vomiting, diarrhea or constipation.       PLAN:      --FU with Radiation Oncology in 3-4 months.   --Carotid ultrasound in 6 months.   --Continue to follow with Geo Magana and Fermin for ENT management.  --Imaging at the discretion of ENT.   --Continue to follow with [facility] for HBO       Please reach out to us with any questions or concerns.   [facility] Guidelines were applicable to guide this patients treatment plan.  Evaristo Winston, ROSALINA-CNP

## 2025-02-19 ENCOUNTER — OFFICE VISIT (OUTPATIENT)
Dept: WOUND CARE | Facility: CLINIC | Age: 75
End: 2025-02-19
Payer: MEDICARE

## 2025-02-19 PROCEDURE — 11042 DBRDMT SUBQ TIS 1ST 20SQCM/<: CPT

## 2025-02-19 PROCEDURE — G0277 HBOT, FULL BODY CHAMBER, 30M: HCPCS

## 2025-02-20 ENCOUNTER — OFFICE VISIT (OUTPATIENT)
Dept: WOUND CARE | Facility: CLINIC | Age: 75
End: 2025-02-20
Payer: MEDICARE

## 2025-02-20 PROCEDURE — 99183 HYPERBARIC OXYGEN THERAPY: CPT | Performed by: PLASTIC SURGERY

## 2025-02-20 PROCEDURE — G0277 HBOT, FULL BODY CHAMBER, 30M: HCPCS

## 2025-02-21 ENCOUNTER — OFFICE VISIT (OUTPATIENT)
Dept: WOUND CARE | Facility: CLINIC | Age: 75
End: 2025-02-21
Payer: MEDICARE

## 2025-02-21 PROCEDURE — G0277 HBOT, FULL BODY CHAMBER, 30M: HCPCS

## 2025-02-24 ENCOUNTER — OFFICE VISIT (OUTPATIENT)
Dept: WOUND CARE | Facility: CLINIC | Age: 75
End: 2025-02-24
Payer: MEDICARE

## 2025-02-24 ENCOUNTER — NUTRITION (OUTPATIENT)
Dept: HEMATOLOGY/ONCOLOGY | Facility: HOSPITAL | Age: 75
End: 2025-02-24

## 2025-02-24 ENCOUNTER — OFFICE VISIT (OUTPATIENT)
Dept: OTOLARYNGOLOGY | Facility: HOSPITAL | Age: 75
End: 2025-02-24
Payer: MEDICARE

## 2025-02-24 VITALS — BODY MASS INDEX: 17.84 KG/M2 | WEIGHT: 111 LBS | HEIGHT: 66 IN | TEMPERATURE: 97.9 F

## 2025-02-24 DIAGNOSIS — C03.1 CANCER OF LOWER GUM (MULTI): ICD-10-CM
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
## 2025-02-24 DIAGNOSIS — R13.12 OROPHARYNGEAL DYSPHAGIA: ICD-10-CM

## 2025-02-24 DIAGNOSIS — M27.2 OSTEORADIONECROSIS OF JAW: ICD-10-CM

## 2025-02-24 PROCEDURE — 99024 POSTOP FOLLOW-UP VISIT: CPT | Performed by: OTOLARYNGOLOGY

## 2025-02-24 PROCEDURE — 1036F TOBACCO NON-USER: CPT | Performed by: OTOLARYNGOLOGY

## 2025-02-24 PROCEDURE — 1126F AMNT PAIN NOTED NONE PRSNT: CPT | Performed by: OTOLARYNGOLOGY

## 2025-02-24 PROCEDURE — 1160F RVW MEDS BY RX/DR IN RCRD: CPT | Performed by: OTOLARYNGOLOGY

## 2025-02-24 PROCEDURE — G0277 HBOT, FULL BODY CHAMBER, 30M: HCPCS

## 2025-02-24 PROCEDURE — 99183 HYPERBARIC OXYGEN THERAPY: CPT | Performed by: PLASTIC SURGERY

## 2025-02-24 PROCEDURE — 99214 OFFICE O/P EST MOD 30 MIN: CPT | Performed by: OTOLARYNGOLOGY

## 2025-02-24 PROCEDURE — 3008F BODY MASS INDEX DOCD: CPT | Performed by: OTOLARYNGOLOGY

## 2025-02-24 PROCEDURE — 1159F MED LIST DOCD IN RCRD: CPT | Performed by: OTOLARYNGOLOGY

## 2025-02-24 ASSESSMENT — PATIENT HEALTH QUESTIONNAIRE - PHQ9
1. LITTLE INTEREST OR PLEASURE IN DOING THINGS: NOT AT ALL
SUM OF ALL RESPONSES TO PHQ9 QUESTIONS 1 & 2: 0
2. FEELING DOWN, DEPRESSED OR HOPELESS: NOT AT ALL

## 2025-02-24 ASSESSMENT — PAIN SCALES - GENERAL: PAINLEVEL_OUTOF10: 0-NO PAIN

## 2025-02-24 NOTE — PROGRESS NOTES
Ms. Romo returns today for her second postoperative visit following a composite resection and removal of hardware for osteoradionecrosis of her mandible.  Brigette had the body of her left native mandible resected following a previous fibula free flap reconstruction for symphysis and right body of mandible resection for malignancy.  She tolerated the operation well and was discharged from the hospital approximately 10 days postoperatively.  Since her discharge she has complained primarily of left TMJ related pain which has resolved. She had a Panorex which reveal no abnormalities.   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  She is also insensate in her lower lip contributing to this problem.  Otherwise, she has is doing well and is very active. She is concerned about a 14 lb weight loss. She believes this is due to her very active lifestyle coupled with an inability obtain enough calories through her PEG.     On physical examination, she is a healthy-appearing woman in no distress.  Her neck is clean dry and intact.  The flap is well-healed and there is no evidence of fistula.  The fibula donor site reveals a 95% take of the split-thickness skin graft with some minor crusting along the incision line.  Intraorally, the flap is well healed with no evidence of exposed bone or plate. Her extraoral skin flap is also viable and well-healed.     Overall, Brigette is making good progress following her significant reconstructive procedure.  She will see our dietician today to discuss her tube feed formulation and her recent weight loss. We will also order a Cxray to rule out metastatic disease as a source of weight loss. She will continue her HBO and return to see me in two months.

## 2025-02-24 NOTE — PROGRESS NOTES
NUTRITION Assessment NOTE    Nutrition Assessment     Reason for Visit:  Brigette Romo is a 75 y.o. female who presents for TF assessment with notable weight loss      history of multiple different oral cavity cancers.  No current evidence of tumor recurrence. She has ongoing dysphagia and bone exposure as well as drainage around her chin.   with Osteoradionecrosis of jaw who underwent trach, triple, right composite mandibulectomy, recon with L fib (A/v: R sup thyroid, R IJ end to end), stsg w wound vac by Ame Magana and Fermin 12/16/2024.     PEG placed 2- by Dr. Magana       Patient Active Problem List   Diagnosis    Abnormal mammogram    Abscess of face    Acquired hypothyroidism    Adverse effect of radiation    Airway obstruction    Anemia    Ankle weakness    Anxiety    Cellulitis    Chest tightness    Dysphagia    Esophageal reflux    GI bleed    HLD (hyperlipidemia)    Influenza B    Insomnia    Left foot pain    Nausea and/or vomiting    Osteoradionecrosis of jaw    Primary osteoarthritis of left knee    Shortness of breath    Sinus congestion    Stiffness of left ankle joint    Trismus    Thyroid nodule    Wound infection    Viral upper respiratory tract infection    Ulceration, oral mucosa    Mouth lesion    Gum lesion    Tongue lesion    Other forms of stomatitis    Exposed orthopaedic hardware (CMS-HCC)    Malignant neoplasm of mandible (Multi)    Gastrostomy status (Multi)    Unspecified severe protein-calorie malnutrition (Multi)    Other disorders of arteries, arterioles and capillaries in diseases classified elsewhere    Non-pressure chronic ulcer of other part of left lower leg with muscle involvement without evidence of necrosis    Type 2 diabetes mellitus with other specified complication, without long-term current use of insulin       Nutrition Significant Labs:  Lab Results   Component Value Date/Time    GLUCOSE 102 (H) 12/23/2024 0545     12/23/2024 0545    K 4.1 12/23/2024  "0545     12/23/2024 0545    CO2 28 12/23/2024 0545    ANIONGAP 12 12/23/2024 0545    BUN 11 12/23/2024 0545    CREATININE 0.54 12/23/2024 0545    EGFR >90 12/23/2024 0545    CALCIUM 9.2 12/23/2024 0545    ALBUMIN 3.1 (L) 12/23/2024 0545    ALKPHOS 98 12/12/2024 1420    PROT 6.8 12/12/2024 1420    AST 17 12/12/2024 1420    BILITOT 0.5 12/12/2024 1420    ALT 12 12/12/2024 1420    MG 2.26 12/23/2024 0545    PHOS 2.9 12/23/2024 0545     No results found for: \"VITD25\"      Anthropometrics:                          Height: 167.6 cm (5' 6\")   BMI (Calculated): 17.92  IBW/kg (Dietitian Calculated): 59.1 kg  Percent of IBW: 85.1 %  Weight History:   Daily Weight  02/24/25 : 50.3 kg (111 lb)  02/18/25 : 50.7 kg (111 lb 12.4 oz)  01/16/25 : 52.6 kg (116 lb)  01/13/25 : 52.6 kg (116 lb)  12/25/24 : 54.4 kg (120 lb)  12/16/24 : 54.6 kg (120 lb 5.9 oz)  12/12/24 : 54.4 kg (120 lb)  12/10/24 : 54.4 kg (120 lb)  12/03/24 : 56.7 kg (125 lb)  11/22/24 : 54.4 kg (120 lb)    -135#    Pt reports feeling like a bag of bones     Weight Change %: in the past 2 months pt has lost 4.1 kg (7.5%)  Significant Weight Loss: Yes  Interpretation of Weight Loss: >7.5% in 3 months    Nutrition History:    Pt currently NPO  Was able to have a small amount of oral intake prior to her surgery in December    TF:    Pt takes 1.5 to 1 carton of TF per feeding  She takes in   Isosource 1.5 3 x/day provides: 1125 kCal, 51 g PRO, 44 g fat, 132 g CHO, 11.4 g fiber, and 573 mL free water    1 day in a week  Isosource 1.5 4 x/day provides: 1500 kCal, 68 g PRO, 59 g fat, 176 g CHO, 15.2 g fiber, and 764 mL free water    4 days in a week  Isosource 1.5 5 x/day provides: 1875 kCal, 85 g PRO, 74 g fat, 220 g CHO, 19 g fiber, and 955 mL free water    2 days per week    Average intake is then 1553 domenico and 70 g protein per day     She reports she is trying to blend foods to put in her tube  She has used  Cottage cheese imani and berries- takes this in " daily  Also   Bone broth , chicken and avocado    She is will ing to look at plant based blenderized foods for a more consistent intake  She has added foods as she feels this sets better on her stomach  In the past she was on Osmolite and this was poorly tolerated- had increased GERD and overall felt poorly  With the change to Isosource this was improved but not completely resolved     Will try:  Liquid Hope   Compleat original 1.5  [employer] standard 1.4  Samples will be provided    Additionally Compleat Blended TF recipe website was shared    Pt will communicate tolerance and will go from there  Business card provided       Medications:  Current Outpatient Medications   Medication Instructions    Children's acetaminophen 1,000 mg, g-tube, Every 8 hours scheduled    chlorhexidine (Peridex) 0.12 % solution 15 mL, Mouth/Throat, 3 times daily after meals    eucerin cream Topical, 2 times daily    famotidine (PEPCID) 20 mg, g-tube, 2 times daily    levothyroxine (SYNTHROID, LEVOXYL) 25 mcg, g-tube, Daily before breakfast, Take on an empty stomach with water only.  Wait 1 hour before having food, medications or beverages other than water.  OK to crush pill.    loperamide (IMODIUM A-D) 2 mg, g-tube, 4 times daily PRN    white petrolatum (Aquaphor) 41 % ointment ointment 1 Application, Topical, 3 times daily       Nutrition Focused Physical Exam Findings:    Subcutaneous Fat Loss:   Orbital Fat Pads:  (difficult to assess with surgery)  Buccal Fat Pads:  (difficult to assess with surgery)    Muscle Wasting:  Temporalis: Mild-Moderate (slight depression)  Pectoralis (Clavicular Region): Mild-Moderate (some protrusion of clavicle)    Physical Findings:  Digestive System Findings: Heartburn  Edema: none    Pt is using a hyperbaric chamber to help with healing     Estimated Needs:        Dosing weight: 50.3 kg  Calories per day: 1500- 1760  determined by 30 kcal/kg  Protein (g) per day: 60-70 determined by 1.2 to 1.4  g/kg  Estimated fluid needs: 1500 + determined by 1 kcal/mL                    Nutrition Diagnosis   Malnutrition Diagnosis  Patient has Malnutrition Diagnosis: Yes  Diagnosis Status: New  Malnutrition Diagnosis: Moderate malnutrition related to chronic disease or condition  Related to: pt being 100% reliant on TF - and TF is not meeting estimated needs daily  As Evidenced by: weight history and reported TF intake            Nutrition Interventions/Recommendations   Nutrition Prescription: Enteral nutrition continue with Isosource with goal of 5 cartons per day    Nutrition Interventions:   Food and Nutrient Delivery:       Coordination of Care: Goals: ENT for order change as needed, Piper is DOROTHY     Nutrition Education:   Nutrition Education Content: Content related nutrition education  pt is looking for a blended TF to further help with issuses of GERD and tolerance to TF- samples of Hali Ongo standard 1.4, Compleat Original 1.5 and Liquid Hope have been requested for her                 Nutrition Monitoring and Evaluation   Food and Nutrient Intake  Monitoring and Evaluation Plan: Enternal and parenternal nutrition intake determination  Criteria: Intake of close to 1800 calories per day  Additional Plan: Increase tolerance to TF    Anthropometric measurements  Monitoring and Evaluation Plan: Weight  Body Weight: Body weight  Criteria: stabilize to increase                   Follow Up: Planned follow up visit: upon pt contact with this service

## 2025-02-25 ENCOUNTER — APPOINTMENT (OUTPATIENT)
Facility: CLINIC | Age: 75
End: 2025-02-25
Payer: MEDICARE

## 2025-02-25 ENCOUNTER — OFFICE VISIT (OUTPATIENT)
Dept: WOUND CARE | Facility: CLINIC | Age: 75
End: 2025-02-25
Payer: MEDICARE

## 2025-02-25 ENCOUNTER — TREATMENT (OUTPATIENT)
Dept: SPEECH THERAPY | Facility: CLINIC | Age: 75
End: 2025-02-25
Payer: MEDICARE

## 2025-02-25 DIAGNOSIS — R13.12 DYSPHAGIA, OROPHARYNGEAL PHASE: Primary | ICD-10-CM

## 2025-02-25 PROCEDURE — G0277 HBOT, FULL BODY CHAMBER, 30M: HCPCS

## 2025-02-25 PROCEDURE — 92526 ORAL FUNCTION THERAPY: CPT | Mod: GN

## 2025-02-25 PROCEDURE — 99183 HYPERBARIC OXYGEN THERAPY: CPT | Performed by: SURGERY

## 2025-02-25 NOTE — PROGRESS NOTES
Speech-Language Pathology    [facility] Adult Outpatient   Speech-Language Pathology Treatment     Patient Name: Brigette Romo  MRN: [id number]  Today's Date: 2/25/2025  Time Calculation  Start Time: 1500  Stop Time: 1554  Time Calculation (min): 54 min       Current Problem: Concern for dysphagia s/p multimodality treatment for head and neck cancer. 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 Magana and Dr. Christensen. Pt with two rounds of RXT.     Current Diet: Thin liquids with the following strategies:  - Liquid sips via syringe, cup or squirt bottle.  - Multiple swallows  - Head/chin tilt to assist with ap propulsion    Functional Oral Intake Scale   FIOS level Comment    Level 1  Nothing by Mouth    Level 2 Tube dependent with minimal attempts of food and liquid.   X Level 3 Tube dependent with consistent oral intake of food and liquid.    Level 4 Total oral diet of a single consistency.    Level 5 Total oral diet with multiple consistencies, but requires special preparations and compensations.    Level 6 Total oral diet with multiple consistencies without special preparations but specific food limitations.    Level 7 Total oral diet with no restrictions.        Type of Exam/ Assessment/ Impression  Brigette Romo received in clinic with mildly improved secretion management and/or less anterior oral spillage of secretions. Pt reports daily intake of thin-naturally mildly thick fluids without overt s/s of airway invasion with much improved comfort and/or less anxiety of choking. Pt continues to utilize PEG tube for main source of nutrition, hydration and medications.     PHYSICAL EXAMINATION including ORAL MECHANISM     Trigeminal Nerve (V)   Jaw open/close: Reduced     Jaw  laterization:  Absent     Bite:  Absent     Sensation of face:  Adequate    Sensation of oral cavity:  Reduced    Sensation of tongue: Reduced    Facial Nerve (VII)    Asymmetry at rest: Adequate    Eyebrow raise: Adequate    Lip pucker: Reduced    Lip retraction/smile: Reduced      Cheek puffed: Absent     Taste: Reduced    Vagus (X)  Palate at rest: n/a    Palatal elevation /a/: n/a    Pitch glides: n/a    Volitional cough: n/a    Spinal Accessory (XI)  Head rotation: Reduced    Shoulder shrug: Adequate    Hypoglossal (XII)       Lingual protrusion: Reduced    Tongue ROM/laterization, elevation: Absent     Tongue strength: Absent         Treatment:  Instructed patient this date in:  - Swallow exercises and jaw/tongue stretches reviewed. Pt reports daily completion with minor tongue discomfort.   - Pt with mild improvement with ROM of jaw and lip movement.   - Speech intelligibility mildly improved.  - Dental care, and oral hygiene in relation to aspiration discussed. As well as the additional prognostic indicators that can contribute to higher risks of development of aspiration pneumonia.   Clinician modeled all techniques and accurate patient follow through confirmed. Handouts provided to facilitate optimal home carryover.      Plan of Care:    Diet Recommendations: Thin liquids with the following strategies:  - Liquid sips via syringe, cup or squirt bottle.  - Multiple swallows  - Head/chin tilt to assist with ap propulsion    Additional Recommendations: n/a    Home Exercise Program:  Established: 01/13/25  Status: Progressing  Duration: 3 months     Short Term Goals:  - Pt will complete effortful swallows 10 reps, 3 times per day for 7 days a week; to strengthen pharyngeal muscles for improve bolus clearance through the pharynx.    - Patient will tolerate the least restrictive diet without overt s/s of oropharyngeal dysphagia 10/10 trials.   - Pt will utilize and recall compensatory strategies independent of  cues 100% of the time to tolerate the least restrictive diet without overt s/s of pharyngeal deficits  - Pt will complete a series of jaw exercises/stretches independent of cues 3x a day, 7 days a week; to reduced the effects of radiation on the jaw muscles necessary to consume PO safely and efficiently.   - Pt will complete a series of neck stretches independent of cues, 5 times a day for 7 days a week; to reduce the effects of radiation on the muscles necessary for optimal head position for safe and efficient PO intake.  - Pt will produce a series of lingual exercises/stretches independent of cues 10 reps, 3 times per day, 7 days a week; to maintain strength and motility of the lingual muscles and reduce the effects of radiation for safe and efficient PO intake.     Long Term Goals:  - Patient will tolerate the least restrictive diet without overt s/s pulmonary compromise by time of discharge.     [facility] Follow-up:  - [facility] 3/25 @ 3:15pm     Continue current plan of care   Monitor home program  Patient instructed to call if there are problems  Patient to follow up with   Progress with POC, as tolerated  Discussed POC with  Patient/caregiver agreeable with POC

## 2025-02-26 ENCOUNTER — OFFICE VISIT (OUTPATIENT)
Dept: WOUND CARE | Facility: CLINIC | Age: 75
End: 2025-02-26
Payer: MEDICARE

## 2025-02-26 PROCEDURE — G0277 HBOT, FULL BODY CHAMBER, 30M: HCPCS

## 2025-02-26 PROCEDURE — 99212 OFFICE O/P EST SF 10 MIN: CPT | Mod: 25

## 2025-02-27 ENCOUNTER — OFFICE VISIT (OUTPATIENT)
Dept: WOUND CARE | Facility: CLINIC | Age: 75
End: 2025-02-27
Payer: MEDICARE

## 2025-02-27 PROCEDURE — 99183 HYPERBARIC OXYGEN THERAPY: CPT | Performed by: PLASTIC SURGERY

## 2025-02-27 PROCEDURE — G0277 HBOT, FULL BODY CHAMBER, 30M: HCPCS

## 2025-02-28 ENCOUNTER — OFFICE VISIT (OUTPATIENT)
Dept: WOUND CARE | Facility: CLINIC | Age: 75
End: 2025-02-28
Payer: MEDICARE

## 2025-02-28 PROCEDURE — G0277 HBOT, FULL BODY CHAMBER, 30M: HCPCS

## 2025-03-03 ENCOUNTER — OFFICE VISIT (OUTPATIENT)
Dept: WOUND CARE | Facility: CLINIC | Age: 75
End: 2025-03-03
Payer: MEDICARE

## 2025-03-03 PROCEDURE — G0277 HBOT, FULL BODY CHAMBER, 30M: HCPCS

## 2025-03-03 PROCEDURE — 99183 HYPERBARIC OXYGEN THERAPY: CPT | Performed by: PLASTIC SURGERY

## 2025-03-04 ENCOUNTER — OFFICE VISIT (OUTPATIENT)
Dept: WOUND CARE | Facility: CLINIC | Age: 75
End: 2025-03-04
Payer: MEDICARE

## 2025-03-04 PROCEDURE — 99183 HYPERBARIC OXYGEN THERAPY: CPT | Performed by: SURGERY

## 2025-03-04 PROCEDURE — G0277 HBOT, FULL BODY CHAMBER, 30M: HCPCS

## 2025-03-05 ENCOUNTER — OFFICE VISIT (OUTPATIENT)
Dept: WOUND CARE | Facility: CLINIC | Age: 75
End: 2025-03-05
Payer: MEDICARE

## 2025-03-05 PROCEDURE — G0277 HBOT, FULL BODY CHAMBER, 30M: HCPCS

## 2025-03-06 ENCOUNTER — OFFICE VISIT (OUTPATIENT)
Dept: WOUND CARE | Facility: CLINIC | Age: 75
End: 2025-03-06
Payer: MEDICARE

## 2025-03-06 PROCEDURE — G0277 HBOT, FULL BODY CHAMBER, 30M: HCPCS

## 2025-03-06 PROCEDURE — 99183 HYPERBARIC OXYGEN THERAPY: CPT | Performed by: PLASTIC SURGERY

## 2025-03-07 ENCOUNTER — OFFICE VISIT (OUTPATIENT)
Dept: WOUND CARE | Facility: CLINIC | Age: 75
End: 2025-03-07
Payer: MEDICARE

## 2025-03-07 PROCEDURE — G0277 HBOT, FULL BODY CHAMBER, 30M: HCPCS

## 2025-03-07 PROCEDURE — 99183 HYPERBARIC OXYGEN THERAPY: CPT | Performed by: SURGERY

## 2025-03-10 ENCOUNTER — OFFICE VISIT (OUTPATIENT)
Dept: WOUND CARE | Facility: CLINIC | Age: 75
End: 2025-03-10
Payer: MEDICARE

## 2025-03-10 PROCEDURE — 99183 HYPERBARIC OXYGEN THERAPY: CPT | Performed by: PLASTIC SURGERY

## 2025-03-10 PROCEDURE — G0277 HBOT, FULL BODY CHAMBER, 30M: HCPCS

## 2025-03-10 NOTE — PROGRESS NOTES
Provider Impressions     Status post treatment of multiple different oral cavity cancers.  I do not see any evidence of any tumor recurrence.    Dysphagia which is unlikely to get better.  She remains PEG dependent.    Thyroid nodule that was picked up on the PET scan.  This turned out to be benign on a biopsy that was eventually done after the ultrasound.    Status post  surgery for radionecrosis of the mandible.  She is completing the hyperbaric oxygen.    I will see her in 2 months.    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 She had surgical excision and a neck dissection followed by radiation therapy. She was found to have tumor recurrence and on September 22, 2016 she underwent reexcision. The margins were felt to be clear. There was no evidence of pedro luis metastasis. She had a TSH level in July 2023 which was normal. She had a chest CT in April 2020 which was negative. She was having some issues with discomfort on the lateral tongue on the left. This led to a biopsy that showed cancer. On April 13, 2020 she underwent a partial glossectomy. The margins were clear. She was presented at our tumor board and it was not felt that anything further needed to be done. She admits that her swallowing is somewhat difficult. She had a chest CT in May 2023 that showed a possible small nodule. I saw her in January 2023 because she had some concern about a lesion in her mouth. 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 She had a CT of the neck done on January 20, 2023 that showed a lesion involving the mandible on the right side. On February 7, 2023 she underwent surgery. There was bone involvement. There was a margin that was described as being close. She completed radiation therapy in April 2023. She had a PET scan done in August 2023 that  really does not show anything worrisome.  At the same time she has some uptake in the right thyroid.  This was followed by an ultrasound that showed a suspicious nodule which was eventually biopsied and turned out to be benign.  I will follow this clinically.  She  had issues with drainage around infected reconstruction plates.  On December 16, 2024 she underwent a composite resection and reconstruction.  The pathology did not show anything worrisome.  She is undergoing hyperbaric oxygen.  Her main complaint today has to do with her PEG site that is painful.  She had a TSH level in November 2024 which was normal.    Physical Exam      Examination of the oral cavity and oropharynx shows all the changes from the recent surgery.  There is no dehiscence and no bone exposure.  I cannot appreciate any mucosal lesions that are concerning.  The neck up also has healed.  I cannot appreciate any worrisome masses or adenopathies.    The PEG tube was examined.  The tube was loosened.  She does have a minimal amount of granulation around the area.  We elected not to cauterize that at this point.

## 2025-03-11 ENCOUNTER — APPOINTMENT (OUTPATIENT)
Facility: CLINIC | Age: 75
End: 2025-03-11
Payer: MEDICARE

## 2025-03-11 ENCOUNTER — HOSPITAL ENCOUNTER (OUTPATIENT)
Dept: RADIOLOGY | Facility: CLINIC | Age: 75
Discharge: HOME | End: 2025-03-11
Payer: MEDICARE

## 2025-03-11 ENCOUNTER — OFFICE VISIT (OUTPATIENT)
Dept: WOUND CARE | Facility: CLINIC | Age: 75
End: 2025-03-11
Payer: MEDICARE

## 2025-03-11 VITALS — BODY MASS INDEX: 18.48 KG/M2 | HEIGHT: 66 IN | WEIGHT: 115 LBS

## 2025-03-11 DIAGNOSIS — C03.1 CANCER OF LOWER GUM (MULTI): ICD-10-CM

## 2025-03-11 DIAGNOSIS — R13.12 OROPHARYNGEAL DYSPHAGIA: ICD-10-CM

## 2025-03-11 DIAGNOSIS — M27.2 OSTEORADIONECROSIS OF JAW: Primary | ICD-10-CM

## 2025-03-11 DIAGNOSIS — C02.9 MALIGNANT NEOPLASM OF TONGUE (MULTI): ICD-10-CM
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
## 2025-03-11 DIAGNOSIS — E04.1 THYROID NODULE: ICD-10-CM
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
## 2025-03-11 DIAGNOSIS — E03.9 ACQUIRED HYPOTHYROIDISM: ICD-10-CM

## 2025-03-11 DIAGNOSIS — M27.2 OSTEORADIONECROSIS OF JAW: ICD-10-CM

## 2025-03-11 PROCEDURE — 3008F BODY MASS INDEX DOCD: CPT | Performed by: OTOLARYNGOLOGY

## 2025-03-11 PROCEDURE — 99183 HYPERBARIC OXYGEN THERAPY: CPT | Performed by: SURGERY

## 2025-03-11 PROCEDURE — 99024 POSTOP FOLLOW-UP VISIT: CPT | Performed by: OTOLARYNGOLOGY

## 2025-03-11 PROCEDURE — 1160F RVW MEDS BY RX/DR IN RCRD: CPT | Performed by: OTOLARYNGOLOGY

## 2025-03-11 PROCEDURE — 71046 X-RAY EXAM CHEST 2 VIEWS: CPT | Performed by: RADIOLOGY

## 2025-03-11 PROCEDURE — 1159F MED LIST DOCD IN RCRD: CPT | Performed by: OTOLARYNGOLOGY

## 2025-03-11 PROCEDURE — 1036F TOBACCO NON-USER: CPT | Performed by: OTOLARYNGOLOGY

## 2025-03-11 PROCEDURE — G0277 HBOT, FULL BODY CHAMBER, 30M: HCPCS

## 2025-03-11 PROCEDURE — 71046 X-RAY EXAM CHEST 2 VIEWS: CPT

## 2025-03-12 ENCOUNTER — OFFICE VISIT (OUTPATIENT)
Dept: WOUND CARE | Facility: CLINIC | Age: 75
End: 2025-03-12
Payer: MEDICARE

## 2025-03-12 PROCEDURE — G0277 HBOT, FULL BODY CHAMBER, 30M: HCPCS

## 2025-03-13 ENCOUNTER — OFFICE VISIT (OUTPATIENT)
Dept: WOUND CARE | Facility: CLINIC | Age: 75
End: 2025-03-13
Payer: MEDICARE

## 2025-03-13 PROCEDURE — G0277 HBOT, FULL BODY CHAMBER, 30M: HCPCS

## 2025-03-13 PROCEDURE — 99183 HYPERBARIC OXYGEN THERAPY: CPT | Performed by: PLASTIC SURGERY

## 2025-03-14 ENCOUNTER — OFFICE VISIT (OUTPATIENT)
Dept: WOUND CARE | Facility: CLINIC | Age: 75
End: 2025-03-14
Payer: MEDICARE

## 2025-03-14 PROCEDURE — G0277 HBOT, FULL BODY CHAMBER, 30M: HCPCS

## 2025-03-14 PROCEDURE — 99183 HYPERBARIC OXYGEN THERAPY: CPT | Performed by: SURGERY

## 2025-03-17 ENCOUNTER — OFFICE VISIT (OUTPATIENT)
Dept: WOUND CARE | Facility: CLINIC | Age: 75
End: 2025-03-17
Payer: MEDICARE

## 2025-03-17 PROCEDURE — G0277 HBOT, FULL BODY CHAMBER, 30M: HCPCS

## 2025-03-17 PROCEDURE — 99183 HYPERBARIC OXYGEN THERAPY: CPT | Performed by: PLASTIC SURGERY

## 2025-03-18 ENCOUNTER — OFFICE VISIT (OUTPATIENT)
Dept: WOUND CARE | Facility: CLINIC | Age: 75
End: 2025-03-18
Payer: MEDICARE

## 2025-03-18 PROCEDURE — 99183 HYPERBARIC OXYGEN THERAPY: CPT | Performed by: SURGERY

## 2025-03-18 PROCEDURE — G0277 HBOT, FULL BODY CHAMBER, 30M: HCPCS

## 2025-03-19 ENCOUNTER — OFFICE VISIT (OUTPATIENT)
Dept: WOUND CARE | Facility: CLINIC | Age: 75
End: 2025-03-19
Payer: MEDICARE

## 2025-03-19 PROCEDURE — G0277 HBOT, FULL BODY CHAMBER, 30M: HCPCS

## 2025-03-19 PROCEDURE — 99212 OFFICE O/P EST SF 10 MIN: CPT | Mod: 25

## 2025-03-20 ENCOUNTER — NUTRITION (OUTPATIENT)
Dept: HEMATOLOGY/ONCOLOGY | Facility: HOSPITAL | Age: 75
End: 2025-03-20

## 2025-03-20 ENCOUNTER — TELEPHONE (OUTPATIENT)
Dept: OTOLARYNGOLOGY | Facility: HOSPITAL | Age: 75
End: 2025-03-20

## 2025-03-20 ENCOUNTER — OFFICE VISIT (OUTPATIENT)
Dept: WOUND CARE | Facility: CLINIC | Age: 75
End: 2025-03-20
Payer: MEDICARE

## 2025-03-20 DIAGNOSIS — R29.898 RIGHT LEG WEAKNESS: ICD-10-CM

## 2025-03-20 PROCEDURE — 99183 HYPERBARIC OXYGEN THERAPY: CPT | Performed by: PLASTIC SURGERY

## 2025-03-20 PROCEDURE — G0277 HBOT, FULL BODY CHAMBER, 30M: HCPCS

## 2025-03-20 NOTE — PROGRESS NOTES
NUTRITION Follow-Up NOTE    Nutrition Assessment     Reason for Visit:  Brigette Romo is a 75 y.o. female who called to discuss desire for a TF order change     Please remember-    history of multiple different oral cavity cancers.  No current evidence of tumor recurrence. She has ongoing dysphagia and bone exposure as well as drainage around her chin.   with Osteoradionecrosis of jaw who underwent trach, triple, right composite mandibulectomy, recon with L fib (A/v: R sup thyroid, R IJ end to end), stsg w wound vac by Ame Magana and Fermin 12/16/2024.     PEG placed 2- by Dr. Magana     Virtual or Telephone Consent    While technically available, the patient was unable or unwilling to consent to connect via audio/video telehealth technology; therefore, I performed this visit using a real-time audio only connection between Brigette Romo & Amy L Lejeune, RDN, ADRIANA.  Verbal consent was requested and obtained from Brigette Romo on this date, 03/21/25 for a telehealth visit and the patient's location was confirmed at the time of the visit.     Patient Active Problem List   Diagnosis    Abnormal mammogram    Abscess of face    Acquired hypothyroidism    Adverse effect of radiation    Airway obstruction    Anemia    Ankle weakness    Anxiety    Cellulitis    Chest tightness    Dysphagia    Esophageal reflux    GI bleed    HLD (hyperlipidemia)    Influenza B    Insomnia    Left foot pain    Nausea and/or vomiting    Osteoradionecrosis of jaw    Primary osteoarthritis of left knee    Shortness of breath    Sinus congestion    Stiffness of left ankle joint    Trismus    Thyroid nodule    Wound infection    Viral upper respiratory tract infection    Ulceration, oral mucosa    Mouth lesion    Gum lesion    Tongue lesion    Other forms of stomatitis    Exposed orthopaedic hardware (CMS-HCC)    Malignant neoplasm of mandible (Multi)    Gastrostomy status (Multi)    Unspecified severe protein-calorie malnutrition  "(Multi)    Other disorders of arteries, arterioles and capillaries in diseases classified elsewhere    Non-pressure chronic ulcer of other part of left lower leg with muscle involvement without evidence of necrosis    Type 2 diabetes mellitus with other specified complication, without long-term current use of insulin       Nutrition Significant Labs:  Lab Results   Component Value Date/Time    GLUCOSE 102 (H) 12/23/2024 0545     12/23/2024 0545    K 4.1 12/23/2024 0545     12/23/2024 0545    CO2 28 12/23/2024 0545    ANIONGAP 12 12/23/2024 0545    BUN 11 12/23/2024 0545    CREATININE 0.54 12/23/2024 0545    EGFR >90 12/23/2024 0545    CALCIUM 9.2 12/23/2024 0545    ALBUMIN 3.1 (L) 12/23/2024 0545    ALKPHOS 98 12/12/2024 1420    PROT 6.8 12/12/2024 1420    AST 17 12/12/2024 1420    BILITOT 0.5 12/12/2024 1420    ALT 12 12/12/2024 1420    MG 2.26 12/23/2024 0545    PHOS 2.9 12/23/2024 0545     No results found for: \"VITD25\"      Anthropometrics:                          Height: 167.6 cm (5' 6\")   BMI (Calculated): 18.56  IBW/kg (Dietitian Calculated): 59.1 kg  Percent of IBW: 88.3 %  Weight History:   Daily Weight  03/11/25 : 52.2 kg (115 lb)  02/24/25 : 50.3 kg (111 lb)  02/18/25 : 50.7 kg (111 lb 12.4 oz)  01/16/25 : 52.6 kg (116 lb)  01/13/25 : 52.6 kg (116 lb)  12/25/24 : 54.4 kg (120 lb)  12/16/24 : 54.6 kg (120 lb 5.9 oz)  12/12/24 : 54.4 kg (120 lb)  12/10/24 : 54.4 kg (120 lb)  12/03/24 : 56.7 kg (125 lb)    -135#         Nutrition History:    Pt currently NPO  Was able to have a small amount of oral intake prior to her surgery in December    TF:    Pt takes 1.5 to 1 carton of TF per feeding  She takes in   Isosource 1.5 3 x/day provides: 1125 kCal, 51 g PRO, 44 g fat, 132 g CHO, 11.4 g fiber, and 573 mL free water    1 day in a week  Isosource 1.5 4 x/day provides: 1500 kCal, 68 g PRO, 59 g fat, 176 g CHO, 15.2 g fiber, and 764 mL free water    4 days in a week  Isosource 1.5 5 x/day " provides: 1875 kCal, 85 g PRO, 74 g fat, 220 g CHO, 19 g fiber, and 955 mL free water    2 days per week    Average intake had been 1553 domenico and 70 g protein per day     Pt intake of Isosource 1.5 is limited some days due to how it makes it her feel- she sometimes gets GERD from TF as well as over all feeling poorly after a feed  She had this feeling while she was on Osmolite as well    With this it was decided to try other formulas to assess tolerance   She was mailed samples from [employer]   Liquid Hope   Compleat original 1.5  [employer] standard 1.4    Pt calls today to tell me she liked Liquid Hope the best but could not tolerate the smell  Her next choice in term s of tolerance was Compleat original 1.5  She reports feeling better on this formula and is without GERD  Will request order change to   She will take 4 cartons per day providing  1500 calories  68 g protein  760 ml of free water     Will complete paper work to change order and send to [place]   Medications:  Current Outpatient Medications   Medication Instructions    Children's acetaminophen 1,000 mg, g-tube, Every 8 hours scheduled    chlorhexidine (Peridex) 0.12 % solution 15 mL, Mouth/Throat, 3 times daily after meals    eucerin cream Topical, 2 times daily    famotidine (PEPCID) 20 mg, g-tube, 2 times daily    levothyroxine (SYNTHROID, LEVOXYL) 25 mcg, g-tube, Daily before breakfast, Take on an empty stomach with water only.  Wait 1 hour before having food, medications or beverages other than water.  OK to crush pill.    loperamide (IMODIUM A-D) 2 mg, g-tube, 4 times daily PRN    white petrolatum (Aquaphor) 41 % ointment ointment 1 Application, Topical, 3 times daily       Nutrition Focused Physical Exam Findings:    Subcutaneous Fat Loss:   Defer Subcutaneous Fat Loss Assessment: Defer all  Defer All Reason: phone    Muscle Wasting:  Defer Muscle Wasting Assessment: Defer all  Defer All Reason: phone    Physical Findings:          Pt is using a  hyperbaric chamber to help with healing     Estimated Needs:        Dosing weight: 50.3 kg  Calories per day: 1500- 1760  determined by 30 kcal/kg  Protein (g) per day: 60-70 determined by 1.2 to 1.4 g/kg  Estimated fluid needs: 1500 + determined by 1 kcal/mL                    Nutrition Diagnosis   Malnutrition Diagnosis  Patient has Malnutrition Diagnosis: Yes  Diagnosis Status: Active  Malnutrition Diagnosis: Moderate malnutrition related to chronic disease or condition  Related to: pt being 100% reliant on TF - and TF is not meeting estimated needs daily  As Evidenced by: weight history and reported TF intake            Nutrition Interventions/Recommendations   Nutrition Prescription: Enteral nutrition change TF to Compleat original 1.5- 4 cartons per day    Nutrition Interventions:   Food and Nutrient Delivery:       Coordination of Care: Goals: ENT for order change Piper is DOROTHY     Nutrition Education:   Nutrition Education Content:                      Nutrition Monitoring and Evaluation   Food and Nutrient Intake  Monitoring and Evaluation Plan: Enternal and parenternal nutrition intake determination  Criteria: Intake of TF to meet 1500 calories per day  Additional Plan: Increase tolerance to TF    Anthropometric measurements  Monitoring and Evaluation Plan: Weight  Body Weight: Body weight  Criteria: stabilize to increase                   Follow Up: Planned follow up visit: on 5-29- at 1p when she meet with NP in rad onc

## 2025-03-21 ENCOUNTER — APPOINTMENT (OUTPATIENT)
Dept: WOUND CARE | Facility: CLINIC | Age: 75
End: 2025-03-21
Payer: MEDICARE

## 2025-03-21 NOTE — TELEPHONE ENCOUNTER
I confirmed with Dr. Christensen that we can proceed with physical therapy.    Order entered and I called Brigette to let her know.   She was appreciative, but was not in a spot to take down [facility]:    [phone]    I told her I would MyChart message her the information.  She was agreeable and appreciative.

## 2025-03-25 ENCOUNTER — APPOINTMENT (OUTPATIENT)
Dept: SPEECH THERAPY | Facility: CLINIC | Age: 75
End: 2025-03-25
Payer: MEDICARE

## 2025-03-26 ENCOUNTER — APPOINTMENT (OUTPATIENT)
Dept: WOUND CARE | Facility: CLINIC | Age: 75
End: 2025-03-26
Payer: MEDICARE

## 2025-03-31 ENCOUNTER — APPOINTMENT (OUTPATIENT)
Dept: PHYSICAL THERAPY | Facility: CLINIC | Age: 75
End: 2025-03-31
Payer: MEDICARE

## 2025-03-31 DIAGNOSIS — R29.898 RIGHT LEG WEAKNESS: Primary | ICD-10-CM

## 2025-03-31 PROCEDURE — 97162 PT EVAL MOD COMPLEX 30 MIN: CPT | Mod: GP

## 2025-03-31 PROCEDURE — 97110 THERAPEUTIC EXERCISES: CPT | Mod: GP

## 2025-03-31 NOTE — PROGRESS NOTES
Physical Therapy    Physical Therapy Evaluation    Patient Name: Brigette Romo  MRN: [id number]  Today's Date: 3/31/2025   confirmed verbally by patient.    Time Entry:   Time Calculation  Start Time: 1345  Stop Time: 1433  Time Calculation (min): 48 min  PT Evaluation Time Entry  PT Evaluation (Moderate) Time Entry: 30  PT Therapeutic Procedures Time Entry  Therapeutic Exercise Time Entry: 18       Reason for Visit  Referred by: Brandy Christensen MD  Referral DX:  Right leg weakness [R29.898]     Insurance:  Visit: #1  Authorized: to be determined  Payor/Plan: [employer]       Current Problem  1. 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 She reports tightness extending into the posterior calf, which corresponds with the graft site from the right lower extremity (RLE) used during her most recent jaw surgery. Prior to this procedure, the patient underwent a second round of radiation therapy for known oral cancer, which resulted in further jaw deterioration and necessitated the use of healthy graft tissue.    The initial graft during the first surgical intervention was taken from the left ankle, after which the patient participated in physical therapy. She recalls that therapy had a positive impact, aiding in the restoration of mobility and functional tolerance to movement.    Regarding ambulation, the patient can walk for an extended period but experiences increasing tightness along the lateral calf, requiring her to stop after approximately one mile. She  enjoys maintaining an active lifestyle but has recently encountered increased difficulty due to decreased strength, balance, and endurance in the right lower extremity.    Additionally, she reports a clicking sensation at the posterior knee/shin when maintaining prolonged knee flexion (e.g., when bent over). She has growing concerns that there may be underlying joint damage and is considering consulting an orthopedic specialist to determine if further intervention is necessary    The patient denies numbness or tingling but describes an abnormal sensation at the previous graft sites.    Functional Limitations:  Prolonged ambulation  Difficulty with stair navigation (ascending is more challenging than descending)  Difficulty with sit-to-stand transfers (e.g., chair, car)        Home environment: one level floor throughout (no stairs/steps)     Work status/occupation: retired           Pain:   Worst = 2-3/10   Sharp, audible cracking/popping to the posterior aspect of the knee / tibiofemoral joint when maintaining prolonged knee flexion postures ---- similar to when working outside     Best = 0/10   Usually the pain is not an issue, experiencing more tightness than anything else       Recent Imaging = None      Reviewed medical screening history form with patient: Yes,   Despite patient feeling unbalanced, she has not had any falls in the last 6 months. Notes a sensation of uneasiness   Denies any changes in breathing, heart rate, lightheadedness or dizziness   Pt notes that she has been having a harder time with eating which can lead to dizziness        Barriers Impacting Care:  Verbal communication   Increased difficulty comprehending patient communication due to most recent jaw surgery. This would challenge subjective questioning and reports of symptoms with interventions and between sessions.       Precautions:   None at this time        Objective   Observation  Incision site   Clean and free from drainage or pus  "  Noticed increased redness surrounding the incision and wound   1x remaining peeled skin to inferior portion near lateral malleoli     Gait:   Foot / Ankle   Lack of controlled eccentric pronation during loading response and mid-stance (worse on the RLE > LLE)   Slight medial heel whip on RLE during pre-swing to initial swing phase of gait   Hip  Reduced hip extension and subsequent step length bilaterally likely from lack of ankle mobility and flexibility        Range of Motion:   Ankle ROM   Dorsiflexion:   L = 10 A, 12 P with firm end-feel   R = 10 A, 12 P with firm end-feel   Plantarflexion:   L = 70 A, 80 P with firm end-feel   R = 50 A, 55 P with muscular end-feel   Inversion:  L = 16 A, 24 P  R = 16 A, 18 P with muscular end-feel (increased stretch)   Eversion:   L = 14 A  R = 4 A, 20 P with firm end-feel     Knee to wall measurement for ankle mobility   10 cm (R) without pain       Knee ROM   L = 0-135 A, 0-140 P with empty end-feel into flexion   R = 0-135 A, 0-140 P with empty end-feel into flexion       Flexibility:  (+) gastroc ---- > only able to achieve neutral position with knee in full extension          Strength:   Ankle  Dorsiflexion:   L = 4+/5  R = 4/5  Plantarflexion:   L = 4/5  R = 4-/5   Inversion  L = 4-/5  R = 3+/5  Eversion:   L = 3+/5  R = 2/5 against gravity     Knee  Extension:   L = 4+/5   R = 4+/5   Flexion:  L = 4/5  R = 4/5       Palpation:   TTP   no tenderness to palpate to the incision site or surrounding soft tissue  Tissue density   Increased to most lateral portion half-way down the tibia/fibula   Increased to distal fibula close in proximity to the lateral malleolus       Balance   (EO) single leg balance   L = 5 seconds  R = 5 seconds       Dermatomes   Intact throughout the LE - local to the graft site and global throughout the LE's       Special Tests = not performed in session          \"Key Sign\" = ankle AROM          TREATMENT  Therapeutic Exercise (Access Code: " [email or web address])  Home exercise plan   - Ankle Eversion with Resistance  - 1 x daily - 7 x weekly - 2 sets - 15 reps  - Standing Heel Raise with Toes Turned Out  - 1 x daily - 7 x weekly - 2 sets - 10 reps - 3-5 seconds hold  - Standing Ankle Dorsiflexion Stretch  - 1 x daily - 7 x weekly - 2 sets - 10 reps - 5 seconds hold  Patient education   Educated patient on course of treatment       OP Education: HEP: Patient verbalizes and demonstrates understanding of home exercises. Will contact writer if with questions or if condition worsens. Discussed       Outcome Measures:  LEFS = 63/80      Assessment  PT Diagnosis: Patient presents 3+ months post trach, triple, right composite mandibulectomy, recon with L fib (A/v: R sup thyroid, R IJ end to end) to address right lower extremity weakness and dysfunction from autograft. She presents with limitations in ankle AROM and strength, most limited into eversion and inversion which contributes to difficulties with prolonged ambulation and stair navigation. Incision is clean and free from pus or drainage at this time. Patient will have a good prognosis for recovery as a result of positive benefit from by previous bout of physical therapy for left lower extremity autograft with similar limitations and impairments. Skilled PT required to return to prior level of function and maximize functional outcome. Likely to benefit from skilled care.        Plan  Therapeutic exercises to improve hip and thoracolumbar ROM and strength of proximal hip and trunk musculature; neuromuscular re-education to promote proper engagement of proximal hip and trunk musculature; manual therapy for joint mobility and soft tissue tightness; therapeutic activity to promote return to prior level of function; biofeedback; gait training; dry needling; taping; cold/cryotherapy; hot packs; aquatic therapy; electrical stimulation; Patient plan will consist of 2 days/week for 12 weeks.    Next Visit Plan:  Assess  response to current HEP   Manual therapy   STM/IASTM to graft site and surrounding tissues   Passive stretching into DF, INV, PF, EV  TE/NMR  4-way ankle motions with resistance   Single leg balance with EO/EC  Kinetic chain incorporation       Goals:  Patient will be independent with HEP.  Patient will report improvement in LEFS outcome measure >72/80 in 8 weeks to meet MCID. (Long-term)   Patient will demonstrate within normal limits of AROM into all ankle motions (DF, PF, INV, EV) in 8 weeks for improvement in gait mechanics and stair/curb navigation. (Long-term)  Patient will demonstrate at least 3+/5 muscle strength into ankle eversion in 4 weeks to reflect positive changes in muscle strength required for foot and ankle stability. (Short-term)  Patient will demonstrate at least 4-/5 muscle strength into ankle eversion and inversion in 6 weeks to reflect positive changes in muscle strength required for foot and ankle stability. (Short-term)  Patient will demonstrate at least 4/5 muscle strength into ankle eversion and inversion in 12 weeks to reflect positive changes in muscle strength required for foot and ankle stability. (Long-term)  Patient will report being able to walk continuously for 1.5 miles without the need to rest due to discomfort in the right lower extremity to improve community and social participation. (4-weeks)  Patient will report being able to walk continuously for 2.0 miles without the need to rest due to discomfort in the right lower extremity to improve community and social participation. (8-weeks)  Patient will report being able to walk continuously for 2.5 miles without the need to rest due to discomfort in the right lower extremity to improve community and social participation. (12-weeks)      Per George PT, DPT

## 2025-04-01 ENCOUNTER — TREATMENT (OUTPATIENT)
Dept: SPEECH THERAPY | Facility: CLINIC | Age: 75
End: 2025-04-01
Payer: MEDICARE

## 2025-04-01 DIAGNOSIS — R13.12 DYSPHAGIA, OROPHARYNGEAL PHASE: Primary | ICD-10-CM

## 2025-04-01 PROCEDURE — 92526 ORAL FUNCTION THERAPY: CPT | Mod: GN

## 2025-04-01 NOTE — PROGRESS NOTES
"Speech-Language Pathology    [facility] Adult [facility]patient   Speech-Language Pathology Treatment     Patient Name: Brigette Romo  MRN: [id number]  Today's Date: 4/1/2025  Time Calculation  Start Time: 1513  Stop Time: 1545  Time Calculation (min): 32 min         Current Problem: Concern for dysphagia s/p multimodality treatment for head and neck cancer. 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 Magana and Dr. Christensen. Pt with two rounds of RXT.       Current Diet: PEG tube for all nutrition, hydration and medications  - Therapeutic feeds of thin liquids with the following strategies:   Liquid sips via syringe, cup or squirt bottle or spray bottle  Multiple swallows  Head/chin tilt to assist with ap propulsion    Functional Oral Intake Scale   FIOS level Comment    Level 1  Nothing by Mouth    Level 2 Tube dependent with minimal attempts of food and liquid.   X Level 3 Tube dependent with consistent oral intake of food and liquid.    Level 4 Total oral diet of a single consistency.    Level 5 Total oral diet with multiple consistencies, but requires special preparations and compensations.    Level 6 Total oral diet with multiple consistencies without special preparations but specific food limitations.    Level 7 Total oral diet with no restrictions.        Type of Exam/ Assessment/ Impression  Brigette Romo received in clinic alert, cooperative and in good spirits. Notable decrease in drooling with improved lingual lateralization and jaw opening. Pt recently tolerating thin liquids via spray bottle without evidence of airway invasion. Pt trying different liquid flavors but due to dysgeusia secondary to  limited lingual surface/ taste buds. Pt endorses being a \"carlotta-holic\" and has tried " small pieces of chocolate by allowing them to melt in the oral cavity for easier swallowing.     PHYSICAL EXAMINATION including ORAL MECHANISM     Trigeminal Nerve (V)   Jaw open/close: Reduced     Jaw laterization:  Reduced    Bite:  Reduced    Sensation of face:  Adequate    Sensation of oral cavity:  Adequate    Sensation of tongue: Reduced    Facial Nerve (VII)    Asymmetry at rest: Reduced    Eyebrow raise: Adequate    Lip pucker: Reduced    Lip retraction/smile: Adequate      Cheek puffed: Absent     Taste: Reduced    Spinal Accessory (XI)  Head rotation: Adequate    Shoulder shrug: Adequate    Hypoglossal (XII)       Lingual protrusion: Absent     Tongue ROM/laterization, elevation: Reduced    Tongue strength: Reduced         Treatment:  Instructed patient this date in:  - Swallow exercises and jaw/tongue stretches reviewed. Pt reports daily completion with minor tongue discomfort.   - Pt with mild improvement with ROM of jaw and lip movement.   - Speech intelligibility mildly improved.  - Dental care, and oral hygiene in relation to aspiration discussed. As well as the additional prognostic indicators that can contribute to higher risks of development of aspiration pneumonia.   Clinician modeled all techniques and accurate patient follow through confirmed. Handouts provided to facilitate optimal home carryover.    Plan of Care:    Diet Recommendations:  PEG tube for all nutrition, hydration and medications  - Therapeutic feeds of thin liquids with the following strategies:   Liquid sips via syringe, cup or squirt bottle or spray bottle  Multiple swallows  Head/chin tilt to assist with ap propulsion    Additional Recommendations: Consider plastic surgery for lip alteration to assist with drooling.     Home Exercise Program:  Established: 01/13/25  Status: Progressing  Duration: 3 months     Short Term Goals:  - Pt will complete effortful swallows 10 reps, 3 times per day for 7 days a week; to strengthen  pharyngeal muscles for improve bolus clearance through the pharynx.    - Patient will tolerate the least restrictive diet without overt s/s of oropharyngeal dysphagia 10/10 trials.   - Pt will utilize and recall compensatory strategies independent of cues 100% of the time to tolerate the least restrictive diet without overt s/s of pharyngeal deficits  - Pt will complete a series of jaw exercises/stretches independent of cues 3x a day, 7 days a week; to reduced the effects of radiation on the jaw muscles necessary to consume PO safely and efficiently.   - Pt will complete a series of neck stretches independent of cues, 5 times a day for 7 days a week; to reduce the effects of radiation on the muscles necessary for optimal head position for safe and efficient PO intake.  - Pt will produce a series of lingual exercises/stretches independent of cues 10 reps, 3 times per day, 7 days a week; to maintain strength and motility of the lingual muscles and reduce the effects of radiation for safe and efficient PO intake.     Long Term Goals:  - Patient will tolerate the least restrictive diet without overt s/s pulmonary compromise by time of discharge.     [facility] Follow-up:  - [facility] 4/29 @ 3:15pm     Continue current plan of care   Monitor home program  Patient instructed to call if there are problems  Patient to follow up with   Progress with POC, as tolerated  Discussed POC with  Patient/caregiver agreeable with POC

## 2025-04-07 ENCOUNTER — TREATMENT (OUTPATIENT)
Dept: PHYSICAL THERAPY | Facility: CLINIC | Age: 75
End: 2025-04-07
Payer: MEDICARE

## 2025-04-07 DIAGNOSIS — M25.671 ANKLE STIFFNESS, RIGHT: Primary | ICD-10-CM

## 2025-04-07 DIAGNOSIS — R29.898 RIGHT LEG WEAKNESS: ICD-10-CM

## 2025-04-07 PROCEDURE — 97140 MANUAL THERAPY 1/> REGIONS: CPT | Mod: GP

## 2025-04-07 PROCEDURE — 97110 THERAPEUTIC EXERCISES: CPT | Mod: GP

## 2025-04-07 NOTE — PROGRESS NOTES
"Physical Therapy    Physical Therapy Treatment    Patient Name: Brigette Romo  MRN: [id number]  Today's Date: 4/7/2025  Time Calculation  Start Time: 1345  Stop Time: 1430  Time Calculation (min): 45 min        Insurance:  Visit: #2  Authorized: tbd  Certification: 3/31/2025 - 7/30/2025 (POC)  Payor: [employer] / Plan: [employer] / Product Type: *No Product type* /       Subjective:  Patient reports to physical therapy with no worsening of symptoms since beginning physical therapy. Denies any additional pain, but notes increased tenderness to the outside of the right calf being tender to the touch which is likely from performing current HEP.     Patient notes that she was able to walk [place] 2[place] the [place] the [place] without any issues or dysfunction throughout, only noting fatigue. This has been a large improvement then what was discussed during the initial evaluation, as patient did alter her shoe wear which could have made an issue.         Current pain: 0/10        Objective:  Ankle ROM   Dorsiflexion:   L = 10 A (from eval)  R = 18 A  Plantarflexion:   L = 70 A (from eval)  R = 68 A   Inversion:  L = 16 A (from eval)  R = 22 A, 18 P with muscular end-feel (increased stretch)   Eversion:   L = 14 A (from eval)  R = 18 A          Treatment:  Manual Therapy = 1 unit  STM = scar mobilization (Creek and vertical technique)   IASTM = scar mobilization, ankle evertors with passive ankle DF/EV    Therapeutic Exercise  = 2 units   Added to HEP  Inversion stretch = 2x30\" hold   Double leg calf raise iso = 5x15\" holds   Review of HEP   Ankle DF (GTB) = 2x20   Ankle inversion calf raise = 2x10x5\" holds         HEP: #2 (Access Code: [email or web address])  - Seated Ankle Evertor Stretch   - 1 x daily - 7 x weekly - 3 sets - 30 seconds hold  - Ankle Dorsiflexion with Resistance  - 1 x daily - 7 x weekly - 2 sets - 20 reps  - Ankle Eversion with Resistance  - 1 x daily - 7 x weekly - 2 sets - 20 " reps  - Standing Heel Raise with Toes Turned Out  - 1 x daily - 7 x weekly - 2 sets - 10 reps - 3-5 seconds hold    OP Education:   Consideration for use of varius shoes that might improve ambulation endurance and tolerable distance           Diagnosis:  1. Ankle stiffness, right    2. Right leg weakness          Assessment:   Pt demonstrates large improvements in ankle AROM when compared to the initial evaluation. She continues to be most limited in ankle eversion AROM due to muscle weakness and ankle inversion due to tissue flexibility of the surrounding graft site. Patient notes slight paresthesia symptoms with IASTM to the most distal aspect of the fibular near the lateral malleoli, but no similar sensations throughout the remainder of the incision/graft site. Patient will continue to benefit from physical therapy services in order to return to prior level of function without compensatory motions during active movements.          Plan:  Assess response to current HEP   Manual therapy   STM/IASTM to graft site and surrounding tissues   Passive stretching into DF, INV, PF, EV  TE/NMR  4-way ankle motions with resistance   Single leg balance with EO/EC  Kinetic chain incorporation         Goals:  Patient will be independent with HEP.  MET  Patient will report improvement in LEFS outcome measure >72/80 in 8 weeks to meet MCID. (Long-term)   Patient will demonstrate within normal limits of AROM into all ankle motions (DF, PF, INV, EV) in 8 weeks for improvement in gait mechanics and stair/curb navigation. (Long-term)  50% MET  Patient will demonstrate at least 3+/5 muscle strength into ankle eversion in 4 weeks to reflect positive changes in muscle strength required for foot and ankle stability. (Short-term)  30% MET   Patient will demonstrate at least 4-/5 muscle strength into ankle eversion and inversion in 6 weeks to reflect positive changes in muscle strength required for foot and ankle stability.  (Short-term)  30% MET   Patient will demonstrate at least 4/5 muscle strength into ankle eversion and inversion in 12 weeks to reflect positive changes in muscle strength required for foot and ankle stability. (Long-term)  15% MET   Patient will report being able to walk continuously for 1.5 miles without the need to rest due to discomfort in the right lower extremity to improve community and social participation. (4-weeks)  75% MET   Patient will report being able to walk continuously for 2.0 miles without the need to rest due to discomfort in the right lower extremity to improve community and social participation. (8-weeks)  50% MET   Patient will report being able to walk continuously for 2.5 miles without the need to rest due to discomfort in the right lower extremity to improve community and social participation. (12-weeks)  25% MET     Per George, PT

## 2025-04-11 ENCOUNTER — OFFICE VISIT (OUTPATIENT)
Dept: OTOLARYNGOLOGY | Facility: HOSPITAL | Age: 75
End: 2025-04-11
Payer: MEDICARE

## 2025-04-11 DIAGNOSIS — K14.8 TONGUE LESION: ICD-10-CM

## 2025-04-11 DIAGNOSIS — C02.9 MALIGNANT NEOPLASM OF TONGUE (MULTI): ICD-10-CM

## 2025-04-11 DIAGNOSIS — C03.1 CANCER OF LOWER GUM (MULTI): Primary | ICD-10-CM

## 2025-04-11 PROCEDURE — 99213 OFFICE O/P EST LOW 20 MIN: CPT | Mod: 25 | Performed by: OTOLARYNGOLOGY

## 2025-04-11 ASSESSMENT — PATIENT HEALTH QUESTIONNAIRE - PHQ9
1. LITTLE INTEREST OR PLEASURE IN DOING THINGS: NOT AT ALL
2. FEELING DOWN, DEPRESSED OR HOPELESS: NOT AT ALL
SUM OF ALL RESPONSES TO PHQ9 QUESTIONS 1 AND 2: 0

## 2025-04-11 NOTE — PROGRESS NOTES
Provider Impressions     Status post treatment of multiple different oral cavity cancers.  Today she had a lesion in the right posterior tongue that did not look that suspicious but regardless a biopsy was done.  I will call her after the biopsy.    Dysphagia which is unlikely to get better.  She remains PEG dependent.    Thyroid nodule that was picked up on the PET scan.  This turned out to be benign on a biopsy that was eventually done after the ultrasound.    Status post  surgery for radionecrosis of the mandible.      I will see her at her regular appointment.    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 She had surgical excision and a neck dissection followed by radiation therapy. She was found to have tumor recurrence and on September 22, 2016 she underwent reexcision. The margins were felt to be clear. There was no evidence of pedro luis metastasis. She had a TSH level in July 2023 which was normal. She had a chest CT in April 2020 which was negative. She was having some issues with discomfort on the lateral tongue on the left. This led to a biopsy that showed cancer. On April 13, 2020 she underwent a partial glossectomy. The margins were clear. She was presented at our tumor board and it was not felt that anything further needed to be done. She admits that her swallowing is somewhat difficult. She had a chest CT in May 2023 that showed a possible small nodule. I saw her in January 2023 because she had some concern about a lesion in her mouth. 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 She had a CT of the neck done on January 20, 2023 that showed a lesion involving the mandible on the right side. On February 7, 2023 she underwent surgery. There was bone involvement. There was a margin that was described as being close. She completed  radiation therapy in April 2023. She had a PET scan done in August 2023 that really does not show anything worrisome.  At the same time she has some uptake in the right thyroid.  This was followed by an ultrasound that showed a suspicious nodule which was eventually biopsied and turned out to be benign.  I will follow this clinically.  She  had issues with drainage around infected reconstruction plates.  On December 16, 2024 she underwent a composite resection and reconstruction.  The pathology did not show anything worrisome.  She is undergoing hyperbaric oxygen.  She had a TSH level in November 2024 which was normal.  She is here today because for the past couple of weeks she has had a painful lesion on her right posterior tongue.    Physical Exam      Examination of the oral cavity and oropharynx shows all the changes from the recent surgery.  On the posterior aspect of the anterior tongue on the right is a lesion that is slightly elevated.  It does not look suspicious but at the same time dilation be there.    After verbal consent and under Xylocaine 1% to 100,000 epinephrine a 3 mm punch biopsy was done.  The base was cauterized with silver nitrate.

## 2025-04-14 ENCOUNTER — TREATMENT (OUTPATIENT)
Dept: PHYSICAL THERAPY | Facility: CLINIC | Age: 75
End: 2025-04-14
Payer: MEDICARE

## 2025-04-14 DIAGNOSIS — R29.898 RIGHT LEG WEAKNESS: ICD-10-CM

## 2025-04-14 PROCEDURE — 97110 THERAPEUTIC EXERCISES: CPT | Mod: GP

## 2025-04-14 PROCEDURE — 97112 NEUROMUSCULAR REEDUCATION: CPT | Mod: GP

## 2025-04-14 PROCEDURE — 97140 MANUAL THERAPY 1/> REGIONS: CPT | Mod: GP

## 2025-04-14 NOTE — PROGRESS NOTES
"Physical Therapy    Physical Therapy Treatment    Patient Name: Brigette Romo  MRN: [id number]  Today's Date: 4/14/2025  Time Calculation  Start Time: 1345  Stop Time: 1430  Time Calculation (min): 45 min        Insurance:  Visit: #3  Authorized: 11  Certification: 3/31/2025 - 7/30/2025 (POC)  Payor: [employer] / Plan: [employer] / Product Type: *No Product type* /       Subjective:  Patient reports to physical therapy with no worsening of symptoms since beginning physical therapy. Denies any additional pain, but notes increased tenderness to the outside of the right calf being tender to the touch which is likely from performing current HEP.     She continues to remain compliant with current HEP, noting less difficulty with performing ankle DF AROM but still struggles to perform ankle eversion with small impingement to the lateral aspect of the lateral malleoli.           Current pain: 0/10        Objective:  Ankle ROM   Inversion:  L = 16 A (from eval)  R = 24 A   Last session = 22 A, 18 P with muscular end-feel (increased stretch)   Eversion:   L = 14 A (from eval)  R = 18 A, 22 P          Treatment:  Manual Therapy = 1 unit  STM =   scar mobilization ([place] and vertical technique)  Pin and stretch to peroneals and scar tissue with passive ankle inversion/eversion   AP grade 3 MWM to distal fibular head with passive ankle DF     Therapeutic Exercise  = 1 units   Review of HEP   Ankle INV (GTB) = 2x20   Ankle EV (GTB) = 2x20   Manual inversion stretch = 4x15\" holds     NMR = 1 unit  Added to HEP  Hip turns with RLE only = 10x   Rocker board  INV/EV = 2x10   DF/PF x20   Maintaining neutral position against INV/EV with EC = x30\" hold        HEP: #2 (Access Code: [email or web address])  - Ankle Inversion with Resistance  - 1 x daily - 7 x weekly - 2 sets - 20 reps  - Seated Ankle Evertor Stretch   - 1 x daily - 7 x weekly - 3 sets - 15-20 seconds hold  - Ankle Eversion with Resistance  - 1 x daily " "- 7 x weekly - 2 sets - 20 reps  - Hip Turns  - 1 x daily - 4-7 x weekly - 2 sets - 10 reps  - Standing Heel Raise  - 1 x daily - 4-7 x weekly - 2 sets - 5 reps - 15-20 seconds hold    OP Education:   Consideration for use of varius shoes that might improve ambulation endurance and tolerable distance           Diagnosis:  1. Right leg weakness          Assessment:   Patient demonstrates continued limitations into ankle inversion and eversion due to tissue healing from previous autograft to the lateral aspect of the lower extremity. Soft tissue density is increased located to the most inferior aspect of the incision, which is where patient notes most sensitive and discomfort sensation felt during passive ankle inversion stretch. She is introduced to resisted ankle inversion and \"hip turns\" to further promote closed chain forefoot adduction/abduction and supination/pronation. Patient performs well with initial cues for proper placement and strategies for progression once initial set up becomes easier. Patient will continue to benefit from physical therapy services in order to return to maximize functional outcomes.         Plan:  Assess response to current HEP   Manual therapy   STM/IASTM to graft site and surrounding tissues   Passive stretching into DF, INV, PF, EV  TE/NMR  4-way ankle motions with resistance   Single leg balance with EO/EC  Kinetic chain incorporation         Goals:  Patient will be independent with HEP.  MET  Patient will report improvement in LEFS outcome measure >72/80 in 8 weeks to meet MCID. (Long-term)   Patient will demonstrate within normal limits of AROM into all ankle motions (DF, PF, INV, EV) in 8 weeks for improvement in gait mechanics and stair/curb navigation. (Long-term)  50% MET  Patient will demonstrate at least 3+/5 muscle strength into ankle eversion in 4 weeks to reflect positive changes in muscle strength required for foot and ankle stability. (Short-term)  40% MET   Patient " will demonstrate at least 4-/5 muscle strength into ankle eversion and inversion in 6 weeks to reflect positive changes in muscle strength required for foot and ankle stability. (Short-term)  30% MET   Patient will demonstrate at least 4/5 muscle strength into ankle eversion and inversion in 12 weeks to reflect positive changes in muscle strength required for foot and ankle stability. (Long-term)  20% MET   Patient will report being able to walk continuously for 1.5 miles without the need to rest due to discomfort in the right lower extremity to improve community and social participation. (4-weeks)  75% MET   Patient will report being able to walk continuously for 2.0 miles without the need to rest due to discomfort in the right lower extremity to improve community and social participation. (8-weeks)  50% MET   Patient will report being able to walk continuously for 2.5 miles without the need to rest due to discomfort in the right lower extremity to improve community and social participation. (12-weeks)  25% MET         Per George, PT

## 2025-04-18 DIAGNOSIS — E03.9 HYPOTHYROIDISM (ACQUIRED): ICD-10-CM

## 2025-04-18 RX ORDER — LEVOTHYROXINE SODIUM 25 UG/1
TABLET ORAL
Qty: 90 TABLET | Refills: 2 | Status: SHIPPED | OUTPATIENT
Start: 2025-04-18

## 2025-04-21 ENCOUNTER — TREATMENT (OUTPATIENT)
Dept: PHYSICAL THERAPY | Facility: CLINIC | Age: 75
End: 2025-04-21
Payer: MEDICARE

## 2025-04-21 DIAGNOSIS — R29.898 RIGHT LEG WEAKNESS: ICD-10-CM

## 2025-04-21 PROCEDURE — 97140 MANUAL THERAPY 1/> REGIONS: CPT | Mod: GP

## 2025-04-21 PROCEDURE — 97110 THERAPEUTIC EXERCISES: CPT | Mod: GP

## 2025-04-21 PROCEDURE — 97112 NEUROMUSCULAR REEDUCATION: CPT | Mod: GP

## 2025-04-21 NOTE — PROGRESS NOTES
"Physical Therapy    Physical Therapy Treatment    Patient Name: Brigette Romo  MRN: [id number]  Today's Date: 4/21/2025  Time Calculation  Start Time: 1157  Stop Time: 1247  Time Calculation (min): 50 min        Insurance:  Visit: #4  Authorized: 11  Certification: 3/31/2025 - 7/30/2025 (POC)  Payor: [employer]IC[employer] / Plan: [employer]ICARE / Product Type: *No Product type* /       Subjective:  Patient reports to physical therapy with no worsening of symptoms since beginning physical therapy. She remains compliant with current HEP, noting ability to perform almost every day. Her limitations to this point outside of ROM are concerned with her balance on a single leg, limiting her comfort level with prolonged standing and dynamic movements that require stability at the foot and ankle when twisting, bending, stepping, lunging, etc.           Current pain: 0/10        Objective:  Ankle ROM   Inversion:  R = 24 A     Eversion:   R = 6 A    Gastroc flexibility   R =   Pre-intervention = 4 A   Post-intervention = 10 A          Treatment:  Manual Therapy = 1 unit  STM =   scar mobilization ([place] and vertical technique)  Pin and stretch to peroneals and scar tissue with passive ankle inversion/eversion     Therapeutic Exercise  = 1 units   Added to HEP   Toe on wall stretch = 2x30\"   Review of HEP   Inversion calf stretch = 2x30\" holds   Ankle INV (GTB) = x20   Ankle EV (GTB) = x20   Inversion stretch = 2x30\" holds     NMR = 1 unit  Review of HEP   Hip turns with RLE only =   Level ground = 10x    Uneven surface =  2x10 with single arm support and EC  Double leg calf raise = 2x10         HEP: #3 (Access Code: [email or web address])  - Gastroc Stretch with Foot at Wall  - 1 x daily - 7 x weekly - 2 sets - 30 seconds hold  - Ankle Inversion with Resistance  - 1 x daily - 7 x weekly - 2 sets - 20 reps  - Seated Ankle Evertor Stretch   - 1 x daily - 7 x weekly - 3 sets - 15-20 seconds hold  - Ankle Eversion with " Resistance  - 1 x daily - 7 x weekly - 2 sets - 20 reps  - Hip Turns  - 1 x daily - 4-7 x weekly - 2 sets - 10 reps  - Standing Heel Raise  - 1 x daily - 4-7 x weekly - 2 sets - 10 reps - 1-3 seconds hold        OP Education:   Consideration for use of varius shoes that might improve ambulation endurance and tolerable distance           Diagnosis:  1. Right leg weakness          Assessment:   Patient continues to note improvement in functional capacity since beginning physical therapy, being able to walk longer distances than weeks prior without any additional soreness or discomfort post. However, she remains limited in ankle inversion and eversion AROM due to muscle flexibility, tissue extensibility, and muscle strength. Emphasized in session was tactile guidance into ankle inversion and eversion for proper performance of the muscle action rather than performing ankle circles into each direction. Verbal cueing is also incorporated with standing hip turns with evidence of increased challenge when externally rotating the hip and foot adduction / pronation in a closed chain position. She is able to perform with a kickstand position of the opposite limb so that she can guide herself throughout the movement without being limited by balance or stability into position. Patient will continue to benefit from physical therapy services in order to maximize functional outcomes.          Plan:  Assess response to current HEP   Manual therapy   STM/IASTM to graft site and surrounding tissues   Passive stretching into DF, INV, PF, EV  TE/NMR  4-way ankle motions with resistance   Single leg balance with EO/EC  Kinetic chain incorporation         Goals:  Patient will be independent with HEP.  MET    OUTCOME MEASURE:   Patient will report improvement in LEFS outcome measure >72/80 in 8 weeks to meet MCID. (Long-term)   Patient will demonstrate within normal limits of AROM into all ankle motions (DF, PF, INV, EV) in 8 weeks for  improvement in gait mechanics and stair/curb navigation. (Long-term)  50% MET    STRENGTH / MOTOR CONTROL / NEURO RE-ED    Patient will demonstrate at least 3+/5 muscle strength into ankle eversion in 4 weeks to reflect positive changes in muscle strength required for foot and ankle stability. (Short-term)  45% MET   Patient will demonstrate at least 4-/5 muscle strength into ankle eversion and inversion in 6 weeks to reflect positive changes in muscle strength required for foot and ankle stability. (Short-term)  35% MET   Patient will demonstrate at least 4/5 muscle strength into ankle eversion and inversion in 12 weeks to reflect positive changes in muscle strength required for foot and ankle stability. (Long-term)  25% MET     FUNCTION:   Patient will report being able to walk continuously for 1.5 miles without the need to rest due to discomfort in the right lower extremity to improve community and social participation. (4-weeks)  MET   Patient will report being able to walk continuously for 2.0 miles without the need to rest due to discomfort in the right lower extremity to improve community and social participation. (8-weeks)  65% MET   Patient will report being able to walk continuously for 2.5 miles without the need to rest due to discomfort in the right lower extremity to improve community and social participation. (12-weeks)  30% MET         Per George, PT

## 2025-04-22 ENCOUNTER — TELEPHONE (OUTPATIENT)
Dept: OTOLARYNGOLOGY | Facility: CLINIC | Age: 75
End: 2025-04-22
Payer: MEDICARE

## 2025-04-22 NOTE — TELEPHONE ENCOUNTER
I called this patient regarding the recent oral cavity biopsy that did not show any evidence of cancer.  She was asked to call me back if she had any further questions.  She will see one of my partners in the next few weeks and I will see her in a month.

## 2025-04-24 LAB
LABORATORY COMMENT REPORT: NORMAL
PATH REPORT.ADDENDUM SPEC: NORMAL
PATH REPORT.FINAL DX SPEC: NORMAL
PATH REPORT.GROSS SPEC: NORMAL
PATH REPORT.RELEVANT HX SPEC: NORMAL
PATH REPORT.TOTAL CANCER: NORMAL

## 2025-04-28 ENCOUNTER — TREATMENT (OUTPATIENT)
Dept: PHYSICAL THERAPY | Facility: CLINIC | Age: 75
End: 2025-04-28
Payer: MEDICARE

## 2025-04-28 DIAGNOSIS — R29.898 RIGHT LEG WEAKNESS: ICD-10-CM

## 2025-04-28 PROCEDURE — 97110 THERAPEUTIC EXERCISES: CPT | Mod: GP

## 2025-04-28 PROCEDURE — 97140 MANUAL THERAPY 1/> REGIONS: CPT | Mod: GP

## 2025-04-28 NOTE — PROGRESS NOTES
"Physical Therapy    Physical Therapy Treatment    Patient Name: Brigette Romo  MRN: [id number]  Today's Date: 4/28/2025  Time Calculation  Start Time: 1155  Stop Time: 1240  Time Calculation (min): 45 min        Insurance:  Visit: #5  Authorized: 11  Certification: 3/31/2025 - 7/30/2025 (POC)  Payor: [employer]IC[employer] / Plan: [employer] MEDICARE / Product Type: *No Product type* /       Subjective:  Patient reports to physical therapy with no worsening of symptoms since beginning physical therapy. She remains compliant with current HEP, noting ability to perform almost every day. She continues to experience limitations in AROM/PROM to the right LE post operatively due to tissue tightness and weakness to the calf when pushing off during walking and stair navigation.           Current pain: 0/10        Objective:  Ankle ROM   Foot adduction   Seated = 30 A  Foot abduction   Seated = 22 A  Inversion:  SEATED (R) = 24 A   Eversion:   Seated (R) = 16 A    Gastroc flexibility   R =   Pre-intervention = 4 A   Post-intervention = 10 A          Treatment:  Manual Therapy = 1 unit  IASTM  Lateral aspect of distal scar near the lateral malleolus with passive ankle DF/PF    STM =   scar mobilization ([place] and vertical technique)  Pin and stretch to peroneals and scar tissue with passive ankle inversion/eversion     Therapeutic Exercise  = 2 units   Added to HEP   Hamstring stretch = 2x30\" holds  Single leg calf raise =  2x10  Review of HEP   Ankle eversion stretch = 2x30\" holds   Toe on wall stretch = 2x15\" holds alternating between straight up and down and stretch to evertors)  Ankle eversion = x20 (thick orange band)       HEP: #4 (Access Code: [email or web address])  - Supine Hamstring Stretch with Strap  - 1 x daily - 7 x weekly - 2 sets - 30 seconds hold  - Seated Ankle Evertor Stretch   - 1 x daily - 7 x weekly - 2 sets - 30 seconds hold  - Gastroc Stretch with Foot at Wall  - 1 x daily - 7 x weekly - 2 sets - 15 " seconds hold  - Single Leg Calf Raise  - 1 x daily - 7 x weekly - 2 sets - 10 reps  - Ankle Inversion with Resistance  - 1 x daily - 7 x weekly - 2 sets - 20 reps  - Ankle Eversion with Resistance  - 1 x daily - 7 x weekly - 2 sets - 20 reps        OP Education:   Consideration for use of varius shoes that might improve ambulation endurance and tolerable distance           Diagnosis:  1. Right leg weakness          Assessment:   Patient continues to note improvement in functional capacity since beginning physical therapy, being able to walk longer distances than weeks prior without any additional soreness or discomfort post. However, she remains limited in ankle inversion and eversion AROM due to muscle flexibility, tissue extensibility, and muscle strength. Added to HEP was strap assisted hamstring stretching, modified foot on wall stretch, and alterations to previous ankle eversion stretch to target structures localized posterior to the lateral malleolus. Attempted was single leg calf raises to determine capacity for load of the achilles tendon and tolerance to loading, patient notes slight discomfort with movement but no worsening of symptoms upon completion. Provided was updated HEP at the end of the session. Patient will continue to benefit from physical therapy services in order to maximize functional outcomes.         Plan:  Assess response to current HEP   Manual therapy   STM/IASTM to graft site and surrounding tissues   Passive stretching into DF, INV, PF, EV  TE/NMR  4-way ankle motions with resistance   Single leg balance with EO/EC  Kinetic chain incorporation         Goals:  Patient will be independent with HEP.  MET    OUTCOME MEASURE:   Patient will report improvement in LEFS outcome measure >72/80 in 8 weeks to meet MCID. (Long-term)   Patient will demonstrate within normal limits of AROM into all ankle motions (DF, PF, INV, EV) in 8 weeks for improvement in gait mechanics and stair/curb navigation.  (Long-term)  50% MET    STRENGTH / MOTOR CONTROL / NEURO RE-ED    Patient will demonstrate at least 3+/5 muscle strength into ankle eversion in 4 weeks to reflect positive changes in muscle strength required for foot and ankle stability. (Short-term)  65% MET   Patient will demonstrate at least 4-/5 muscle strength into ankle eversion and inversion in 6 weeks to reflect positive changes in muscle strength required for foot and ankle stability. (Short-term)  35% MET   Patient will demonstrate at least 4/5 muscle strength into ankle eversion and inversion in 12 weeks to reflect positive changes in muscle strength required for foot and ankle stability. (Long-term)  25% MET     FUNCTION:   Patient will report being able to walk continuously for 1.5 miles without the need to rest due to discomfort in the right lower extremity to improve community and social participation. (4-weeks)  MET   Patient will report being able to walk continuously for 2.0 miles without the need to rest due to discomfort in the right lower extremity to improve community and social participation. (8-weeks)  65% MET   Patient will report being able to walk continuously for 2.5 miles without the need to rest due to discomfort in the right lower extremity to improve community and social participation. (12-weeks)  30% MET         Per George, PT

## 2025-05-08 ENCOUNTER — TREATMENT (OUTPATIENT)
Dept: PHYSICAL THERAPY | Facility: CLINIC | Age: 75
End: 2025-05-08
Payer: MEDICARE

## 2025-05-08 DIAGNOSIS — R29.898 RIGHT LEG WEAKNESS: ICD-10-CM

## 2025-05-08 DIAGNOSIS — M25.671 ANKLE STIFFNESS, RIGHT: Primary | ICD-10-CM

## 2025-05-08 PROCEDURE — 97110 THERAPEUTIC EXERCISES: CPT | Mod: GP

## 2025-05-08 PROCEDURE — 97140 MANUAL THERAPY 1/> REGIONS: CPT | Mod: GP

## 2025-05-08 NOTE — PROGRESS NOTES
"Physical Therapy    Physical Therapy Treatment    Patient Name: Brigette Romo  MRN: [id number]  Today's Date: 5/9/2025  Time Calculation  Start Time: 1518  Stop Time: 1600  Time Calculation (min): 42 min        Insurance:  Visit: #6  Authorized: 11  Certification: 3/31/2025 - 7/30/2025 (POC)  Payor: [employer]ICARE / Plan: [employer] MEDICARE / Product Type: *No Product type* /       Subjective:  Patient reports to physical therapy with no worsening of symptoms since beginning physical therapy. She remains compliant with current HEP, noting ability to perform almost every day. She continues to experience limitations in AROM/PROM to the right LE post operatively due to tissue tightness and weakness to the calf when pushing off during walking and stair navigation.           Current pain: 0/10        Objective:  Ankle ROM   Inversion:  SEATED (R) = 30 A   Eversion:   Seated (R) = 20 A              Treatment:  Manual Therapy = 1 unit  IASTM  Cupping and instrument to the medial calf complex, gastrocsoleus complex, posterior tibialis, and medial achilles tendon with passive ankle DF/PF and active DF/PF/INV/EV  STM with passive ankle PF/DF  scar mobilization ([place] and vertical technique)  Pin and stretch to:  Medial achilles tendon and soleus   peroneals  scar tissue     Therapeutic Exercise  = 2 units   Review of HEP   Hamstring stretch on step = 2x30\" holds   Runner position calf stretch = 2x30\" holds   Ankle eversion (GTB) = 2x12   Ankle inversion (GTB) =   Added to HEP   Knee to wall stretch = 2x10   Single leg calf raise = 2x10         HEP: #4 (Access Code: [email or web address])  - Supine Hamstring Stretch with Strap  - 1 x daily - 7 x weekly - 2 sets - 30 seconds hold  - Seated Ankle Evertor Stretch   - 1 x daily - 7 x weekly - 2 sets - 30 seconds hold  - Gastroc Stretch with Foot at Wall  - 1 x daily - 7 x weekly - 2 sets - 15 seconds hold  - Single Leg Calf Raise  - 1 x daily - 7 x weekly - 2 sets - 10 " reps  - Ankle Inversion with Resistance  - 1 x daily - 7 x weekly - 2 sets - 20 reps  - Ankle Eversion with Resistance  - 1 x daily - 7 x weekly - 2 sets - 20 reps        OP Education:   Consideration for use of varius shoes that might improve ambulation endurance and tolerable distance           Diagnosis:  1. Ankle stiffness, right    2. Right leg weakness            Assessment:   The patient continues to demonstrate limited active range of motion in ankle inversion, eversion, and dorsiflexion, primarily due to soft tissue adhesions and restricted flexibility. Manual therapy targeting the posterior and medial gastrocnemius-soleus complex is utilized at the start of each session to improve tissue mobility, followed by weight-bearing exercises to further address flexibility in the sagittal plane. The patient is progressing with external resistance exercises in inversion and eversion to improve strength, which remains a significant limiting factor. Continued physical therapy is recommended to support functional improvement        Plan:  Assess response to current HEP   Manual therapy   STM/IASTM to graft site and surrounding tissues   Passive stretching into DF, INV, PF, EV  TE/NMR  4-way ankle motions with resistance   Single leg balance with EO/EC  Kinetic chain incorporation         Goals:  Patient will be independent with HEP.  MET    OUTCOME MEASURE:   Patient will report improvement in LEFS outcome measure >72/80 in 8 weeks to meet MCID. (Long-term)   Patient will demonstrate within normal limits of AROM into all ankle motions (DF, PF, INV, EV) in 8 weeks for improvement in gait mechanics and stair/curb navigation. (Long-term)  50% MET    STRENGTH / MOTOR CONTROL / NEURO RE-ED    Patient will demonstrate at least 3+/5 muscle strength into ankle eversion in 4 weeks to reflect positive changes in muscle strength required for foot and ankle stability. (Short-term)  65% MET   Patient will demonstrate at least 4-/5  muscle strength into ankle eversion and inversion in 6 weeks to reflect positive changes in muscle strength required for foot and ankle stability. (Short-term)  35% MET   Patient will demonstrate at least 4/5 muscle strength into ankle eversion and inversion in 12 weeks to reflect positive changes in muscle strength required for foot and ankle stability. (Long-term)  25% MET     FUNCTION:   Patient will report being able to walk continuously for 1.5 miles without the need to rest due to discomfort in the right lower extremity to improve community and social participation. (4-weeks)  MET   Patient will report being able to walk continuously for 2.0 miles without the need to rest due to discomfort in the right lower extremity to improve community and social participation. (8-weeks)  65% MET   Patient will report being able to walk continuously for 2.5 miles without the need to rest due to discomfort in the right lower extremity to improve community and social participation. (12-weeks)  30% MET         Per George, PT

## 2025-05-12 ENCOUNTER — OFFICE VISIT (OUTPATIENT)
Dept: OTOLARYNGOLOGY | Facility: HOSPITAL | Age: 75
End: 2025-05-12
Payer: MEDICARE

## 2025-05-12 VITALS — BODY MASS INDEX: 18.77 KG/M2 | WEIGHT: 116.3 LBS

## 2025-05-12 DIAGNOSIS — C03.1 CANCER OF LOWER GUM (MULTI): ICD-10-CM

## 2025-05-12 DIAGNOSIS — M27.2 OSTEORADIONECROSIS OF JAW: ICD-10-CM
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
## 2025-05-12 PROCEDURE — 99213 OFFICE O/P EST LOW 20 MIN: CPT | Performed by: OTOLARYNGOLOGY

## 2025-05-12 PROCEDURE — 1036F TOBACCO NON-USER: CPT | Performed by: OTOLARYNGOLOGY

## 2025-05-12 PROCEDURE — 1159F MED LIST DOCD IN RCRD: CPT | Performed by: OTOLARYNGOLOGY

## 2025-05-12 PROCEDURE — 1160F RVW MEDS BY RX/DR IN RCRD: CPT | Performed by: OTOLARYNGOLOGY

## 2025-05-12 NOTE — PROGRESS NOTES
Subjective   Patient ID: Brigette Romo is a 75 y.o. female who presents for Follow-up.  History of Present Illness  The patient presents for evaluation of drooling.    She underwent a biopsy of a lesion on the right posterior aspect of her tongue at the beginning of last month, which was associated with significant discomfort. The pain subsided post-biopsy. A fungal culture was also conducted, yielding negative results. She reports a persistent sensation of an object lodged in her tongue, accompanied by pain, which has progressively worsened. Despite the resolution of pain post-biopsy, she continues to experience drooling, which is her primary concern.     She has consulted with a speech therapist, Dawson, who suggested the possibility of using fillers. Various strategies have been attempted to manage the drooling, including the use of a mask and a sanitary pad placed inside it, which she finds beneficial. She has not experienced any drainage from the incision sites.      Objective     Wt 52.8 kg (116 lb 4.8 oz)   LMP  (LMP Unknown)   BMI 18.77 kg/m²      Physical Exam  ENT: Oral tongue and mucosa appear normal. Tongue palpation reveals slight firmness in the area of a recent negative biopsy by Dr. Mc. No signs of infection or purulence in the mouth or externally to indicate hardware infection.  Neck shows expected scarring with no lumps or masses.  Integument/Skin: All incisions on the skin are well-healed with no signs of infection or purulence.    Results  Labs   - Fungus culture: Negative    Diagnostic Testing   - Biopsy of the right posterior part of the tongue: 04/2025, Negative       Assessment & Plan  1. Drooling.  Drooling is attributed to the inability of the lower lip to form a seal with the upper lip and teeth, despite previous reconstructive efforts. The negative biopsy result is reassuring, and there are no indications of recurrent cancer upon examination. Referral to a facial plastic surgeon  for further evaluation and potential treatment options, including the use of fillers to raise the height of the lower lip, will be made. The goal of the treatment is to achieve better lip closure, which may help reduce drooling. Potential side effects of fillers include swelling, bruising, and infection at the injection site.     Follow-up appointments will be decreased to twice a year.    Brandy Christensen MD     This medical note was created with the assistance of artificial intelligence (AI) for documentation purposes. The content has been reviewed and confirmed by the healthcare provider for accuracy and completeness. Patient consented to the use of audio recording and use of AI during their visit.

## 2025-05-15 ENCOUNTER — APPOINTMENT (OUTPATIENT)
Dept: PHYSICAL THERAPY | Facility: CLINIC | Age: 75
End: 2025-05-15
Payer: MEDICARE

## 2025-05-22 ENCOUNTER — APPOINTMENT (OUTPATIENT)
Facility: CLINIC | Age: 75
End: 2025-05-22
Payer: MEDICARE

## 2025-05-22 ENCOUNTER — TREATMENT (OUTPATIENT)
Dept: PHYSICAL THERAPY | Facility: CLINIC | Age: 75
End: 2025-05-22
Payer: MEDICARE

## 2025-05-22 VITALS — HEIGHT: 66 IN | WEIGHT: 112 LBS | BODY MASS INDEX: 18 KG/M2

## 2025-05-22 DIAGNOSIS — R47.1 DYSARTHRIA: Primary | ICD-10-CM

## 2025-05-22 DIAGNOSIS — R29.898 RIGHT LEG WEAKNESS: ICD-10-CM

## 2025-05-22 DIAGNOSIS — K11.7 DROOLING: ICD-10-CM

## 2025-05-22 DIAGNOSIS — K13.0 LIP DEFORMITY, ACQUIRED: ICD-10-CM

## 2025-05-22 DIAGNOSIS — Z98.890 HISTORY OF MANDIBULAR SURGERY: ICD-10-CM

## 2025-05-22 DIAGNOSIS — R13.11 ORAL PHASE DYSPHAGIA: ICD-10-CM

## 2025-05-22 PROCEDURE — 99215 OFFICE O/P EST HI 40 MIN: CPT | Performed by: OTOLARYNGOLOGY

## 2025-05-22 PROCEDURE — 97140 MANUAL THERAPY 1/> REGIONS: CPT | Mod: GP

## 2025-05-22 PROCEDURE — 3008F BODY MASS INDEX DOCD: CPT | Performed by: OTOLARYNGOLOGY

## 2025-05-22 PROCEDURE — 97110 THERAPEUTIC EXERCISES: CPT | Mod: GP

## 2025-05-22 PROCEDURE — 97112 NEUROMUSCULAR REEDUCATION: CPT | Mod: GP

## 2025-05-22 PROCEDURE — 1126F AMNT PAIN NOTED NONE PRSNT: CPT | Performed by: OTOLARYNGOLOGY

## 2025-05-22 PROCEDURE — 1159F MED LIST DOCD IN RCRD: CPT | Performed by: OTOLARYNGOLOGY

## 2025-05-22 ASSESSMENT — PAIN SCALES - GENERAL: PAINLEVEL_OUTOF10: 0-NO PAIN

## 2025-05-22 NOTE — PROGRESS NOTES
Facial Plastic & Reconstructive Surgery      Chief Complaint: Drooling    Referring Provider: Dr. Christensen    75 y.o. 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 Magana and Dr. Christensen).    Recently had right posterior tongue lesion biopsy 4/11/25 with Dr. Christensen, presents today for surgical evaluation for possible filler for drooling.      Location: Lower lip  Quality: Retraction, drooling  Severity: Severe  Duration: Months  Timing: All times  Context: 2 previous jaw surgeries, adjuvant radiation, cancer history  Modifying factors: None  Associated signs and symptoms: Persistent drooling, disfigurement of the lower lip, retraction of the lower lip, dysarthria, oral phase dysphagia    Past Medical History  She has a past medical history of Cancer (Multi) (8/14), Cataract, Dysphagia (06/2023), Hypothyroid, Hypothyroidism, Personal history of irradiation (03/2023), and Personal history of malignant neoplasm of tongue.    Surgical History  She has a past surgical history that includes Other surgical history (08/30/2016); Other surgical history (08/30/2016); CT angio aorta and bilateral iliofemoral runoff including without contrast if performed (02/03/2023); Eye surgery (01/17 and 31/2024); and Tracheostomy tube placement (02/2023).     Social History  She reports that she has never smoked. She has never been exposed to tobacco smoke. She has never used smokeless tobacco. She reports that she does not currently use alcohol. She reports that she does not use drugs.    Family History  Family History[1]     Allergies  Cefepime and Sulfa (sulfonamide antibiotics)    Past, family, and social history obtained but not pertinent to current problem unless documented  above.    All other systems have been reviewed and are negative for complaint unless documented above.    Physical Exam:  General: Well-developed and well-nourished in appearance.  Skin: No rashes or concerning lesions on the visible portions of the skin.  Eyes: Extraocular movements intact. Visual fields grossly normal.  Ears: Pinna are normal in shape and position. External canals are patent.  Nose: Dorsum is midline.   Oral Cavity/Oropharynx: Dentition is intact. Mucous membranes moist.  Tethering of the lower lip with lack of vestibule, viability of the skin flap intact.  Drooling with dysarthria  Neck: Midline trachea without masses or lesions.  Respiratory: No respiratory distress. Quiet breathing without stertor or stridor.  Cardiovascular: Regular rate and rhythm. Warm extremities with equal pulses.  Psych: Normal mood and affect. Judgement and insight appropriate.  Neuro: Alert and oriented. CN II-XII grossly intact. No focal deficits.  Musculoskeletal: Gait intact. Moves all extremities well without apparent deformities.    Assessment: Lower lip retraction, vestibular retraction, dysarthria, drooling, oral phase dysphagia    Plan: Brigette presents with a complicated lower lip deformity with retraction of the mucosa and lack of vestibule contributing to drooling, oral phase dysphagia, dysarthria, disfigurement of the lip and jaw.  She has a complicated history of cancer resection with reconstruction followed by adjuvant radiation.  This is created significant vestibular retraction and tethering of the lower lip.  Considerations for reconstruction would include a skin graft, by pedicled superior labial flap, and free flap.  I think the trade-offs to doing a free flap would be a bit extreme and not worth the risk.  A bipedicle superior labial flap and vestibuloplasty would be a worthwhile consideration.  A skin graft would likely not provide enough benefit.  We discussed all these options thoroughly.  She  understands and will consider these options and follow-up with me as needed.    Zackary Campos MD      , Facial Plastic & Reconstructive Surgery        P: [phone]  F: [phone]         [1]   Family History  Problem Relation Name Age of Onset    Blood clot Mother Gene Brockway     Other (ST elevation myocardial infarction) Father Ector Luna     Diabetes Father Ector Luna     Diabetes Sister Asia     Diabetes Brother Carlos     Diabetes Sister Asia     Diabetes Brother Carlos

## 2025-05-22 NOTE — PROGRESS NOTES
"Physical Therapy    Physical Therapy Treatment    Patient Name: Brigette Romo  MRN: [id number]  Today's Date: 5/22/2025  Time Calculation  Start Time: 1520  Stop Time: 1600  Time Calculation (min): 40 min        Insurance:  Visit: #7  Authorized: 11  Certification: 3/31/2025 - 7/30/2025 (POC)  Payor: [employer] / Plan: [employer] / Product Type: *No Product type* /       Subjective:  Patient reports to physical therapy with continued improvement in lateral ankle symptoms, specifically stiffness, after she recently received massage therapy to the incision site and surrounding ankle musculature. She was not as compliant to previous HEP last week as she was out of town/traveling but returns to physical therapy session today motivated to continue improving with her progression.           Current pain: 0/10        Objective:  Ankle ROM   Inversion:  SEATED (R) = 30 A   Eversion:   Seated (R) = 20 A  DF   Pre-intervention = 4 A  Post-intervention = 12 A        Treatment:  Manual Therapy = 1 unit  TCJ  AP grade 4 distraction   AP MWM grade 4 into passive DF   MWM  AP grade 4 \"knee to wall\" with foot on step = 10x    Therapeutic Exercise  = 1 units   Review of HEP   Soleus stretch on step = 2x30\" holds   Hamstring stretch on step = 2x30\" holds  Double leg to single leg eccentric calf raise = x15   Patient education   Performance of interventions as part of current HEP - prioritizing mobility into calf complex leading up to next session     NMR = 1 unit  Airex foam pad SL balance with EC  Banded resistance into ankle inversion/eversion (thick green band)  = 2x30\"   Ankle rocker board   EV   only = 15x  Powerful movement (starting down and emphasis on speed to neutral) = 15x  INV   only = 15x  Powerful movement (starting down and emphasis on speed to neutral) = 15x  SL RDL with cones   [facility]  and stack   1 round on level ground = 8 total   2nd round on airex foam pad = 8 total "           HEP: #4 (Access Code: [email or web address])  - Supine Hamstring Stretch with Strap  - 1 x daily - 7 x weekly - 2 sets - 30 seconds hold  - Seated Ankle Evertor Stretch   - 1 x daily - 7 x weekly - 2 sets - 30 seconds hold  - Gastroc Stretch with Foot at Wall  - 1 x daily - 7 x weekly - 2 sets - 15 seconds hold  - Single Leg Calf Raise  - 1 x daily - 7 x weekly - 2 sets - 10 reps  - Ankle Inversion with Resistance  - 1 x daily - 7 x weekly - 2 sets - 20 reps  - Ankle Eversion with Resistance  - 1 x daily - 7 x weekly - 2 sets - 20 reps        OP Education:   Consideration for use of varius shoes that might improve ambulation endurance and tolerable distance           Diagnosis:  1. Right leg weakness            Assessment:   The patient demonstrates improved ankle inversion and eversion within normal limits for the first time since beginning physical therapy. However, limitations in ankle dorsiflexion remain, particularly in sitting and supine positions, accompanied by anterior ankle impingement. This was addressed with manual therapy to the talocrural joint, resulting in near resolution of the impingement and improved dorsiflexion. A review of previous interventions, including flexibility exercises and calf complex strengthening, was performed, and additional in-session interventions included single-leg balance and dynamic movements with a hip hinge on both even and uneven surfaces to challenge ankle stability and the kinetic chain. The patient reports increased awareness of movement patterns and improved tolerance to previously symptomatic activities, such as prolonged walking. The patient is scheduled for three additional sessions before a reevaluation, at which time discharge considerations will be discussed. The patient will continue to benefit from physical therapy services to maximize functional outcomes.      Plan:  Assess response to current HEP   Manual therapy   STM/IASTM to graft site and surrounding  tissues   Passive stretching into DF, INV, PF, EV  TE/NMR  4-way ankle motions with resistance   Single leg balance with EO/EC  Kinetic chain incorporation         Goals:  Patient will be independent with HEP.  MET    OUTCOME MEASURE:   Patient will report improvement in LEFS outcome measure >72/80 in 8 weeks to meet MCID. (Long-term)   Patient will demonstrate within normal limits of AROM into all ankle motions (DF, PF, INV, EV) in 8 weeks for improvement in gait mechanics and stair/curb navigation. (Long-term)  85% MET    STRENGTH / MOTOR CONTROL / NEURO RE-ED  Patient will demonstrate at least 3+/5 muscle strength into ankle eversion in 4 weeks to reflect positive changes in muscle strength required for foot and ankle stability. (Short-term)  MET   Patient will demonstrate at least 4-/5 muscle strength into ankle eversion and inversion in 6 weeks to reflect positive changes in muscle strength required for foot and ankle stability. (Short-term)  50% MET   Patient will demonstrate at least 4/5 muscle strength into ankle eversion and inversion in 12 weeks to reflect positive changes in muscle strength required for foot and ankle stability. (Long-term)  35% MET     FUNCTION:   Patient will report being able to walk continuously for 1.5 miles without the need to rest due to discomfort in the right lower extremity to improve community and social participation. (4-weeks)  MET   Patient will report being able to walk continuously for 2.0 miles without the need to rest due to discomfort in the right lower extremity to improve community and social participation. (8-weeks)  85% MET   Patient will report being able to walk continuously for 2.5 miles without the need to rest due to discomfort in the right lower extremity to improve community and social participation. (12-weeks)  45% MET         Per George, PT

## 2025-05-28 ENCOUNTER — TELEPHONE (OUTPATIENT)
Dept: RADIATION ONCOLOGY | Facility: HOSPITAL | Age: 75
End: 2025-05-28
Payer: MEDICARE

## 2025-05-28 NOTE — TELEPHONE ENCOUNTER
Called pt to remind of [facility]  appointment on 05/29/25 at 2:00. Pt's phone went to voicemail left number if needs to reschedule.

## 2025-05-29 ENCOUNTER — HOSPITAL ENCOUNTER (OUTPATIENT)
Dept: RADIATION ONCOLOGY | Facility: HOSPITAL | Age: 75
Setting detail: RADIATION/ONCOLOGY SERIES
Discharge: HOME | End: 2025-05-29
Payer: MEDICARE

## 2025-05-29 VITALS
DIASTOLIC BLOOD PRESSURE: 56 MMHG | TEMPERATURE: 97 F | BODY MASS INDEX: 19.14 KG/M2 | WEIGHT: 118.6 LBS | RESPIRATION RATE: 18 BRPM | SYSTOLIC BLOOD PRESSURE: 109 MMHG | HEART RATE: 74 BPM | OXYGEN SATURATION: 98 %

## 2025-05-29 DIAGNOSIS — C02.3 CANCER OF ANTERIOR TWO-THIRDS OF TONGUE (MULTI): ICD-10-CM

## 2025-05-29 DIAGNOSIS — I79.8 OTHER DISORDERS OF ARTERIES, ARTERIOLES AND CAPILLARIES IN DISEASES CLASSIFIED ELSEWHERE: ICD-10-CM

## 2025-05-29 DIAGNOSIS — C41.1 MALIGNANT NEOPLASM OF MANDIBLE (MULTI): ICD-10-CM

## 2025-05-29 DIAGNOSIS — C02.9 MALIGNANT NEOPLASM OF TONGUE (MULTI): ICD-10-CM

## 2025-05-29 PROCEDURE — 99214 OFFICE O/P EST MOD 30 MIN: CPT

## 2025-05-29 ASSESSMENT — ENCOUNTER SYMPTOMS
DEPRESSION: 0
DIFFICULTY URINATING: 0
SEIZURES: 0
LIGHT-HEADEDNESS: 0
SPEECH DIFFICULTY: 0
CHEST TIGHTNESS: 0
HEMOPTYSIS: 0
PALPITATIONS: 0
NERVOUS/ANXIOUS: 0
VOMITING: 0
HEMATURIA: 0
ARTHRALGIAS: 0
SORE THROAT: 0
CHILLS: 0
FATIGUE: 0
CONSTIPATION: 0
ABDOMINAL DISTENTION: 0
COUGH: 0
DIZZINESS: 0
NAUSEA: 0
FEVER: 0
WHEEZING: 0
BLOOD IN STOOL: 0
DIARRHEA: 0
ABDOMINAL PAIN: 0
DIAPHORESIS: 0
CONFUSION: 0
LOSS OF SENSATION IN FEET: 0
OCCASIONAL FEELINGS OF UNSTEADINESS: 0
HEADACHES: 0
NUMBNESS: 0
APPETITE CHANGE: 0
TROUBLE SWALLOWING: 1
ADENOPATHY: 0
BACK PAIN: 0
EYE PROBLEMS: 1

## 2025-05-29 ASSESSMENT — PAIN SCALES - GENERAL: PAINLEVEL_OUTOF10: 0-NO PAIN

## 2025-05-29 NOTE — PROGRESS NOTES
Radiation Oncology Follow-Up    Patient Name:  Brigette Romo  MRN:  [id number]  :  1950    Referring Provider: Evaristo Winston, *  Primary Care Provider: Eri Read DO  Care Team: Patient Care Team:  Eri Read DO as PCP - General (Family Medicine)  Eri Read DO as PCP - [employer] PCP  Brandy Christensen MD as Surgeon (Otolaryngology)  Sierra Gagnon MD as Radiation Oncologist (Radiation Oncology)  Maximino Aguilera DMD as Dentist (Prosthodontics)  Bacilio Lockwood MD MPH as Consulting Physician (Infectious Diseases)    Date of Service: 2025     Diagnosis: T4N0M0 SCC of the  alveolar ridge.     Oncological History:   This lady was treated back in 2014 for a T1 N1 lesion of her right tongue. She had surgical excision and a neck dissection followed by radiation therapy.      She was found to have tumor recurrence and on 2016 she underwent reexcision. The margins were felt to be clear. There was no evidence of pedro luis metastasis.      She had a TSH level in 2021 which was normal. She had a chest CT in 2020 which was negative.      She was having some issues with discomfort on the lateral tongue on the left. This led to a biopsy that showed cancer. On 2020 she underwent a partial glossectomy. The margins were clear. She was presented at our tumor board and it was not felt  that anything further needed to be done.     She admits that her swallowing is somewhat difficult. She had a chest x-ray in 2021 which was negative.     Is found to have a suspicious area on her right anterior gum line in 2023.  On clinical exam she was found to have swelling on the lingual aspect of the mandible on the right side.  2 of the adjacent teeth were mobile.  Biopsy was performed on 1/3/2023  and revealed moderately differentiated squamous cell carcinoma.     CT of the neck with contrast on 2023  revealed:  IMPRESSION:1. The body of the right mandible has bony erosion from the rightincisor tooth posteriorly to the anterior aspect of the anteriormolar tooth with soft tissue masses along the oral and buccal aspectsof the erosion as noted. No adenopathy is  noted.2. The right thyroid gland has a 17 mm mass similar to the prior examallowing for differences in exam technique (prior exam wasunenhanced).3. Mixed density plaque without significant stenosis in right commoncarotid bifurcation and aberrant right  subclavian artery      Patient underwent triple endoscopy with the right composite resection of the mandible and floor of mouth and reconstruction with a locking plate, left fibula osteocutaneous free flap.  The tumor was found to be eroding through the buccal aspect of the  mandible.   Pathology from the surgery on 2/7/2023 revealed a 3.7 cm tumor involving the mandible, gingiva, and floor of mouth with depth of invasion of 2.5 cm.  It was moderately differentiated squamous cell carcinoma.  There was no lymphovascular invasion,  but there was focal perineural invasion.  The distance to the closest margin was 1 mm in the deep and lateral orientation.  0 lymph nodes were submitted.  The patient had pathologic T4NX oral cavity cancer.     She recently saw Dr. Christensen  and was advised to begin a full liquid diet and assess for signs of aspiration.  She was asked to keep her tracheotomy tube capped continuously in order to assess for possibility of removal in future visits.    Patient underwent photon radiation (60Gy in 30 fractions) to the oral cavity ending on 4/28/23.  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 On 11/4/24 the  patient met with Dr. Christensen and agreed to removal of all hardware with a bony free flap reconstruction.  The patient was agreeable to the plan.     12/16/24 the patinet underwent removal of hardware for ORN of her left mandible and composite resection and reconstruction under the direction of Ame Magana and Fermin.     SUBJECTIVE  History of Present Illness:   Mrs. Romo is a pleasant 75 y/o who is in clinic today for her first Radiation Oncology survival visit s/p diagnosis with a new primary T4N0M0 SCC of the  alveolar ridge.  She underwent a direct laryngoscopy, bronchoscopy, and Esophagoscopy with segmental mandibulectomy/ composite resection, right mandibular reconstruction with plating and right sided neck exploration of vessels on 2/27/23.  She then underwent  photon radiation (60Gy in 30 fractions) to the oral cavity ending on 4/28/23.  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  Today the patient states that she is doing well and has good energy.   Continues to have a fistula on the right jaw which is scabbed over on the outside.  Pt denies liquids draining from the site.  Is currently on Augmentin, per Dr. Magana.  Patient continues to follow the recommendations from her 6/1/23 MBS.  She eats soft pureed foods and does thin liquid and medications through her PEG.  Denies coughing or choking when eating.  Denies mucositis or any new lumps/bumps in the mouth or around the neck.  She endorses being able to maintain her weight with her current diet.  She continues to endorse xerostomia, which she's had since her head and neck cancer in 2014.  She manages with extra hydration and Biotene spray throughout the day. She endorses significant trismus which began after her most recent treatments.  She continues to follow with SLP.  She does have some hearing loss which has been stable.  She does have some  "visual changes which she attributes to b/l cataracts.  She is scheduled to have eye surgery in Jan 2024.  Denies chills, fatigue, fever, dyspnea or chest pain.  Denies seizures,  headaches, dizziness and focal/sensory neurological changes.  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  She has a prism in the left lens of her glasses which helps.  Denies abdominal pain, nausea, vomiting, diarrhea or constipation.      2/14/24 Interim Follow-up visit:   Today the patient states that she's \"okay\".  She continues to have issues with a with a fistula (with exposed hardware) in her right anterior jaw.  She states that it's not painful but bothersome with drainage.  She is currently back on ABX and is scheduled for a revision on 3/4/24.  Additionally, she is anxious about a biopsy of her thyroid that is taking place on 2/22/24.  Continue to have issues with PO nutrition.  She states that the immobility of her tongue along with xerostomia poses a problem with the transit of food to the back of her throat.  She does endorse being able to eat creamy soups and mashed potatoes with gravy.  The rest of her nutrition is via PEG.  She states that she's doing 4 boxes of TF/day.  Endorses being able to sustain her weight with her current diet.  Denies chills, fatigue, fever, dyspnea or chest pain.  Denies seizures,  headaches, dizziness and focal/sensory neurological changes.  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  She has a prism in the left lens of her glasses which helps.  Patient underwent cataract surgery in Jan 2024 and stats that her vision is much better.  Denies abdominal pain, nausea, vomiting, diarrhea or constipation.      6/27/24 Interval Visit:   Today the patient is in clinic for a Routine Radiation Oncology FU visit.  She states that she's doing well.  Endorses surgery on 7/15/24 for a left anterior mandibular debridement related to ongoing ORN.  She " states that she's hopeful that this will the last procedure that she'll need.  She's also hoping that at some point she'll be able to wear dentures to eat more solid foods.  Continues to have issues eating foods because of dryness and the lack of mobility of her tongue.  She continue to get almost all of her nutrition via a PEG.  Is currently consuming 4 boxes of TF/day.   Does occasionally attempt to eat soup, but states that it often just drips out of her mouth which is can be messy.  Denies coughing or choking.  Denies odynophagia, mucositis, or new lumps/bumps/discolorations in her mouth our around her head/neck.  Does endorse significant trismus.  Also endorses some mild right jaw pain.  Denies chills, fatigue, fever, dyspnea or chest pain. Denies seizures,  headaches, dizziness and focal/sensory neurological changes.  Endorses having cataract surgery in Jan but states that she still has some vision issue.  Will be following up with her eye doctor to manage.   Denies abdominal pain, nausea, vomiting, diarrhea or constipation.      5/29/25 Interval FU visit:   Today the patient arrives to clinic for a routine Radiation Oncology FU visit.  Overall the patient states that she's doing well.  She recently met with Dr. Campos to discuss some sort of filler for her lower lip retraction.  She's not sure if she want's go have more surgery but she's going to discuss with family.  Because of the extent of her surgery, she is NPO and dependent on a PEG for enteral nutrition.  She currently ingests 4 isosource/day.  She also supplements with blenderized foods such as eggs, cheese and fruit.  The patient endorses weight gain.  She continue to see SLP but only for her ongoing trismus.  Denies odynophagia, mucositis, or new lumps/bumps/discolorations in her mouth our around her head/neck.  Patient does endorse a lot of drooling because of the surgical alterations of her face.  She carries a spit rag with her to mange.  Denies  chills, fatigue, fever, dyspnea or chest pain. Denies seizures, headaches, dizziness and focal/sensory neurological changes.  She does have some [place] which has been stable.   Denies abdominal pain, nausea, vomiting, diarrhea or constipation.      Treatment Rendered:   Radiation Treatments       No radiation treatments to show. (Treatments may have been administered in another system.)            Review of Systems:   Review of Systems   Constitutional:  Negative for appetite change, chills, diaphoresis, fatigue and fever.   HENT:   Positive for hearing loss and trouble swallowing. Negative for lump/mass, mouth sores, nosebleeds, sore throat and tinnitus.         Is following a soft/puree diet for swallowing issues.    Eyes:  Positive for eye problems.   Respiratory:  Negative for chest tightness, cough, hemoptysis and wheezing.    Cardiovascular:  Negative for chest pain and palpitations.   Gastrointestinal:  Negative for abdominal distention, abdominal pain, blood in stool, constipation, diarrhea, nausea and vomiting.   Genitourinary:  Negative for difficulty urinating and hematuria.    Musculoskeletal:  Negative for arthralgias, back pain and gait problem.   Neurological:  Negative for dizziness, gait problem, headaches, light-headedness, numbness, seizures and speech difficulty.   Hematological:  Negative for adenopathy.   Psychiatric/Behavioral:  Negative for confusion and depression. The patient is not nervous/anxious.      The patient's current pain level was assessed.  They report currently having a pain of 8 out of 10 (right jaw).  They feel their pain is under control without the use of pain medications.  Patient manages with a warm compress as the pain is intermittent.     Performance Status:   The Karnofsky performance scale today is 100, Fully active, able to carry on all pre-disease performed without restriction (ECOG equivalent 0).      OBJECTIVE  Vital Signs:      1/14/2025     9:47 AM 1/16/2025      "1:10 PM 2/18/2025     2:01 PM 2/24/2025    10:15 AM 3/11/2025    12:17 PM 5/12/2025    10:44 AM 5/22/2025     9:14 AM   Vitals   Systolic 92 114 105       Diastolic 60 60 55       BP Location Right arm         Heart Rate 111 83 69       Temp 35.9 °C (96.7 °F) 36.3 °C (97.4 °F) 36.6 °C (97.9 °F) 36.6 °C (97.9 °F)      Resp 18 20 18       Height    1.676 m (5' 6\") 1.676 m (5' 6\")  1.676 m (5' 6\")   Weight (lb)  116 111.77 111 115 116.3 112   BMI  18.72 kg/m2 18.04 kg/m2 17.92 kg/m2 18.56 kg/m2 18.77 kg/m2 18.08 kg/m2   BSA (m2)  1.57 m2 1.54 m2 1.53 m2 1.56 m2 1.57 m2 1.54 m2   Visit Report  Report Report Report    Report Report    Report Report Report      Physical Exam:  Physical Exam  Vitals and nursing note reviewed.   Constitutional:       General: She is not in acute distress.     Appearance: Normal appearance. She is normal weight. She is not ill-appearing.   HENT:      Head: Normocephalic and atraumatic. No masses.      Jaw: Trismus present. No tenderness, swelling or pain on movement.      Salivary Glands: Right salivary gland is not diffusely enlarged. Left salivary gland is diffusely enlarged.      Comments: Some tenderness in her left TMJ.     Nose: Nose normal. No congestion or rhinorrhea.      Mouth/Throat:      Mouth: Mucous membranes are dry. No injury.      Dentition: Normal dentition. Does not have dentures. No dental tenderness, gingival swelling or gum lesions.      Tongue: No lesions. Tongue does not deviate from midline.      Palate: No mass and lesions.      Pharynx: No pharyngeal swelling, oropharyngeal exudate or posterior oropharyngeal erythema.      Tonsils: No tonsillar exudate or tonsillar abscesses.      Comments: Surgically altered tongue.   Eyes:      General: No scleral icterus.     Extraocular Movements: Extraocular movements intact.      Pupils: Pupils are equal, round, and reactive to light.   Cardiovascular:      Rate and Rhythm: Normal rate and regular rhythm.      Pulses: Normal " pulses.      Heart sounds: Normal heart sounds. No murmur heard.  Pulmonary:      Effort: Pulmonary effort is normal. No respiratory distress.      Breath sounds: Normal breath sounds. No stridor. No wheezing or rhonchi.   Chest:      Chest wall: No tenderness.   Abdominal:      General: Abdomen is flat. Bowel sounds are normal.      Palpations: Abdomen is soft. There is no mass.      Tenderness: There is no abdominal tenderness. There is no guarding or rebound.      Comments: PEG in place.    Musculoskeletal:         General: No swelling or tenderness. Normal range of motion.      Cervical back: Normal range of motion and neck supple. No tenderness.   Skin:     General: Skin is warm and dry.      Coloration: Skin is not jaundiced.      Findings: No erythema or lesion.   Neurological:      General: No focal deficit present.      Mental Status: She is alert and oriented to person, place, and time.      Motor: No weakness.      Coordination: Coordination normal.      Gait: Gait normal.      Deep Tendon Reflexes: Reflexes normal.   Psychiatric:         Mood and Affect: Mood normal.         Behavior: Behavior normal.         Thought Content: Thought content normal.         Judgment: Judgment normal.        ASSESSMENT:  Today the patient arrives to clinic for a routine Radiation Oncology FU visit.  Overall the patient states that she's doing well.  She recently met with Dr. Campos to discuss some sort of filler for her lower lip retraction.  She's not sure if she want's go have more surgery but she's going to discuss with family.  Because of the extent of her surgery, she is NPO and dependent on a PEG for enteral nutrition.  She currently ingests 4 isosource/day.  She also supplements with blenderized foods such as eggs, cheese and fruit.  The patient endorses weight gain.  She continue to see SLP but only for her ongoing trismus.  Denies odynophagia, mucositis, or new lumps/bumps/discolorations in her mouth our around  her head/neck.  Patient does endorse a lot of drooling because of the surgical alterations of her face.  She carries a spit rag with her to mange.  Denies chills, fatigue, fever, dyspnea or chest pain. Denies seizures, headaches, dizziness and focal/sensory neurological changes.  She does have some [place] which has been stable.   Denies abdominal pain, nausea, vomiting, diarrhea or constipation.       PLAN:      --FU with Radiation Oncology in 3 months.   --Carotid ultrasound in 3 months.   --Continue to follow with Geo Magana and Fermin for ENT management.  --Imaging at the discretion of ENT.   --Continue to follow with [facility] for HBO       Please reach out to us with any questions or concerns.   [facility] Guidelines were applicable to guide this patients treatment plan.  Evaristo Winston, ROSALINA-CNP

## 2025-06-09 NOTE — PROGRESS NOTES
Provider Impressions     Status post treatment of multiple different oral cavity cancers.  I cannot appreciate any tumor recurrence.    Dysphagia which is unlikely to get better.  She remains PEG dependent.  She is having some discomfort and I loosened the tube somewhat today.    Thyroid nodule that was picked up on the PET scan.  This turned out to be benign on a biopsy that was eventually done after the ultrasound.    Status post  surgery for radionecrosis of the mandible.      I will see her in 3 months.    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 She had surgical excision and a neck dissection followed by radiation therapy. She was found to have tumor recurrence and on September 22, 2016 she underwent reexcision. The margins were felt to be clear. There was no evidence of pedro luis metastasis. She had a TSH level in July 2023 which was normal. She had a chest CT in April 2020 which was negative. She was having some issues with discomfort on the lateral tongue on the left. This led to a biopsy that showed cancer. On April 13, 2020 she underwent a partial glossectomy. The margins were clear. She was presented at our tumor board and it was not felt that anything further needed to be done. She admits that her swallowing is somewhat difficult. She had a chest CT in May 2023 that showed a possible small nodule. I saw her in January 2023 because she had some concern about a lesion in her mouth. 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 She had a CT of the neck done on January 20, 2023 that showed a lesion involving the mandible on the right side. On February 7, 2023 she underwent surgery. There was bone involvement. There was a margin that was described as being close. She completed radiation therapy in April 2023. She had a PET scan done in  August 2023 that really does not show anything worrisome.  At the same time she has some uptake in the right thyroid.  This was followed by an ultrasound that showed a suspicious nodule which was eventually biopsied and turned out to be benign.  I will follow this clinically.  She  had issues with drainage around infected reconstruction plates.  On December 16, 2024 she underwent a composite resection and reconstruction.  The pathology did not show anything worrisome.  She is undergoing hyperbaric oxygen.  She had a TSH level in November 2024 which was normal.  I biopsied something in her mouth the back in April 2025 that turned out to be negative.  Interestingly it got rid of the discomfort she had over the area.  She is looking at the possibility of having her lower lip reconstructed to minimize the drooling.  She also has had some issues with discomfort around her PEG tube.    Physical Exam      Examination of the oral cavity and oropharynx shows all the changes from the surgeries.  At the same time I cannot appreciate any worrisome mucosal lesions.  She does have significant drooling.  She has minimal mobility of her tongue.  She does have good mandibular excursion.      A flexible laryngoscopy was carried out. Under topical Xylocaine and Leonard-Synephrine the scope was introduced through the nostril. The nasopharynx, base of tongue, hypopharynx, and larynx are visualized.  The vocal cords are normally mobile. There is some pooling of secretions in the piriform sinuses. There is no evidence of any mucosal lesions.    The PEG site was examined.  There is no evidence of any inflammation or infection.  The abdomen is soft.  I did loosen the tube to see if that would provide her with some relief.

## 2025-06-10 ENCOUNTER — APPOINTMENT (OUTPATIENT)
Facility: CLINIC | Age: 75
End: 2025-06-10
Payer: MEDICARE

## 2025-06-10 VITALS — WEIGHT: 111 LBS | BODY MASS INDEX: 17.84 KG/M2 | HEIGHT: 66 IN

## 2025-06-10 DIAGNOSIS — M27.2 OSTEORADIONECROSIS OF JAW: ICD-10-CM

## 2025-06-10 DIAGNOSIS — C03.1 CANCER OF LOWER GUM (MULTI): ICD-10-CM

## 2025-06-10 DIAGNOSIS — R13.11 ORAL PHASE DYSPHAGIA: Primary | ICD-10-CM

## 2025-06-10 DIAGNOSIS — E03.9 ACQUIRED HYPOTHYROIDISM: ICD-10-CM
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
## 2025-06-10 DIAGNOSIS — C02.9 MALIGNANT NEOPLASM OF TONGUE (MULTI): ICD-10-CM

## 2025-06-10 PROCEDURE — 3008F BODY MASS INDEX DOCD: CPT | Performed by: OTOLARYNGOLOGY

## 2025-06-10 PROCEDURE — 99213 OFFICE O/P EST LOW 20 MIN: CPT | Performed by: OTOLARYNGOLOGY

## 2025-06-10 PROCEDURE — 31575 DIAGNOSTIC LARYNGOSCOPY: CPT | Performed by: OTOLARYNGOLOGY

## 2025-06-10 PROCEDURE — 1160F RVW MEDS BY RX/DR IN RCRD: CPT | Performed by: OTOLARYNGOLOGY

## 2025-06-10 PROCEDURE — 1159F MED LIST DOCD IN RCRD: CPT | Performed by: OTOLARYNGOLOGY

## 2025-06-10 PROCEDURE — 1036F TOBACCO NON-USER: CPT | Performed by: OTOLARYNGOLOGY

## 2025-06-18 ENCOUNTER — TREATMENT (OUTPATIENT)
Dept: PHYSICAL THERAPY | Facility: CLINIC | Age: 75
End: 2025-06-18
Payer: MEDICARE

## 2025-06-18 DIAGNOSIS — R29.898 RIGHT LEG WEAKNESS: ICD-10-CM

## 2025-06-18 PROCEDURE — 97112 NEUROMUSCULAR REEDUCATION: CPT | Mod: GP

## 2025-06-18 PROCEDURE — 97110 THERAPEUTIC EXERCISES: CPT | Mod: GP

## 2025-06-18 PROCEDURE — 97164 PT RE-EVAL EST PLAN CARE: CPT | Mod: GP

## 2025-06-18 PROCEDURE — 97140 MANUAL THERAPY 1/> REGIONS: CPT | Mod: GP

## 2025-06-18 NOTE — PROGRESS NOTES
"Physical Therapy    Physical Therapy Treatment    Patient Name: Brigette Romo  MRN: [id number]  Today's Date: 6/18/2025  Time Calculation  Start Time: 1610  Stop Time: 1700  Time Calculation (min): 50 min        Insurance:  Visit: #8  Authorized: 11  Certification: 3/31/2025 - 7/30/2025 (POC)  Payor: [employer] / Plan: [employer] / Product Type: *No Product type* /       Subjective:  Patient reports to physical therapy with continued improvement in lateral ankle symptoms, specifically stiffness, after she recently received massage therapy to the incision site and surrounding ankle musculature. She was not as compliant to previous HEP last week as she was out of town/traveling but returns to physical therapy session today motivated to continue improving with her progression.           Current pain: 0/10        Objective:  Ankle ROM   Inversion:  SEATED (R) =   Pre-intervention = 20 A   Post-intervention = 30 A  Eversion:   Seated (R) =  Pre-intervention = 12 A  Post-intervention = 12 A  DF   Seated (R) = 12 A  PF   Seated (R) = 58 A        Treatment:  Therapeutic Exercise  = 0 units   Patient education   Performance of interventions as part of current HEP - prioritizing mobility into calf complex leading up to next session     NMR = 2 unit  NMES   Seated end-range Ankle EV - level 30, no on/off, rate 65 = 10x10\" holds   Seated ankle EV - level 30, 15 sec on / 10 sec off, rate 65 = 5 total minutes   Patient education   Reasoning for NMES usage to the ankle evertors   Differentiation between NMES and TENS (pain versus strength)       Manual Therapy = 1 unit  IASTM  Cupping to the distal scar   Movement superior and inferior along the anterior aspect of the incision   Movement superior/inferior with passive ankle inversion x10, active ankle INV x20  STM  Movement superior and inferior along the anterior aspect of the incision with active ankle eversion x10          HEP: #4 (Access Code: " [email or web address])  - Supine Hamstring Stretch with Strap  - 1 x daily - 7 x weekly - 2 sets - 30 seconds hold  - Seated Ankle Evertor Stretch   - 1 x daily - 7 x weekly - 2 sets - 30 seconds hold  - Gastroc Stretch with Foot at Wall  - 1 x daily - 7 x weekly - 2 sets - 15 seconds hold  - Single Leg Calf Raise  - 1 x daily - 7 x weekly - 2 sets - 10 reps  - Ankle Inversion with Resistance  - 1 x daily - 7 x weekly - 2 sets - 20 reps  - Ankle Eversion with Resistance  - 1 x daily - 7 x weekly - 2 sets - 20 reps        OP Education:   Consideration for use of varius shoes that might improve ambulation endurance and tolerable distance           Diagnosis:  1. Right leg weakness            Assessment:   Reassessment of patient's active ankle motions initially demonstrates large reductions in overall available motion when compared to the previous session, likely the result of increased time spent ambulating and pedal biking with reduced performance of active stretching as part of HEP. Multiple different forms of treatment used in session to improve patient's tolerance into ankle inversion, as well as, eversion strength beginning with NMES to the peroneal muscle group with eversion isometrically at end-ranges. No additional improvement gained when retesting. Palpation demonstrates increased tissue density and sensitivity to the most anterior and distal aspect of the graft site, patient confirming this area as the area of limitation into ankle inversion.     Patient was communicated on the need to increase the frequency of flexibility-based interventions between current session and the next session due to increased emphasis next session on strengthening the ankle evertors/invertors in both open and closed chain. Patient's limitations in ROM and strength place her at an increased risk for additional injury during dynamic movements. Patient will continue to benefit from physical therapy services in order to maximize functional  outcomes.        Plan:  Assess response to current HEP   Manual therapy   STM/IASTM to graft site and surrounding tissues   Passive stretching into DF, INV, PF, EV  TE/NMR  4-way ankle motions with resistance   Single leg balance with EO/EC  Kinetic chain incorporation         Goals:  Patient will be independent with HEP.  MET    OUTCOME MEASURE:   Patient will report improvement in LEFS outcome measure >72/80 in 8 weeks to meet MCID. (Long-term)       RANGE OF MOTION:  Patient will demonstrate within normal limits of AROM into ankle motions (INV, EV) for improvement in gait mechanics and stair/curb navigation. (Long-term)  75% MET    STRENGTH / MOTOR CONTROL / NEURO RE-ED  Patient will demonstrate at least 3+/5 muscle strength into ankle eversion in 4 weeks to reflect positive changes in muscle strength required for foot and ankle stability. (Short-term)  MET   Patient will demonstrate at least 4-/5 muscle strength into ankle eversion and inversion in 6 weeks to reflect positive changes in muscle strength required for foot and ankle stability. (Short-term)  50% MET   Patient will demonstrate at least 4/5 muscle strength into ankle eversion and inversion in 12 weeks to reflect positive changes in muscle strength required for foot and ankle stability. (Long-term)  35% MET     FUNCTION:   Patient will report being able to walk continuously for 1.5 miles without the need to rest due to discomfort in the right lower extremity to improve community and social participation. (4-weeks)  MET   Patient will report being able to walk continuously for 2.0 miles without the need to rest due to discomfort in the right lower extremity to improve community and social participation. (8-weeks)  MET   Patient will report being able to walk continuously for 2.5 miles without the need to rest due to discomfort in the right lower extremity to improve community and social participation. (12-weeks)  45% MET         Per George, PT

## 2025-06-19 ENCOUNTER — APPOINTMENT (OUTPATIENT)
Facility: CLINIC | Age: 75
End: 2025-06-19
Payer: MEDICARE

## 2025-06-19 VITALS — BODY MASS INDEX: 17.84 KG/M2 | WEIGHT: 111 LBS | HEIGHT: 66 IN

## 2025-06-19 DIAGNOSIS — K11.7 DROOLING: Primary | ICD-10-CM

## 2025-06-19 DIAGNOSIS — G24.4 ORAL DYSKINESIA: ICD-10-CM

## 2025-06-19 PROCEDURE — 3008F BODY MASS INDEX DOCD: CPT | Performed by: OTOLARYNGOLOGY

## 2025-06-19 PROCEDURE — 1159F MED LIST DOCD IN RCRD: CPT | Performed by: OTOLARYNGOLOGY

## 2025-06-19 PROCEDURE — 1126F AMNT PAIN NOTED NONE PRSNT: CPT | Performed by: OTOLARYNGOLOGY

## 2025-06-19 PROCEDURE — 99214 OFFICE O/P EST MOD 30 MIN: CPT | Performed by: OTOLARYNGOLOGY

## 2025-06-19 ASSESSMENT — PAIN SCALES - GENERAL: PAINLEVEL_OUTOF10: 0-NO PAIN

## 2025-06-20 NOTE — PROGRESS NOTES
Facial Plastic & Reconstructive Surgery    Returns today to discuss the surgical options    Chief Complaint: Drooling    Referring Provider: Dr. Christensen    75 y.o. 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 Magana and Dr. Christensen).    Recently had right posterior tongue lesion biopsy 4/11/25 with Dr. Christensen, presents today for surgical evaluation for possible filler for drooling.      Location: Lower lip  Quality: Retraction, drooling  Severity: Severe  Duration: Months  Timing: All times  Context: 2 previous jaw surgeries, adjuvant radiation, cancer history  Modifying factors: None  Associated signs and symptoms: Persistent drooling, disfigurement of the lower lip, retraction of the lower lip, dysarthria, oral phase dysphagia    Past Medical History  She has a past medical history of Cancer (Multi) (8/14), Cataract, Dysphagia (06/2023), Hypothyroid, Hypothyroidism, Personal history of irradiation (03/2023), and Personal history of malignant neoplasm of tongue.    Surgical History  She has a past surgical history that includes Other surgical history (08/30/2016); Other surgical history (08/30/2016); CT angio aorta and bilateral iliofemoral runoff including without contrast if performed (02/03/2023); Eye surgery (01/17 and 31/2024); and Tracheostomy tube placement (02/2023).     Social History  She reports that she has never smoked. She has never been exposed to tobacco smoke. She has never used smokeless tobacco. She reports that she does not currently use alcohol. She reports that she does not use drugs.    Family History  Family History[1]     Allergies  Cefepime and Sulfa (sulfonamide antibiotics)    Past, family, and social history obtained but not pertinent  to current problem unless documented above.    All other systems have been reviewed and are negative for complaint unless documented above.    Physical Exam:  General: Well-developed and well-nourished in appearance.  Skin: No rashes or concerning lesions on the visible portions of the skin.  Eyes: Extraocular movements intact. Visual fields grossly normal.  Ears: Pinna are normal in shape and position. External canals are patent.  Nose: Dorsum is midline.   Oral Cavity/Oropharynx: Dentition is intact. Mucous membranes moist.  Tethering of the lower lip with lack of vestibule, viability of the skin flap intact.  Drooling with dysarthria  Neck: Midline trachea without masses or lesions.  Respiratory: No respiratory distress. Quiet breathing without stertor or stridor.  Cardiovascular: Regular rate and rhythm. Warm extremities with equal pulses.  Psych: Normal mood and affect. Judgement and insight appropriate.  Neuro: Alert and oriented. CN II-XII grossly intact. No focal deficits.  Musculoskeletal: Gait intact. Moves all extremities well without apparent deformities.    Assessment: Lower lip retraction, vestibular retraction, dysarthria, drooling, oral phase dysphagia    Plan: Brigette presents with a complicated lower lip deformity with retraction of the mucosa and lack of vestibule contributing to drooling, oral phase dysphagia, dysarthria, disfigurement of the lip and jaw.  She has a complicated history of cancer resection with reconstruction followed by adjuvant radiation.  This is created significant vestibular retraction and tethering of the lower lip.  Considerations for reconstruction would include a skin graft, by pedicled superior labial flap, and free flap.  I think the trade-offs to doing a free flap would be a bit extreme and not worth the risk.  A bipedicle superior labial flap and vestibuloplasty would be a worthwhile consideration.  A skin graft would likely not provide enough benefit.  We discussed  all these options thoroughly.      Today, I was in direct face-to-face consultation with the patient, of which greater than 50% of the time was spent discussing the diagnoses, treatment plan, counseling, and care coordination. The patient and/or caregiver voiced understanding of our conversation and all questions were satisfactorily answered.    Total time for this visit: 30 min    Zackary Campos MD      , Facial Plastic & Reconstructive Surgery        P: [phone]  F: [phone]         [1]   Family History  Problem Relation Name Age of Onset    Blood clot Mother Gene Jachin     Other (ST elevation myocardial infarction) Father Ector Luna     Diabetes Father Ector Luna     Diabetes Sister Asia     Diabetes Brother Carlos     Diabetes Sister Asia     Diabetes Brother Carlos

## 2025-06-23 ENCOUNTER — TREATMENT (OUTPATIENT)
Dept: PHYSICAL THERAPY | Facility: CLINIC | Age: 75
End: 2025-06-23
Payer: MEDICARE

## 2025-06-23 DIAGNOSIS — G24.4 ORAL DYSKINESIA: ICD-10-CM

## 2025-06-23 DIAGNOSIS — K11.7 DROOLING: ICD-10-CM

## 2025-06-23 DIAGNOSIS — K13.0 LIP DEFORMITY, ACQUIRED: ICD-10-CM

## 2025-06-26 ENCOUNTER — TELEPHONE (OUTPATIENT)
Dept: OTOLARYNGOLOGY | Facility: HOSPITAL | Age: 75
End: 2025-06-26
Payer: MEDICARE

## 2025-06-26 NOTE — TELEPHONE ENCOUNTER
"Sharon called and left me a message at 9:55 a.m. asking for her thyroid medication to be changed to liquid form.  \"It will be easier than crushing the pill.\"    I called her back and left a message asking if she's checked with her pharmacy to see if (1) they can get the liquid form and (2) if her insurance will cover it.    I asked that she call me back with the answer to those questions before we attempt to make any changes.  "

## 2025-06-26 NOTE — TELEPHONE ENCOUNTER
"Sharon called me back and left a message 12:23 p.m.  She stated she did check with her pharmacy and they confirmed it is \"tough to get\" and her insurance won't cover the liquid form.  \"It was just a thought.\"    She appreciated my \"time\" and call back.   "

## 2025-06-30 ENCOUNTER — PATIENT MESSAGE (OUTPATIENT)
Dept: OTOLARYNGOLOGY | Facility: HOSPITAL | Age: 75
End: 2025-06-30

## 2025-06-30 ENCOUNTER — TELEPHONE (OUTPATIENT)
Dept: OTOLARYNGOLOGY | Facility: HOSPITAL | Age: 75
End: 2025-06-30

## 2025-06-30 ENCOUNTER — TREATMENT (OUTPATIENT)
Dept: PHYSICAL THERAPY | Facility: CLINIC | Age: 75
End: 2025-06-30
Payer: MEDICARE

## 2025-06-30 DIAGNOSIS — M27.2 OSTEORADIONECROSIS OF JAW: ICD-10-CM

## 2025-06-30 DIAGNOSIS — L03.211 CELLULITIS OF CHIN: ICD-10-CM

## 2025-06-30 DIAGNOSIS — R29.898 RIGHT LEG WEAKNESS: ICD-10-CM
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
## 2025-06-30 DIAGNOSIS — M25.671 ANKLE STIFFNESS, RIGHT: Primary | ICD-10-CM

## 2025-06-30 PROCEDURE — 97140 MANUAL THERAPY 1/> REGIONS: CPT | Mod: GP

## 2025-06-30 PROCEDURE — 97110 THERAPEUTIC EXERCISES: CPT | Mod: GP

## 2025-06-30 RX ORDER — AMOXICILLIN AND CLAVULANATE POTASSIUM 400; 57 MG/5ML; MG/5ML
875 POWDER, FOR SUSPENSION ORAL EVERY 12 HOURS SCHEDULED
Qty: 305.2 ML | Refills: 0 | Status: SHIPPED | OUTPATIENT
Start: 2025-06-30 | End: 2025-07-14

## 2025-06-30 NOTE — TELEPHONE ENCOUNTER
--------------  ADMISSION REVIEW     Payor: [employer]  Subscriber #:  [id number]  Authorization Number: [id number]    Admit date: 11/20/24  Admit time: 10:01 PM       REVIEW DOCUMENTATION:     ED Provider Notes        Subjective:   HPI    50-year-old male with history of hypertension GERD diabetes mellitus presenting for evaluation of difficulty using right upper extremity.  He is right-hand dominant.  He states that he had intermittent issues since Saturday.  Where he has several minutes of decreased dexterity in the right upper extremity.  Also noticed a similar issue in the right leg which has been intermittent.  Presently he is asymptomatic.  No headache.  No visual changes.  No neck pain.  No falls or trauma to the head.  Is a poorly controlled diabetic.  Has not checked her sugars in the last month.        Physical Exam     ED Triage Vitals [11/20/24 1604]   /87   Pulse 93   Resp 18   Temp 97.1 °F (36.2 °C)   Temp src Temporal   SpO2 99 %   O2 Device None (Room air)       Current Vitals:   Vital Signs  BP: (!) 168/99  Pulse: 82  Resp: 20  Temp: 98.4 °F (36.9 °C)  Temp src: Oral  MAP (mmHg): (!) 117    Oxygen Therapy  SpO2: 98 %  O2 Device: None (Room air)        Physical Exam  Constitutional: awake, alert, no sig distress  HENT: mmm, no lesions,  Neck: normal range of motion, no tenderness, supple.  Eyes: PERRL, EOMI, conjunctiva normal, no discharge. Sclera anicteric.  Cardiovascular: rr no murmur  Respiratory: Normal breath sounds, no respiratory distress, no wheezing, no chest tenderness.  GI: Bowel sounds normal, Soft, no tenderness, no masses, no pulsatile masses.  : No CVA tenderness.  Skin: Warm, dry, no erythema, no rash.  Musculoskeletal: Intact distal pulses, no edema, no tenderness, no cyanosis, no clubbing. Good range of motion in all major joints. No tenderness to palpation or major deformities noted. Back- No tenderness.  Neurologic: Alert & oriented x 3, normal motor function,  Sharon called and left me 2 message--one at 1:57 p.m. and another at 2 p.m. stating she has an infection again.  She's got drainage and swelling from her chin.    I called her as well as sent her a [employer] message asking that she please send a couple photos for me to review with Ame Magana & Fermin.  I did review her chart and she is not on ATB at this time.    I will await the photos.  Once received will review them with the providers and contact her back with the treatment plan.   normal sensory function, no focal deficits noted.  -mild dysmetria on FNF with RUE, normal proprioception to joint position, normal sensation  Psych: Calm, cooperative, nl affect        ED Course     Labs Reviewed   BASIC METABOLIC PANEL (8) - Abnormal; Notable for the following components:       Result Value    Glucose 375 (*)     Sodium 133 (*)     All other components within normal limits   CBC WITH DIFFERENTIAL WITH PLATELET - Abnormal; Notable for the following components:    MCV 79.1 (*)     All other components within normal limits   HEMOGLOBIN A1C - Abnormal; Notable for the following components:    HgbA1C 14.8 (*)     Estimated Average Glucose 378 (*)     All other components within normal limits   POCT GLUCOSE - Abnormal; Notable for the following components:    POC Glucose  325 (*)    EKG    Rate, intervals and axes as noted on EKG Report.  Rate: 91  Rhythm: Sinus Rhythm  Reading: SR with PAC, left axis, lvh changes, no st change no stemi. Qtc 437        MDM      50M hx as above presenting with intermittent decreased coordination and dexterity of the right upper and occasionally right lower extremity.  On arrival he is hypertensive other vitals are stable and reassuring.  DDx includes metabolic derangements, acute ischemic stroke TIA, demyelinating disease such as MS  Plan labs CTA MRI brain acute series      Independently reviewed patient CTA no large vessel occlusion or dissection    MRI without acute stroke, does have signal abnormality in left medulla, which does correlate to patient's symptoms, may be artifactual.  Discussed with neurology who recommends MRI brain without contrast admission.  Discussed with hospitalist.  Admission disposition: 11/20/2024  8:42 PM      Disposition and Plan     Clinical Impression:  1. Ataxia of right upper extremity         Disposition:  Admit  11/20/2024  8:42 pm        INTERNAL MEDICINE HISTORY & PHYSICAL       CHIEF COMPLAINT   Weakness and Other     HISTORY OF THE  PRESENT ILLNESS    Mr. Marek Mcqueen is a 51 y/o M w/ T2DM, HTN, HLD, JAZZ, and GERD who presented for weakness and decreased coordination     Patient following with primary care and endocrinology at [place]. Was recently seen in clinic for dizziness. Thought potentially due to anxiety/panic attack, possibly blood sugar (unsure since patient did not check during event). Dizziness resolved, continued to have on and off difficulty with right arm, sometimes feeling like his coordination was slightly off. Resolves spontaneously after happening. This had not occurred until a few days prior to admission.     Seen on admission. Patient feels well when seen. No headache, vision changes, or other weakness. No fever or chills. Has not had recent infections. Has been taking medications as prescribed.      On admission:  - VS: afebrile, HR 84, RR 22, /80  - labs notable for glucose 375, cbc wnl, hgb A1c 14.8  - MRI brain  w/out infarct or hemorrhage, CTA head/neck w/out occlusion or significant stenosis, mild atherosclerosis of internal carotids, MRI brain w+w/out wnl  - neuro consulted    ASSESSMENT & PLAN   Mr. Marek Mcqueen is a 51 y/o M w/ T2DM, HTN, HLD, JAZZ, and GERD who presented for weakness and decreased coordination     Decreased coordination  C/F TIA  - recent episode of dizziness PTA  - on and off decreased coordination causing him to present to ED  - unclear if due to tia, dehydration, peripheral neuropathy, seizure activity  - MRI brain without acute hemorrhage or infarct  - CTA head/neck without occlusion or significant stenosis  - neuro consulted               - eeg ordered               - patient considering LP  - monitor on telemetry  - PT/OT ordered - OP Therapy recommended     T2DM c/b hyperglycemia  - A1c 14.8 on admission  - on glipizide, metformin, rybelus  - SSI inpatient , start glargine if remains inpatient  - due to continued elevation in sugars, would recommend starting insulin - patient to discuss  with his endocrinologist 11/25 at appointment     Chronic medical conditions:  - HTN: continue lisinopril  - HLD: crestor  - JAZZ: CPAP  - GERD: PPI     Ppx: enox  Dispo  EDoD: ~   pending neurological workup  F/u:   - PCP: No primary care provider on file.     Stevie Sawant MD  Internal Medicine - Hospitalist            MEDICATIONS ADMINISTERED IN LAST 1 DAY:  insulin aspart (NovoLOG) 100 Units/mL FlexPen 1-11 Units       Date Action Dose Route User    Discharged on 11/21/2024 11/21/2024 1329 Given 7 Units Subcutaneous (Right Upper Arm) Stephany Garrido RN            Vitals (last day) before discharge       Date/Time Temp Pulse Resp BP SpO2 Weight O2 Device O2 Flow Rate (L/min) [facility]    11/21/24 1229 98.6 °F (37 °C) -- 16 132/73 99 % -- None (Room air) -- LF    11/21/24 1148 -- 89 -- -- -- -- -- -- ST    11/21/24 0540 98.5 °F (36.9 °C) 95 18 152/92 97 % -- -- -- MW    11/20/24 2207 98.4 °F (36.9 °C) 82 20 168/99 98 % 252 lb (114.3 kg) -- -- MW    11/20/24 2145 -- 90 16 136/80 99 % -- None (Room air) -- ZE    11/20/24 2030 -- 81 18 132/70 -- -- -- -- ZE    11/20/24 2000 -- 88 20 137/76 98 % -- None (Room air) -- ZE    11/20/24 1930 -- 83 17 133/77 98 % -- None (Room air) -- ZE    11/20/24 1828 -- -- -- -- -- -- None (Room air) -- RL    11/20/24 1745 -- 84 22 133/80 95 % -- None (Room air) -- RL    11/20/24 1730 -- 87 27 127/73 97 % -- None (Room air) -- RL    11/20/24 1715 -- 46 16 122/72 97 % -- None (Room air) -- RL    11/20/24 1604 97.1 °F (36.2 °C) 93 18 151/87 99 % 270 lb (122.5 kg) None (Room air) -- ZE

## 2025-07-01 NOTE — TELEPHONE ENCOUNTER
Brigette and I spoke yesterday afternoon as Dr. Magana wanted her started on ATB.    Prescription for Augmentin was sent in yesterday afternoon to her pharmacy.  Brigette was agreeable with the treatment plan.    I did adviser her to call me if things got worse, or were not improving come Monday.

## 2025-07-07 ASSESSMENT — ENCOUNTER SYMPTOMS
MUSCULOSKELETAL NEGATIVE: 1
HEMATOLOGIC/LYMPHATIC NEGATIVE: 1
PALPITATIONS: 0
ABDOMINAL PAIN: 1
PSYCHIATRIC NEGATIVE: 1
RESPIRATORY NEGATIVE: 1
CONSTITUTIONAL NEGATIVE: 1
ALLERGIC/IMMUNOLOGIC NEGATIVE: 1
NEUROLOGICAL NEGATIVE: 1
EYES NEGATIVE: 1
DIARRHEA: 1
ENDOCRINE NEGATIVE: 1

## 2025-07-07 NOTE — PROGRESS NOTES
Subjective   Patient ID: Brigette Romo is a 75 y.o. female. They present today with a chief complaint of Diarrhea (Diarrhea with cramping, lost about 8 pounds within a week. No blood in stool. Started last Tuesday. ).    History of Present Illness    Diarrhea  Associated symptoms: abdominal pain         75-year-old female presents to urgent care with diarrhea lower abdominal cramping  For the past 1 week.  She also reports 8 pound weight loss in the past 1 week. patient states that her watery diarrhea is too numerous to count and that happens every time she consumes food or water.  She denies bloody diarrhea.  She reports associated lower abdominal cramping. The patient consumes food through a PEG tube due to having oral surgery from her malignant neoplasm.     Past Medical History  Allergies as of 11/12/2024 - Reviewed 11/12/2024   Allergen Reaction Noted    Cefepime Other 11/01/2024    Sulfa (sulfonamide antibiotics) Rash 10/01/2023       Prescriptions Prior to Admission[1]     Medical History[2]    Surgical History[3]     reports that she has never smoked. She has never been exposed to tobacco smoke. She has never used smokeless tobacco. She reports that she does not currently use alcohol. She reports that she does not use drugs.    Review of Systems  Review of Systems   Constitutional: Negative.    HENT: Negative.     Eyes: Negative.    Respiratory: Negative.     Cardiovascular:  Negative for chest pain and palpitations.   Gastrointestinal:  Positive for abdominal pain and diarrhea.   Endocrine: Negative.    Genitourinary: Negative.    Musculoskeletal: Negative.    Skin: Negative.    Allergic/Immunologic: Negative.    Neurological: Negative.    Hematological: Negative.    Psychiatric/Behavioral: Negative.     All other systems reviewed and are negative.                                 Objective    Vitals:    11/12/24 0936   BP: 112/61   Pulse: 80   Resp: 16   Temp: 36 °C (96.8 °F)   TempSrc: Temporal   SpO2:  "97%   Weight: 54.4 kg (120 lb)   Height: 1.676 m (5' 6\")     No LMP recorded (lmp unknown). Patient is postmenopausal.    Physical Exam  Vitals and nursing note reviewed.   Constitutional:       General: She is not in acute distress.     Appearance: Normal appearance. She is not ill-appearing or toxic-appearing.   HENT:      Head: Atraumatic.      Mouth/Throat:      Mouth: Mucous membranes are moist.      Pharynx: Oropharynx is clear.   Eyes:      Extraocular Movements: Extraocular movements intact.      Conjunctiva/sclera: Conjunctivae normal.      Pupils: Pupils are equal, round, and reactive to light.   Cardiovascular:      Rate and Rhythm: Normal rate.   Pulmonary:      Effort: Pulmonary effort is normal.   Skin:     General: Skin is warm and dry.   Neurological:      General: No focal deficit present.      Mental Status: She is alert and oriented to person, place, and time.   Psychiatric:         Mood and Affect: Mood normal.         Behavior: Behavior normal.         Thought Content: Thought content normal.         Procedures    Point of Care Test & Imaging Results from this visit  No results found for this visit on 11/12/24.   Imaging  No results found.    Cardiology, Vascular, and Other Imaging  No other imaging results found for the past 2 days      Diagnostic study results (if any) were reviewed by EUSEBIO Ellsworth.    Assessment/Plan   Allergies, medications, history, and pertinent labs/EKGs/Imaging reviewed by EUSEBIO Ellsworth.     Medical Decision Making    Given patient's reports of severe diarrhea, significant weight loss.  Inability to tolerate p.o., recommend further evaluation and treatment with possible IV fluids in the emergency department patient is agreeable to this plan.  She declines transport via EMS.    Orders and Diagnoses  Diagnoses and all orders for this visit:  Diarrhea, unspecified type      Medical Admin Record      Patient disposition: ED    Electronically signed " by EUSEBIO Ellsworth  11:24 AM           [1] (Not in a hospital admission)   [2]   Past Medical History:  Diagnosis Date    Cancer (Multi) 8/14    Cataract     Dysphagia 06/2023    Hypothyroid     Hypothyroidism     Personal history of irradiation 03/2023    Personal history of malignant neoplasm of tongue     History of tongue cancer   [3]   Past Surgical History:  Procedure Laterality Date    CT ANGIO AORTA AND BILATERAL ILIOFEMORAL RUN OFF INCLUDING WITHOUT CONTRAST IF PERFORMED  02/03/2023    CT AORTA AND BILATERAL ILIOFEMORAL RUNOFF ANGIOGRAM W AND/OR WO IV CONTRAST 2/3/2023 DOCTOR OFF[facility]    EYE SURGERY  01/17 and 31/2024    OTHER SURGICAL HISTORY  08/30/2016    Glossectomy    OTHER SURGICAL HISTORY  08/30/2016    Radical Neck Dissection    TRACHEOSTOMY TUBE PLACEMENT  02/2023

## 2025-07-08 ENCOUNTER — OFFICE VISIT (OUTPATIENT)
Facility: CLINIC | Age: 75
End: 2025-07-08
Payer: MEDICARE

## 2025-07-08 VITALS — BODY MASS INDEX: 18.88 KG/M2 | WEIGHT: 117 LBS

## 2025-07-08 DIAGNOSIS — C03.1 CANCER OF LOWER GUM (MULTI): ICD-10-CM

## 2025-07-08 DIAGNOSIS — C02.9 MALIGNANT NEOPLASM OF TONGUE (MULTI): ICD-10-CM
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
## 2025-07-08 DIAGNOSIS — M27.2 OSTEORADIONECROSIS OF JAW: Primary | ICD-10-CM

## 2025-07-08 PROCEDURE — 1160F RVW MEDS BY RX/DR IN RCRD: CPT | Performed by: OTOLARYNGOLOGY

## 2025-07-08 PROCEDURE — 99213 OFFICE O/P EST LOW 20 MIN: CPT | Performed by: OTOLARYNGOLOGY

## 2025-07-08 PROCEDURE — 1036F TOBACCO NON-USER: CPT | Performed by: OTOLARYNGOLOGY

## 2025-07-08 PROCEDURE — 1159F MED LIST DOCD IN RCRD: CPT | Performed by: OTOLARYNGOLOGY

## 2025-07-08 NOTE — PROGRESS NOTES
Provider Impressions     Status post treatment of multiple different oral cavity cancers.  I cannot appreciate any tumor recurrence.    Status post  surgery for radionecrosis of the mandible.  He has had some swelling and redness over the area without any drainage.  It has been stable since I put her on Augmentin approximately a week ago.  A CT scan will be obtained.    I will see her after the scan.    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 She had surgical excision and a neck dissection followed by radiation therapy. She was found to have tumor recurrence and on September 22, 2016 she underwent reexcision. The margins were felt to be clear. There was no evidence of pedro luis metastasis. She had a TSH level in July 2023 which was normal. She had a chest CT in April 2020 which was negative. She was having some issues with discomfort on the lateral tongue on the left. This led to a biopsy that showed cancer. On April 13, 2020 she underwent a partial glossectomy. The margins were clear. She was presented at our tumor board and it was not felt that anything further needed to be done. She admits that her swallowing is somewhat difficult. She had a chest CT in May 2023 that showed a possible small nodule. I saw her in January 2023 because she had some concern about a lesion in her mouth. 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 She had a CT of the neck done on January 20, 2023 that showed a lesion involving the mandible on the right side. On February 7, 2023 she underwent surgery. There was bone involvement. There was a margin that was described as being close. She completed radiation therapy in April 2023. She had a PET scan done in August 2023 that really does not show anything worrisome.  At the same time she has some uptake in the right  thyroid.  This was followed by an ultrasound that showed a suspicious nodule which was eventually biopsied and turned out to be benign.  I will follow this clinically.  She  had issues with drainage around infected reconstruction plates.  On December 16, 2024 she underwent a composite resection and reconstruction.  The pathology did not show anything worrisome.  She is undergoing hyperbaric oxygen.  She had a TSH level in November 2024 which was normal.  I biopsied something in her mouth the back in April 2025 that turned out to be negative.  She was doing fine but about a week or 2 ago she started having some swelling and redness over the chin.  She has not had any drainage.  She was put on Augmentin.  She says that the area has not really changed that much since she was put on Augmentin.    Physical Exam      Examination of the oral cavity and oropharynx shows all the changes from the surgeries.  At the same time I cannot appreciate any worrisome mucosal lesions.   She has minimal mobility of her tongue.  She does have good mandibular excursion.  Semination of the skin of the face and neck shows some swelling of the overlying skin of the chin.  To the right there is an area of about 2 x 4 cm that is covered by a very superficial crust or scab.  I cannot express anything from around there.

## 2025-07-09 LAB
CREAT SERPL-MCNC: 0.79 MG/DL (ref 0.6–1)
EGFRCR SERPLBLD CKD-EPI 2021: 79 ML/MIN/1.73M2

## 2025-07-14 ENCOUNTER — APPOINTMENT (OUTPATIENT)
Dept: PHYSICAL THERAPY | Facility: CLINIC | Age: 75
End: 2025-07-14
Payer: MEDICARE

## 2025-07-14 ENCOUNTER — TELEPHONE (OUTPATIENT)
Dept: OTOLARYNGOLOGY | Facility: HOSPITAL | Age: 75
End: 2025-07-14
Payer: MEDICARE

## 2025-07-14 DIAGNOSIS — L03.211 CELLULITIS OF CHIN: ICD-10-CM
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
## 2025-07-14 DIAGNOSIS — M27.2 OSTEORADIONECROSIS OF JAW: ICD-10-CM

## 2025-07-14 RX ORDER — AMOXICILLIN AND CLAVULANATE POTASSIUM 400; 57 MG/5ML; MG/5ML
875 POWDER, FOR SUSPENSION ORAL EVERY 12 HOURS SCHEDULED
Qty: 305.2 ML | Refills: 0 | Status: SHIPPED | OUTPATIENT
Start: 2025-07-14 | End: 2025-07-28

## 2025-07-14 NOTE — TELEPHONE ENCOUNTER
"Sharon called and left a message at 11:36 a.m. stating Dr. Magana wanted her to remain on ATB at least until he sees her next week.  She will be out of medication this week.  She also stated that \"the spot on my chin is doing a little dripping.\"    I reviewed the chart and see nothing documented.  I did message him and he does want her to remain on full dose Augmentin until after the scan.    Order entered.  I let her know a refill will be submitted.  "

## 2025-07-21 ENCOUNTER — APPOINTMENT (OUTPATIENT)
Dept: PHYSICAL THERAPY | Facility: CLINIC | Age: 75
End: 2025-07-21
Payer: MEDICARE

## 2025-07-21 NOTE — PROGRESS NOTES
Provider Impressions     Status post treatment of multiple different oral cavity cancers.  I cannot appreciate any tumor recurrence.    Status post  surgery for radionecrosis of the mandible.  She had some swelling and now some drainage from the right side of her face.  A culture was obtained today.  I will call her if we need to change the antibiotics.  I asked her to see my colleague.  This area will need to be addressed surgically at some point.  There may be a fracture of the reconstruction plate.      I will see her in 2 months.    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 She had surgical excision and a neck dissection followed by radiation therapy. She was found to have tumor recurrence and on September 22, 2016 she underwent reexcision. The margins were felt to be clear. There was no evidence of pedro luis metastasis. She had a TSH level in July 2023 which was normal. She had a chest CT in April 2020 which was negative. She was having some issues with discomfort on the lateral tongue on the left. This led to a biopsy that showed cancer. On April 13, 2020 she underwent a partial glossectomy. The margins were clear. She was presented at our tumor board and it was not felt that anything further needed to be done. She admits that her swallowing is somewhat difficult. She had a chest CT in May 2023 that showed a possible small nodule. I saw her in January 2023 because she had some concern about a lesion in her mouth. 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 She had a CT of the neck done on January 20, 2023 that showed a lesion involving the mandible on the right side. On February 7, 2023 she underwent surgery. There was bone involvement. There was a margin that was described as being close. She completed radiation therapy in April 2023.  She had a PET scan done in August 2023 that really does not show anything worrisome.  At the same time she has some uptake in the right thyroid.  This was followed by an ultrasound that showed a suspicious nodule which was eventually biopsied and turned out to be benign.  I will follow this clinically.  She  had issues with drainage around infected reconstruction plates.  On December 16, 2024 she underwent a composite resection and reconstruction.  The pathology did not show anything worrisome.  She is undergoing hyperbaric oxygen.  She had a TSH level in November 2024 which was normal.  I biopsied something in her mouth the back in April 2025 that turned out to be negative.  She was doing fine but  she started having some swelling and redness over the chin.  She was put on Augmentin.  More recently she started having some drainage on the right side.  She had a CT scan of her face done on July 22, 2025 that I personally reviewed.  The whole area has been reconstructed.  There seems to be a possible fracture of the plate.    Physical Exam      Examination of the oral cavity and oropharynx shows all the changes from the surgeries.  At the same time I cannot appreciate any worrisome mucosal lesions.   She has minimal mobility of her tongue.  She does have good mandibular excursion.  On the outside of the mandible on the right there is an area of about a centimeter and a half that is granular in appearance.  There is some definite purulence coming out of the area and this was cultured.

## 2025-07-22 ENCOUNTER — HOSPITAL ENCOUNTER (OUTPATIENT)
Dept: RADIOLOGY | Facility: CLINIC | Age: 75
Discharge: HOME | End: 2025-07-22
Payer: MEDICARE

## 2025-07-22 ENCOUNTER — APPOINTMENT (OUTPATIENT)
Facility: CLINIC | Age: 75
End: 2025-07-22
Payer: MEDICARE

## 2025-07-22 VITALS — WEIGHT: 114.6 LBS | HEIGHT: 66 IN | BODY MASS INDEX: 18.42 KG/M2

## 2025-07-22 DIAGNOSIS — M27.2 OSTEORADIONECROSIS OF JAW: ICD-10-CM

## 2025-07-22 DIAGNOSIS — L08.9 WOUND INFECTION: ICD-10-CM

## 2025-07-22 DIAGNOSIS — T14.8XXA WOUND INFECTION: ICD-10-CM
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
## 2025-07-22 DIAGNOSIS — E03.9 HYPOTHYROIDISM (ACQUIRED): ICD-10-CM

## 2025-07-22 DIAGNOSIS — C02.9 MALIGNANT NEOPLASM OF TONGUE (MULTI): ICD-10-CM

## 2025-07-22 DIAGNOSIS — C03.1 CANCER OF LOWER GUM (MULTI): ICD-10-CM

## 2025-07-22 DIAGNOSIS — R13.11 ORAL PHASE DYSPHAGIA: ICD-10-CM

## 2025-07-22 DIAGNOSIS — M27.2 OSTEORADIONECROSIS OF JAW: Primary | ICD-10-CM
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
## 2025-07-22 PROCEDURE — 87070 CULTURE OTHR SPECIMN AEROBIC: CPT

## 2025-07-22 PROCEDURE — 70487 CT MAXILLOFACIAL W/DYE: CPT

## 2025-07-22 PROCEDURE — 99213 OFFICE O/P EST LOW 20 MIN: CPT | Performed by: OTOLARYNGOLOGY

## 2025-07-22 PROCEDURE — 3008F BODY MASS INDEX DOCD: CPT | Performed by: OTOLARYNGOLOGY

## 2025-07-22 PROCEDURE — 1160F RVW MEDS BY RX/DR IN RCRD: CPT | Performed by: OTOLARYNGOLOGY

## 2025-07-22 PROCEDURE — 87077 CULTURE AEROBIC IDENTIFY: CPT

## 2025-07-22 PROCEDURE — 70487 CT MAXILLOFACIAL W/DYE: CPT | Performed by: RADIOLOGY

## 2025-07-22 PROCEDURE — 2550000001 HC RX 255 CONTRASTS: Performed by: OTOLARYNGOLOGY

## 2025-07-22 PROCEDURE — 1159F MED LIST DOCD IN RCRD: CPT | Performed by: OTOLARYNGOLOGY

## 2025-07-22 PROCEDURE — 1036F TOBACCO NON-USER: CPT | Performed by: OTOLARYNGOLOGY

## 2025-07-22 PROCEDURE — 87205 SMEAR GRAM STAIN: CPT

## 2025-07-22 PROCEDURE — 87075 CULTR BACTERIA EXCEPT BLOOD: CPT

## 2025-07-22 PROCEDURE — 87185 SC STD ENZYME DETCJ PER NZM: CPT

## 2025-07-22 RX ADMIN — IOHEXOL 70 ML: 350 INJECTION, SOLUTION INTRAVENOUS at 09:25

## 2025-07-28 ENCOUNTER — TELEPHONE (OUTPATIENT)
Dept: OTOLARYNGOLOGY | Facility: HOSPITAL | Age: 75
End: 2025-07-28
Payer: MEDICARE

## 2025-07-28 ENCOUNTER — APPOINTMENT (OUTPATIENT)
Dept: PHYSICAL THERAPY | Facility: CLINIC | Age: 75
End: 2025-07-28
Payer: MEDICARE

## 2025-07-28 NOTE — TELEPHONE ENCOUNTER
"Sharon called and left a message at 2:01 p.m. stating she saw her culture report and hasn't heard from Dr. Magana about starting a new ATB.  \"I want to start it ASAP as I'm running out of the Augmentin.\"    Dr. Magana is out of town this week.  I reviewed the culture report and she seems to be on the proper ATB based on the culture.   I did message Dr. Magana to see if he wanted her still on full dose and if so for how long.  I did remind him that she did 2 week full dose course starting on 6/30/25 and again starting 7/14/25.    I called to inform Brigette of the above.  She thought, when she read the report she needed to be on vancomycin.  I informed her no and read her the report and confirmed she is on a beta-lactam antibiotics.    I will [place] back with her once I hear back from Dr. Magana about further ATB treatment.    "

## 2025-07-30 ENCOUNTER — TELEPHONE (OUTPATIENT)
Dept: OTOLARYNGOLOGY | Facility: CLINIC | Age: 75
End: 2025-07-30
Payer: MEDICARE

## 2025-07-30 DIAGNOSIS — T14.8XXA WOUND INFECTION: ICD-10-CM

## 2025-07-30 DIAGNOSIS — L03.211 CELLULITIS OF CHIN: ICD-10-CM

## 2025-07-30 DIAGNOSIS — L08.9 WOUND INFECTION: ICD-10-CM
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
## 2025-07-30 DIAGNOSIS — M27.2 OSTEORADIONECROSIS OF JAW: ICD-10-CM

## 2025-07-30 RX ORDER — AMOXICILLIN AND CLAVULANATE POTASSIUM 400; 57 MG/5ML; MG/5ML
875 POWDER, FOR SUSPENSION ORAL EVERY 12 HOURS SCHEDULED
Qty: 654 ML | Refills: 1 | Status: SHIPPED | OUTPATIENT
Start: 2025-07-30 | End: 2025-09-28

## 2025-08-11 ENCOUNTER — OFFICE VISIT (OUTPATIENT)
Dept: OTOLARYNGOLOGY | Facility: HOSPITAL | Age: 75
End: 2025-08-11
Payer: MEDICARE

## 2025-08-11 VITALS — HEIGHT: 66 IN | BODY MASS INDEX: 18.48 KG/M2 | WEIGHT: 115 LBS | TEMPERATURE: 98.6 F

## 2025-08-11 DIAGNOSIS — T14.8XXA WOUND INFECTION: ICD-10-CM

## 2025-08-11 DIAGNOSIS — L08.9 WOUND INFECTION: ICD-10-CM
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
## 2025-08-11 DIAGNOSIS — M27.2 OSTEORADIONECROSIS OF JAW: Primary | ICD-10-CM

## 2025-08-11 PROCEDURE — 99215 OFFICE O/P EST HI 40 MIN: CPT | Performed by: OTOLARYNGOLOGY

## 2025-08-11 PROCEDURE — 1036F TOBACCO NON-USER: CPT | Performed by: OTOLARYNGOLOGY

## 2025-08-11 PROCEDURE — 1160F RVW MEDS BY RX/DR IN RCRD: CPT | Performed by: OTOLARYNGOLOGY

## 2025-08-11 PROCEDURE — 3008F BODY MASS INDEX DOCD: CPT | Performed by: OTOLARYNGOLOGY

## 2025-08-11 PROCEDURE — 1126F AMNT PAIN NOTED NONE PRSNT: CPT | Performed by: OTOLARYNGOLOGY

## 2025-08-11 PROCEDURE — 1159F MED LIST DOCD IN RCRD: CPT | Performed by: OTOLARYNGOLOGY

## 2025-08-11 ASSESSMENT — PAIN SCALES - GENERAL: PAINLEVEL_OUTOF10: 0-NO PAIN

## 2025-08-22 ENCOUNTER — TELEPHONE (OUTPATIENT)
Dept: OTOLARYNGOLOGY | Facility: HOSPITAL | Age: 75
End: 2025-08-22
Payer: MEDICARE

## 2025-08-22 DIAGNOSIS — E11.69 TYPE 2 DIABETES MELLITUS WITH OTHER SPECIFIED COMPLICATION, WITHOUT LONG-TERM CURRENT USE OF INSULIN: ICD-10-CM

## 2025-08-22 DIAGNOSIS — M27.2 OSTEORADIONECROSIS OF JAW: ICD-10-CM
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
## 2025-08-22 DIAGNOSIS — E03.9 ACQUIRED HYPOTHYROIDISM: ICD-10-CM

## 2025-08-25 ENCOUNTER — APPOINTMENT (OUTPATIENT)
Facility: CLINIC | Age: 75
End: 2025-08-25
Payer: MEDICARE

## 2025-08-25 VITALS
HEART RATE: 78 BPM | RESPIRATION RATE: 18 BRPM | SYSTOLIC BLOOD PRESSURE: 118 MMHG | DIASTOLIC BLOOD PRESSURE: 72 MMHG | BODY MASS INDEX: 18.68 KG/M2 | OXYGEN SATURATION: 97 % | WEIGHT: 116.2 LBS | HEIGHT: 66 IN | TEMPERATURE: 98 F

## 2025-08-25 DIAGNOSIS — Z00.00 ROUTINE GENERAL MEDICAL EXAMINATION AT HEALTH CARE FACILITY: ICD-10-CM

## 2025-08-25 DIAGNOSIS — Z00.00 HEALTHCARE MAINTENANCE: Primary | ICD-10-CM

## 2025-08-25 DIAGNOSIS — Z12.31 ENCOUNTER FOR SCREENING MAMMOGRAM FOR BREAST CANCER: ICD-10-CM

## 2025-08-25 DIAGNOSIS — E78.2 MIXED HYPERLIPIDEMIA: Primary | ICD-10-CM

## 2025-08-25 DIAGNOSIS — Z78.0 ASYMPTOMATIC MENOPAUSAL STATE: ICD-10-CM

## 2025-08-25 PROBLEM — E11.69 TYPE 2 DIABETES MELLITUS WITH OTHER SPECIFIED COMPLICATION, WITHOUT LONG-TERM CURRENT USE OF INSULIN: Status: RESOLVED | Noted: 2024-11-14 | Resolved: 2025-08-25

## 2025-08-25 PROCEDURE — 93000 ELECTROCARDIOGRAM COMPLETE: CPT | Performed by: FAMILY MEDICINE

## 2025-08-25 PROCEDURE — 3008F BODY MASS INDEX DOCD: CPT | Performed by: FAMILY MEDICINE

## 2025-08-25 PROCEDURE — 1159F MED LIST DOCD IN RCRD: CPT | Performed by: FAMILY MEDICINE

## 2025-08-25 PROCEDURE — G0439 PPPS, SUBSEQ VISIT: HCPCS | Performed by: FAMILY MEDICINE

## 2025-08-25 PROCEDURE — 1170F FXNL STATUS ASSESSED: CPT | Performed by: FAMILY MEDICINE

## 2025-08-25 PROCEDURE — 99397 PER PM REEVAL EST PAT 65+ YR: CPT | Performed by: FAMILY MEDICINE

## 2025-08-25 PROCEDURE — 1036F TOBACCO NON-USER: CPT | Performed by: FAMILY MEDICINE

## 2025-08-25 PROCEDURE — 1160F RVW MEDS BY RX/DR IN RCRD: CPT | Performed by: FAMILY MEDICINE

## 2025-08-25 ASSESSMENT — ACTIVITIES OF DAILY LIVING (ADL)
DOING_HOUSEWORK: INDEPENDENT
MANAGING_FINANCES: INDEPENDENT
DRESSING: INDEPENDENT
GROCERY_SHOPPING: INDEPENDENT
BATHING: INDEPENDENT
TAKING_MEDICATION: INDEPENDENT

## 2025-08-25 ASSESSMENT — ENCOUNTER SYMPTOMS
LOSS OF SENSATION IN FEET: 0
NEUROLOGICAL NEGATIVE: 1
ENDOCRINE NEGATIVE: 1
CARDIOVASCULAR NEGATIVE: 1
GASTROINTESTINAL NEGATIVE: 1
CONSTITUTIONAL NEGATIVE: 1
EYES NEGATIVE: 1
RESPIRATORY NEGATIVE: 1
DEPRESSION: 0
PSYCHIATRIC NEGATIVE: 1
OCCASIONAL FEELINGS OF UNSTEADINESS: 0

## 2025-08-26 LAB
ALBUMIN SERPL-MCNC: 4.4 G/DL (ref 3.6–5.1)
ALP SERPL-CCNC: 90 U/L (ref 37–153)
ALT SERPL-CCNC: 22 U/L (ref 6–29)
ANION GAP SERPL CALCULATED.4IONS-SCNC: 7 MMOL/L (CALC) (ref 7–17)
AST SERPL-CCNC: 24 U/L (ref 10–35)
BILIRUB SERPL-MCNC: 0.6 MG/DL (ref 0.2–1.2)
BUN SERPL-MCNC: 24 MG/DL (ref 7–25)
CALCIUM SERPL-MCNC: 10.2 MG/DL (ref 8.6–10.4)
CHLORIDE SERPL-SCNC: 103 MMOL/L (ref 98–110)
CHOLEST SERPL-MCNC: 179 MG/DL
CHOLEST/HDLC SERPL: 3.1 (CALC)
CO2 SERPL-SCNC: 31 MMOL/L (ref 20–32)
CREAT SERPL-MCNC: 0.7 MG/DL (ref 0.6–1)
EGFRCR SERPLBLD CKD-EPI 2021: 91 ML/MIN/1.73M2
ERYTHROCYTE [DISTWIDTH] IN BLOOD BY AUTOMATED COUNT: 13.7 % (ref 11–15)
GLUCOSE SERPL-MCNC: 81 MG/DL (ref 65–99)
HCT VFR BLD AUTO: 43.3 % (ref 35–45)
HDLC SERPL-MCNC: 58 MG/DL
HGB BLD-MCNC: 14.2 G/DL (ref 11.7–15.5)
LDLC SERPL CALC-MCNC: 102 MG/DL (CALC)
MCH RBC QN AUTO: 30.9 PG (ref 27–33)
MCHC RBC AUTO-ENTMCNC: 32.8 G/DL (ref 32–36)
MCV RBC AUTO: 94.3 FL (ref 80–100)
NONHDLC SERPL-MCNC: 121 MG/DL (CALC)
PLATELET # BLD AUTO: 230 THOUSAND/UL (ref 140–400)
PMV BLD REES-ECKER: 10.3 FL (ref 7.5–12.5)
POTASSIUM SERPL-SCNC: 4.6 MMOL/L (ref 3.5–5.3)
PROT SERPL-MCNC: 6.8 G/DL (ref 6.1–8.1)
RBC # BLD AUTO: 4.59 MILLION/UL (ref 3.8–5.1)
SODIUM SERPL-SCNC: 141 MMOL/L (ref 135–146)
TRIGL SERPL-MCNC: 92 MG/DL
TSH SERPL-ACNC: 2.54 MIU/L (ref 0.4–4.5)
WBC # BLD AUTO: 4.8 THOUSAND/UL (ref 3.8–10.8)

## 2025-09-09 ENCOUNTER — APPOINTMENT (OUTPATIENT)
Facility: CLINIC | Age: 75
End: 2025-09-09
Payer: MEDICARE

## 2026-08-26 ENCOUNTER — APPOINTMENT (OUTPATIENT)
Facility: CLINIC | Age: 76
End: 2026-08-26
Payer: MEDICARE

## (undated) DEVICE — SPEAR, EYE, SURGICAL, WECK-CEL, CELLULOSE

## (undated) DEVICE — MANIFOLD, 4 PORT NEPTUNE STANDARD

## (undated) DEVICE — SUTURE, PERMA HAND 2-0, TAPER POINT, SH BLACK 8-18 INCH

## (undated) DEVICE — BLADE, OSCILLATING/SAGITTAL, 25MM X 9MM

## (undated) DEVICE — GOWN, SURGICAL, SMARTGOWN, XLARGE, STERILE

## (undated) DEVICE — BOWL, BASIN, 32 OZ, STERILE

## (undated) DEVICE — SUTURE, MONOCRYL, 4-0, 18 IN, PS2, UNDYED

## (undated) DEVICE — CATHETER TRAY, SURESTEP, 16FR, URINE METER W/STATLOCK

## (undated) DEVICE — DRESSING, FOAM, MEPILEX, AG TRANSFER, 6 X 8, LF

## (undated) DEVICE — Device

## (undated) DEVICE — APPLIER, LIGACLIP MULTICLIP, 20 LG CLIP 13 1/4

## (undated) DEVICE — CLIP, LIGATING, W/ADHESIVE PAD, MEDIUM, TITANIUM

## (undated) DEVICE — SUTURE, PLAIN, 5-0, 18 IN, PC1, YELLOW

## (undated) DEVICE — TUBE, TRACH, CUFFED, LOW PRESS, LPC, SZ-6, 6.4MM ID, LF

## (undated) DEVICE — CANNULA, ANTERIOR CHAMBER

## (undated) DEVICE — SPONGE GAUZE, XRAY SC+RFID, 4X4 16 PLY, STERILE

## (undated) DEVICE — RETRACTOR, SPANDEX CHEEK & LIP HAGER-WERKEN P605-454

## (undated) DEVICE — COVER, CART, 45 X 27 X 48 IN, CLEAR

## (undated) DEVICE — SPONGE, HEMOSTATIC, CELLULOSE, SURGICEL, 2 X 14 IN

## (undated) DEVICE — MICROCLIPS, GEM HEMOSTATIC TITANIUM

## (undated) DEVICE — PADDING, WEBRIL, UNDERCAST, STERILE, 4 IN

## (undated) DEVICE — CATHETER, URETHRAL, PEZZER, 3 CM HEAD, 36 FR, LATEX

## (undated) DEVICE — SUTURE, PROLENE, 2-0, 30 IN, SH, BLUE

## (undated) DEVICE — BLADE, DERMATOME, 1.25 X 4.25 IN, STERILE

## (undated) DEVICE — DRESSING, MEPILEX, BORDER, HEEL, 8.7 X 9.1 IN

## (undated) DEVICE — DRESSING, ABDOMINAL, TENDERSORB, 8 X 10 IN, STERILE

## (undated) DEVICE — TRAY, SURESTEP, URINE METER, 14FR, SILICONE

## (undated) DEVICE — HOLSTER, JET SAFETY

## (undated) DEVICE — DRESSING, GAUZE, 16 PLY, 4 X 4 IN, STERILE

## (undated) DEVICE — BASIN, EMESIS, STANDARD, STERILE

## (undated) DEVICE — DRESSING, MEPILEX FOAM BORDER AG, SILVER, 4 X 4

## (undated) DEVICE — DRESSING, MEPILEX, BORDER, SACRUM, 8.7 X 9.8 IN

## (undated) DEVICE — CLEANER, WIPE, INSTRUMENT, 3.25 X 3.25 IN

## (undated) DEVICE — SYRINGE, 5 CC, LUER LOCK

## (undated) DEVICE — SUTURE, VICRYL, 3-0,18 IN, SH, UNDYED

## (undated) DEVICE — NEEDLE, ELECTRODE, ELECTROSURGICAL, INSULATED

## (undated) DEVICE — BURR, OVAL MEDIUM, 4.0MM

## (undated) DEVICE — CATHETER, IV, INSYTE, AUTOGUARD, SHIELDED, 16 G X 1.16 IN, VIALON

## (undated) DEVICE — CUP, SOLUTION

## (undated) DEVICE — DRILL, KLSM 1.5 X 105

## (undated) DEVICE — DRESSING, TRANSPARENT, TEGADERM, 8 X 12 IN

## (undated) DEVICE — BOWL, UTILITY, 32 OZ, PLASTIC, STERILE

## (undated) DEVICE — CATHETER, IV, ANGIOCATH, 24 G X 0.75 IN, FEP POLYMER

## (undated) DEVICE — NEEDLE, HYPODERMIC, 25 G X 1.5 IN, A BEVEL, STERILE

## (undated) DEVICE — DRAPE, SHEET, FAN FOLDED, HALF, 44 X 58 IN, DISPOSABLE, LF, STERILE

## (undated) DEVICE — SUTURE, SILK, 2-0, 30 IN, SH, BLACK

## (undated) DEVICE — DRAPE, MICROSCOPE, W/REMOVABLE LENS

## (undated) DEVICE — REST, HEAD, BAGEL, 9 IN

## (undated) DEVICE — CLIP, LIGATING, W/ADHESIVE, WIDE SLOT, SMALL, TITANIUM

## (undated) DEVICE — DRAIN, WOUND, FLAT, HUBLESS, FULL LENGTH PERFORATION, 10 MM X 20 CM, SILICONE

## (undated) DEVICE — SPONGE, DISSECTING, PEANUT, 3/8 IN, XRAY DETECTABLE

## (undated) DEVICE — LEGEND, 3 MM, METAL CUTTER

## (undated) DEVICE — COUNTER, NEEDLE, FOAM BLOCK, W/MAGNET, W/BLADE GUARD, 10 COUNT, RED, LF

## (undated) DEVICE — CONTAINER, SPECIMEN, 120 ML, STERILE

## (undated) DEVICE — PAD, GROUNDING, ELECTROSURGICAL, W/9 FT CABLE, POLYHESIVE II, ADULT, LF

## (undated) DEVICE — DRILL, TWIST, W/NOTCH, 1.5 X 50 7MM STOP

## (undated) DEVICE — COVER, TABLE, 44 X 75 IN, DISPOSABLE, LF, STERILE

## (undated) DEVICE — GEL, ECOVUE, ULTRASOUND, 20GRAM, STERILE

## (undated) DEVICE — SPONGE, DISSECTOR, PEANUT, 3/8, STERILE 5 FOAM HOLDER"

## (undated) DEVICE — BACKGROUND MATERIAL, MERCIAN, YELLOW, 1MM GRID, LF

## (undated) DEVICE — SPONGE, LAP, XRAY DECT, SC+RFID, 12X12, STERILE

## (undated) DEVICE — SHEARS, CURVED 9CM HARMONIC FOCUS

## (undated) DEVICE — SCREW, LOCK 2.0 X 17 MAXDRV TI ALLOY: Type: IMPLANTABLE DEVICE | Site: MANDIBLE | Status: NON-FUNCTIONAL